# Patient Record
Sex: FEMALE | Race: BLACK OR AFRICAN AMERICAN | NOT HISPANIC OR LATINO | Employment: OTHER | URBAN - METROPOLITAN AREA
[De-identification: names, ages, dates, MRNs, and addresses within clinical notes are randomized per-mention and may not be internally consistent; named-entity substitution may affect disease eponyms.]

---

## 2017-05-02 ENCOUNTER — APPOINTMENT (OUTPATIENT)
Dept: WOMENS IMAGING | Facility: HOSPITAL | Age: 57
End: 2017-05-02

## 2017-05-02 PROCEDURE — 77067 SCR MAMMO BI INCL CAD: CPT | Performed by: RADIOLOGY

## 2017-05-31 DIAGNOSIS — I10 ESSENTIAL HYPERTENSION: ICD-10-CM

## 2017-05-31 DIAGNOSIS — Z00.00 HEALTHCARE MAINTENANCE: Primary | ICD-10-CM

## 2017-06-04 LAB
ALBUMIN SERPL-MCNC: 4.1 G/DL (ref 3.5–5.5)
ALBUMIN/GLOB SERPL: 1.3 {RATIO} (ref 1.2–2.2)
ALP SERPL-CCNC: 91 IU/L (ref 39–117)
ALT SERPL-CCNC: 14 IU/L (ref 0–32)
AST SERPL-CCNC: 14 IU/L (ref 0–40)
BILIRUB SERPL-MCNC: 0.3 MG/DL (ref 0–1.2)
BUN SERPL-MCNC: 9 MG/DL (ref 6–24)
BUN/CREAT SERPL: 11 (ref 9–23)
CALCIUM SERPL-MCNC: 9.7 MG/DL (ref 8.7–10.2)
CHLORIDE SERPL-SCNC: 101 MMOL/L (ref 96–106)
CHOLEST SERPL-MCNC: 242 MG/DL (ref 100–199)
CO2 SERPL-SCNC: 26 MMOL/L (ref 18–29)
CREAT SERPL-MCNC: 0.82 MG/DL (ref 0.57–1)
GLOBULIN SER CALC-MCNC: 3.1 G/DL (ref 1.5–4.5)
GLUCOSE SERPL-MCNC: 98 MG/DL (ref 65–99)
HCV AB S/CO SERPL IA: <0.1 S/CO RATIO (ref 0–0.9)
HDLC SERPL-MCNC: 53 MG/DL
LDLC SERPL CALC-MCNC: 146 MG/DL (ref 0–99)
LDLC/HDLC SERPL: 2.8 RATIO UNITS (ref 0–3.2)
POTASSIUM SERPL-SCNC: 3.8 MMOL/L (ref 3.5–5.2)
PROT SERPL-MCNC: 7.2 G/DL (ref 6–8.5)
SODIUM SERPL-SCNC: 143 MMOL/L (ref 134–144)
TRIGL SERPL-MCNC: 214 MG/DL (ref 0–149)
TSH SERPL DL<=0.005 MIU/L-ACNC: 0.56 UIU/ML (ref 0.45–4.5)
VLDLC SERPL CALC-MCNC: 43 MG/DL (ref 5–40)

## 2017-06-06 ENCOUNTER — OFFICE VISIT (OUTPATIENT)
Dept: INTERNAL MEDICINE | Facility: CLINIC | Age: 57
End: 2017-06-06

## 2017-06-06 VITALS
HEIGHT: 66 IN | WEIGHT: 241 LBS | BODY MASS INDEX: 38.73 KG/M2 | SYSTOLIC BLOOD PRESSURE: 122 MMHG | HEART RATE: 64 BPM | DIASTOLIC BLOOD PRESSURE: 80 MMHG | OXYGEN SATURATION: 98 %

## 2017-06-06 DIAGNOSIS — J30.9 ATOPIC RHINITIS: ICD-10-CM

## 2017-06-06 DIAGNOSIS — E78.5 HYPERLIPIDEMIA, UNSPECIFIED HYPERLIPIDEMIA TYPE: ICD-10-CM

## 2017-06-06 DIAGNOSIS — N32.81 OVERACTIVE BLADDER: ICD-10-CM

## 2017-06-06 DIAGNOSIS — I10 ESSENTIAL HYPERTENSION: Primary | ICD-10-CM

## 2017-06-06 PROCEDURE — 99214 OFFICE O/P EST MOD 30 MIN: CPT | Performed by: INTERNAL MEDICINE

## 2017-06-06 RX ORDER — LISINOPRIL AND HYDROCHLOROTHIAZIDE 20; 12.5 MG/1; MG/1
2 TABLET ORAL DAILY
Qty: 180 TABLET | Refills: 3 | Status: SHIPPED | OUTPATIENT
Start: 2017-06-06 | End: 2018-04-01 | Stop reason: SDUPTHER

## 2017-06-06 RX ORDER — ESTRADIOL 0.05 MG/D
1 FILM, EXTENDED RELEASE TRANSDERMAL 2 TIMES WEEKLY
Refills: 12 | COMMUNITY
Start: 2017-05-02 | End: 2018-07-27

## 2017-06-06 RX ORDER — METOPROLOL SUCCINATE 200 MG/1
200 TABLET, EXTENDED RELEASE ORAL DAILY
Qty: 90 TABLET | Refills: 3 | Status: SHIPPED | OUTPATIENT
Start: 2017-06-06 | End: 2019-03-06 | Stop reason: SDUPTHER

## 2017-06-06 RX ORDER — AMLODIPINE BESYLATE 10 MG/1
10 TABLET ORAL DAILY
Qty: 90 TABLET | Refills: 3 | Status: SHIPPED | OUTPATIENT
Start: 2017-06-06 | End: 2020-02-18

## 2017-06-06 RX ORDER — LOVASTATIN 20 MG/1
20 TABLET ORAL NIGHTLY
Qty: 30 TABLET | Refills: 6 | Status: SHIPPED | OUTPATIENT
Start: 2017-06-06 | End: 2017-12-15

## 2017-06-06 NOTE — PROGRESS NOTES
Subjective   Kirsten Lima is a 56 y.o. female here today to f/u on HTN and hyperlipidemia.  Pt c/o sweating badly.      History of Present Illness   Pt has been taking BP meds as prescribed without any problems.  No HA  No episodes of orthostasis  Pt has been taking cholesterol meds as prescribed.  No difficulties with myalgias.   She is still having some urinary urgency but she is doing ok without and meds right now    The following portions of the patient's history were reviewed and updated as appropriate: allergies, current medications, past medical history, past social history and problem list.  She has   Review of Systems   All other systems reviewed and are negative.      Objective   Physical Exam   Constitutional: She is oriented to person, place, and time. She appears well-developed and well-nourished.   HENT:   Head: Normocephalic and atraumatic.   Right Ear: External ear normal.   Left Ear: External ear normal.   Mouth/Throat: Oropharynx is clear and moist.   Eyes: Conjunctivae and EOM are normal. Pupils are equal, round, and reactive to light.   Neck: Normal range of motion. No tracheal deviation present. No thyromegaly present.   Cardiovascular: Normal rate, regular rhythm, normal heart sounds and intact distal pulses.    Pulmonary/Chest: Effort normal and breath sounds normal.   Abdominal: Soft. Bowel sounds are normal. She exhibits no distension. There is no tenderness.   Musculoskeletal: Normal range of motion. She exhibits no edema or deformity.   Neurological: She is alert and oriented to person, place, and time.   Skin: Skin is warm and dry.   Psychiatric: She has a normal mood and affect. Her behavior is normal. Judgment and thought content normal.   Vitals reviewed.      Vitals:    06/06/17 1610   BP: 122/80   Pulse: 64   SpO2: 98%        Orders Only on 05/31/2017   Component Date Value Ref Range Status   • Hep C Virus Ab 06/03/2017 <0.1  0.0 - 0.9 s/co ratio Final    Comment:                                    Negative:     < 0.8                               Indeterminate: 0.8 - 0.9                                    Positive:     > 0.9   The CDC recommends that a positive HCV antibody result   be followed up with a HCV Nucleic Acid Amplification   test (803584).         Current Outpatient Prescriptions:   •  amLODIPine (NORVASC) 10 MG tablet, Take 1 tablet by mouth Daily., Disp: 90 tablet, Rfl: 3  •  cetirizine (ZyrTEC) 10 MG tablet, Take 1 tablet by mouth daily., Disp: , Rfl:   •  lisinopril-hydrochlorothiazide (PRINZIDE,ZESTORETIC) 20-12.5 MG per tablet, Take 2 tablets by mouth Daily., Disp: 180 tablet, Rfl: 3  •  lovastatin (MEVACOR) 20 MG tablet, Take 1 tablet by mouth every night., Disp: 90 tablet, Rfl: 1  •  metoprolol succinate XL (TOPROL-XL) 200 MG 24 hr tablet, Take 1 tablet by mouth Daily., Disp: 90 tablet, Rfl: 3  •  estradiol (MINIVELLE, VIVELLE-DOT) 0.05 MG/24HR patch, 1 patch 2 (Two) Times a Week., Disp: , Rfl: 12  •  ESTROGEL 0.75 MG/1.25 GM (0.06%) topical gel, 1 application by Other route daily., Disp: , Rfl:   •  meloxicam (MOBIC) 7.5 MG tablet, Take 1 tablet by mouth Daily. Take with food, Disp: 30 tablet, Rfl: 2           Assessment/Plan   Diagnoses and all orders for this visit:    Essential hypertension    Atopic rhinitis    Hyperlipidemia, unspecified hyperlipidemia type    1. HTN-she is doing well with current meds  2. OAB- SHe is doing well now off meds  3.  AR- doing well with zyrtec  4. HPL-  She has been off.  She is going to go back on the and recheck labs at next visit'

## 2017-12-06 ENCOUNTER — RESULTS ENCOUNTER (OUTPATIENT)
Dept: INTERNAL MEDICINE | Facility: CLINIC | Age: 57
End: 2017-12-06

## 2017-12-06 DIAGNOSIS — E78.5 HYPERLIPIDEMIA, UNSPECIFIED HYPERLIPIDEMIA TYPE: ICD-10-CM

## 2017-12-06 DIAGNOSIS — I10 ESSENTIAL HYPERTENSION: ICD-10-CM

## 2017-12-15 ENCOUNTER — OFFICE VISIT (OUTPATIENT)
Dept: INTERNAL MEDICINE | Facility: CLINIC | Age: 57
End: 2017-12-15

## 2017-12-15 VITALS
BODY MASS INDEX: 39.2 KG/M2 | HEART RATE: 67 BPM | SYSTOLIC BLOOD PRESSURE: 122 MMHG | OXYGEN SATURATION: 99 % | HEIGHT: 66 IN | WEIGHT: 243.9 LBS | DIASTOLIC BLOOD PRESSURE: 68 MMHG

## 2017-12-15 DIAGNOSIS — N32.81 OVERACTIVE BLADDER: ICD-10-CM

## 2017-12-15 DIAGNOSIS — I10 ESSENTIAL HYPERTENSION: ICD-10-CM

## 2017-12-15 DIAGNOSIS — F41.9 ANXIETY: Primary | ICD-10-CM

## 2017-12-15 DIAGNOSIS — G47.00 INSOMNIA, UNSPECIFIED TYPE: ICD-10-CM

## 2017-12-15 LAB
ALBUMIN SERPL-MCNC: 3.9 G/DL (ref 3.5–5.5)
ALBUMIN/GLOB SERPL: 1.2 {RATIO} (ref 1.2–2.2)
ALP SERPL-CCNC: 86 IU/L (ref 39–117)
ALT SERPL-CCNC: 12 IU/L (ref 0–32)
AST SERPL-CCNC: 12 IU/L (ref 0–40)
BILIRUB SERPL-MCNC: 0.3 MG/DL (ref 0–1.2)
BUN SERPL-MCNC: 11 MG/DL (ref 6–24)
BUN/CREAT SERPL: 13 (ref 9–23)
CALCIUM SERPL-MCNC: 9.4 MG/DL (ref 8.7–10.2)
CHLORIDE SERPL-SCNC: 102 MMOL/L (ref 96–106)
CHOLEST SERPL-MCNC: 267 MG/DL (ref 100–199)
CO2 SERPL-SCNC: 28 MMOL/L (ref 18–29)
CREAT SERPL-MCNC: 0.84 MG/DL (ref 0.57–1)
GFR SERPLBLD CREATININE-BSD FMLA CKD-EPI: 77 ML/MIN/1.73
GFR SERPLBLD CREATININE-BSD FMLA CKD-EPI: 89 ML/MIN/1.73
GLOBULIN SER CALC-MCNC: 3.2 G/DL (ref 1.5–4.5)
GLUCOSE SERPL-MCNC: 100 MG/DL (ref 65–99)
HDLC SERPL-MCNC: 50 MG/DL
LDLC SERPL CALC-MCNC: 166 MG/DL (ref 0–99)
LDLC/HDLC SERPL: 3.3 RATIO UNITS (ref 0–3.2)
POTASSIUM SERPL-SCNC: 3.9 MMOL/L (ref 3.5–5.2)
PROT SERPL-MCNC: 7.1 G/DL (ref 6–8.5)
SODIUM SERPL-SCNC: 143 MMOL/L (ref 134–144)
TRIGL SERPL-MCNC: 255 MG/DL (ref 0–149)
TSH SERPL DL<=0.005 MIU/L-ACNC: 0.49 UIU/ML (ref 0.45–4.5)
VLDLC SERPL CALC-MCNC: 51 MG/DL (ref 5–40)

## 2017-12-15 PROCEDURE — 99214 OFFICE O/P EST MOD 30 MIN: CPT | Performed by: INTERNAL MEDICINE

## 2017-12-15 RX ORDER — TRAZODONE HYDROCHLORIDE 50 MG/1
50 TABLET ORAL NIGHTLY
Qty: 30 TABLET | Refills: 5 | Status: SHIPPED | OUTPATIENT
Start: 2017-12-15 | End: 2018-07-27

## 2017-12-15 RX ORDER — ATORVASTATIN CALCIUM 10 MG/1
10 TABLET, FILM COATED ORAL DAILY
Qty: 30 TABLET | Refills: 3 | Status: SHIPPED | OUTPATIENT
Start: 2017-12-15 | End: 2018-02-28 | Stop reason: SDUPTHER

## 2017-12-15 NOTE — PROGRESS NOTES
Subjective   Kirsten Lima is a 57 y.o. female who is here to follow up on HTN, HPL, and Insomnia. Pt. states that she is feeling overwhelmed with working, taking care of her mother, dealing with her sister. She has not started her statin yet.     History of Present Illness   Pt has been taking BP meds as prescribed without any problems.  No HA  No episodes of orthostasis  She has tried multiple meds for OAB with no relief.  She has seen the urology for this  She cont to struggle with oab    The following portions of the patient's history were reviewed and updated as appropriate: allergies, current medications, past medical history, past social history and problem list.  She has been under a lot of stress at home   She has not been eating as healthy as she should    Review of Systems   All other systems reviewed and are negative.      Objective   Physical Exam   Constitutional: She is oriented to person, place, and time. She appears well-developed and well-nourished.   HENT:   Head: Normocephalic and atraumatic.   Right Ear: External ear normal.   Left Ear: External ear normal.   Mouth/Throat: Oropharynx is clear and moist.   Eyes: Conjunctivae and EOM are normal. Pupils are equal, round, and reactive to light.   Neck: Normal range of motion. No tracheal deviation present. No thyromegaly present.   Cardiovascular: Normal rate, regular rhythm, normal heart sounds and intact distal pulses.    Pulmonary/Chest: Effort normal and breath sounds normal.   Abdominal: Soft. Bowel sounds are normal. She exhibits no distension. There is no tenderness.   Musculoskeletal: Normal range of motion. She exhibits no edema or deformity.   Neurological: She is alert and oriented to person, place, and time.   Skin: Skin is warm and dry.   Psychiatric: She has a normal mood and affect. Her behavior is normal. Judgment and thought content normal.   Vitals reviewed.    Vitals:    12/15/17 1322   BP: 122/68   Pulse: 67   SpO2: 99%      Results Encounter on 12/06/2017   Component Date Value Ref Range Status   • Glucose 12/14/2017 100* 65 - 99 mg/dL Final   • BUN 12/14/2017 11  6 - 24 mg/dL Final   • Creatinine 12/14/2017 0.84  0.57 - 1.00 mg/dL Final   • eGFR Non  Am 12/14/2017 77  >59 mL/min/1.73 Final   • eGFR African Am 12/14/2017 89  >59 mL/min/1.73 Final   • BUN/Creatinine Ratio 12/14/2017 13  9 - 23 Final   • Sodium 12/14/2017 143  134 - 144 mmol/L Final   • Potassium 12/14/2017 3.9  3.5 - 5.2 mmol/L Final   • Chloride 12/14/2017 102  96 - 106 mmol/L Final   • Total CO2 12/14/2017 28  18 - 29 mmol/L Final   • Calcium 12/14/2017 9.4  8.7 - 10.2 mg/dL Final   • Total Protein 12/14/2017 7.1  6.0 - 8.5 g/dL Final   • Albumin 12/14/2017 3.9  3.5 - 5.5 g/dL Final   • Globulin 12/14/2017 3.2  1.5 - 4.5 g/dL Final   • A/G Ratio 12/14/2017 1.2  1.2 - 2.2 Final   • Total Bilirubin 12/14/2017 0.3  0.0 - 1.2 mg/dL Final   • Alkaline Phosphatase 12/14/2017 86  39 - 117 IU/L Final   • AST (SGOT) 12/14/2017 12  0 - 40 IU/L Final   • ALT (SGPT) 12/14/2017 12  0 - 32 IU/L Final   • Total Cholesterol 12/14/2017 267* 100 - 199 mg/dL Final   • Triglycerides 12/14/2017 255* 0 - 149 mg/dL Final   • HDL Cholesterol 12/14/2017 50  >39 mg/dL Final   • VLDL Cholesterol 12/14/2017 51* 5 - 40 mg/dL Final   • LDL Cholesterol  12/14/2017 166* 0 - 99 mg/dL Final   • LDL/HDL Ratio 12/14/2017 3.3* 0.0 - 3.2 ratio units Final    Comment:                                     LDL/HDL Ratio                                              Men  Women                                1/2 Avg.Risk  1.0    1.5                                    Avg.Risk  3.6    3.2                                 2X Avg.Risk  6.2    5.0                                 3X Avg.Risk  8.0    6.1     • TSH 12/14/2017 0.486  0.450 - 4.500 uIU/mL Final     Current Outpatient Prescriptions:   •  amLODIPine (NORVASC) 10 MG tablet, Take 1 tablet by mouth Daily., Disp: 90 tablet, Rfl: 3  •  cetirizine  (ZyrTEC) 10 MG tablet, Take 1 tablet by mouth daily., Disp: , Rfl:   •  estradiol (MINIVELLE, VIVELLE-DOT) 0.05 MG/24HR patch, 1 patch 2 (Two) Times a Week., Disp: , Rfl: 12  •  ESTROGEL 0.75 MG/1.25 GM (0.06%) topical gel, 1 application by Other route daily., Disp: , Rfl:   •  lisinopril-hydrochlorothiazide (PRINZIDE,ZESTORETIC) 20-12.5 MG per tablet, Take 2 tablets by mouth Daily., Disp: 180 tablet, Rfl: 3  •  meloxicam (MOBIC) 7.5 MG tablet, Take 1 tablet by mouth Daily. Take with food, Disp: 30 tablet, Rfl: 2  •  metoprolol succinate XL (TOPROL-XL) 200 MG 24 hr tablet, Take 1 tablet by mouth Daily., Disp: 90 tablet, Rfl: 3     Assessment/Plan   Kirsten was seen today for hypertension and insomnia.    Diagnoses and all orders for this visit:    Anxiety    Insomnia, unspecified type    Overactive bladder    1. Anxiety- she is under a lot of stress caring for her aging mother  We are going to work on her sleep and may consider an SSRI  2. Insomnia-  She thinks she has been tested in the past.  We will try trazodone. Start with 1/2 tab and follow  3. OAB- she has tried several meds and seen urology   4. HTN- she is doing well with current meds

## 2018-02-15 ENCOUNTER — RESULTS ENCOUNTER (OUTPATIENT)
Dept: INTERNAL MEDICINE | Facility: CLINIC | Age: 58
End: 2018-02-15

## 2018-02-15 DIAGNOSIS — I10 ESSENTIAL HYPERTENSION: ICD-10-CM

## 2018-02-27 ENCOUNTER — OFFICE VISIT (OUTPATIENT)
Dept: INTERNAL MEDICINE | Facility: CLINIC | Age: 58
End: 2018-02-27

## 2018-02-27 VITALS
OXYGEN SATURATION: 98 % | HEART RATE: 76 BPM | DIASTOLIC BLOOD PRESSURE: 76 MMHG | WEIGHT: 240.19 LBS | BODY MASS INDEX: 38.6 KG/M2 | HEIGHT: 66 IN | SYSTOLIC BLOOD PRESSURE: 138 MMHG

## 2018-02-27 DIAGNOSIS — M79.674 PAIN OF TOE OF RIGHT FOOT: Primary | ICD-10-CM

## 2018-02-27 PROCEDURE — 99213 OFFICE O/P EST LOW 20 MIN: CPT | Performed by: NURSE PRACTITIONER

## 2018-02-27 RX ORDER — METHYLPREDNISOLONE 4 MG/1
TABLET ORAL
Qty: 21 TABLET | Refills: 0 | Status: SHIPPED | OUTPATIENT
Start: 2018-02-27 | End: 2018-07-01

## 2018-02-28 ENCOUNTER — OFFICE VISIT (OUTPATIENT)
Dept: INTERNAL MEDICINE | Facility: CLINIC | Age: 58
End: 2018-02-28

## 2018-02-28 ENCOUNTER — HOSPITAL ENCOUNTER (OUTPATIENT)
Dept: GENERAL RADIOLOGY | Facility: HOSPITAL | Age: 58
Discharge: HOME | End: 2018-02-28
Admitting: INTERNAL MEDICINE

## 2018-02-28 VITALS
HEIGHT: 66 IN | SYSTOLIC BLOOD PRESSURE: 132 MMHG | TEMPERATURE: 98 F | DIASTOLIC BLOOD PRESSURE: 60 MMHG | OXYGEN SATURATION: 98 % | WEIGHT: 240 LBS | BODY MASS INDEX: 38.57 KG/M2 | HEART RATE: 64 BPM

## 2018-02-28 DIAGNOSIS — R79.89 ABNORMAL THYROID STIMULATING HORMONE (TSH) LEVEL: ICD-10-CM

## 2018-02-28 DIAGNOSIS — M79.671 RIGHT FOOT PAIN: ICD-10-CM

## 2018-02-28 DIAGNOSIS — I10 ESSENTIAL HYPERTENSION: Primary | ICD-10-CM

## 2018-02-28 LAB — URATE SERPL-MCNC: 4.9 MG/DL (ref 2.4–5.7)

## 2018-02-28 PROCEDURE — 73630 X-RAY EXAM OF FOOT: CPT

## 2018-02-28 PROCEDURE — 99214 OFFICE O/P EST MOD 30 MIN: CPT | Performed by: INTERNAL MEDICINE

## 2018-02-28 RX ORDER — ATORVASTATIN CALCIUM 10 MG/1
10 TABLET, FILM COATED ORAL DAILY
Qty: 90 TABLET | Refills: 3 | Status: SHIPPED | OUTPATIENT
Start: 2018-02-28 | End: 2019-01-15

## 2018-02-28 NOTE — PROGRESS NOTES
Subjective   Kirsten Lima is a 57 y.o. female. Patient is here to follow up on blood pressure and cholesterol.     History of Present Illness   She has been having pain in the left foot 1st MCP  Pain for a few days  Some better today after taking 1 meloxicam  Pt has been taking BP meds as prescribed without any problems.  No HA  No episodes of orthostasis  Pt has been taking cholesterol meds as prescribed.  No difficulties with myalgias.     The following portions of the patient's history were reviewed and updated as appropriate: allergies, current medications, past medical history, past social history and problem list.  Recent death of mother    Review of Systems   All other systems reviewed and are negative.      Objective   Physical Exam   Constitutional: She is oriented to person, place, and time. She appears well-developed and well-nourished.   HENT:   Head: Normocephalic and atraumatic.   Right Ear: External ear normal.   Left Ear: External ear normal.   Mouth/Throat: Oropharynx is clear and moist.   Eyes: Conjunctivae and EOM are normal. Pupils are equal, round, and reactive to light.   Neck: Normal range of motion. No tracheal deviation present. No thyromegaly present.   Cardiovascular: Normal rate, regular rhythm, normal heart sounds and intact distal pulses.    Pulmonary/Chest: Effort normal and breath sounds normal.   Abdominal: Soft. Bowel sounds are normal. She exhibits no distension. There is no tenderness.   Musculoskeletal: Normal range of motion. She exhibits no edema or deformity.   Neurological: She is alert and oriented to person, place, and time.   Skin: Skin is warm and dry.   Psychiatric: She has a normal mood and affect. Her behavior is normal. Judgment and thought content normal.   Vitals reviewed.      Assessment/Plan   Kirsten was seen today for hypertension and hyperlipidemia.    Diagnoses and all orders for this visit:    Essential hypertension  -     Comprehensive Metabolic Panel  -      LP+LDL / HDL Ratio ([facility])  -     CBC & Differential  -     TSH Rfx On Abnormal To Free T4  -     XR Foot 3+ View Right    Abnormal thyroid stimulating hormone (TSH) level  -     Comprehensive Metabolic Panel  -     LP+LDL / HDL Ratio ([facility])  -     CBC & Differential  -     TSH Rfx On Abnormal To Free T4  -     XR Foot 3+ View Right    Right foot pain  -     XR Foot 3+ View Right      1.  HTN-  She isdoing well with current meds  2.  HPL- doing well with lipitor  3. Right foot pain  I will check an xray and refer to podiatrist if needed and she will cont the streroids  4. Abnormal TSH-  Recheck level  5. Insomnia-  She is sleeping better as the stress is better

## 2018-03-02 LAB
ALBUMIN SERPL-MCNC: 3.9 G/DL (ref 3.5–5.5)
ALBUMIN/GLOB SERPL: 1.2 {RATIO} (ref 1.2–2.2)
ALP SERPL-CCNC: 87 IU/L (ref 39–117)
ALT SERPL-CCNC: 10 IU/L (ref 0–32)
AST SERPL-CCNC: 14 IU/L (ref 0–40)
BASOPHILS # BLD AUTO: 0 X10E3/UL (ref 0–0.2)
BASOPHILS NFR BLD AUTO: 0 %
BILIRUB SERPL-MCNC: 0.2 MG/DL (ref 0–1.2)
BUN SERPL-MCNC: 13 MG/DL (ref 6–24)
BUN/CREAT SERPL: 15 (ref 9–23)
CALCIUM SERPL-MCNC: 9.5 MG/DL (ref 8.7–10.2)
CHLORIDE SERPL-SCNC: 100 MMOL/L (ref 96–106)
CHOLEST SERPL-MCNC: 208 MG/DL (ref 100–199)
CO2 SERPL-SCNC: 27 MMOL/L (ref 18–29)
CREAT SERPL-MCNC: 0.86 MG/DL (ref 0.57–1)
EOSINOPHIL # BLD AUTO: 0 X10E3/UL (ref 0–0.4)
EOSINOPHIL NFR BLD AUTO: 0 %
ERYTHROCYTE [DISTWIDTH] IN BLOOD BY AUTOMATED COUNT: 14.7 % (ref 12.3–15.4)
GFR SERPLBLD CREATININE-BSD FMLA CKD-EPI: 75 ML/MIN/1.73
GFR SERPLBLD CREATININE-BSD FMLA CKD-EPI: 87 ML/MIN/1.73
GLOBULIN SER CALC-MCNC: 3.2 G/DL (ref 1.5–4.5)
GLUCOSE SERPL-MCNC: 112 MG/DL (ref 65–99)
HCT VFR BLD AUTO: 42 % (ref 34–46.6)
HDLC SERPL-MCNC: 65 MG/DL
HGB BLD-MCNC: 14 G/DL (ref 11.1–15.9)
IMM GRANULOCYTES # BLD: 0 X10E3/UL (ref 0–0.1)
IMM GRANULOCYTES NFR BLD: 0 %
LDLC SERPL CALC-MCNC: 119 MG/DL (ref 0–99)
LDLC/HDLC SERPL: 1.8 RATIO UNITS (ref 0–3.2)
LYMPHOCYTES # BLD AUTO: 1.7 X10E3/UL (ref 0.7–3.1)
LYMPHOCYTES NFR BLD AUTO: 13 %
Lab: NORMAL
MCH RBC QN AUTO: 28.1 PG (ref 26.6–33)
MCHC RBC AUTO-ENTMCNC: 33.3 G/DL (ref 31.5–35.7)
MCV RBC AUTO: 84 FL (ref 79–97)
MONOCYTES # BLD AUTO: 0.5 X10E3/UL (ref 0.1–0.9)
MONOCYTES NFR BLD AUTO: 4 %
NEUTROPHILS # BLD AUTO: 10.7 X10E3/UL (ref 1.4–7)
NEUTROPHILS NFR BLD AUTO: 83 %
PLATELET # BLD AUTO: 283 X10E3/UL (ref 150–379)
POTASSIUM SERPL-SCNC: 4 MMOL/L (ref 3.5–5.2)
PROT SERPL-MCNC: 7.1 G/DL (ref 6–8.5)
RBC # BLD AUTO: 4.98 X10E6/UL (ref 3.77–5.28)
SODIUM SERPL-SCNC: 142 MMOL/L (ref 134–144)
TRIGL SERPL-MCNC: 119 MG/DL (ref 0–149)
TSH SERPL DL<=0.005 MIU/L-ACNC: 0.56 UIU/ML (ref 0.45–4.5)
VLDLC SERPL CALC-MCNC: 24 MG/DL (ref 5–40)
WBC # BLD AUTO: 13.1 X10E3/UL (ref 3.4–10.8)

## 2018-03-22 DIAGNOSIS — I10 ESSENTIAL HYPERTENSION: ICD-10-CM

## 2018-03-22 RX ORDER — METOPROLOL SUCCINATE 200 MG/1
TABLET, EXTENDED RELEASE ORAL
Qty: 90 TABLET | Refills: 3 | Status: SHIPPED | OUTPATIENT
Start: 2018-03-22 | End: 2018-07-27 | Stop reason: SDUPTHER

## 2018-03-22 RX ORDER — AMLODIPINE BESYLATE 10 MG/1
TABLET ORAL
Qty: 90 TABLET | Refills: 3 | Status: SHIPPED | OUTPATIENT
Start: 2018-03-22 | End: 2018-07-27 | Stop reason: SDUPTHER

## 2018-04-01 DIAGNOSIS — I10 ESSENTIAL HYPERTENSION: ICD-10-CM

## 2018-04-02 RX ORDER — LISINOPRIL AND HYDROCHLOROTHIAZIDE 20; 12.5 MG/1; MG/1
TABLET ORAL
Qty: 180 TABLET | Refills: 3 | Status: SHIPPED | OUTPATIENT
Start: 2018-04-02 | End: 2018-11-08 | Stop reason: SDUPTHER

## 2018-05-15 ENCOUNTER — APPOINTMENT (OUTPATIENT)
Dept: WOMENS IMAGING | Facility: HOSPITAL | Age: 58
End: 2018-05-15

## 2018-05-15 PROCEDURE — 77067 SCR MAMMO BI INCL CAD: CPT | Performed by: RADIOLOGY

## 2018-05-15 PROCEDURE — 77063 BREAST TOMOSYNTHESIS BI: CPT | Performed by: RADIOLOGY

## 2018-07-11 ENCOUNTER — TELEPHONE (OUTPATIENT)
Dept: INTERNAL MEDICINE | Facility: CLINIC | Age: 58
End: 2018-07-11

## 2018-07-11 DIAGNOSIS — I10 ESSENTIAL HYPERTENSION: Primary | ICD-10-CM

## 2018-07-11 DIAGNOSIS — R79.89 ABNORMAL TSH: ICD-10-CM

## 2018-07-11 DIAGNOSIS — E78.5 HYPERLIPIDEMIA, UNSPECIFIED HYPERLIPIDEMIA TYPE: ICD-10-CM

## 2018-07-11 NOTE — TELEPHONE ENCOUNTER
LABS ORDERED AND MAIL TO PT    ----- Message from Verenice Valdez MD sent at 7/11/2018  1:39 PM EDT -----  cmp flp cbc tsh  ----- Message -----  From: Cecy Castro MA  Sent: 7/11/2018  12:52 PM  To: Verenice Valdez MD    PLEASE ADVISE ON LABS  ----- Message -----  From: Kendra Castelan  Sent: 7/11/2018  12:45 PM  To: Cecy Castro MA    Pt has appt with Dr Valdez on 8/28. She wants to get her labs done at an off site lab [facility]. Need an order put in and then mail her order to her.

## 2018-07-27 ENCOUNTER — OFFICE VISIT (OUTPATIENT)
Dept: INTERNAL MEDICINE | Facility: CLINIC | Age: 58
End: 2018-07-27

## 2018-07-27 VITALS
HEART RATE: 72 BPM | SYSTOLIC BLOOD PRESSURE: 134 MMHG | DIASTOLIC BLOOD PRESSURE: 96 MMHG | BODY MASS INDEX: 40.11 KG/M2 | HEIGHT: 66 IN | WEIGHT: 249.6 LBS | OXYGEN SATURATION: 97 % | TEMPERATURE: 98.8 F

## 2018-07-27 DIAGNOSIS — M75.82 TENDINITIS OF LEFT ROTATOR CUFF: Primary | ICD-10-CM

## 2018-07-27 LAB
ALBUMIN SERPL-MCNC: 4.4 G/DL (ref 3.5–5.2)
ALBUMIN/GLOB SERPL: 1.6 G/DL
ALP SERPL-CCNC: 99 U/L (ref 39–117)
ALT SERPL-CCNC: 15 U/L (ref 1–33)
AST SERPL-CCNC: 10 U/L (ref 1–32)
BASOPHILS # BLD AUTO: 0.03 10*3/MM3 (ref 0–0.2)
BASOPHILS NFR BLD AUTO: 0.4 % (ref 0–1.5)
BILIRUB SERPL-MCNC: 0.4 MG/DL (ref 0.1–1.2)
BUN SERPL-MCNC: 8 MG/DL (ref 6–20)
BUN/CREAT SERPL: 10 (ref 7–25)
CALCIUM SERPL-MCNC: 9.1 MG/DL (ref 8.6–10.5)
CHLORIDE SERPL-SCNC: 101 MMOL/L (ref 98–107)
CHOLEST SERPL-MCNC: 210 MG/DL (ref 0–200)
CO2 SERPL-SCNC: 27.9 MMOL/L (ref 22–29)
CREAT SERPL-MCNC: 0.8 MG/DL (ref 0.57–1)
EOSINOPHIL # BLD AUTO: 0.11 10*3/MM3 (ref 0–0.7)
EOSINOPHIL NFR BLD AUTO: 1.4 % (ref 0.3–6.2)
ERYTHROCYTE [DISTWIDTH] IN BLOOD BY AUTOMATED COUNT: 13.9 % (ref 11.7–13)
GLOBULIN SER CALC-MCNC: 2.8 GM/DL
GLUCOSE SERPL-MCNC: 99 MG/DL (ref 65–99)
HCT VFR BLD AUTO: 44.2 % (ref 35.6–45.5)
HDLC SERPL-MCNC: 54 MG/DL (ref 40–60)
HGB BLD-MCNC: 14.6 G/DL (ref 11.9–15.5)
IMM GRANULOCYTES # BLD: 0.03 10*3/MM3 (ref 0–0.03)
IMM GRANULOCYTES NFR BLD: 0.4 % (ref 0–0.5)
LDLC SERPL CALC-MCNC: 119 MG/DL (ref 0–100)
LDLC/HDLC SERPL: 2.21 {RATIO}
LYMPHOCYTES # BLD AUTO: 2.56 10*3/MM3 (ref 0.9–4.8)
LYMPHOCYTES NFR BLD AUTO: 33.2 % (ref 19.6–45.3)
MCH RBC QN AUTO: 28.8 PG (ref 26.9–32)
MCHC RBC AUTO-ENTMCNC: 33 G/DL (ref 32.4–36.3)
MCV RBC AUTO: 87.2 FL (ref 80.5–98.2)
MONOCYTES # BLD AUTO: 0.64 10*3/MM3 (ref 0.2–1.2)
MONOCYTES NFR BLD AUTO: 8.3 % (ref 5–12)
NEUTROPHILS # BLD AUTO: 4.37 10*3/MM3 (ref 1.9–8.1)
NEUTROPHILS NFR BLD AUTO: 56.7 % (ref 42.7–76)
PLATELET # BLD AUTO: 247 10*3/MM3 (ref 140–500)
POTASSIUM SERPL-SCNC: 3.6 MMOL/L (ref 3.5–5.2)
PROT SERPL-MCNC: 7.2 G/DL (ref 6–8.5)
RBC # BLD AUTO: 5.07 10*6/MM3 (ref 3.9–5.2)
SODIUM SERPL-SCNC: 143 MMOL/L (ref 136–145)
TRIGL SERPL-MCNC: 183 MG/DL (ref 0–150)
TSH SERPL DL<=0.005 MIU/L-ACNC: 0.7 MIU/ML (ref 0.27–4.2)
VLDLC SERPL CALC-MCNC: 36.6 MG/DL (ref 5–40)
WBC # BLD AUTO: 7.71 10*3/MM3 (ref 4.5–10.7)

## 2018-07-27 PROCEDURE — 99213 OFFICE O/P EST LOW 20 MIN: CPT | Performed by: INTERNAL MEDICINE

## 2018-07-27 NOTE — PROGRESS NOTES
Subjective   Kirsten Lima is a 58 y.o. female here to follow up on shoulder pain and burned.    History of Present Illness   Left rotator cuff tendonitis.  Painful to lift arm  Some pain between shoulder blades  Now doing all of her work on the right.  No trauma  Woke up Monday like this  No privious problems with this     The following portions of the patient's history were reviewed and updated as appropriate: allergies, current medications, past medical history, past social history and problem list.    Review of Systems   Musculoskeletal:        Left shoulder pain         Objective   Physical Exam   Constitutional: She is oriented to person, place, and time. She appears well-developed and well-nourished.   HENT:   Head: Normocephalic and atraumatic.   Right Ear: External ear normal.   Left Ear: External ear normal.   Mouth/Throat: Oropharynx is clear and moist.   Eyes: Pupils are equal, round, and reactive to light. Conjunctivae and EOM are normal.   Neck: Normal range of motion. No tracheal deviation present. No thyromegaly present.   Cardiovascular: Normal rate, regular rhythm, normal heart sounds and intact distal pulses.    Pulmonary/Chest: Effort normal and breath sounds normal.   Musculoskeletal: Normal range of motion. She exhibits no edema or deformity.   Decrease ROM left shoulder     Neurological: She is alert and oriented to person, place, and time.   Skin: Skin is warm and dry.   Psychiatric: She has a normal mood and affect. Her behavior is normal. Judgment and thought content normal.   Vitals reviewed.    Vitals:    07/27/18 0801   BP: 134/96   Pulse: 72   Temp: 98.8 °F (37.1 °C)   SpO2: 97%     Current Outpatient Prescriptions:   •  amLODIPine (NORVASC) 10 MG tablet, Take 1 tablet by mouth Daily., Disp: 90 tablet, Rfl: 3  •  atorvastatin (LIPITOR) 10 MG tablet, Take 1 tablet by mouth Daily., Disp: 90 tablet, Rfl: 3  •  cetirizine (ZyrTEC) 10 MG tablet, Take 1 tablet by mouth daily., Disp: , Rfl:   •   lisinopril-hydrochlorothiazide (PRINZIDE,ZESTORETIC) 20-12.5 MG per tablet, TAKE 2 TABLETS DAILY, Disp: 180 tablet, Rfl: 3  •  metoprolol succinate XL (TOPROL-XL) 200 MG 24 hr tablet, Take 1 tablet by mouth Daily., Disp: 90 tablet, Rfl: 3  •  diclofenac (VOLTAREN) 50 MG EC tablet, Take 1 tablet by mouth 3 (Three) Times a Day. Fu with food, Disp: 30 tablet, Rfl: 1      Assessment/Plan   Diagnoses and all orders for this visit:    Tendinitis of left rotator cuff  -     Ambulatory Referral to Orthopedic Surgery    Other orders  -     diclofenac (VOLTAREN) 50 MG EC tablet; Take 1 tablet by mouth 3 (Three) Times a Day. Fu with food    1. Left shoulder pain-  I have rec referral ortho  She is too inflamed at present to do PT  SHe will also try diclofena

## 2018-08-30 ENCOUNTER — OFFICE VISIT (OUTPATIENT)
Dept: INTERNAL MEDICINE | Facility: CLINIC | Age: 58
End: 2018-08-30

## 2018-08-30 VITALS
HEIGHT: 66 IN | WEIGHT: 255.1 LBS | TEMPERATURE: 98.5 F | BODY MASS INDEX: 41 KG/M2 | DIASTOLIC BLOOD PRESSURE: 90 MMHG | HEART RATE: 71 BPM | SYSTOLIC BLOOD PRESSURE: 134 MMHG | OXYGEN SATURATION: 99 %

## 2018-08-30 DIAGNOSIS — N64.3 GALACTORRHEA: ICD-10-CM

## 2018-08-30 DIAGNOSIS — M79.10 MUSCLE ACHE: ICD-10-CM

## 2018-08-30 DIAGNOSIS — E55.9 VITAMIN D DEFICIENCY: ICD-10-CM

## 2018-08-30 DIAGNOSIS — I10 ESSENTIAL HYPERTENSION: Primary | ICD-10-CM

## 2018-08-30 PROCEDURE — 99214 OFFICE O/P EST MOD 30 MIN: CPT | Performed by: INTERNAL MEDICINE

## 2018-08-30 NOTE — PROGRESS NOTES
Subjective   Kirsten Lima is a 58 y.o. female here to follow up on HTN.  Pt complains of legs pain and numbness, couldn't stand for long.    History of Present Illness   She has been having bilat leg pain worse after standing a lot  Numbness into heals.  She is stiff in the am  She has been having some in lbp.  Both legs.    She denies any radicular sx  No weakness.  Pain is also worse when she first gets up then improves a little when she moves around  Pt has been taking BP meds as prescribed without any problems.  No HA  No episodes of orthostasis      The following portions of the patient's history were reviewed and updated as appropriate: allergies, past family history, past medical history, past social history and problem list.  She is eating a lot of sugar  No reg exercise    Review of Systems   All other systems reviewed and are negative.      Objective   Physical Exam   Constitutional: She is oriented to person, place, and time. She appears well-developed and well-nourished.   HENT:   Head: Normocephalic and atraumatic.   Right Ear: External ear normal.   Left Ear: External ear normal.   Mouth/Throat: Oropharynx is clear and moist.   Eyes: Pupils are equal, round, and reactive to light. Conjunctivae and EOM are normal.   Neck: Normal range of motion. No tracheal deviation present. No thyromegaly present.   Cardiovascular: Normal rate, regular rhythm, normal heart sounds and intact distal pulses.    Pulmonary/Chest: Effort normal and breath sounds normal.   Abdominal: Soft. Bowel sounds are normal. She exhibits no distension. There is no tenderness.   Musculoskeletal: Normal range of motion. She exhibits no edema or deformity.   Neurological: She is alert and oriented to person, place, and time.   Skin: Skin is warm and dry.   Psychiatric: She has a normal mood and affect. Her behavior is normal. Judgment and thought content normal.   Vitals reviewed.      Vitals:    08/30/18 1512   BP: 134/90   Pulse: 71    Temp: 98.5 °F (36.9 °C)   SpO2: 99%     Current Outpatient Prescriptions:   •  amLODIPine (NORVASC) 10 MG tablet, Take 1 tablet by mouth Daily., Disp: 90 tablet, Rfl: 3  •  atorvastatin (LIPITOR) 10 MG tablet, Take 1 tablet by mouth Daily., Disp: 90 tablet, Rfl: 3  •  cetirizine (ZyrTEC) 10 MG tablet, Take 1 tablet by mouth daily., Disp: , Rfl:   •  diclofenac (VOLTAREN) 50 MG EC tablet, Take 1 tablet by mouth 3 (Three) Times a Day. Fu with food, Disp: 30 tablet, Rfl: 1  •  lisinopril-hydrochlorothiazide (PRINZIDE,ZESTORETIC) 20-12.5 MG per tablet, TAKE 2 TABLETS DAILY, Disp: 180 tablet, Rfl: 3  •  metoprolol succinate XL (TOPROL-XL) 200 MG 24 hr tablet, Take 1 tablet by mouth Daily., Disp: 90 tablet, Rfl: 3         Assessment/Plan   Diagnoses and all orders for this visit:    Essential hypertension  -     Sedimentation rate, automated  -     CK  -     Vitamin D 25 Hydroxy    Muscle ache  -     Sedimentation rate, automated  -     CK  -     Vitamin D 25 Hydroxy    Vitamin D deficiency  -     Sedimentation rate, automated  -     CK  -     Vitamin D 25 Hydroxy    Galactorrhea  -     Prolactin    1. HTN- ok with current meds  2. Myalgias- she is going to check some labs today  She will try magnesium  3. Vit D-  She did take a loading dose in the past  We will recheck today and then may reload  4. galactorhea-  Seeing gyn  Needs labs done per gyn  5.  HPL-  Ok but I am concerned about this causing the muscle aches  She is to stop the statins

## 2018-09-01 LAB
25(OH)D3+25(OH)D2 SERPL-MCNC: 13.8 NG/ML (ref 30–100)
CK SERPL-CCNC: 97 U/L (ref 24–173)
ERYTHROCYTE [SEDIMENTATION RATE] IN BLOOD BY WESTERGREN METHOD: 35 MM/HR (ref 0–40)
PROLACTIN SERPL-MCNC: 10.3 NG/ML (ref 4.8–23.3)

## 2018-09-05 RX ORDER — ERGOCALCIFEROL 1.25 MG/1
50000 CAPSULE ORAL WEEKLY
Qty: 8 CAPSULE | Refills: 0 | Status: SHIPPED | OUTPATIENT
Start: 2018-09-05 | End: 2018-10-25

## 2018-10-11 ENCOUNTER — OFFICE VISIT (OUTPATIENT)
Dept: INTERNAL MEDICINE | Facility: CLINIC | Age: 58
End: 2018-10-11

## 2018-10-11 VITALS
WEIGHT: 255.2 LBS | DIASTOLIC BLOOD PRESSURE: 68 MMHG | OXYGEN SATURATION: 98 % | SYSTOLIC BLOOD PRESSURE: 130 MMHG | HEART RATE: 69 BPM | BODY MASS INDEX: 41.01 KG/M2 | HEIGHT: 66 IN | TEMPERATURE: 99 F

## 2018-10-11 DIAGNOSIS — M79.10 MYALGIA: Primary | ICD-10-CM

## 2018-10-11 DIAGNOSIS — E55.9 VITAMIN D DEFICIENCY: ICD-10-CM

## 2018-10-11 DIAGNOSIS — I10 ESSENTIAL HYPERTENSION: ICD-10-CM

## 2018-10-11 PROCEDURE — 99213 OFFICE O/P EST LOW 20 MIN: CPT | Performed by: INTERNAL MEDICINE

## 2018-10-11 RX ORDER — TRAZODONE HYDROCHLORIDE 50 MG/1
50 TABLET ORAL NIGHTLY
Refills: 5 | COMMUNITY
Start: 2018-10-03 | End: 2018-11-26 | Stop reason: SDUPTHER

## 2018-10-11 NOTE — PROGRESS NOTES
Subjective    Kirsten Lima is a 58 y.o. female here to follow up on myalgias      History of Present Illness   She says she had not been doing well but now she is feeling better and plans to go walking.  She would like to see a chiro    The following portions of the patient's history were reviewed and updated as appropriate: allergies, current medications, past medical history, past social history and problem list.  She is starting to exercise reg  Review of Systems   All other systems reviewed and are negative.      Objective   Physical Exam   Constitutional: She is oriented to person, place, and time. She appears well-developed and well-nourished.   HENT:   Head: Normocephalic and atraumatic.   Right Ear: External ear normal.   Left Ear: External ear normal.   Mouth/Throat: Oropharynx is clear and moist.   Eyes: Pupils are equal, round, and reactive to light. Conjunctivae and EOM are normal.   Neck: Normal range of motion. No tracheal deviation present. No thyromegaly present.   Cardiovascular: Normal rate, regular rhythm, normal heart sounds and intact distal pulses.    Pulmonary/Chest: Effort normal and breath sounds normal.   Abdominal: Soft. Bowel sounds are normal. She exhibits no distension. There is no tenderness.   Musculoskeletal: Normal range of motion. She exhibits no edema or deformity.   Neurological: She is alert and oriented to person, place, and time.   Skin: Skin is warm and dry.   Psychiatric: She has a normal mood and affect. Her behavior is normal. Judgment and thought content normal.   Vitals reviewed.      Vitals:    10/11/18 1514   BP: 130/68   Pulse: 69   Temp: 99 °F (37.2 °C)   SpO2: 98%      Current Outpatient Prescriptions:   •  amLODIPine (NORVASC) 10 MG tablet, Take 1 tablet by mouth Daily., Disp: 90 tablet, Rfl: 3  •  cetirizine (ZyrTEC) 10 MG tablet, Take 1 tablet by mouth daily., Disp: , Rfl:   •  lisinopril-hydrochlorothiazide (PRINZIDE,ZESTORETIC) 20-12.5 MG per tablet, TAKE 2  TABLETS DAILY, Disp: 180 tablet, Rfl: 3  •  metoprolol succinate XL (TOPROL-XL) 200 MG 24 hr tablet, Take 1 tablet by mouth Daily., Disp: 90 tablet, Rfl: 3  •  traZODone (DESYREL) 50 MG tablet, Take 50 mg by mouth Every Night., Disp: , Rfl: 5  •  vitamin D (ERGOCALCIFEROL) 66637 units capsule capsule, Take 1 capsule by mouth 1 (One) Time Per Week for 8 doses., Disp: 8 capsule, Rfl: 0  •  atorvastatin (LIPITOR) 10 MG tablet, Take 1 tablet by mouth Daily., Disp: 90 tablet, Rfl: 3      Assessment/Plan   Diagnoses and all orders for this visit:    Myalgia  -     Vitamin D 25 Hydroxy; Future  -     Comprehensive Metabolic Panel; Future  -     LP+LDL / HDL Ratio ([facility]); Future  -     TSH Rfx On Abnormal To Free T4; Future    Essential hypertension  -     Vitamin D 25 Hydroxy; Future  -     Comprehensive Metabolic Panel; Future  -     LP+LDL / HDL Ratio ([facility]); Future  -     TSH Rfx On Abnormal To Free T4; Future    Vitamin D deficiency  -     Vitamin D 25 Hydroxy; Future  -     Comprehensive Metabolic Panel; Future  -     LP+LDL / HDL Ratio ([facility]); Future  -     TSH Rfx On Abnormal To Free T4; Future      1. Vit d def- she will complete the loading and then will take otc 200 a day and perhaps that is helping with muscle  2.  HTN-  She is doing well with labs  3. Myalgias- better cont mag and vit D

## 2018-11-08 DIAGNOSIS — I10 ESSENTIAL HYPERTENSION: ICD-10-CM

## 2018-11-08 RX ORDER — LISINOPRIL AND HYDROCHLOROTHIAZIDE 20; 12.5 MG/1; MG/1
TABLET ORAL
Qty: 180 TABLET | Refills: 1 | Status: SHIPPED | OUTPATIENT
Start: 2018-11-08 | End: 2019-04-17 | Stop reason: SDUPTHER

## 2018-11-26 RX ORDER — TRAZODONE HYDROCHLORIDE 50 MG/1
50 TABLET ORAL NIGHTLY
Qty: 90 TABLET | Refills: 1 | Status: SHIPPED | OUTPATIENT
Start: 2018-11-26 | End: 2019-01-15 | Stop reason: SDUPTHER

## 2018-12-04 ENCOUNTER — OFFICE VISIT (OUTPATIENT)
Dept: INTERNAL MEDICINE | Facility: CLINIC | Age: 58
End: 2018-12-04

## 2018-12-04 VITALS
OXYGEN SATURATION: 98 % | BODY MASS INDEX: 40.84 KG/M2 | HEIGHT: 66 IN | WEIGHT: 254.1 LBS | TEMPERATURE: 98.9 F | HEART RATE: 81 BPM | SYSTOLIC BLOOD PRESSURE: 140 MMHG | DIASTOLIC BLOOD PRESSURE: 82 MMHG

## 2018-12-04 DIAGNOSIS — B35.1 NAIL FUNGUS: Primary | ICD-10-CM

## 2018-12-04 PROCEDURE — 99213 OFFICE O/P EST LOW 20 MIN: CPT | Performed by: NURSE PRACTITIONER

## 2018-12-04 RX ORDER — TERBINAFINE HYDROCHLORIDE 250 MG/1
250 TABLET ORAL DAILY
Qty: 30 TABLET | Refills: 2 | Status: SHIPPED | OUTPATIENT
Start: 2018-12-04 | End: 2019-03-26

## 2018-12-05 NOTE — PROGRESS NOTES
Subjective   Kirsten Lima is a 58 y.o. female. Patient is here today for   Chief Complaint   Patient presents with   • Nail Problem   .    History of Present Illness   C/o discoloration to her nail for a couple of days.  Patient did recently have her nails done at a salon.  States that she gets them done on a regular basis.  Denies any pain or discomfort.  Denies any drainage from the fingernail.    The following portions of the patient's history were reviewed and updated as appropriate: allergies, current medications, past family history, past medical history, past social history, past surgical history and problem list.    Review of Systems   Constitutional: Negative.    Respiratory: Negative.    Cardiovascular: Negative.    Skin: Positive for color change (left longer finger nail).       Objective   Vitals:    12/04/18 1431   BP: 140/82   Pulse: 81   Temp: 98.9 °F (37.2 °C)   SpO2: 98%     Physical Exam   Constitutional: Vital signs are normal. She appears well-developed and well-nourished.   Cardiovascular: Normal rate, regular rhythm and normal heart sounds.   Pulmonary/Chest: Effort normal and breath sounds normal.   Skin: Skin is warm, dry and intact.   Left middle finger nail with green discoloration. Nail is cracked on the underside in the middle - this is where the discoloration is.   Psychiatric: She has a normal mood and affect. Her speech is normal and behavior is normal. Thought content normal.   Nursing note and vitals reviewed.      Assessment/Plan   Kirsten was seen today for nail problem.    Diagnoses and all orders for this visit:    Nail fungus  -     terbinafine (LAMISIL) 250 MG tablet; Take 1 tablet by mouth Daily.    She was given a sample of [place].  She was shown how to apply it under her nail.

## 2019-01-09 LAB
25(OH)D3+25(OH)D2 SERPL-MCNC: 17.9 NG/ML (ref 30–100)
ALBUMIN SERPL-MCNC: 4.2 G/DL (ref 3.5–5.5)
ALBUMIN/GLOB SERPL: 1.4 {RATIO} (ref 1.2–2.2)
ALP SERPL-CCNC: 89 IU/L (ref 39–117)
ALT SERPL-CCNC: 11 IU/L (ref 0–32)
AST SERPL-CCNC: 13 IU/L (ref 0–40)
BILIRUB SERPL-MCNC: 0.3 MG/DL (ref 0–1.2)
BUN SERPL-MCNC: 9 MG/DL (ref 6–24)
BUN/CREAT SERPL: 10 (ref 9–23)
CALCIUM SERPL-MCNC: 9.8 MG/DL (ref 8.7–10.2)
CHLORIDE SERPL-SCNC: 103 MMOL/L (ref 96–106)
CHOLEST SERPL-MCNC: 246 MG/DL (ref 100–199)
CO2 SERPL-SCNC: 26 MMOL/L (ref 20–29)
CREAT SERPL-MCNC: 0.93 MG/DL (ref 0.57–1)
GLOBULIN SER CALC-MCNC: 3.1 G/DL (ref 1.5–4.5)
GLUCOSE SERPL-MCNC: 100 MG/DL (ref 65–99)
HDLC SERPL-MCNC: 50 MG/DL
LDLC SERPL CALC-MCNC: 159 MG/DL (ref 0–99)
LDLC/HDLC SERPL: 3.2 RATIO (ref 0–3.2)
POTASSIUM SERPL-SCNC: 3.8 MMOL/L (ref 3.5–5.2)
PROT SERPL-MCNC: 7.3 G/DL (ref 6–8.5)
SODIUM SERPL-SCNC: 144 MMOL/L (ref 134–144)
TRIGL SERPL-MCNC: 185 MG/DL (ref 0–149)
TSH SERPL DL<=0.005 MIU/L-ACNC: 0.58 UIU/ML (ref 0.45–4.5)
VLDLC SERPL CALC-MCNC: 37 MG/DL (ref 5–40)

## 2019-01-11 ENCOUNTER — RESULTS ENCOUNTER (OUTPATIENT)
Dept: INTERNAL MEDICINE | Facility: CLINIC | Age: 59
End: 2019-01-11

## 2019-01-11 DIAGNOSIS — I10 ESSENTIAL HYPERTENSION: ICD-10-CM

## 2019-01-11 DIAGNOSIS — M79.10 MYALGIA: ICD-10-CM

## 2019-01-11 DIAGNOSIS — E55.9 VITAMIN D DEFICIENCY: ICD-10-CM

## 2019-01-15 ENCOUNTER — OFFICE VISIT (OUTPATIENT)
Dept: INTERNAL MEDICINE | Facility: CLINIC | Age: 59
End: 2019-01-15

## 2019-01-15 VITALS
WEIGHT: 245.2 LBS | SYSTOLIC BLOOD PRESSURE: 130 MMHG | HEART RATE: 60 BPM | HEIGHT: 66 IN | TEMPERATURE: 99.4 F | OXYGEN SATURATION: 99 % | BODY MASS INDEX: 39.41 KG/M2 | DIASTOLIC BLOOD PRESSURE: 82 MMHG

## 2019-01-15 DIAGNOSIS — E55.9 VITAMIN D DEFICIENCY: ICD-10-CM

## 2019-01-15 DIAGNOSIS — I10 ESSENTIAL HYPERTENSION: ICD-10-CM

## 2019-01-15 DIAGNOSIS — E78.5 HYPERLIPIDEMIA, UNSPECIFIED HYPERLIPIDEMIA TYPE: ICD-10-CM

## 2019-01-15 DIAGNOSIS — Z00.00 HEALTH CARE MAINTENANCE: Primary | ICD-10-CM

## 2019-01-15 PROCEDURE — 99396 PREV VISIT EST AGE 40-64: CPT | Performed by: INTERNAL MEDICINE

## 2019-01-15 RX ORDER — TRAZODONE HYDROCHLORIDE 50 MG/1
50 TABLET ORAL NIGHTLY
Qty: 90 TABLET | Refills: 3 | Status: SHIPPED | OUTPATIENT
Start: 2019-01-15 | End: 2020-02-14

## 2019-01-15 RX ORDER — ERGOCALCIFEROL 1.25 MG/1
50000 CAPSULE ORAL WEEKLY
Qty: 8 CAPSULE | Refills: 0 | Status: SHIPPED | OUTPATIENT
Start: 2019-01-15 | End: 2019-03-19 | Stop reason: SDUPTHER

## 2019-01-15 RX ORDER — ROSUVASTATIN CALCIUM 5 MG/1
5 TABLET, COATED ORAL DAILY
Qty: 30 TABLET | Refills: 3 | Status: SHIPPED | OUTPATIENT
Start: 2019-01-15 | End: 2019-09-24

## 2019-01-15 NOTE — PROGRESS NOTES
Subjective   Kirsten Lima is a 58 y.o. female and is here for a comprehensive physical exam. The patient reports problems - htn.  Pt has been taking BP meds as prescribed without any problems.  No HA  No episodes of orthostasis  She is cannot tolerate lipitor  She says bladder issues are stable  She is doing better with the trazodone  Pt is UTD with annual gyn exam and mammo     Do you take any herbs or supplements that were not prescribed by a doctor? Vit d occas      Social History: she has been watching sugar but eating out a lot  She is not exercising  Social History     Socioeconomic History   • Marital status: Single     Spouse name: Not on file   • Number of children: Not on file   • Years of education: Not on file   • Highest education level: Not on file   Social Needs   • Financial resource strain: Not on file   • Food insecurity - worry: Not on file   • Food insecurity - inability: Not on file   • Transportation needs - medical: Not on file   • Transportation needs - non-medical: Not on file   Occupational History   • Not on file   Tobacco Use   • Smoking status: Current Every Day Smoker   • Smokeless tobacco: Never Used   • Tobacco comment:  ppd since age 18   Substance and Sexual Activity   • Alcohol use: No     Frequency: Never   • Drug use: No   • Sexual activity: Not on file   Other Topics Concern   • Not on file   Social History Narrative   • Not on file       Family History:   Family History   Problem Relation Age of Onset   • Hypertension Mother    • COPD Father    • Heart failure Father    • Hypertension Father    • Pancreatic cancer Brother         1 brother  from pancreatic cancer   • Other Sister         1 sister  renal failure   • Alcohol abuse Sister    • Liver disease Sister        Past Medical History:   Past Medical History:   Diagnosis Date   • Acute cystitis    • Allergic rhinitis    • Dysuria    • Hyperlipidemia    • Hypertension    • Insomnia    • Nonspecific  "abnormal electrocardiogram (ECG) (EKG)    • Obesity    • Overactive bladder    • Pregnancy     G-2, P-0   • Vitamin B12 deficiency    • Vitamin D insufficiency            Review of Systems    A comprehensive review of systems was negative.    Objective   /82 (BP Location: Left arm, Patient Position: Sitting, Cuff Size: Large Adult)   Pulse 60   Temp 99.4 °F (37.4 °C) (Oral)   Ht 167.6 cm (65.98\")   Wt 111 kg (245 lb 3.2 oz)   SpO2 99%   BMI 39.60 kg/m²     General Appearance:    Alert, cooperative, no distress, appears stated age   Head:    Normocephalic, without obvious abnormality, atraumatic   Eyes:    PERRL, conjunctiva/corneas clear, EOM's intact, fundi     benign, both eyes   Ears:    Normal TM's and external ear canals, both ears   Nose:   Nares normal, septum midline, mucosa normal, no drainage    or sinus tenderness   Throat:   Lips, mucosa, and tongue normal; teeth and gums normal   Neck:   Supple, symmetrical, trachea midline, no adenopathy;     thyroid:  no enlargement/tenderness/nodules; no carotid    bruit or JVD   Back:     Symmetric, no curvature, ROM normal, no CVA tenderness   Lungs:     Clear to auscultation bilaterally, respirations unlabored   Chest Wall:    No tenderness or deformity    Heart:    Regular rate and rhythm, S1 and S2 normal, no murmur, rub   or gallop   Breast Exam:    No tenderness, masses, or nipple abnormality   Abdomen:     Soft, non-tender, bowel sounds active all four quadrants,     no masses, no organomegaly           Extremities:   Extremities normal, atraumatic, no cyanosis or edema   Pulses:   2+ and symmetric all extremities   Skin:   Skin color, texture, turgor normal, no rashes or lesions   Lymph nodes:   Cervical, supraclavicular, and axillary nodes normal   Neurologic:   CNII-XII intact, normal strength, sensation and reflexes     throughout       Medications:   Current Outpatient Medications:   •  amLODIPine (NORVASC) 10 MG tablet, Take 1 tablet by " mouth Daily., Disp: 90 tablet, Rfl: 3  •  benzonatate (TESSALON) 200 MG capsule, Take 1 capsule by mouth 3 (Three) Times a Day As Needed for Cough., Disp: 30 capsule, Rfl: 0  •  cetirizine (ZyrTEC) 10 MG tablet, Take 1 tablet by mouth daily., Disp: , Rfl:   •  Efinaconazole 10 % solution, Apply  topically., Disp: , Rfl:   •  fluticasone (FLONASE) 50 MCG/ACT nasal spray, 2 sprays into the nostril(s) as directed by provider Daily., Disp: 1 bottle, Rfl: 0  •  guaiFENesin-codeine (ROMILAR-AC) 100-10 MG/5ML syrup, 2 tsp at bedtime for cough, Disp: 120 mL, Rfl: 0  •  lisinopril-hydrochlorothiazide (PRINZIDE,ZESTORETIC) 20-12.5 MG per tablet, TAKE 2 TABLETS DAILY, Disp: 180 tablet, Rfl: 1  •  metoprolol succinate XL (TOPROL-XL) 200 MG 24 hr tablet, Take 1 tablet by mouth Daily., Disp: 90 tablet, Rfl: 3  •  terbinafine (LAMISIL) 250 MG tablet, Take 1 tablet by mouth Daily., Disp: 30 tablet, Rfl: 2  •  traZODone (DESYREL) 50 MG tablet, Take 1 tablet by mouth Every Night., Disp: 90 tablet, Rfl: 1       Assessment/Plan   Healthy female exam.      1. Healthcare Maintenance:  2. Patient Counseling:  --Nutrition: Stressed importance of moderation in sodium/caffeine intake, saturated fat and cholesterol, caloric balance, sufficient intake of fresh fruits, vegetables, fiber, calcium and vit D  --Exercise: I have rec reg exercise  --Substance Abuse:   --Dental health: she does go to the dentist reg  --Immunizations reviewed.  --Discussed benefits of screening colonoscopy.  3. HPL- we will try crestor 5mg a day  4.  HTN- ok with current meds  5.  Insomnia- she is doing better with trazodone  6. VIT d- needs to be loaded and take otc

## 2019-02-27 ENCOUNTER — OFFICE VISIT (OUTPATIENT)
Dept: INTERNAL MEDICINE | Facility: CLINIC | Age: 59
End: 2019-02-27

## 2019-02-27 VITALS
HEIGHT: 66 IN | HEART RATE: 64 BPM | DIASTOLIC BLOOD PRESSURE: 78 MMHG | TEMPERATURE: 98.9 F | WEIGHT: 244.3 LBS | BODY MASS INDEX: 39.26 KG/M2 | SYSTOLIC BLOOD PRESSURE: 128 MMHG | OXYGEN SATURATION: 98 %

## 2019-02-27 DIAGNOSIS — R05.9 COUGH: ICD-10-CM

## 2019-02-27 DIAGNOSIS — J06.9 ACUTE URI: Primary | ICD-10-CM

## 2019-02-27 LAB
EXPIRATION DATE: NORMAL
FLUAV AG NPH QL: NEGATIVE
FLUBV AG NPH QL: NEGATIVE
INTERNAL CONTROL: NORMAL
Lab: NORMAL

## 2019-02-27 PROCEDURE — 99213 OFFICE O/P EST LOW 20 MIN: CPT | Performed by: INTERNAL MEDICINE

## 2019-02-27 PROCEDURE — 87804 INFLUENZA ASSAY W/OPTIC: CPT | Performed by: INTERNAL MEDICINE

## 2019-02-27 PROCEDURE — 94640 AIRWAY INHALATION TREATMENT: CPT | Performed by: INTERNAL MEDICINE

## 2019-02-27 RX ORDER — ALBUTEROL SULFATE 2.5 MG/3ML
2.5 SOLUTION RESPIRATORY (INHALATION) ONCE
Status: COMPLETED | OUTPATIENT
Start: 2019-02-27 | End: 2019-02-27

## 2019-02-27 RX ORDER — ALBUTEROL SULFATE 90 UG/1
2 AEROSOL, METERED RESPIRATORY (INHALATION) EVERY 6 HOURS PRN
Qty: 1 INHALER | Refills: 1 | Status: SHIPPED | OUTPATIENT
Start: 2019-02-27 | End: 2019-06-14

## 2019-02-27 RX ORDER — AZITHROMYCIN 250 MG/1
TABLET, FILM COATED ORAL
Qty: 6 TABLET | Refills: 0 | Status: SHIPPED | OUTPATIENT
Start: 2019-02-27 | End: 2019-03-26

## 2019-02-27 RX ADMIN — ALBUTEROL SULFATE 2.5 MG: 2.5 SOLUTION RESPIRATORY (INHALATION) at 13:22

## 2019-02-27 NOTE — PROGRESS NOTES
Subjective   Kirsten Lima is a 58 y.o. female c/o cough sometimes productive, chills, had loose stool x last Friday  Pt states she cough till the point that she pooped, loosing appetite.    History of Present Illness   Pt started Friday with a cough  An some sinus pressure.   Cough is more productive and ribs hurt  She has had some nausea with no emesis     The following portions of the patient's history were reviewed and updated as appropriate: allergies, current medications, past medical history, past social history and problem list.  No ill contacts    Review of Systems   Constitutional: Positive for fatigue. Negative for fever.   HENT: Positive for postnasal drip and rhinorrhea.    Respiratory: Positive for cough and wheezing.    All other systems reviewed and are negative.      Objective   Physical Exam   Constitutional: She is oriented to person, place, and time. She appears well-developed and well-nourished.   HENT:   Head: Normocephalic and atraumatic.   Right Ear: External ear normal.   Left Ear: External ear normal.   Mouth/Throat: Oropharynx is clear and moist.   Eyes: Conjunctivae and EOM are normal. Pupils are equal, round, and reactive to light.   Neck: Normal range of motion. No tracheal deviation present. No thyromegaly present.   Cardiovascular: Normal rate, regular rhythm, normal heart sounds and intact distal pulses.   Pulmonary/Chest: Effort normal and breath sounds normal.   Abdominal: Soft. Bowel sounds are normal. She exhibits no distension. There is no tenderness.   Musculoskeletal: Normal range of motion. She exhibits no edema or deformity.   Neurological: She is alert and oriented to person, place, and time.   Skin: Skin is warm and dry.   Psychiatric: She has a normal mood and affect. Her behavior is normal. Judgment and thought content normal.   Vitals reviewed.      Vitals:    02/27/19 1111   BP: 128/78   Pulse: 64   Temp: 98.9 °F (37.2 °C)   SpO2: 98%     Current Outpatient Medications:    •  amLODIPine (NORVASC) 10 MG tablet, Take 1 tablet by mouth Daily., Disp: 90 tablet, Rfl: 3  •  benzonatate (TESSALON) 200 MG capsule, Take 1 capsule by mouth 3 (Three) Times a Day As Needed for Cough., Disp: 30 capsule, Rfl: 0  •  cetirizine (ZyrTEC) 10 MG tablet, Take 1 tablet by mouth daily., Disp: , Rfl:   •  Efinaconazole 10 % solution, Apply  topically., Disp: , Rfl:   •  fluticasone (FLONASE) 50 MCG/ACT nasal spray, 2 sprays into the nostril(s) as directed by provider Daily., Disp: 1 bottle, Rfl: 0  •  guaiFENesin-codeine (ROMILAR-AC) 100-10 MG/5ML syrup, 2 tsp at bedtime for cough, Disp: 120 mL, Rfl: 0  •  lisinopril-hydrochlorothiazide (PRINZIDE,ZESTORETIC) 20-12.5 MG per tablet, TAKE 2 TABLETS DAILY, Disp: 180 tablet, Rfl: 1  •  metoprolol succinate XL (TOPROL-XL) 200 MG 24 hr tablet, Take 1 tablet by mouth Daily., Disp: 90 tablet, Rfl: 3  •  rosuvastatin (CRESTOR) 5 MG tablet, Take 1 tablet by mouth Daily., Disp: 30 tablet, Rfl: 3  •  terbinafine (LAMISIL) 250 MG tablet, Take 1 tablet by mouth Daily., Disp: 30 tablet, Rfl: 2  •  traZODone (DESYREL) 50 MG tablet, Take 1 tablet by mouth Every Night., Disp: 90 tablet, Rfl: 3  •  vitamin D (ERGOCALCIFEROL) 26774 units capsule capsule, Take 1 capsule by mouth 1 (One) Time Per Week., Disp: 8 capsule, Rfl: 0  •  albuterol sulfate  (90 Base) MCG/ACT inhaler, Inhale 2 puffs Every 6 (Six) Hours As Needed for Wheezing., Disp: 1 inhaler, Rfl: 1  •  azithromycin (ZITHROMAX Z-WOLFGANG) 250 MG tablet, Take 2 tablets the first day, then 1 tablet daily for 4 days., Disp: 6 tablet, Rfl: 0         Assessment/Plan   Diagnoses and all orders for this visit:    Acute URI  -     POCT Influenza A/B    Cough    Other orders  -     azithromycin (ZITHROMAX Z-WOLFGANG) 250 MG tablet; Take 2 tablets the first day, then 1 tablet daily for 4 days.  -     albuterol sulfate  (90 Base) MCG/ACT inhaler; Inhale 2 puffs Every 6 (Six) Hours As Needed for Wheezing.      1.  Cough with  URI-  She is smoker.  We will use an abx and an albuterol in haler  I have encouraged rest and vit D  Lungs are clear today  If sx worsen or fail to improve she may need a CXR

## 2019-03-01 ENCOUNTER — TELEPHONE (OUTPATIENT)
Dept: INTERNAL MEDICINE | Facility: CLINIC | Age: 59
End: 2019-03-01

## 2019-03-01 NOTE — TELEPHONE ENCOUNTER
SHE SAID SHE WAS DOING GOOD UNTIL LAST NIGHT AND THE COUGH IS GETTING BAD, NO FEVER.  SHE STILL HAVE 2 MORE ANTIBIOTIC LEFT.  RECOMMEND HER TO GO UC IF IT GET WORSE OVER THE WEEKEND.    ----- Message from Meir Pinto sent at 3/1/2019  1:32 PM EST -----  Pt was in on 2/27/19 and she says the medicine that Dr. Valdez gave her isn't helping her cough and she is coughing more and wants to know what else Dr. Valdez can send her

## 2019-03-06 DIAGNOSIS — I10 ESSENTIAL HYPERTENSION: ICD-10-CM

## 2019-03-06 RX ORDER — AMLODIPINE BESYLATE 10 MG/1
TABLET ORAL
Qty: 90 TABLET | Refills: 3 | Status: SHIPPED | OUTPATIENT
Start: 2019-03-06 | End: 2019-03-26 | Stop reason: SDUPTHER

## 2019-03-06 RX ORDER — METOPROLOL SUCCINATE 200 MG/1
TABLET, EXTENDED RELEASE ORAL
Qty: 90 TABLET | Refills: 3 | Status: SHIPPED | OUTPATIENT
Start: 2019-03-06 | End: 2020-02-18

## 2019-03-19 RX ORDER — ERGOCALCIFEROL 1.25 MG/1
50000 CAPSULE ORAL WEEKLY
Qty: 8 CAPSULE | Refills: 0 | Status: SHIPPED | OUTPATIENT
Start: 2019-03-19 | End: 2019-03-26

## 2019-03-26 ENCOUNTER — OFFICE VISIT (OUTPATIENT)
Dept: INTERNAL MEDICINE | Facility: CLINIC | Age: 59
End: 2019-03-26

## 2019-03-26 VITALS
HEIGHT: 66 IN | TEMPERATURE: 98.3 F | DIASTOLIC BLOOD PRESSURE: 84 MMHG | OXYGEN SATURATION: 98 % | SYSTOLIC BLOOD PRESSURE: 130 MMHG | HEART RATE: 63 BPM | WEIGHT: 245.8 LBS | BODY MASS INDEX: 39.5 KG/M2

## 2019-03-26 DIAGNOSIS — E55.9 VITAMIN D DEFICIENCY: ICD-10-CM

## 2019-03-26 DIAGNOSIS — E78.5 HYPERLIPIDEMIA, UNSPECIFIED HYPERLIPIDEMIA TYPE: ICD-10-CM

## 2019-03-26 DIAGNOSIS — I10 ESSENTIAL HYPERTENSION: Primary | ICD-10-CM

## 2019-03-26 PROCEDURE — 99213 OFFICE O/P EST LOW 20 MIN: CPT | Performed by: INTERNAL MEDICINE

## 2019-03-26 RX ORDER — MULTIVIT-MIN/IRON/FOLIC ACID/K 18-600-40
2000 CAPSULE ORAL DAILY
COMMUNITY

## 2019-03-26 NOTE — PROGRESS NOTES
Subjective   Kirsten Lima is a 58 y.o. female.   Here to FU on vit d and HPl    History of Present Illness   Pt has been taking cholesterol meds as prescribed.  No difficulties with myalgias.   She loaded on Vit D  Pt has been taking BP meds as prescribed without any problems.  No HA  No episodes of orthostasis    The following portions of the patient's history were reviewed and updated as appropriate: allergies, current medications, past medical history, past social history and problem list.  She is not watching  Diet  No exercise    Review of Systems   All other systems reviewed and are negative.      Objective   Physical Exam   Constitutional: She is oriented to person, place, and time. She appears well-developed and well-nourished.   HENT:   Head: Normocephalic and atraumatic.   Right Ear: External ear normal.   Left Ear: External ear normal.   Mouth/Throat: Oropharynx is clear and moist.   Eyes: Conjunctivae and EOM are normal. Pupils are equal, round, and reactive to light.   Neck: Normal range of motion. No tracheal deviation present. No thyromegaly present.   Cardiovascular: Normal rate, regular rhythm, normal heart sounds and intact distal pulses.   Pulmonary/Chest: Effort normal and breath sounds normal.   Abdominal: Soft. Bowel sounds are normal. She exhibits no distension. There is no tenderness.   Musculoskeletal: Normal range of motion. She exhibits no edema or deformity.   Neurological: She is alert and oriented to person, place, and time.   Skin: Skin is warm and dry.   Psychiatric: She has a normal mood and affect. Her behavior is normal. Judgment and thought content normal.   Vitals reviewed.      Vitals:    03/26/19 1534   BP: 130/84   Pulse: 63   Temp: 98.3 °F (36.8 °C)   SpO2: 98%          Assessment/Plan   Diagnoses and all orders for this visit:    Essential hypertension    Hyperlipidemia, unspecified hyperlipidemia type    Vitamin D deficiency    1.  HPL- we will recheck labs on the crestor  and cont to rec diet and exercise  2.  HTN- ok with current meds  3.  Vit D def-  She has loaded and is now taking OTC

## 2019-03-28 LAB
25(OH)D3+25(OH)D2 SERPL-MCNC: 37.2 NG/ML (ref 30–100)
ALBUMIN SERPL-MCNC: 4.3 G/DL (ref 3.5–5.5)
ALBUMIN/GLOB SERPL: 1.3 {RATIO} (ref 1.2–2.2)
ALP SERPL-CCNC: 92 IU/L (ref 39–117)
ALT SERPL-CCNC: 11 IU/L (ref 0–32)
AST SERPL-CCNC: 14 IU/L (ref 0–40)
BILIRUB SERPL-MCNC: 0.3 MG/DL (ref 0–1.2)
BUN SERPL-MCNC: 7 MG/DL (ref 6–24)
BUN/CREAT SERPL: 9 (ref 9–23)
CALCIUM SERPL-MCNC: 9.9 MG/DL (ref 8.7–10.2)
CHLORIDE SERPL-SCNC: 101 MMOL/L (ref 96–106)
CHOLEST SERPL-MCNC: 226 MG/DL (ref 100–199)
CO2 SERPL-SCNC: 27 MMOL/L (ref 20–29)
CREAT SERPL-MCNC: 0.82 MG/DL (ref 0.57–1)
GLOBULIN SER CALC-MCNC: 3.4 G/DL (ref 1.5–4.5)
GLUCOSE SERPL-MCNC: 101 MG/DL (ref 65–99)
HDLC SERPL-MCNC: 56 MG/DL
LDLC SERPL CALC-MCNC: 131 MG/DL (ref 0–99)
LDLC/HDLC SERPL: 2.3 RATIO (ref 0–3.2)
POTASSIUM SERPL-SCNC: 3.7 MMOL/L (ref 3.5–5.2)
PROT SERPL-MCNC: 7.7 G/DL (ref 6–8.5)
SODIUM SERPL-SCNC: 144 MMOL/L (ref 134–144)
TRIGL SERPL-MCNC: 197 MG/DL (ref 0–149)
VLDLC SERPL CALC-MCNC: 39 MG/DL (ref 5–40)

## 2019-03-29 DIAGNOSIS — I10 ESSENTIAL HYPERTENSION: ICD-10-CM

## 2019-03-29 DIAGNOSIS — E78.2 MIXED HYPERLIPIDEMIA: Primary | ICD-10-CM

## 2019-03-29 DIAGNOSIS — E55.9 VITAMIN D DEFICIENCY: ICD-10-CM

## 2019-04-17 DIAGNOSIS — I10 ESSENTIAL HYPERTENSION: ICD-10-CM

## 2019-04-17 RX ORDER — LISINOPRIL AND HYDROCHLOROTHIAZIDE 20; 12.5 MG/1; MG/1
TABLET ORAL
Qty: 180 TABLET | Refills: 3 | Status: SHIPPED | OUTPATIENT
Start: 2019-04-17 | End: 2020-02-14

## 2019-05-28 ENCOUNTER — APPOINTMENT (OUTPATIENT)
Dept: WOMENS IMAGING | Facility: HOSPITAL | Age: 59
End: 2019-05-28

## 2019-05-28 PROCEDURE — 77063 BREAST TOMOSYNTHESIS BI: CPT | Performed by: RADIOLOGY

## 2019-05-28 PROCEDURE — 77067 SCR MAMMO BI INCL CAD: CPT | Performed by: RADIOLOGY

## 2019-05-29 ENCOUNTER — TELEPHONE (OUTPATIENT)
Dept: URGENT CARE | Facility: CLINIC | Age: 59
End: 2019-05-29

## 2019-05-29 NOTE — TELEPHONE ENCOUNTER
----- Message from Jun Neville MD sent at 5/29/2019  2:20 PM EDT -----  Urine culture was positive. Needs to finish the antibiotic. If still having symptoms, needs to recheck with PMD.

## 2019-06-14 ENCOUNTER — OFFICE VISIT (OUTPATIENT)
Dept: INTERNAL MEDICINE | Facility: CLINIC | Age: 59
End: 2019-06-14

## 2019-06-14 VITALS
BODY MASS INDEX: 39.97 KG/M2 | TEMPERATURE: 98.1 F | SYSTOLIC BLOOD PRESSURE: 136 MMHG | DIASTOLIC BLOOD PRESSURE: 82 MMHG | OXYGEN SATURATION: 99 % | WEIGHT: 248.7 LBS | HEIGHT: 66 IN | HEART RATE: 73 BPM

## 2019-06-14 DIAGNOSIS — R30.0 DYSURIA: Primary | ICD-10-CM

## 2019-06-14 LAB
BILIRUB BLD-MCNC: NEGATIVE MG/DL
CLARITY, POC: CLEAR
COLOR UR: YELLOW
GLUCOSE UR STRIP-MCNC: NEGATIVE MG/DL
KETONES UR QL: NEGATIVE
LEUKOCYTE EST, POC: ABNORMAL
NITRITE UR-MCNC: NEGATIVE MG/ML
PH UR: 6 [PH] (ref 5–8)
PROT UR STRIP-MCNC: NEGATIVE MG/DL
RBC # UR STRIP: ABNORMAL /UL
SP GR UR: 1.02 (ref 1–1.03)
UROBILINOGEN UR QL: NORMAL

## 2019-06-14 PROCEDURE — 99213 OFFICE O/P EST LOW 20 MIN: CPT | Performed by: NURSE PRACTITIONER

## 2019-06-14 PROCEDURE — 81003 URINALYSIS AUTO W/O SCOPE: CPT | Performed by: NURSE PRACTITIONER

## 2019-06-14 RX ORDER — CIPROFLOXACIN 500 MG/1
500 TABLET, FILM COATED ORAL 2 TIMES DAILY
Qty: 14 TABLET | Refills: 1 | Status: SHIPPED | OUTPATIENT
Start: 2019-06-14 | End: 2019-09-03

## 2019-06-14 NOTE — PROGRESS NOTES
Subjective   Kirsten Lima is a 58 y.o. female. Patient is here today for   Chief Complaint   Patient presents with   • Back Pain     PT C/O LOWER BACK PAIN X 3 WEEKS.   • Difficulty Urinating     PT C/O BURNING AND PAIN WHILE URINATING X 3 day.   .    History of Present Illness   New problem to this provider: C/o back pain that started today. She started with burning with urination associated with urgency yesterday. She was treated for a UTI about 3 weeks ago at an urgent care. She had been prescribed macrobid. She did feel better after finishing that antibiotic.       The following portions of the patient's history were reviewed and updated as appropriate: allergies, current medications, past family history, past medical history, past social history, past surgical history and problem list.    Review of Systems   Constitutional: Negative.    Respiratory: Negative.    Cardiovascular: Negative.    Genitourinary: Positive for dysuria, frequency and urgency.   Musculoskeletal: Positive for back pain.       Objective   Vitals:    06/14/19 1441   BP: 136/82   Pulse: 73   Temp: 98.1 °F (36.7 °C)   SpO2: 99%     Results for orders placed or performed in visit on 06/14/19   POCT urinalysis dipstick, automated   Result Value Ref Range    Color Yellow Yellow, Straw, Dark Yellow, Chelle    Clarity, UA Clear Clear    Specific Gravity  1.020 1.005 - 1.030    pH, Urine 6.0 5.0 - 8.0    Leukocytes Small (1+) (A) Negative    Nitrite, UA Negative Negative    Protein, POC Negative Negative mg/dL    Glucose, UA Negative Negative, 1000 mg/dL (3+) mg/dL    Ketones, UA Negative Negative    Urobilinogen, UA Normal Normal    Bilirubin Negative Negative    Blood, UA Trace (A) Negative       Physical Exam   Constitutional: Vital signs are normal. She appears well-developed and well-nourished.   Cardiovascular: Normal rate, regular rhythm and normal heart sounds.   Pulmonary/Chest: Effort normal and breath sounds normal.   Abdominal: There is  no CVA tenderness.   Skin: Skin is warm, dry and intact.   Psychiatric: She has a normal mood and affect. Her speech is normal and behavior is normal. Thought content normal.   Nursing note and vitals reviewed.      Assessment/Plan   Kirsten was seen today for back pain and difficulty urinating.    Diagnoses and all orders for this visit:    Dysuria  -     POCT urinalysis dipstick, automated  -     ciprofloxacin (CIPRO) 500 MG tablet; Take 1 tablet by mouth 2 (Two) Times a Day.  -     Urine Culture - Urine, Urine, Clean Catch

## 2019-06-17 LAB
BACTERIA UR CULT: ABNORMAL
BACTERIA UR CULT: ABNORMAL
OTHER ANTIBIOTIC SUSC ISLT: ABNORMAL

## 2019-09-05 ENCOUNTER — TELEPHONE (OUTPATIENT)
Dept: URGENT CARE | Facility: CLINIC | Age: 59
End: 2019-09-05

## 2019-09-06 NOTE — TELEPHONE ENCOUNTER
Pt advised of positive urine culture and need to finish antibiotic per Dr. Neville and TRAM Rosas, YESENIA.

## 2019-09-16 DIAGNOSIS — E78.2 MIXED HYPERLIPIDEMIA: ICD-10-CM

## 2019-09-16 DIAGNOSIS — E55.9 VITAMIN D DEFICIENCY: ICD-10-CM

## 2019-09-16 DIAGNOSIS — I10 ESSENTIAL HYPERTENSION: ICD-10-CM

## 2019-09-24 ENCOUNTER — OFFICE VISIT (OUTPATIENT)
Dept: INTERNAL MEDICINE | Facility: CLINIC | Age: 59
End: 2019-09-24

## 2019-09-24 VITALS
TEMPERATURE: 98.2 F | BODY MASS INDEX: 40.18 KG/M2 | DIASTOLIC BLOOD PRESSURE: 82 MMHG | SYSTOLIC BLOOD PRESSURE: 134 MMHG | OXYGEN SATURATION: 99 % | HEIGHT: 66 IN | WEIGHT: 250 LBS | HEART RATE: 64 BPM

## 2019-09-24 DIAGNOSIS — J30.9 ALLERGIC RHINITIS, UNSPECIFIED SEASONALITY, UNSPECIFIED TRIGGER: ICD-10-CM

## 2019-09-24 DIAGNOSIS — E78.5 HYPERLIPIDEMIA, UNSPECIFIED HYPERLIPIDEMIA TYPE: Primary | ICD-10-CM

## 2019-09-24 DIAGNOSIS — G47.00 INSOMNIA, UNSPECIFIED TYPE: ICD-10-CM

## 2019-09-24 DIAGNOSIS — I10 ESSENTIAL HYPERTENSION: ICD-10-CM

## 2019-09-24 LAB
25(OH)D3+25(OH)D2 SERPL-MCNC: 30.6 NG/ML (ref 30–100)
ALBUMIN SERPL-MCNC: 4 G/DL (ref 3.5–5.5)
ALBUMIN/GLOB SERPL: 1.3 {RATIO} (ref 1.2–2.2)
ALP SERPL-CCNC: 86 IU/L (ref 39–117)
ALT SERPL-CCNC: 13 IU/L (ref 0–32)
AST SERPL-CCNC: 12 IU/L (ref 0–40)
BILIRUB SERPL-MCNC: 0.3 MG/DL (ref 0–1.2)
BUN SERPL-MCNC: 8 MG/DL (ref 6–24)
BUN/CREAT SERPL: 9 (ref 9–23)
CALCIUM SERPL-MCNC: 9.6 MG/DL (ref 8.7–10.2)
CHLORIDE SERPL-SCNC: 98 MMOL/L (ref 96–106)
CHOLEST SERPL-MCNC: 252 MG/DL (ref 100–199)
CO2 SERPL-SCNC: 29 MMOL/L (ref 20–29)
CREAT SERPL-MCNC: 0.87 MG/DL (ref 0.57–1)
GLOBULIN SER CALC-MCNC: 3.1 G/DL (ref 1.5–4.5)
GLUCOSE SERPL-MCNC: 101 MG/DL (ref 65–99)
HBA1C MFR BLD: 5.6 % (ref 4.8–5.6)
HDLC SERPL-MCNC: 54 MG/DL
LDLC SERPL CALC-MCNC: 155 MG/DL (ref 0–99)
LDLC/HDLC SERPL: 2.9 RATIO (ref 0–3.2)
POTASSIUM SERPL-SCNC: 3.9 MMOL/L (ref 3.5–5.2)
PROT SERPL-MCNC: 7.1 G/DL (ref 6–8.5)
SODIUM SERPL-SCNC: 141 MMOL/L (ref 134–144)
TRIGL SERPL-MCNC: 214 MG/DL (ref 0–149)
TSH SERPL DL<=0.005 MIU/L-ACNC: 0.74 UIU/ML (ref 0.45–4.5)
VLDLC SERPL CALC-MCNC: 43 MG/DL (ref 5–40)

## 2019-09-24 PROCEDURE — 99214 OFFICE O/P EST MOD 30 MIN: CPT | Performed by: INTERNAL MEDICINE

## 2019-09-24 RX ORDER — MONTELUKAST SODIUM 10 MG/1
10 TABLET ORAL NIGHTLY
Qty: 30 TABLET | Refills: 2 | Status: SHIPPED | OUTPATIENT
Start: 2019-09-24 | End: 2019-10-18 | Stop reason: SDUPTHER

## 2019-09-24 NOTE — PROGRESS NOTES
Subjective   Kirsten Lima is a 59 y.o. female here today to f/u on HTN and hyperlipidemia.      History of Present Illness   She said she felt great when she went on a trip tp [place]    She hooper shave some aching in her legs and thought the crestor was making her worse but not affected  She has had a cough with some allergy sx    The following portions of the patient's history were reviewed and updated as appropriate: allergies, current medications, past medical history, past social history and problem list.    Review of Systems   All other systems reviewed and are negative.      Objective   Physical Exam   Constitutional: She is oriented to person, place, and time. She appears well-developed and well-nourished.   HENT:   Head: Normocephalic and atraumatic.   Right Ear: External ear normal.   Left Ear: External ear normal.   Mouth/Throat: Oropharynx is clear and moist.   Eyes: Conjunctivae and EOM are normal. Pupils are equal, round, and reactive to light.   Neck: Normal range of motion. No tracheal deviation present. No thyromegaly present.   Cardiovascular: Normal rate, regular rhythm, normal heart sounds and intact distal pulses.   Pulmonary/Chest: Effort normal and breath sounds normal.   Abdominal: Soft. Bowel sounds are normal. She exhibits no distension. There is no tenderness.   Musculoskeletal: Normal range of motion. She exhibits no edema or deformity.   Neurological: She is alert and oriented to person, place, and time.   Skin: Skin is warm and dry.   Psychiatric: She has a normal mood and affect. Her behavior is normal. Judgment and thought content normal.   Vitals reviewed.      Vitals:    09/24/19 1504   BP: 134/82   Pulse: 64   Temp: 98.2 °F (36.8 °C)   SpO2: 99%        Orders Only on 09/16/2019   Component Date Value Ref Range Status   • 25 Hydroxy, Vitamin D 09/23/2019 30.6  30.0 - 100.0 ng/mL Final    Comment: Vitamin D deficiency has been defined by the [place] of  Medicine and an Endocrine  Society practice guideline as a  level of serum 25-OH vitamin D less than 20 ng/mL (1,2).  The Endocrine Society went on to further define vitamin D  insufficiency as a level between 21 and 29 ng/mL (2).  1. IOM ([place] of Medicine). 2010. Dietary reference     intakes for calcium and D. [place]: The     National [employer].  2. Karina MF, Kelli NC, Zoila CARBALLO, et al.     Evaluation, treatment, and prevention of vitamin D     deficiency: an Endocrine Society clinical practice     guideline. JCEM. 2011 Jul; 96(8):7411-30.     • Hemoglobin A1C 09/23/2019 5.6  4.8 - 5.6 % Final    Comment:          Prediabetes: 5.7 - 6.4           Diabetes: >6.4           Glycemic control for adults with diabetes: <7.0     • TSH 09/23/2019 0.739  0.450 - 4.500 uIU/mL Final   • Total Cholesterol 09/23/2019 252* 100 - 199 mg/dL Final   • Triglycerides 09/23/2019 214* 0 - 149 mg/dL Final   • HDL Cholesterol 09/23/2019 54  >39 mg/dL Final   • VLDL Cholesterol 09/23/2019 43* 5 - 40 mg/dL Final   • LDL Cholesterol  09/23/2019 155* 0 - 99 mg/dL Final   • LDL/HDL Ratio 09/23/2019 2.9  0.0 - 3.2 ratio Final    Comment:                                     LDL/HDL Ratio                                              Men  Women                                1/2 Avg.Risk  1.0    1.5                                    Avg.Risk  3.6    3.2                                 2X Avg.Risk  6.2    5.0                                 3X Avg.Risk  8.0    6.1     • Glucose 09/23/2019 101* 65 - 99 mg/dL Final   • BUN 09/23/2019 8  6 - 24 mg/dL Final   • Creatinine 09/23/2019 0.87  0.57 - 1.00 mg/dL Final   • eGFR Non  Am 09/23/2019 73  >59 mL/min/1.73 Final   • eGFR African Am 09/23/2019 84  >59 mL/min/1.73 Final   • BUN/Creatinine Ratio 09/23/2019 9  9 - 23 Final   • Sodium 09/23/2019 141  134 - 144 mmol/L Final   • Potassium 09/23/2019 3.9  3.5 - 5.2 mmol/L Final   • Chloride 09/23/2019 98  96 - 106 mmol/L Final   • Total CO2  09/23/2019 29  20 - 29 mmol/L Final   • Calcium 09/23/2019 9.6  8.7 - 10.2 mg/dL Final   • Total Protein 09/23/2019 7.1  6.0 - 8.5 g/dL Final   • Albumin 09/23/2019 4.0  3.5 - 5.5 g/dL Final   • Globulin 09/23/2019 3.1  1.5 - 4.5 g/dL Final   • A/G Ratio 09/23/2019 1.3  1.2 - 2.2 Final   • Total Bilirubin 09/23/2019 0.3  0.0 - 1.2 mg/dL Final   • Alkaline Phosphatase 09/23/2019 86  39 - 117 IU/L Final   • AST (SGOT) 09/23/2019 12  0 - 40 IU/L Final   • ALT (SGPT) 09/23/2019 13  0 - 32 IU/L Final       Current Outpatient Medications:   •  amLODIPine (NORVASC) 10 MG tablet, Take 1 tablet by mouth Daily., Disp: 90 tablet, Rfl: 3  •  cetirizine (ZyrTEC) 10 MG tablet, Take 1 tablet by mouth daily., Disp: , Rfl:   •  Cholecalciferol (VITAMIN D) 2000 units capsule, Take  by mouth., Disp: , Rfl:   •  fluticasone (FLONASE) 50 MCG/ACT nasal spray, 2 sprays into the nostril(s) as directed by provider Daily., Disp: 1 bottle, Rfl: 0  •  lisinopril-hydrochlorothiazide (PRINZIDE,ZESTORETIC) 20-12.5 MG per tablet, TAKE 2 TABLETS DAILY, Disp: 180 tablet, Rfl: 3  •  metoprolol succinate XL (TOPROL-XL) 200 MG 24 hr tablet, TAKE 1 TABLET DAILY, Disp: 90 tablet, Rfl: 3  •  traZODone (DESYREL) 50 MG tablet, Take 1 tablet by mouth Every Night., Disp: 90 tablet, Rfl: 3  •  montelukast (SINGULAIR) 10 MG tablet, Take 1 tablet by mouth Every Night., Disp: 30 tablet, Rfl: 2      Assessment/Plan   Diagnoses and all orders for this visit:    Hyperlipidemia, unspecified hyperlipidemia type  -     Comprehensive Metabolic Panel; Future  -     Magnesium; Future  -     TSH Rfx On Abnormal To Free T4; Future  -     CBC & Differential; Future    Essential hypertension  -     Comprehensive Metabolic Panel; Future  -     Magnesium; Future  -     TSH Rfx On Abnormal To Free T4; Future  -     CBC & Differential; Future    Allergic rhinitis, unspecified seasonality, unspecified trigger    Insomnia, unspecified type    Other orders  -     montelukast  (SINGULAIR) 10 MG tablet; Take 1 tablet by mouth Every Night.      1.  HPL- she will resume the crestor  2.  AR- we will add singulair  3.  HTN- ok with current meds  4.  COugh- related to allergies and she will will how she does with singulair  She says she is much better  No hemoptysis  If cont we will do  CXR

## 2019-10-07 ENCOUNTER — TELEPHONE (OUTPATIENT)
Dept: INTERNAL MEDICINE | Facility: CLINIC | Age: 59
End: 2019-10-07

## 2019-10-07 DIAGNOSIS — R05.9 COUGH: Primary | ICD-10-CM

## 2019-10-07 NOTE — TELEPHONE ENCOUNTER
TRIED CALLING PT TO LET HER KNOW THAT WE CAN ORDER A CXR. ORDER IS IN.     ----- Message from Sheyla Flannery sent at 10/7/2019  9:24 AM EDT -----  Regarding: chest x-ray  Contact: [phone]  Patient said she still has a cough Dr Valdez told her if the cough continues they will need to do a chest x-ray. She wants to know if we can order a chest x-ray for her.

## 2019-10-09 ENCOUNTER — HOSPITAL ENCOUNTER (OUTPATIENT)
Dept: GENERAL RADIOLOGY | Facility: HOSPITAL | Age: 59
Discharge: HOME | End: 2019-10-09
Admitting: INTERNAL MEDICINE

## 2019-10-09 DIAGNOSIS — R05.9 COUGH: ICD-10-CM

## 2019-10-09 PROCEDURE — 71046 X-RAY EXAM CHEST 2 VIEWS: CPT

## 2019-10-18 RX ORDER — MONTELUKAST SODIUM 10 MG/1
TABLET ORAL
Qty: 30 TABLET | Refills: 5 | Status: SHIPPED | OUTPATIENT
Start: 2019-10-18 | End: 2020-03-24

## 2019-11-01 RX ORDER — ROSUVASTATIN CALCIUM 5 MG/1
TABLET, COATED ORAL
Qty: 30 TABLET | Refills: 5 | Status: SHIPPED | OUTPATIENT
Start: 2019-11-01 | End: 2020-03-24 | Stop reason: SDUPTHER

## 2020-01-07 ENCOUNTER — TELEPHONE (OUTPATIENT)
Dept: INTERNAL MEDICINE | Facility: CLINIC | Age: 60
End: 2020-01-07

## 2020-01-07 NOTE — TELEPHONE ENCOUNTER
PT AWARE. TRANSFERRED TO THE FRONT TO SCHEDULE    ----- Message from Verenice Valdez MD sent at 1/7/2020 10:59 AM EST -----  Contact: [phone]  Needs to schedule appt  Likely needs an mri first  ----- Message -----  From: Cecy Castro MA  Sent: 1/7/2020   9:36 AM EST  To: Verenice Valdez MD    SHE HAS CONSTANT BACK PAIN THAT EFFECTS HER LEGS. ANY RECOMMENDATIONS?  ----- Message -----  From: Isela Flores RegSched Rep  Sent: 1/6/2020   2:20 PM EST  To: Cecy Castro MA    Pt is requesting a recommend for a back doctor

## 2020-01-09 ENCOUNTER — OFFICE VISIT (OUTPATIENT)
Dept: INTERNAL MEDICINE | Facility: CLINIC | Age: 60
End: 2020-01-09

## 2020-01-09 VITALS
HEIGHT: 66 IN | SYSTOLIC BLOOD PRESSURE: 142 MMHG | WEIGHT: 254 LBS | DIASTOLIC BLOOD PRESSURE: 84 MMHG | BODY MASS INDEX: 40.82 KG/M2 | HEART RATE: 65 BPM | TEMPERATURE: 98.1 F | OXYGEN SATURATION: 99 %

## 2020-01-09 DIAGNOSIS — N32.81 OVERACTIVE BLADDER: ICD-10-CM

## 2020-01-09 DIAGNOSIS — M54.50 LOW BACK PAIN WITH RADIATION: Primary | ICD-10-CM

## 2020-01-09 PROCEDURE — 99214 OFFICE O/P EST MOD 30 MIN: CPT | Performed by: INTERNAL MEDICINE

## 2020-01-09 RX ORDER — MELOXICAM 15 MG/1
15 TABLET ORAL DAILY
Qty: 30 TABLET | Refills: 2 | Status: SHIPPED | OUTPATIENT
Start: 2020-01-09 | End: 2020-01-30

## 2020-01-09 RX ORDER — TOLTERODINE 4 MG/1
4 CAPSULE, EXTENDED RELEASE ORAL DAILY
Qty: 30 CAPSULE | Refills: 3 | Status: SHIPPED | OUTPATIENT
Start: 2020-01-09 | End: 2020-03-24 | Stop reason: SDUPTHER

## 2020-01-09 NOTE — PROGRESS NOTES
Subjective   Kirsten Lima is a 59 y.o. female c/o LBP that radiated down to legs and also have tinglings and numbness on legs.    History of Present Illness   SHE SAYS HER BACK pain and stiffness has gotten worse  Pain radiates down both legs with tingling into her leg    Legs feel weak and numb but does not feel like it is going to give out  Sx worse for a couple week  No truama.  She has had this for several months  But worse  She did get some relief with aleve    The following portions of the patient's history were reviewed and updated as appropriate: allergies, current medications, past medical history, past social history and problem list.  No change in PA    Review of Systems   All other systems reviewed and are negative.      Objective   Physical Exam   Constitutional: She is oriented to person, place, and time. She appears well-developed and well-nourished.   HENT:   Head: Normocephalic and atraumatic.   Right Ear: External ear normal.   Left Ear: External ear normal.   Mouth/Throat: Oropharynx is clear and moist.   Eyes: Pupils are equal, round, and reactive to light. Conjunctivae and EOM are normal.   Neck: Normal range of motion. No tracheal deviation present. No thyromegaly present.   Cardiovascular: Normal rate, regular rhythm, normal heart sounds and intact distal pulses.   Pulmonary/Chest: Effort normal and breath sounds normal.   Abdominal: Soft. Bowel sounds are normal. She exhibits no distension. There is no tenderness.   Musculoskeletal: Normal range of motion. She exhibits no edema or deformity.   Neurological: She is alert and oriented to person, place, and time.   Skin: Skin is warm and dry.   Psychiatric: She has a normal mood and affect. Her behavior is normal. Judgment and thought content normal.   Vitals reviewed.      Vitals:    01/09/20 1000   BP: 142/84   Pulse: 65   Temp: 98.1 °F (36.7 °C)   SpO2: 99%     Current Outpatient Medications:   •  amLODIPine (NORVASC) 10 MG tablet, Take 1  tablet by mouth Daily., Disp: 90 tablet, Rfl: 3  •  cetirizine (ZyrTEC) 10 MG tablet, Take 1 tablet by mouth daily., Disp: , Rfl:   •  Cholecalciferol (VITAMIN D) 2000 units capsule, Take  by mouth., Disp: , Rfl:   •  lisinopril-hydrochlorothiazide (PRINZIDE,ZESTORETIC) 20-12.5 MG per tablet, TAKE 2 TABLETS DAILY, Disp: 180 tablet, Rfl: 3  •  metoprolol succinate XL (TOPROL-XL) 200 MG 24 hr tablet, TAKE 1 TABLET DAILY, Disp: 90 tablet, Rfl: 3  •  montelukast (SINGULAIR) 10 MG tablet, TAKE 1 TABLET BY MOUTH EVERY DAY AT NIGHT, Disp: 30 tablet, Rfl: 5  •  rosuvastatin (CRESTOR) 5 MG tablet, TAKE 1 TABLET BY MOUTH EVERY DAY, Disp: 30 tablet, Rfl: 5  •  traZODone (DESYREL) 50 MG tablet, Take 1 tablet by mouth Every Night., Disp: 90 tablet, Rfl: 3  •  fluticasone (FLONASE) 50 MCG/ACT nasal spray, 2 sprays into the nostril(s) as directed by provider Daily., Disp: 1 bottle, Rfl: 0  •  tolterodine LA (DETROL LA) 4 MG 24 hr capsule, Take 1 capsule by mouth Daily., Disp: 30 capsule, Rfl: 3    Body mass index is 41.02 kg/m².         Assessment/Plan   Diagnoses and all orders for this visit:    Low back pain with radiation  -     MRI Lumbar Spine Without Contrast; Future  -     MRI Lumbar Spine Without Contrast    Overactive bladder    Other orders  -     Discontinue: meloxicam (MOBIC) 15 MG tablet; Take 1 tablet by mouth Daily. With food  -     tolterodine LA (DETROL LA) 4 MG 24 hr capsule; Take 1 capsule by mouth Daily.      1.  LBP-  Get mri  May need a referral to pain  2.  OAB- try detrol

## 2020-01-17 DIAGNOSIS — M54.50 LOW BACK PAIN WITH RADIATION: Primary | ICD-10-CM

## 2020-01-30 ENCOUNTER — OFFICE VISIT (OUTPATIENT)
Dept: INTERNAL MEDICINE | Facility: CLINIC | Age: 60
End: 2020-01-30

## 2020-01-30 VITALS
HEIGHT: 66 IN | SYSTOLIC BLOOD PRESSURE: 138 MMHG | OXYGEN SATURATION: 97 % | BODY MASS INDEX: 40.45 KG/M2 | TEMPERATURE: 99.8 F | HEART RATE: 66 BPM | DIASTOLIC BLOOD PRESSURE: 82 MMHG | WEIGHT: 251.7 LBS

## 2020-01-30 DIAGNOSIS — J40 BRONCHITIS: Primary | ICD-10-CM

## 2020-01-30 PROCEDURE — 99213 OFFICE O/P EST LOW 20 MIN: CPT | Performed by: INTERNAL MEDICINE

## 2020-01-30 RX ORDER — FLUCONAZOLE 150 MG/1
150 TABLET ORAL ONCE
Qty: 1 TABLET | Refills: 0 | Status: SHIPPED | OUTPATIENT
Start: 2020-01-30 | End: 2020-01-30

## 2020-01-30 RX ORDER — DOXYCYCLINE HYCLATE 100 MG
100 TABLET ORAL 2 TIMES DAILY
Qty: 14 TABLET | Refills: 0 | Status: SHIPPED | OUTPATIENT
Start: 2020-01-30 | End: 2020-03-24

## 2020-01-30 NOTE — PROGRESS NOTES
Subjective   Kirsten Lima is a 59 y.o. female here today for cough, drainage and nausea.      History of Present Illness   She has had a cough with uri sx  Cough worse in the evening  Sx for close to 3 weeks now  No wheezing.  No sig sinus pressure  She is having some int nause that she thinks is from the drainage    The following portions of the patient's history were reviewed and updated as appropriate: allergies, current medications, past medical history, past social history and problem list.    Review of Systems   All other systems reviewed and are negative.      Objective   Physical Exam   Constitutional: She is oriented to person, place, and time. She appears well-developed and well-nourished.   HENT:   Head: Normocephalic and atraumatic.   Right Ear: External ear normal.   Left Ear: External ear normal.   Mouth/Throat: Oropharynx is clear and moist.   Eyes: Pupils are equal, round, and reactive to light. Conjunctivae and EOM are normal.   Neck: Normal range of motion. No tracheal deviation present. No thyromegaly present.   Cardiovascular: Normal rate, regular rhythm, normal heart sounds and intact distal pulses.   Pulmonary/Chest: Effort normal and breath sounds normal.   Abdominal: Soft. Bowel sounds are normal. She exhibits no distension. There is no tenderness.   Musculoskeletal: Normal range of motion. She exhibits no edema or deformity.   Neurological: She is alert and oriented to person, place, and time.   Skin: Skin is warm and dry.   Psychiatric: She has a normal mood and affect. Her behavior is normal. Judgment and thought content normal.   Vitals reviewed.      Vitals:    01/30/20 1508   BP: 138/82   Pulse: 66   Temp: 99.8 °F (37.7 °C)   SpO2: 97%     Body mass index is 40.65 kg/m².       Current Outpatient Medications:   •  amLODIPine (NORVASC) 10 MG tablet, Take 1 tablet by mouth Daily., Disp: 90 tablet, Rfl: 3  •  cetirizine (ZyrTEC) 10 MG tablet, Take 1 tablet by mouth daily., Disp: , Rfl:   •   Cholecalciferol (VITAMIN D) 2000 units capsule, Take  by mouth., Disp: , Rfl:   •  lisinopril-hydrochlorothiazide (PRINZIDE,ZESTORETIC) 20-12.5 MG per tablet, TAKE 2 TABLETS DAILY, Disp: 180 tablet, Rfl: 3  •  metoprolol succinate XL (TOPROL-XL) 200 MG 24 hr tablet, TAKE 1 TABLET DAILY, Disp: 90 tablet, Rfl: 3  •  montelukast (SINGULAIR) 10 MG tablet, TAKE 1 TABLET BY MOUTH EVERY DAY AT NIGHT, Disp: 30 tablet, Rfl: 5  •  rosuvastatin (CRESTOR) 5 MG tablet, TAKE 1 TABLET BY MOUTH EVERY DAY, Disp: 30 tablet, Rfl: 5  •  tolterodine LA (DETROL LA) 4 MG 24 hr capsule, Take 1 capsule by mouth Daily., Disp: 30 capsule, Rfl: 3  •  traZODone (DESYREL) 50 MG tablet, Take 1 tablet by mouth Every Night., Disp: 90 tablet, Rfl: 3  •  doxycycline (VIBRAMYICN) 100 MG tablet, Take 1 tablet by mouth 2 (Two) Times a Day., Disp: 14 tablet, Rfl: 0  •  fluconazole (DIFLUCAN) 150 MG tablet, Take 1 tablet by mouth 1 (One) Time for 1 dose., Disp: 1 tablet, Rfl: 0  •  fluticasone (FLONASE) 50 MCG/ACT nasal spray, 2 sprays into the nostril(s) as directed by provider Daily., Disp: 1 bottle, Rfl: 0      Assessment/Plan   Diagnoses and all orders for this visit:    Bronchitis    Other orders  -     doxycycline (VIBRAMYICN) 100 MG tablet; Take 1 tablet by mouth 2 (Two) Times a Day.  -     fluconazole (DIFLUCAN) 150 MG tablet; Take 1 tablet by mouth 1 (One) Time for 1 dose.    1.  Bronchitis-  She has a lot of rhinchi that clear with coughing  We will try doxycycline and increased fluids and vit c

## 2020-02-03 ENCOUNTER — OFFICE VISIT (OUTPATIENT)
Dept: PAIN MEDICINE | Facility: CLINIC | Age: 60
End: 2020-02-03

## 2020-02-03 VITALS
HEIGHT: 66 IN | HEART RATE: 63 BPM | OXYGEN SATURATION: 96 % | BODY MASS INDEX: 40.47 KG/M2 | TEMPERATURE: 97.7 F | WEIGHT: 251.8 LBS | SYSTOLIC BLOOD PRESSURE: 145 MMHG | RESPIRATION RATE: 20 BRPM | DIASTOLIC BLOOD PRESSURE: 87 MMHG

## 2020-02-03 DIAGNOSIS — M48.061 SPINAL STENOSIS OF LUMBAR REGION, UNSPECIFIED WHETHER NEUROGENIC CLAUDICATION PRESENT: ICD-10-CM

## 2020-02-03 DIAGNOSIS — G89.29 OTHER CHRONIC PAIN: Primary | ICD-10-CM

## 2020-02-03 DIAGNOSIS — M47.816 LUMBAR FACET ARTHROPATHY: ICD-10-CM

## 2020-02-03 PROCEDURE — 99214 OFFICE O/P EST MOD 30 MIN: CPT | Performed by: NURSE PRACTITIONER

## 2020-02-03 RX ORDER — FLUCONAZOLE 150 MG/1
TABLET ORAL
COMMUNITY
Start: 2020-01-30 | End: 2020-03-24

## 2020-02-03 NOTE — PROGRESS NOTES
CHIEF COMPLAINT  New pt referred to our office by Verenice Valdez MD for back pain. Pt sts pain started 2 years ago bilat legs had constant throbbing pain and pt had intermittent back pain. Pt sts pain worsened in the past year. Pt currently works at [employer], has a desk job, when she gets up and walks she feels tingling in the back of her heels. Pt c/o back pain radiates down bilat legs. Pt sts legs ache all day then gets better at night. Pt saw PCP, had MRI lumbar spine 1/2020. Pt has been taking aleve otc to manage pain, but now not helping. Pt sts she was given mobic in the past, pt sts improved her back pain while walking x 3 days, however on the fourth day, pt developed a HA, and felt flushed, then stopped the medication and the HA went away. Pt sts never went to PT or chiropractor. Pt sts has never had lumbar epidurals or steroid injections. This is pt first time in pain management.     Macey Lima is a single RHD 59 y.o. female.   She presents to the office for evaluation of chronic back pain. She was referred here by Dr Verenice Valdez(PCP).  She lives by herself. 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 Does smoke 3/4 PPD x 30 years. Does drink alcohol, 2/month. Denies any illicit substance use. Family history is positive for alcoholism( 3 brothers, 1 sister). Family history is negative for back problems.    Complains of pain in her low back, buttocks, hips and legs. Today her pain is 6/10VAS. Describes the back pain as continuous aching and throbbing. Her leg pain is intermittent numbing and tingling. Pain increases with walking, standing, activity; pain decreases with rest, changing position, heat/ice. Failed Meloxicam-- initially helped but had intolerable side effects(headache). Tried Aleve an Ibuprofen, noted mild temporary improvement. ADL's by self. Denies any bowel incontinence. History of bladder incontinence, which as been taking Detrol LA, but does not believe this  is due to the back pain.    HAs had low back for several years without any specific incident or trauma. However, over the past 2 years, she started noticing her back and leg pain was happening. She remember slipping about 2-3 years ago and falling on tailbone and falling under car. Since then, her back and legs have been hurting since then. The pain has progressively worsened, including addition of leg pain.  She finally decided to see her PCP a few weeks ago. Ordered lumbar MRI. Referred to pain management.     Back Pain   This is a chronic problem. The current episode started more than 1 year ago. The problem occurs constantly. The problem has been gradually worsening since onset. The pain is present in the lumbar spine and sacro-iliac. The quality of the pain is described as aching. Radiates to: BLE's. The pain is at a severity of 6/10. The pain is moderate. The pain is worse during the day. The symptoms are aggravated by bending, position, standing and twisting. Associated symptoms include bladder incontinence and numbness (feet). Pertinent negatives include no bowel incontinence, chest pain, fever, headaches or weakness. Risk factors include lack of exercise, menopause, obesity and sedentary lifestyle. She has tried bed rest, heat, home exercises, ice, NSAIDs and walking for the symptoms. The treatment provided mild relief.      PEG Assessment   What number best describes your pain on average in the past week?6  What number best describes how, during the past week, pain has interfered with your enjoyment of life?6  What number best describes how, during the past week, pain has interfered with your general activity?  7      Current Outpatient Medications:   •  amLODIPine (NORVASC) 10 MG tablet, Take 1 tablet by mouth Daily., Disp: 90 tablet, Rfl: 3  •  cetirizine (ZyrTEC) 10 MG tablet, Take 1 tablet by mouth daily., Disp: , Rfl:   •  Cholecalciferol (VITAMIN D) 2000 units capsule, Take  by mouth., Disp: , Rfl:    •  doxycycline (VIBRAMYICN) 100 MG tablet, Take 1 tablet by mouth 2 (Two) Times a Day., Disp: 14 tablet, Rfl: 0  •  fluconazole (DIFLUCAN) 150 MG tablet, , Disp: , Rfl:   •  lisinopril-hydrochlorothiazide (PRINZIDE,ZESTORETIC) 20-12.5 MG per tablet, TAKE 2 TABLETS DAILY, Disp: 180 tablet, Rfl: 3  •  metoprolol succinate XL (TOPROL-XL) 200 MG 24 hr tablet, TAKE 1 TABLET DAILY, Disp: 90 tablet, Rfl: 3  •  montelukast (SINGULAIR) 10 MG tablet, TAKE 1 TABLET BY MOUTH EVERY DAY AT NIGHT, Disp: 30 tablet, Rfl: 5  •  rosuvastatin (CRESTOR) 5 MG tablet, TAKE 1 TABLET BY MOUTH EVERY DAY, Disp: 30 tablet, Rfl: 5  •  tolterodine LA (DETROL LA) 4 MG 24 hr capsule, Take 1 capsule by mouth Daily., Disp: 30 capsule, Rfl: 3  •  traZODone (DESYREL) 50 MG tablet, Take 1 tablet by mouth Every Night., Disp: 90 tablet, Rfl: 3  •  fluticasone (FLONASE) 50 MCG/ACT nasal spray, 2 sprays into the nostril(s) as directed by provider Daily., Disp: 1 bottle, Rfl: 0    The following portions of the patient's history were reviewed and updated as appropriate: allergies, current medications, past family history, past medical history, past social history, past surgical history and problem list.    REVIEW OF PERTINENT MEDICAL DATA    DWAYNE [id number]--[id number]    BOYCE Inventory-- 4 (INCOMPLETE)    Reviewed Dr Verenice Valdez(PCP) office visit 1-9-20--complains of low back pain that radiates down to legs.  Also has tingling and numbness of legs.  Legs feel weak and went numb but does not feel like is going to get out.  No recent trauma.  Continues to worsen.  Has had this for several months.  Minimal relief with Aleve. Plan to get lumbar MRI and consider referral to pain managment.           Review of Systems   Constitutional: Positive for activity change (dec) and fatigue. Negative for fever and unexpected weight change.   HENT: Positive for postnasal drip. Negative for congestion.    Eyes: Negative for visual disturbance.   Respiratory: Negative for  "apnea and shortness of breath.    Cardiovascular: Negative for chest pain.   Gastrointestinal: Negative for bowel incontinence, constipation, diarrhea, nausea and vomiting.   Endocrine: Negative for polyuria.   Genitourinary: Positive for bladder incontinence. Negative for difficulty urinating.   Musculoskeletal: Positive for arthralgias (bilat legs) and back pain. Negative for gait problem.   Allergic/Immunologic: Negative for immunocompromised state.   Neurological: Positive for numbness (feet). Negative for dizziness, weakness, light-headedness and headaches.   Psychiatric/Behavioral: Negative for agitation, sleep disturbance and suicidal ideas. The patient is not nervous/anxious.      Vitals:    02/03/20 1440   BP: 145/87  Comment: pt sts had bp meds today   Pulse: 63   Resp: 20   Temp: 97.7 °F (36.5 °C)   SpO2: 96%   Weight: 114 kg (251 lb 12.8 oz)   Height: 167.6 cm (65.98\")   PainSc:   6   PainLoc: Back     Objective   Physical Exam   Constitutional: She is oriented to person, place, and time. Vital signs are normal. She appears well-developed and well-nourished. She is cooperative.   HENT:   Head: Normocephalic and atraumatic.   Nose: Nose normal.   Eyes: Conjunctivae and lids are normal.   Neck: Trachea normal. Neck supple.   Cardiovascular: Normal rate.   Pulmonary/Chest: Effort normal.   Abdominal: Normal appearance.   Musculoskeletal:        Lumbar back: She exhibits decreased range of motion, tenderness, bony tenderness (mild lumbar facet tenderness) and pain.   Equivocal SLR bilaterally   Neurological: She is alert and oriented to person, place, and time. She has normal strength. No cranial nerve deficit. Gait normal.   Reflex Scores:       Patellar reflexes are 1+ on the right side and 1+ on the left side.       Achilles reflexes are 1+ on the right side and 1+ on the left side.  Skin: Skin is warm, dry and intact.   Psychiatric: She has a normal mood and affect. Her speech is normal and behavior is " normal. Judgment and thought content normal. Cognition and memory are normal.   Nursing note and vitals reviewed.      Assessment/Plan   Kirsten was seen today for back pain and leg pain.    Diagnoses and all orders for this visit:    Other chronic pain    Lumbar facet arthropathy  -     Ambulatory Referral to Physical Therapy    Spinal stenosis of lumbar region, unspecified whether neurogenic claudication present  -     Ambulatory Referral to Physical Therapy      --- PT  --- half dose of Meloxicam PRN. Reviewed not taking daily, only when having severe pain. Watch BP. Patient verbalized understanding. She states she still has some at home.  --- Consider LESI L5-S1. PAtient wants to wait at this time.  --- Discussed consideration for PATI referral. Patient declined at this time.  --- Follow-up 6 weeks or sooner if needed.    This was an extended office visit. Over 25 minutes was spent in face to face contact with the patient. Over half of the time was spent in education and counseling.      DWAYNE REPORT    DWAYNE report has been reviewed and scanned into the patient's chart.    As the clinician, I personally reviewed the DWAYNE from 1-30-20 while the patient was in the office today.            EMR Dragon/Transcription disclaimer:   Much of this encounter note is an electronic transcription/translation of spoken language to printed text. The electronic translation of spoken language may permit erroneous, or at times, nonsensical words or phrases to be inadvertently transcribed; Although I have reviewed the note for such errors, some may still exist.

## 2020-02-14 DIAGNOSIS — I10 ESSENTIAL HYPERTENSION: ICD-10-CM

## 2020-02-14 RX ORDER — TRAZODONE HYDROCHLORIDE 50 MG/1
TABLET ORAL
Qty: 90 TABLET | Refills: 3 | Status: SHIPPED | OUTPATIENT
Start: 2020-02-14 | End: 2020-10-08 | Stop reason: SDUPTHER

## 2020-02-14 RX ORDER — LISINOPRIL AND HYDROCHLOROTHIAZIDE 20; 12.5 MG/1; MG/1
TABLET ORAL
Qty: 180 TABLET | Refills: 3 | Status: SHIPPED | OUTPATIENT
Start: 2020-02-14 | End: 2020-10-08 | Stop reason: SDUPTHER

## 2020-02-18 DIAGNOSIS — I10 ESSENTIAL HYPERTENSION: ICD-10-CM

## 2020-02-18 RX ORDER — METOPROLOL SUCCINATE 200 MG/1
TABLET, EXTENDED RELEASE ORAL
Qty: 90 TABLET | Refills: 3 | Status: SHIPPED | OUTPATIENT
Start: 2020-02-18 | End: 2020-10-08 | Stop reason: SDUPTHER

## 2020-02-18 RX ORDER — AMLODIPINE BESYLATE 10 MG/1
TABLET ORAL
Qty: 90 TABLET | Refills: 3 | Status: SHIPPED | OUTPATIENT
Start: 2020-02-18 | End: 2020-10-08 | Stop reason: SDUPTHER

## 2020-02-20 ENCOUNTER — HOSPITAL ENCOUNTER (OUTPATIENT)
Dept: PHYSICAL THERAPY | Facility: HOSPITAL | Age: 60
Setting detail: THERAPIES SERIES
Discharge: HOME | End: 2020-02-20

## 2020-02-20 DIAGNOSIS — G89.29 CHRONIC BILATERAL LOW BACK PAIN WITHOUT SCIATICA: Primary | ICD-10-CM

## 2020-02-20 DIAGNOSIS — M54.50 CHRONIC BILATERAL LOW BACK PAIN WITHOUT SCIATICA: Primary | ICD-10-CM

## 2020-02-20 DIAGNOSIS — R26.2 DIFFICULTY WALKING: ICD-10-CM

## 2020-02-20 PROCEDURE — 97110 THERAPEUTIC EXERCISES: CPT

## 2020-02-20 PROCEDURE — 97161 PT EVAL LOW COMPLEX 20 MIN: CPT

## 2020-02-20 NOTE — THERAPY EVALUATION
Outpatient Physical Therapy Ortho Initial Evaluation  [facility]     Patient Name: Kirsten Lima  : 1960  MRN: [id number]  Today's Date: 2020      Visit Date: 2020    Patient Active Problem List   Diagnosis   • Abnormal thyroid stimulating hormone (TSH) level   • Atopic rhinitis   • Hypertension   • Insomnia   • Overactive bladder   • Knee pain   • Cobalamin deficiency   • Vitamin D deficiency   • Hyperlipidemia   • Other chronic pain   • Lumbar facet arthropathy   • Spinal stenosis of lumbar region        Past Medical History:   Diagnosis Date   • Acute cystitis    • Allergic rhinitis    • Dysuria    • Hyperlipidemia    • Hypertension    • Insomnia    • Nonspecific abnormal electrocardiogram (ECG) (EKG)    • Obesity    • Overactive bladder    • Pregnancy     G-2, P-0   • Vitamin B12 deficiency    • Vitamin D insufficiency         Past Surgical History:   Procedure Laterality Date   • HYSTERECTOMY      total abdominal hysterectomy       Visit Dx:     ICD-10-CM ICD-9-CM   1. Chronic bilateral low back pain without sciatica M54.5 724.2    G89.29 338.29   2. Difficulty walking R26.2 719.7         Patient History     Row Name 20 1400             History    Chief Complaint  Pain  -RS      Type of Pain  Back pain  -RS      Date Current Problem(s) Began  19 approx , pt states began a year prior  -RS      Brief Description of Current Complaint  The pt is a 49 yo female who present sto PT with reports of chronic LBP that has been made worse in the past few months, no KATHRINE. She recalls falling onto her hips when she slipped on ice greater than 1 year prior, does not seem to correlate to the onset of pain. She has tingling in B heels with standing/walking too long (when taking a smoke break) and also has intermittent B anterior thigh pain, no pattern of pain. No changes in B/B or numbness. SHe would like to start a gym routine to lose some weight.  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  MRI  -RS         Pain     Pain Location  Back  -RS      Pain at Present  4  -RS      Tolerance Time- Standing  20-30 min  -RS      Tolerance Time- Sitting  gets stiff  -RS      Tolerance Time- Walking  less than 10 min  -RS      Is your sleep disturbed?  Yes  -RS      Difficulties at work?  walking to building from car, stiffness when sitting  -RS      Difficulties with ADL's?  LE dressing, stairs, walking  -RS         Fall Risk Assessment    Any falls in the past year:  No  -RS         Services    Are you currently receiving Home Health services  No  -RS         Daily Activities    Primary Language  English  -RS      How does patient learn best?  Listening;Reading  -RS      Pt Participated in POC and Goals  Yes  -RS         Safety    Are you being hurt, hit, or frightened by anyone at home or in your life?  No  -RS      Are you being neglected by a caregiver  No  -RS        User Key  (r) = Recorded By, (t) = Taken By, (c) = Cosigned By    Initials Name Provider Type    RS Alina Lee, PT Physical Therapist          PT Ortho     Row Name 02/20/20 1400       Posture/Observations    Alignment Options  Cervical lordosis;Lumbar lordosis  -RS    Cervical Lordosis  Mild  -RS    Lumbar lordosis  Mild;Increased  -RS       Neural Tension Signs- Lower Quarter Clearing    Slump  Bilateral:;Negative  -RS       Myotomal Screen- Lower Quarter Clearing    Hip flexion (L2)  Bilateral:;4+ (Good +)  -RS    Knee extension (L3)  Bilateral:;5 (Normal)  -RS    Ankle DF (L4)  Bilateral:;5 (Normal)  -RS    Great toe extension (L5)  Bilateral:;4+ (Good +)  -RS    Ankle PF (S1)  Bilateral:;4 (Good)  -RS    Knee flexion (S2)  Right:;5 (Normal);Left:;4+ (Good +)  -RS        Lumbar ROM Screen- Lower Quarter Clearing    Lumbar Flexion  Normal  -RS    Lumbar Extension  Impaired 30% WNL, stiff  -RS    Lumbar Lateral Flexion  Impaired 40% WNL bilaterally, pain ipsilateral LB  -RS    Lumbar Rotation  Impaired 60% WNL bilateral  -RS       Special Tests/Palpation    Special Tests/Palpation  Lumbar/SI  -RS       Lumbosacral Accessory Motions    Lumbosacral Accessory Motions Tested?  Yes  -RS    PA [place]- L1  Center:;Hypomobile  -RS    PA [place]- L2  Center:;Hypomobile  -RS    PA [place]- L3  Center:;Hypomobile  -RS    PA [place]- L4  Center:;Hypomobile  -RS    PA [place]- L5  Center:;Hypomobile  -RS       Lumbosacral Palpation    Lumbosacral Palpation?  Yes  -RS    Quadratus Lumborum  Bilateral:;Guarded/taut  -RS    Erector Spinae (Paraspinals)  Bilateral:;Guarded/taut  -RS       MMT (Manual Muscle Testing)    Rt Lower Ext  Rt Hip ABduction;Rt Hip Extension  -RS    Lt Lower Ext  Lt Hip Extension;Lt Hip ABduction  -RS       MMT Right Lower Ext    Rt Hip Extension MMT, Gross Movement  (3+/5) fair plus  -RS    Rt Hip ABduction MMT, Gross Movement  (3+/5) fair plus  -RS    Rt Lower Extremity Comments   measured in sidelying  -RS       MMT Left Lower Ext    Lt Hip Extension MMT, Gross Movement  (3+/5) fair plus  -RS    Lt Hip ABduction MMT, Gross Movement  (3+/5) fair plus  -RS    Lt Lower Extremity Comments   measured in sidelying  -RS      User Key  (r) = Recorded By, (t) = Taken By, (c) = Cosigned By    Initials Name Provider Type    RS Alina Lee, PT Physical Therapist                      Therapy Education  Education Details: Role of OP PT, POC, HEP, diff diagnosis, goals, expectations  Given: HEP, Symptoms/condition management  Program: New  How Provided: Verbal, Demonstration, Written  Provided to: Patient  Level of Understanding: Teach back education performed, Verbalized, Demonstrated     PT OP Goals     Row Name 02/20/20 1500          PT Short Term Goals    STG Date to Achieve  03/12/20   -RS     STG 1  The pt will demonstrate IND with initial HEP focuse don improved lumbar mobility and decreased pain.  -RS     STG 1 Progress  New  -RS     STG 2  The pt will report no increase in pain greater than 2/10 when walking from car to work building for improved functional activity tolerance.   -RS     STG 2 Progress  New  -RS        Long Term Goals    LTG Date to Achieve  04/20/20  -RS     LTG 1  The pt will demonstrate IND with progressive HEP focused on return to PLOF and IND condition management.  -RS     LTG 1 Progress  New  -RS     LTG 2  The pt will walk through grocery store for approx 30 min without increased pain greater than 1-2/10 for improved community navigation.  -RS     LTG 2 Progress  New  -RS     LTG 3  The pt will report at least a 70% reduction in LE pain into thighs for improved QOL.  -RS     LTG 3 Progress  New  -RS     LTG 4  The pt will score less than 30% disability on the modified oswestry to indicate improved perceived performance of ADLs.  -RS     LTG 4 Progress  New  -RS     LTG 5  The pt will initiate a gym routine for 30-45 min 2-3 days for week for improved health maitenance.  -RS     LTG 5 Progress  New  -RS        Time Calculation    PT Goal Re-Cert Due Date  05/20/20  -RS       User Key  (r) = Recorded By, (t) = Taken By, (c) = Cosigned By    Initials Name Provider Type    RS Alina Lee, PT Physical Therapist          PT Assessment/Plan     Row Name 02/20/20 1400          PT Assessment    Functional Limitations  Limitation in home management;Limitations in community activities;Limitations in functional capacity and performance;Performance in leisure activities;Performance in self-care ADL;Performance in work activities  -RS     Impairments  Endurance;Gait;Pain;Poor body mechanics;Posture;Range of motion;Sensation;Muscle strength;Joint mobility  -RS     Assessment Comments  Kirsten Lima is a 59 y.o. female referred to physical therapy for LBP. She presents with a  stable clinical presentation, along with  comorbidities of smoker and overweight and personal factors of decreased activity tolerance and chronicity of pain that may impact her progress in the plan of care. Pt presents today with decreased lumbar mobility, specifically into flexion and pain with sidebending, decreased hip and core stability, limited hip mobility . her signs and symptoms are consistent with referring diagnosis. The previous impairments limit her ability to  Walk short distances, navigate stairs without pain, tolerate prolonged standing without tingling in her heels. She scores 54% disability on the modified oswestry (100=full disability). Pt will benefit from skilled PT to address the previous impairments and return to PLOF.  -RS     Please refer to paper survey for additional self-reported information  Yes  -RS     Rehab Potential  Good  -RS     Patient/caregiver participated in establishment of treatment plan and goals  Yes  -RS     Patient would benefit from skilled therapy intervention  Yes  -RS        PT Plan    PT Frequency  2x/week  -RS     Predicted Duration of Therapy Intervention (Therapy Eval)  8-10 weeks for a total of 12-14 visits  -RS     Planned CPT's?  PT EVAL LOW COMPLEXITY: 71533;PT RE-EVAL: 33772;PT THER PROC EA 15 MIN: 39837;PT THER ACT EA 15 MIN: 34874;PT MANUAL THERAPY EA 15 MIN: 84865;PT NEUROMUSC RE-EDUCATION EA 15 MIN: 02663;PT GAIT TRAINING EA 15 MIN: 43029;PT AQUATIC THERAPY EA 15 MIN: 13516;PT SELF CARE/HOME MGMT/TRAIN EA 15: 87079;PT HOT OR COLD PACK TREAT MCARE;PT ELECTRICAL STIM UNATTEND: ;PT TRACTION LUMBAR: 58488  -RS     PT Plan Comments  Assess tolerance for initial HEP, progress as tolerance allows focused on flexion based protocol and lumbopelvic stabilization  -RS       User Key  (r) = Recorded By, (t) = Taken By, (c) = Cosigned By    Initials Name Provider Type    RS Alina Lee PT Physical Therapist            OP Exercises     Row Name 02/20/20 9284              Total Minutes    59573 - PT Therapeutic Exercise Minutes  12  -RS         Exercise 1    Exercise Name 1  LTR  -RS      Cueing 1  Verbal  -RS      Reps 1  10  -RS      Time 1  3s  -RS         Exercise 2    Exercise Name 2  PPT with TA  -RS      Cueing 2  Verbal;Tactile  -RS      Reps 2  10  -RS      Time 2  5s  -RS      Additional Comments  PT hand under pt's back for tactil cue  -RS         Exercise 3    Exercise Name 3  SKTC  -RS      Cueing 3  Verbal;Demo  -RS      Reps 3  2  -RS      Time 3  30s  -RS         Exercise 4    Exercise Name 4  Hl clamshell  -RS      Cueing 4  Verbal  -RS      Reps 4  15  -RS      Additional Comments  RTB  -RS        User Key  (r) = Recorded By, (t) = Taken By, (c) = Cosigned By    Initials Name Provider Type    RS Alina Lee, PT Physical Therapist                        Outcome Measure Options: Modifed Owestry  Modified Oswestry  Modified Oswestry Score/Comments: 54% disability      Time Calculation:     Start Time: 1445  Stop Time: 1525  Time Calculation (min): 40 min     Therapy Charges for Today     Code Description Service Date Service Provider Modifiers Qty    26959062359 HC PT THER PROC EA 15 MIN 2/20/2020 Alina Lee, PT GP 1    47708212674 HC PT EVAL LOW COMPLEXITY 2 2/20/2020 Alina Lee, PT GP 1          PT G-Codes  Outcome Measure Options: Modifed Owestry  Modified Oswestry Score/Comments: 54% disability         Alina Lee PT  2/20/2020

## 2020-03-03 DIAGNOSIS — I10 ESSENTIAL HYPERTENSION: ICD-10-CM

## 2020-03-03 DIAGNOSIS — E78.5 HYPERLIPIDEMIA, UNSPECIFIED HYPERLIPIDEMIA TYPE: ICD-10-CM

## 2020-03-04 ENCOUNTER — HOSPITAL ENCOUNTER (OUTPATIENT)
Dept: PHYSICAL THERAPY | Facility: HOSPITAL | Age: 60
Setting detail: THERAPIES SERIES
Discharge: HOME | End: 2020-03-04

## 2020-03-04 DIAGNOSIS — R26.2 DIFFICULTY WALKING: ICD-10-CM

## 2020-03-04 DIAGNOSIS — M54.50 CHRONIC BILATERAL LOW BACK PAIN WITHOUT SCIATICA: Primary | ICD-10-CM

## 2020-03-04 DIAGNOSIS — G89.29 CHRONIC BILATERAL LOW BACK PAIN WITHOUT SCIATICA: Primary | ICD-10-CM

## 2020-03-04 PROCEDURE — 97110 THERAPEUTIC EXERCISES: CPT

## 2020-03-04 NOTE — THERAPY TREATMENT NOTE
"    Outpatient Physical Therapy Ortho Treatment Note  [facility]     Patient Name: Kirsten Lima  : 1960  MRN: [id number]  Today's Date: 3/4/2020      Visit Date: 2020    Visit Dx:    ICD-10-CM ICD-9-CM   1. Chronic bilateral low back pain without sciatica M54.5 724.2    G89.29 338.29   2. Difficulty walking R26.2 719.7       Patient Active Problem List   Diagnosis   • Abnormal thyroid stimulating hormone (TSH) level   • Atopic rhinitis   • Hypertension   • Insomnia   • Overactive bladder   • Knee pain   • Cobalamin deficiency   • Vitamin D deficiency   • Hyperlipidemia   • Other chronic pain   • Lumbar facet arthropathy   • Spinal stenosis of lumbar region        Past Medical History:   Diagnosis Date   • Acute cystitis    • Allergic rhinitis    • Dysuria    • Hyperlipidemia    • Hypertension    • Insomnia    • Nonspecific abnormal electrocardiogram (ECG) (EKG)    • Obesity    • Overactive bladder    • Pregnancy     G-2, P-0   • Vitamin B12 deficiency    • Vitamin D insufficiency         Past Surgical History:   Procedure Laterality Date   • HYSTERECTOMY      total abdominal hysterectomy       PT Ortho     Row Name 20 1600       Subjective Comments    Subjective Comments  bilat thigh and bilat leg pain daily  -SI       Subjective Pain    Able to rate subjective pain?  yes  -SI    Subjective Pain Comment  pain worst in AM and lightens up as day goes on...\"achy, throbbing, both legs, stiffness back  -SI      User Key  (r) = Recorded By, (t) = Taken By, (c) = Cosigned By    Initials Name Provider Type    Roxanne Pretty, PTA Physical Therapy Assistant                      PT Assessment/Plan     Row Name 20 1613          PT Assessment    Assessment Comments  pt very receptiveto teaching.  She realizes needs to lose weight....Sit posture review.  Ex review and upgrade and re-print.  -SI       User Key  (r) = Recorded By, (t) = Taken By, (c) = Cosigned By    Initials Name Provider Type    " "Roxanne Pretty PTA Physical Therapy Assistant            OP Exercises     Row Name 03/04/20 1600 03/04/20 1500          Subjective Comments    Subjective Comments  bilat thigh and bilat leg pain daily  -SI  --        Subjective Pain    Able to rate subjective pain?  yes  -SI  --     Subjective Pain Comment  pain worst in AM and lightens up as day goes on...\"achy, throbbing, both legs, stiffness back  -SI  --        Total Minutes    37365 - PT Therapeutic Exercise Minutes  --  45  -SI        Exercise 1    Exercise Name 1  --  LTR  -SI     Cueing 1  --  Verbal  -SI     Reps 1  --  10  -SI     Time 1  --  3s  -SI        Exercise 2    Exercise Name 2  --  PPT with TA  -SI     Cueing 2  --  Verbal;Tactile  -SI     Reps 2  --  10  -SI     Time 2  --  5s  -SI        Exercise 3    Exercise Name 3  --  SKTC followed by DKC  -SI     Cueing 3  --  Verbal;Demo  -SI     Reps 3  --  2  -SI     Time 3  --  30s  -SI        Exercise 4    Exercise Name 4  --  Hl clamshell  -SI     Cueing 4  --  Verbal  -SI     Sets 4  --  2  -SI     Reps 4  --  10  -SI     Additional Comments  --  GTB  -SI        Exercise 5    Exercise Name 5  --  HS stretch sit side of mat  -SI     Additional Comments  --  next time  -SI        Exercise 6    Exercise Name 6  --  90/90 position after ex  -SI     Additional Comments  --  Next time.  -SI       User Key  (r) = Recorded By, (t) = Taken By, (c) = Cosigned By    Initials Name Provider Type    Roxanne Pretty PTA Physical Therapy Assistant                           Therapy Education  Given: Posture/body mechanics, Symptoms/condition management, HEP(HEP reprinted and upgraded.  P and B mechanics info printed and issued.)  Program: Progressed  How Provided: Verbal, Demonstration, Written  Provided to: Patient  Level of Understanding: Teach back education performed              Time Calculation:   Start Time: 1530  Stop Time: 1615  Time Calculation (min): 45 min  Total Timed Code Minutes- PT: 45 " minute(s)  Therapy Charges for Today     Code Description Service Date Service Provider Modifiers Qty    45853738043 HC PT THER PROC EA 15 MIN 3/4/2020 Roxanne Robertson PTA GP 3                    Roxanne Robertson PTA  3/4/2020

## 2020-03-08 LAB
ALBUMIN SERPL-MCNC: 4.1 G/DL (ref 3.8–4.9)
ALBUMIN/GLOB SERPL: 1.3 {RATIO} (ref 1.2–2.2)
ALP SERPL-CCNC: 82 IU/L (ref 39–117)
ALT SERPL-CCNC: 12 IU/L (ref 0–32)
AST SERPL-CCNC: 14 IU/L (ref 0–40)
BASOPHILS # BLD AUTO: 0.1 X10E3/UL (ref 0–0.2)
BASOPHILS NFR BLD AUTO: 1 %
BILIRUB SERPL-MCNC: 0.4 MG/DL (ref 0–1.2)
BUN SERPL-MCNC: 7 MG/DL (ref 6–24)
BUN/CREAT SERPL: 8 (ref 9–23)
CALCIUM SERPL-MCNC: 9.6 MG/DL (ref 8.7–10.2)
CHLORIDE SERPL-SCNC: 102 MMOL/L (ref 96–106)
CO2 SERPL-SCNC: 27 MMOL/L (ref 20–29)
CREAT SERPL-MCNC: 0.83 MG/DL (ref 0.57–1)
EOSINOPHIL # BLD AUTO: 0.1 X10E3/UL (ref 0–0.4)
EOSINOPHIL NFR BLD AUTO: 1 %
ERYTHROCYTE [DISTWIDTH] IN BLOOD BY AUTOMATED COUNT: 14.2 % (ref 11.7–15.4)
GLOBULIN SER CALC-MCNC: 3.1 G/DL (ref 1.5–4.5)
GLUCOSE SERPL-MCNC: 101 MG/DL (ref 65–99)
HCT VFR BLD AUTO: 45.5 % (ref 34–46.6)
HGB BLD-MCNC: 15 G/DL (ref 11.1–15.9)
IMM GRANULOCYTES # BLD AUTO: 0 X10E3/UL (ref 0–0.1)
IMM GRANULOCYTES NFR BLD AUTO: 0 %
LYMPHOCYTES # BLD AUTO: 2.1 X10E3/UL (ref 0.7–3.1)
LYMPHOCYTES NFR BLD AUTO: 32 %
MAGNESIUM SERPL-MCNC: 2.1 MG/DL (ref 1.6–2.3)
MCH RBC QN AUTO: 27.5 PG (ref 26.6–33)
MCHC RBC AUTO-ENTMCNC: 33 G/DL (ref 31.5–35.7)
MCV RBC AUTO: 84 FL (ref 79–97)
MONOCYTES # BLD AUTO: 0.5 X10E3/UL (ref 0.1–0.9)
MONOCYTES NFR BLD AUTO: 7 %
NEUTROPHILS # BLD AUTO: 4 X10E3/UL (ref 1.4–7)
NEUTROPHILS NFR BLD AUTO: 59 %
PLATELET # BLD AUTO: 265 X10E3/UL (ref 150–450)
POTASSIUM SERPL-SCNC: 3.6 MMOL/L (ref 3.5–5.2)
PROT SERPL-MCNC: 7.2 G/DL (ref 6–8.5)
RBC # BLD AUTO: 5.45 X10E6/UL (ref 3.77–5.28)
SODIUM SERPL-SCNC: 144 MMOL/L (ref 134–144)
T4 FREE SERPL-MCNC: 1.53 NG/DL (ref 0.82–1.77)
TSH SERPL DL<=0.005 MIU/L-ACNC: 0.44 UIU/ML (ref 0.45–4.5)
WBC # BLD AUTO: 6.7 X10E3/UL (ref 3.4–10.8)

## 2020-03-10 ENCOUNTER — HOSPITAL ENCOUNTER (OUTPATIENT)
Dept: PHYSICAL THERAPY | Facility: HOSPITAL | Age: 60
Setting detail: THERAPIES SERIES
Discharge: HOME | End: 2020-03-10

## 2020-03-10 DIAGNOSIS — R26.2 DIFFICULTY WALKING: ICD-10-CM

## 2020-03-10 DIAGNOSIS — G89.29 CHRONIC BILATERAL LOW BACK PAIN WITHOUT SCIATICA: Primary | ICD-10-CM

## 2020-03-10 DIAGNOSIS — M54.50 CHRONIC BILATERAL LOW BACK PAIN WITHOUT SCIATICA: Primary | ICD-10-CM

## 2020-03-10 PROCEDURE — 97140 MANUAL THERAPY 1/> REGIONS: CPT

## 2020-03-10 PROCEDURE — 97110 THERAPEUTIC EXERCISES: CPT

## 2020-03-10 NOTE — THERAPY TREATMENT NOTE
Outpatient Physical Therapy Ortho Treatment Note  [facility]     Patient Name: Kirsten Lima  : 1960  MRN: [id number]  Today's Date: 3/10/2020      Visit Date: 03/10/2020    Visit Dx:    ICD-10-CM ICD-9-CM   1. Chronic bilateral low back pain without sciatica M54.5 724.2    G89.29 338.29   2. Difficulty walking R26.2 719.7       Patient Active Problem List   Diagnosis   • Abnormal thyroid stimulating hormone (TSH) level   • Atopic rhinitis   • Hypertension   • Insomnia   • Overactive bladder   • Knee pain   • Cobalamin deficiency   • Vitamin D deficiency   • Hyperlipidemia   • Other chronic pain   • Lumbar facet arthropathy   • Spinal stenosis of lumbar region        Past Medical History:   Diagnosis Date   • Acute cystitis    • Allergic rhinitis    • Dysuria    • Hyperlipidemia    • Hypertension    • Insomnia    • Nonspecific abnormal electrocardiogram (ECG) (EKG)    • Obesity    • Overactive bladder    • Pregnancy     G-2, P-0   • Vitamin B12 deficiency    • Vitamin D insufficiency         Past Surgical History:   Procedure Laterality Date   • HYSTERECTOMY      total abdominal hysterectomy                       PT Assessment/Plan     Row Name 03/10/20 1600          PT Assessment    Assessment Comments  Pt reporting continnued soreness throughout her body, reports good compliance with HEP. Added seated HS stretch, hip add with glute set, as well as erector spinae stretch with ball. Also, performed  manual lumbar traction and BLE LAD with good tolerance.  -RS        PT Plan    PT Plan Comments  Assess tolerance for manual traction, consider mechnical traction in HL position.  -RS       User Key  (r) = Recorded By, (t) = Taken By, (c) = Cosigned By    Initials Name Provider Type    RS Alina Lee, PT Physical Therapist          Modalities     Row Name 03/10/20 1500             Moist Heat    MH Applied  Yes  -RS      Location  low back 90-90 position  -RS      Rx Minutes  10 mins  -RS      MH S/P  Rx  Yes  -RS        User Key  (r) = Recorded By, (t) = Taken By, (c) = Cosigned By    Initials Name Provider Type    RS Alina Lee, PT Physical Therapist        OP Exercises     Row Name 03/10/20 1500             Subjective Comments    Subjective Comments  Still feeling pretty sore, don't feel much better in the back.  -RS         Subjective Pain    Able to rate subjective pain?  yes  -RS      Subjective Pain Comment  pain more in morning, stiff and sore now  -RS         Total Minutes    61687 - PT Therapeutic Exercise Minutes  32  -RS      98931 - PT Manual Therapy Minutes  10  -RS         Exercise 1    Exercise Name 1  LTR  -RS      Cueing 1  Verbal  -RS      Reps 1  10  -RS      Time 1  3s  -RS         Exercise 2    Exercise Name 2  PPT with TA  -RS      Cueing 2  Verbal;Tactile  -RS      Reps 2  10  -RS      Time 2  5s  -RS         Exercise 3    Exercise Name 3  SKTC followed by DKC  -RS      Cueing 3  Verbal;Demo  -RS      Reps 3  2  -RS      Time 3  30s  -RS         Exercise 4    Exercise Name 4  HL clamshell  -RS      Cueing 4  Verbal  -RS      Sets 4  2  -RS      Reps 4  10  -RS      Additional Comments  GTB- with TA  -RS         Exercise 5    Exercise Name 5  HS stretch sit side of mat  -RS      Cueing 5  Verbal;Demo  -RS      Reps 5  3  -RS      Time 5  20s  -RS      Additional Comments  cues for upright posutre/Ant pelvic tilt  -RS         Exercise 6    Exercise Name 6  90/90 position after ex  -RS      Additional Comments  during heat  -RS         Exercise 7    Exercise Name 7  Nustep  -RS      Cueing 7  Verbal  -RS      Time 7  5 min  -RS         Exercise 8    Exercise Name 8  hip ADD with ball squeeze  -RS      Cueing 8  Verbal  -RS      Reps 8  10  -RS      Time 8  5s  -RS      Additional Comments  ball  -RS         Exercise 9    Exercise Name 9  erector stretch with ball  -RS      Cueing 9  Verbal;Demo  -RS      Reps 9  4  -RS      Time 9  10s  -RS      Additional Comments  with big blue ball   -RS        User Key  (r) = Recorded By, (t) = Taken By, (c) = Cosigned By    Initials Name Provider Type    RS Alina Lee PT Physical Therapist                      Manual Rx (last 36 hours)      Manual Treatments     Row Name 03/10/20 1500             Total Minutes    53382 - PT Manual Therapy Minutes  10  -RS         Manual Rx 1    Manual Rx 1 Location  manual lumbar traction with belt  -RS      Manual Rx 1 Grade  III  -RS      Manual Rx 1 Duration  6 min  -RS         Manual Rx 2    Manual Rx 2 Location  B hip  -RS      Manual Rx 2 Type  LAD  -RS      Manual Rx 2 Duration  4 min  -RS        User Key  (r) = Recorded By, (t) = Taken By, (c) = Cosigned By    Initials Name Provider Type    RS Alina Lee PT Physical Therapist                             Time Calculation:   Start Time: 1530  Stop Time: 1625  Time Calculation (min): 55 min  Therapy Charges for Today     Code Description Service Date Service Provider Modifiers Qty    04799519294 HC PT THER PROC EA 15 MIN 3/10/2020 Alina Lee, PT GP 2    09861235405 HC PT MANUAL THERAPY EA 15 MIN 3/10/2020 Alina Lee, PT GP 1    53119684949 HC PT HOT OR COLD PACK TREAT MCARE 3/10/2020 Alina Lee, PT GP 1                    Alina Lee PT  3/10/2020

## 2020-03-13 ENCOUNTER — HOSPITAL ENCOUNTER (OUTPATIENT)
Dept: PHYSICAL THERAPY | Facility: HOSPITAL | Age: 60
Setting detail: THERAPIES SERIES
Discharge: HOME | End: 2020-03-13

## 2020-03-13 DIAGNOSIS — G89.29 CHRONIC BILATERAL LOW BACK PAIN WITHOUT SCIATICA: Primary | ICD-10-CM

## 2020-03-13 DIAGNOSIS — M54.50 CHRONIC BILATERAL LOW BACK PAIN WITHOUT SCIATICA: Primary | ICD-10-CM

## 2020-03-13 DIAGNOSIS — R26.2 DIFFICULTY WALKING: ICD-10-CM

## 2020-03-13 PROCEDURE — 97110 THERAPEUTIC EXERCISES: CPT

## 2020-03-13 NOTE — THERAPY TREATMENT NOTE
"    Outpatient Physical Therapy Ortho Treatment Note  [facility]     Patient Name: Kirsten Lima  : 1960  MRN: [id number]  Today's Date: 3/13/2020      Visit Date: 2020    Visit Dx:    ICD-10-CM ICD-9-CM   1. Chronic bilateral low back pain without sciatica M54.5 724.2    G89.29 338.29   2. Difficulty walking R26.2 719.7       Patient Active Problem List   Diagnosis   • Abnormal thyroid stimulating hormone (TSH) level   • Atopic rhinitis   • Hypertension   • Insomnia   • Overactive bladder   • Knee pain   • Cobalamin deficiency   • Vitamin D deficiency   • Hyperlipidemia   • Other chronic pain   • Lumbar facet arthropathy   • Spinal stenosis of lumbar region        Past Medical History:   Diagnosis Date   • Acute cystitis    • Allergic rhinitis    • Dysuria    • Hyperlipidemia    • Hypertension    • Insomnia    • Nonspecific abnormal electrocardiogram (ECG) (EKG)    • Obesity    • Overactive bladder    • Pregnancy     G-2, P-0   • Vitamin B12 deficiency    • Vitamin D insufficiency         Past Surgical History:   Procedure Laterality Date   • HYSTERECTOMY      total abdominal hysterectomy       PT Ortho     Row Name 20 1500       Subjective Comments    Subjective Comments  Min pain in legs past two days and able to get up fro low seat and haven't been able to do that in past....  -SI       Subjective Pain    Able to rate subjective pain?  yes  -SI    Subjective Pain Comment  sx at time of tx today \"tingle\" left heel  -SI      User Key  (r) = Recorded By, (t) = Taken By, (c) = Cosigned By    Initials Name Provider Type    Roxanne Pretty PTA Physical Therapy Assistant                      PT Assessment/Plan     Row Name 20 1625          PT Assessment    Assessment Comments  Pt starting to have less leg pain and improved mobilty IE tie shoes and get up from low seat....  -SI       User Key  (r) = Recorded By, (t) = Taken By, (c) = Cosigned By    Initials Name Provider Type    NEGIN Robertson" "Roxanne AMBROSIO PTA Physical Therapy Assistant            OP Exercises     Row Name 03/13/20 1500             Subjective Comments    Subjective Comments  Min pain in legs past two days and able to get up fro low seat and haven't been able to do that in past....  -SI         Subjective Pain    Able to rate subjective pain?  yes  -SI      Subjective Pain Comment  sx at time of tx today \"tingle\" left heel  -SI         Total Minutes    67145 - PT Therapeutic Exercise Minutes  40  -SI      04481 - PT Manual Therapy Minutes  5  -SI         Exercise 1    Exercise Name 1  LTR  -SI      Cueing 1  Verbal  -SI      Reps 1  10  -SI      Time 1  3s  -SI         Exercise 2    Exercise Name 2  PPT with TA  -SI      Cueing 2  Verbal;Tactile  -SI      Reps 2  10  -SI      Time 2  5s  -SI         Exercise 3    Exercise Name 3  SKTC followed by DKC  -SI      Cueing 3  Verbal;Demo  -SI      Reps 3  2  -SI      Time 3  30s  -SI         Exercise 4    Exercise Name 4  HL clamshell  -SI      Cueing 4  Verbal  -SI      Sets 4  2  -SI      Reps 4  10  -SI      Additional Comments  GTB  -SI         Exercise 5    Exercise Name 5  HS stretch sit side of mat  -SI      Cueing 5  Verbal;Demo  -SI      Reps 5  3  -SI      Time 5  20s  -SI         Exercise 6    Exercise Name 6  90/90 position after ex  -SI         Exercise 7    Exercise Name 7  Nustep  -SI      Cueing 7  Verbal  -SI      Time 7  5 min  -SI         Exercise 8    Exercise Name 8  hip ADD with ball squeeze  -SI      Cueing 8  Verbal  -SI      Reps 8  10  -SI      Time 8  5s  -SI         Exercise 9    Exercise Name 9  erector stretch with ball  -SI      Cueing 9  Verbal;Demo  -SI      Reps 9  4  -SI      Time 9  10s  -SI        User Key  (r) = Recorded By, (t) = Taken By, (c) = Cosigned By    Initials Name Provider Type    SI Roxanne Robertson PTA Physical Therapy Assistant                      Manual Rx (last 36 hours)      Manual Treatments     Row Name 03/13/20 1500             Total " Minutes    36556 - PT Manual Therapy Minutes  5  -SI         Manual Rx 1    Manual Rx 1 Location  manual lumbar traction with belt  -SI      Manual Rx 1 Duration  5  -SI        User Key  (r) = Recorded By, (t) = Taken By, (c) = Cosigned By    Initials Name Provider Type    Roxanne Pretty PTA Physical Therapy Assistant                             Time Calculation:   Start Time: 1545  Stop Time: 1630  Time Calculation (min): 45 min  Total Timed Code Minutes- PT: 45 minute(s)  Therapy Charges for Today     Code Description Service Date Service Provider Modifiers Qty    69820815407 HC PT THER PROC EA 15 MIN 3/13/2020 Roxanne Robertson PTA GP 3                    Roxanne Robertson PTA  3/13/2020

## 2020-03-16 ENCOUNTER — OFFICE VISIT (OUTPATIENT)
Dept: PAIN MEDICINE | Facility: CLINIC | Age: 60
End: 2020-03-16

## 2020-03-16 VITALS
SYSTOLIC BLOOD PRESSURE: 140 MMHG | HEIGHT: 66 IN | DIASTOLIC BLOOD PRESSURE: 93 MMHG | RESPIRATION RATE: 20 BRPM | BODY MASS INDEX: 41.46 KG/M2 | WEIGHT: 258 LBS | HEART RATE: 64 BPM | OXYGEN SATURATION: 98 % | TEMPERATURE: 98.2 F

## 2020-03-16 DIAGNOSIS — M48.061 SPINAL STENOSIS OF LUMBAR REGION, UNSPECIFIED WHETHER NEUROGENIC CLAUDICATION PRESENT: ICD-10-CM

## 2020-03-16 DIAGNOSIS — M47.816 LUMBAR FACET ARTHROPATHY: ICD-10-CM

## 2020-03-16 DIAGNOSIS — G89.29 OTHER CHRONIC PAIN: Primary | ICD-10-CM

## 2020-03-16 PROCEDURE — 99213 OFFICE O/P EST LOW 20 MIN: CPT | Performed by: NURSE PRACTITIONER

## 2020-03-16 RX ORDER — MELOXICAM 15 MG/1
TABLET ORAL
COMMUNITY
Start: 2020-02-06 | End: 2020-03-24

## 2020-03-16 NOTE — PROGRESS NOTES
CHIEF COMPLAINT  F/u back pain. Pt sts pain remained the same since last ov. Pt sts goes to PT twice a week. Pt sts PT has improved her pain.     Macey Lima is a 59 y.o. female  who presents to the office for follow-up.She has a history of chronic back pain. Reports this is improved since last office visit.    Patient was initially evaluated as a new patient by myself for chronic low back pain on 2/3/2020.  She was referred here by Dr. Juliet Valdez for low back pain.  Had complaints of low back, buttocks, hips, leg pain.  Described as continuous aching and throbbing.  Her leg pain is described as intermittent numbing and tingling.  Pain increases with walking, standing, activity and decreases with rest, changing position, heat and ice.  She previously failed meloxicam.  Low back pain started several years ago without any specific incident or trauma.  Pain is worsened over the past 2 years.  She does remember slipping about 2 to 3 years ago and falling on tailbone and falling onto her car.  Since then her back and leg pain has been worsening.  She decided see her PCP a few weeks ago.  Ordered lumbar MRI referred to pain management.  Ordered physical therapy.  Also discussed half dose of meloxicam.  Discussed consideration for LESI L5-S1 but patient wanted to wait.  Also discussed her consideration for neurosurgical referral.  Patient wanted to wait at this time.    Complains of pain in her low back and legs. Today her pain is 5/10VAS.  Describes her back pain as nearly continuous aching with tingling when on feet. Pain increases with walking, standing, being on feet; pain decreases with sitting or laying. Upon standing, her legs start tingling. ADL's by self. Denies any bowel or bladder changes.     Back Pain   This is a chronic problem. The current episode started more than 1 year ago. The problem has been gradually worsening (improved since last office visit) since onset. The pain is present in the  lumbar spine and sacro-iliac. The quality of the pain is described as aching. Radiates to: BLE's. The pain is at a severity of 5/10. The pain is moderate. The pain is worse during the day. The symptoms are aggravated by bending, position, standing and twisting. Associated symptoms include bladder incontinence and numbness (feet). Pertinent negatives include no bowel incontinence, chest pain, fever, headaches or weakness. Risk factors include lack of exercise, menopause, obesity and sedentary lifestyle. She has tried bed rest, heat, home exercises, ice, NSAIDs and walking for the symptoms. The treatment provided mild relief.          PEG Assessment   What number best describes your pain on average in the past week?5  What number best describes how, during the past week, pain has interfered with your enjoyment of life?4  What number best describes how, during the past week, pain has interfered with your general activity?  4    The following portions of the patient's history were reviewed and updated as appropriate: allergies, current medications, past family history, past medical history, past social history, past surgical history and problem list.    Review of Systems   Constitutional: Positive for fatigue. Negative for activity change, fever and unexpected weight change.   HENT: Negative for congestion and postnasal drip.    Eyes: Negative for visual disturbance.   Respiratory: Negative for apnea, cough and shortness of breath.    Cardiovascular: Negative for chest pain.   Gastrointestinal: Negative for bowel incontinence, constipation, diarrhea, nausea and vomiting.   Endocrine: Negative for polyuria.   Genitourinary: Positive for bladder incontinence. Negative for difficulty urinating.   Musculoskeletal: Positive for arthralgias (bilat legs) and back pain. Negative for gait problem.   Allergic/Immunologic: Negative for immunocompromised state.   Neurological: Positive for numbness (feet). Negative for dizziness,  "weakness, light-headedness and headaches.   Psychiatric/Behavioral: Positive for sleep disturbance. Negative for agitation and suicidal ideas. The patient is not nervous/anxious.      Vitals:    03/16/20 1500   BP: 140/93   Pulse: 64   Resp: 20   Temp: 98.2 °F (36.8 °C)   SpO2: 98%   Weight: 117 kg (258 lb)   Height: 167.6 cm (65.98\")   PainSc:   5   PainLoc: Back     Objective   Physical Exam   Constitutional: She is oriented to person, place, and time. Vital signs are normal. She appears well-developed and well-nourished. She is cooperative.   HENT:   Head: Normocephalic and atraumatic.   Nose: Nose normal.   Eyes: Conjunctivae and lids are normal.   Neck: Trachea normal. Neck supple.   Cardiovascular: Normal rate.   Pulmonary/Chest: Effort normal.   Abdominal: Normal appearance.   Musculoskeletal:        Lumbar back: She exhibits decreased range of motion, tenderness, bony tenderness (mild lumbar facet tenderness) and pain.   Neurological: She is alert and oriented to person, place, and time. Gait normal.   Skin: Skin is warm, dry and intact.   Psychiatric: She has a normal mood and affect. Her speech is normal and behavior is normal. Judgment and thought content normal. Cognition and memory are normal.   Nursing note and vitals reviewed.      Assessment/Plan   Kirsten was seen today for back pain.    Diagnoses and all orders for this visit:    Other chronic pain    Lumbar facet arthropathy    Spinal stenosis of lumbar region, unspecified whether neurogenic claudication present      --- Discussed LESI.  Patient wants to wait at this time. Given information about epidural  --- Discussed neurosurgical evaluation. Patient wants to wait at this time.  --- Continue with PT at this time.  --- Follow-up 4-6 weeks or sooner if needed.    This was an extended office visit. Over 15 minutes minutes was spent in face to face contact with the patient. Over half of the time was spent in education and counseling.        DWAYNE" REPORT      DWAYNE report has been reviewed and scanned into the patient's chart.    As the clinician, I personally reviewed the DWAYNE from 3-13-20 while the patient was in the office today.        EMR Dragon/Transcription disclaimer:   Much of this encounter note is an electronic transcription/translation of spoken language to printed text. The electronic translation of spoken language may permit erroneous, or at times, nonsensical words or phrases to be inadvertently transcribed; Although I have reviewed the note for such errors, some may still exist.

## 2020-03-16 NOTE — PATIENT INSTRUCTIONS
Epidural Steroid Injection, Care After  Refer to this sheet in the next few weeks. These instructions provide you with information about caring for yourself after your procedure. Your health care provider may also give you more specific instructions. Your treatment has been planned according to current medical practices, but problems sometimes occur. Call your health care provider if you have any problems or questions after your procedure.  What can I expect after the procedure?  After your procedure, it is common to feel a little discomfort at the injection site.  Follow these instructions at home:  · For 24 hours after the procedure:  ? Avoid using heat on the injection site.  ? Do not take a tub bath, and do not soak in water.  ? Do not drive if you received a medicine to help you relax (sedative).  · If directed, put ice on the injection site:  ? Put ice in a plastic bag.  ? Place a towel between your skin and the bag.  ? Leave the ice on for 20 minutes, 2-3 times a day.  · Return to your normal activities as told by your health care provider. Ask your health care provider what activities are safe for you.  · You may remove the bandage (dressing) after 24 hours.  · Take over-the-counter and prescription medicines only as told by your health care provider.  · Keep all follow-up visits as told by your health care provider. This is important.  Contact a health care provider if:  · You have a fever.  · You continue to have pain and soreness around the injection site, even after taking over-the-counter pain medicine.  · You have severe, sudden, or lasting nausea or vomiting.  Get help right away if:  · You have severe pain at the injection site that is not relieved by medicines.  · You develop a severe headache or a stiff neck.  · You become sensitive to light.  · You have any new numbness or weakness in your legs or arms.  · You lose control of your bladder or bowel movements.  · You have trouble breathing.  This  information is not intended to replace advice given to you by your health care provider. Make sure you discuss any questions you have with your health care provider.  Document Released: 04/03/2012 Document Revised: 05/25/2017 Document Reviewed: 04/04/2017  [employer] Interactive Patient Education © 2020 [employer]      Reviewed the procedure at length with the patient.  Included in the review was expectations, complications, risk and benefits.The procedure was described in detail and the risks, benefits and alternatives were discussed with the patient (including but not limited to: bleeding, infection, nerve damage, worsening of pain, inability to perform injection, paralysis, seizures, and death) who agreed to proceed.  Discussed the potential for sedation if warranted/wanted.  The procedure will plan to be performed at [facility] with fluoroscopic guidance(unless ultrasound is indicated). Discussed with patient that all procedures are part of a multimodal plan of care and include either formal PT or a home exercise program.  Patient has no evidence of coagulopathy or current infection.

## 2020-03-17 ENCOUNTER — HOSPITAL ENCOUNTER (OUTPATIENT)
Dept: PHYSICAL THERAPY | Facility: HOSPITAL | Age: 60
Setting detail: THERAPIES SERIES
Discharge: HOME | End: 2020-03-17

## 2020-03-17 DIAGNOSIS — R26.2 DIFFICULTY WALKING: ICD-10-CM

## 2020-03-17 DIAGNOSIS — G89.29 CHRONIC BILATERAL LOW BACK PAIN WITHOUT SCIATICA: Primary | ICD-10-CM

## 2020-03-17 DIAGNOSIS — M54.50 CHRONIC BILATERAL LOW BACK PAIN WITHOUT SCIATICA: Primary | ICD-10-CM

## 2020-03-17 PROCEDURE — 97140 MANUAL THERAPY 1/> REGIONS: CPT

## 2020-03-17 PROCEDURE — 97110 THERAPEUTIC EXERCISES: CPT

## 2020-03-17 NOTE — THERAPY PROGRESS REPORT/RE-CERT
Outpatient Physical Therapy Ortho Progress Note  [facility]     Patient Name: Kirsten Lima  : 1960  MRN: [id number]  Today's Date: 3/17/2020      Visit Date: 2020    Visit Dx:    ICD-10-CM ICD-9-CM   1. Chronic bilateral low back pain without sciatica M54.5 724.2    G89.29 338.29   2. Difficulty walking R26.2 719.7       Patient Active Problem List   Diagnosis   • Abnormal thyroid stimulating hormone (TSH) level   • Atopic rhinitis   • Hypertension   • Insomnia   • Overactive bladder   • Knee pain   • Cobalamin deficiency   • Vitamin D deficiency   • Hyperlipidemia   • Other chronic pain   • Lumbar facet arthropathy   • Spinal stenosis of lumbar region        Past Medical History:   Diagnosis Date   • Acute cystitis    • Allergic rhinitis    • Dysuria    • Hyperlipidemia    • Hypertension    • Insomnia    • Nonspecific abnormal electrocardiogram (ECG) (EKG)    • Obesity    • Overactive bladder    • Pregnancy     G-2, P-0   • Vitamin B12 deficiency    • Vitamin D insufficiency         Past Surgical History:   Procedure Laterality Date   • HYSTERECTOMY      total abdominal hysterectomy                       PT Assessment/Plan     Row Name 20 1500          PT Assessment    Functional Limitations  Limitation in home management;Limitations in community activities;Limitations in functional capacity and performance;Performance in leisure activities;Performance in self-care ADL;Performance in work activities  -RS     Impairments  Endurance;Gait;Pain;Poor body mechanics;Posture;Range of motion;Sensation;Muscle strength;Joint mobility  -RS     Assessment Comments  Ms. Lima reports some improvement in pain/ function overall with less frequent/intense pain into her LLE as well as less pain when walking into work from her car. Shecontinues to be limitmed in lumbopelvic strength/stability and functional activity tolerance, as evideced by LE strength and reports of 4-5/10 pain with short distance  ambulation. Added 23 standing activities to therex program, fair tolerance noted for mini squats. Overall, pt progressing toward functional goals but continues to demonstrate the need for continued skilled PT.  -RS     Please refer to paper survey for additional self-reported information  Yes  -RS     Rehab Potential  Good  -RS     Patient/caregiver participated in establishment of treatment plan and goals  Yes  -RS     Patient would benefit from skilled therapy intervention  Yes  -RS        PT Plan    PT Plan Comments  Continue skilled PT focused on improved lumbopelvic stability and functional activity tolerance. Consider performing manual theraapy after stretching, prior to standing activities.  -RS       User Key  (r) = Recorded By, (t) = Taken By, (c) = Cosigned By    Initials Name Provider Type    RS Alina Lee PT Physical Therapist          Modalities     Row Name 03/17/20 1500             Moist Heat    MH Applied  Yes  -RS      Location  low back 90-90 position  -RS      Rx Minutes  10 mins  -RS      MH S/P Rx  Yes  -RS        User Key  (r) = Recorded By, (t) = Taken By, (c) = Cosigned By    Initials Name Provider Type    RS Alina Lee, PT Physical Therapist        OP Exercises     Row Name 03/17/20 1500             Subjective Comments    Subjective Comments  Pain in leg is less frequent, walking from car to work is some better too.  -RS         Subjective Pain    Able to rate subjective pain?  yes  -RS      Pre-Treatment Pain Level  4  -RS         Total Minutes    08797 - PT Therapeutic Exercise Minutes  35  -RS      19732 - PT Manual Therapy Minutes  10  -RS         Exercise 1    Exercise Name 1  LTR  -RS      Cueing 1  Verbal  -RS      Reps 1  10  -RS      Time 1  3s  -RS         Exercise 2    Exercise Name 2  PPT with TA  -RS      Cueing 2  Verbal;Tactile  -RS      Reps 2  10  -RS      Time 2  5s  -RS         Exercise 3    Exercise Name 3  SKTC followed by DKC  -RS      Cueing 3   Verbal;Demo  -RS      Reps 3  2  -RS      Time 3  30s  -RS         Exercise 4    Exercise Name 4  HL clamshell  -RS      Cueing 4  Verbal  -RS      Sets 4  2  -RS      Reps 4  10  -RS      Additional Comments  BTB  -RS         Exercise 5    Exercise Name 5  HS stretch sit side of mat  -RS      Cueing 5  Verbal;Demo  -RS      Reps 5  3  -RS      Time 5  20s  -RS      Additional Comments  cues for uprightposture  -RS         Exercise 6    Exercise Name 6  rows with TB  -RS      Cueing 6  Verbal;Demo  -RS      Reps 6  10  -RS      Additional Comments  RTB, cues for TA  -RS         Exercise 7    Exercise Name 7  Nustep  -RS      Cueing 7  Verbal  -RS      Time 7  5 min  -RS      Additional Comments  L4  -RS         Exercise 8    Exercise Name 8  small bridge with hip ADD  -RS      Cueing 8  Verbal  -RS      Reps 8  10  -RS      Time 8  5s  -RS      Additional Comments  ball, 50% squeeze, has increased L ant hip pain toward end of exercise  -RS         Exercise 9    Exercise Name 9  erector stretch with ball  -RS      Cueing 9  Verbal;Demo  -RS      Reps 9  4  -RS      Time 9  10s  -RS      Additional Comments  with big blue ball  -RS         Exercise 10    Exercise Name 10  side steps without resistance  -RS      Cueing 10  Verbal;Demo  -RS      Reps 10  2 laps  -RS      Additional Comments  cues for TA  -RS         Exercise 11    Exercise Name 11  mini squat  -RS      Cueing 11  Verbal;Demo  -RS      Reps 11  6  -RS      Additional Comments  pt with increased L ant hip pain, did not improve with smaller ROM  -RS        User Key  (r) = Recorded By, (t) = Taken By, (c) = Cosigned By    Initials Name Provider Type    RS Alina Lee PT Physical Therapist                      Manual Rx (last 36 hours)      Manual Treatments     Row Name 03/17/20 1500             Total Minutes    81702 - PT Manual Therapy Minutes  10  -RS         Manual Rx 1    Manual Rx 1 Location  manual lumbar traction with belt  -RS      Manual Rx  1 Grade  III  -RS      Manual Rx 1 Duration  6 min  -RS         Manual Rx 2    Manual Rx 2 Location  B hip  -RS      Manual Rx 2 Type  LAD  -RS      Manual Rx 2 Duration  4 min  -RS        User Key  (r) = Recorded By, (t) = Taken By, (c) = Cosigned By    Initials Name Provider Type    RS Alina Lee, PT Physical Therapist          PT OP Goals     Row Name 03/17/20 1500          PT Short Term Goals    STG Date to Achieve  03/12/20  -RS     STG 1  The pt will demonstrate IND with initial HEP focuse don improved lumbar mobility and decreased pain.  -RS     STG 1 Progress  Met  -RS     STG 2  The pt will report no increase in pain greater than 2/10 when walking from car to work building for improved functional activity tolerance.   -RS     STG 2 Progress  Ongoing;Progressing  -RS     STG 2 Progress Comments  pain 4-5 on average walking into work- pt reporting less pain overall  -RS        Long Term Goals    LTG Date to Achieve  04/20/20  -RS     LTG 1  The pt will demonstrate IND with progressive HEP focused on return to PLOF and IND condition management.  -RS     LTG 1 Progress  Ongoing;Progressing  -RS     LTG 2  The pt will walk through grocery store for approx 30 min without increased pain greater than 1-2/10 for improved community navigation.  -RS     LTG 2 Progress  Ongoing  -RS     LTG 2 Progress Comments  pain 4-5 out of 10, has not attempted to walk through grocery store  -RS     LTG 3  The pt will report at least a 70% reduction in LE pain into thighs for improved QOL.  -RS     LTG 3 Progress  Ongoing  -RS     LTG 3 Progress Comments  reports 40-50% better and less frequent  -RS     LTG 4  The pt will score less than 30% disability on the modified oswestry to indicate improved perceived performance of ADLs.  -RS     LTG 4 Progress  Partially Met  -RS     LTG 4 Progress Comments  32%  -RS     LTG 5  The pt will initiate a gym routine for 30-45 min 2-3 days for week for improved health maitenance.  -RS      LTG 5 Progress  Ongoing  -RS     LTG 5 Progress Comments  has not attempted yet  -RS       User Key  (r) = Recorded By, (t) = Taken By, (c) = Cosigned By    Initials Name Provider Type    RS Alina Lee, PT Physical Therapist               Outcome Measure Options: Modifed Owestry  Modified Oswestry  Modified Oswestry Score/Comments: 32% disability      Time Calculation:   Start Time: 1530  Stop Time: 1635  Time Calculation (min): 65 min  Therapy Charges for Today     Code Description Service Date Service Provider Modifiers Qty    82247329923 HC PT THER PROC EA 15 MIN 3/17/2020 Alina Lee, PT GP 2    83566133487 HC PT MANUAL THERAPY EA 15 MIN 3/17/2020 Alina Lee, PT GP 1    [id number]  PT HOT OR COLD PACK TREAT MCARE 3/17/2020 Alina Lee, PT GP 1          PT G-Codes  Outcome Measure Options: Modifed Owestry  Modified Oswestry Score/Comments: 32% disability         Alina Lee PT  3/17/2020

## 2020-03-24 ENCOUNTER — OFFICE VISIT (OUTPATIENT)
Dept: INTERNAL MEDICINE | Facility: CLINIC | Age: 60
End: 2020-03-24

## 2020-03-24 VITALS
DIASTOLIC BLOOD PRESSURE: 82 MMHG | WEIGHT: 257.6 LBS | BODY MASS INDEX: 41.4 KG/M2 | OXYGEN SATURATION: 96 % | TEMPERATURE: 98.6 F | SYSTOLIC BLOOD PRESSURE: 134 MMHG | HEIGHT: 66 IN | HEART RATE: 71 BPM

## 2020-03-24 DIAGNOSIS — I10 ESSENTIAL HYPERTENSION: ICD-10-CM

## 2020-03-24 DIAGNOSIS — Z00.00 HEALTH CARE MAINTENANCE: Primary | ICD-10-CM

## 2020-03-24 DIAGNOSIS — E78.5 HYPERLIPIDEMIA, UNSPECIFIED HYPERLIPIDEMIA TYPE: ICD-10-CM

## 2020-03-24 PROCEDURE — 99396 PREV VISIT EST AGE 40-64: CPT | Performed by: INTERNAL MEDICINE

## 2020-03-24 RX ORDER — TOLTERODINE 4 MG/1
4 CAPSULE, EXTENDED RELEASE ORAL DAILY
Qty: 90 CAPSULE | Refills: 3 | Status: SHIPPED | OUTPATIENT
Start: 2020-03-24 | End: 2021-02-15

## 2020-03-24 RX ORDER — ROSUVASTATIN CALCIUM 5 MG/1
5 TABLET, COATED ORAL DAILY
Qty: 90 TABLET | Refills: 3 | Status: SHIPPED | OUTPATIENT
Start: 2020-03-24 | End: 2020-10-08 | Stop reason: SDUPTHER

## 2020-03-24 NOTE — PROGRESS NOTES
Subjective   Kirsten Lima is a 59 y.o. female and is here for a comprehensive physical exam. The patient reports problems - htn.  Pt has been taking BP meds as prescribed without any problems.  No HA  No episodes of orthostasis  Pt has been taking cholesterol meds as prescribed.  No difficulties with myalgias.     Pt is UTD with annual gyn exam and mammo     Do you take any herbs or supplements that were not prescribed by a doctor?       Social History:   Social History     Socioeconomic History   • Marital status: Single     Spouse name: Not on file   • Number of children: Not on file   • Years of education: Not on file   • Highest education level: Not on file   Tobacco Use   • Smoking status: Current Every Day Smoker     Packs/day: 0.50     Types: Cigarettes   • Smokeless tobacco: Never Used   • Tobacco comment:  ppd since age 18   Substance and Sexual Activity   • Alcohol use: Yes     Frequency: Never     Drinks per session: 1 or 2     Binge frequency: Never     Comment: OCCASS   • Drug use: No   • Sexual activity: Defer       Family History:   Family History   Problem Relation Age of Onset   • Hypertension Mother    • COPD Father    • Heart failure Father    • Hypertension Father    • Pancreatic cancer Brother         1 brother  from pancreatic cancer   • Other Sister         1 sister  renal failure   • Alcohol abuse Sister    • Liver disease Sister        Past Medical History:   Past Medical History:   Diagnosis Date   • Acute cystitis    • Allergic rhinitis    • Dysuria    • Hyperlipidemia    • Hypertension    • Insomnia    • Nonspecific abnormal electrocardiogram (ECG) (EKG)    • Obesity    • Overactive bladder    • Pregnancy     G-2, P-0   • Vitamin B12 deficiency    • Vitamin D insufficiency            Review of Systems    A comprehensive review of systems was negative.    Objective   /82 (BP Location: Left arm, Patient Position: Sitting)   Pulse 71   Temp 98.6 °F (37 °C) (Oral)   " Ht 167.6 cm (65.98\")   Wt 117 kg (257 lb 9.6 oz)   SpO2 96%   BMI 41.60 kg/m²     General Appearance:    Alert, cooperative, no distress, appears stated age   Head:    Normocephalic, without obvious abnormality, atraumatic   Eyes:    PERRL, conjunctiva/corneas clear, EOM's intact, fundi     benign, both eyes   Ears:    Normal TM's and external ear canals, both ears   Nose:   Nares normal, septum midline, mucosa normal, no drainage    or sinus tenderness   Throat:   Lips, mucosa, and tongue normal; teeth and gums normal   Neck:   Supple, symmetrical, trachea midline, no adenopathy;     thyroid:  no enlargement/tenderness/nodules; no carotid    bruit or JVD   Back:     Symmetric, no curvature, ROM normal, no CVA tenderness   Lungs:     Clear to auscultation bilaterally, respirations unlabored   Chest Wall:    No tenderness or deformity    Heart:    Regular rate and rhythm, S1 and S2 normal, no murmur, rub   or gallop   Breast Exam:    No tenderness, masses, or nipple abnormality   Abdomen:     Soft, non-tender, bowel sounds active all four quadrants,     no masses, no organomegaly   Genitalia:    Normal female without lesion, discharge or tenderness   Rectal:    Normal tone, no masses or tenderness; guaiac negative stool   Extremities:   Extremities normal, atraumatic, no cyanosis or edema   Pulses:   2+ and symmetric all extremities   Skin:   Skin color, texture, turgor normal, no rashes or lesions   Lymph nodes:   Cervical, supraclavicular, and axillary nodes normal   Neurologic:   CNII-XII intact, normal strength, sensation and reflexes     throughout       Vitals:    03/24/20 0750   BP: 134/82   Pulse: 71   Temp: 98.6 °F (37 °C)   SpO2: 96%     Body mass index is 41.6 kg/m².      Medications:   Current Outpatient Medications:   •  amLODIPine (NORVASC) 10 MG tablet, TAKE 1 TABLET DAILY, Disp: 90 tablet, Rfl: 3  •  cetirizine (ZyrTEC) 10 MG tablet, Take 1 tablet by mouth daily., Disp: , Rfl:   •  " Cholecalciferol (VITAMIN D) 2000 units capsule, Take  by mouth., Disp: , Rfl:   •  lisinopril-hydrochlorothiazide (PRINZIDE,ZESTORETIC) 20-12.5 MG per tablet, TAKE 2 TABLETS DAILY, Disp: 180 tablet, Rfl: 3  •  metoprolol succinate XL (TOPROL-XL) 200 MG 24 hr tablet, TAKE 1 TABLET DAILY, Disp: 90 tablet, Rfl: 3  •  rosuvastatin (CRESTOR) 5 MG tablet, TAKE 1 TABLET BY MOUTH EVERY DAY, Disp: 30 tablet, Rfl: 5  •  tolterodine LA (DETROL LA) 4 MG 24 hr capsule, Take 1 capsule by mouth Daily., Disp: 30 capsule, Rfl: 3  •  traZODone (DESYREL) 50 MG tablet, TAKE 1 TABLET EVERY NIGHT, Disp: 90 tablet, Rfl: 3  •  fluticasone (FLONASE) 50 MCG/ACT nasal spray, 2 sprays into the nostril(s) as directed by provider Daily., Disp: 1 bottle, Rfl: 0       Assessment/Plan   Healthy female exam.      1. Healthcare Maintenance:  2. Patient Counseling:  --Nutrition: Stressed importance of moderation in sodium/caffeine intake, saturated fat and cholesterol, caloric balance, sufficient intake of fresh fruits, vegetables, fiber, calcium and vit D  --Exercise: she has been exercising reg  --Substance Abuse: no tob no eoth  --Dental health: she does go to the dentist reg  --Immunizations reviewed.  --Discussed benefits of screening colonoscopy.  3.  HTN-  Ok with current meds  4.  HPL- ok with crestor

## 2020-06-02 ENCOUNTER — APPOINTMENT (OUTPATIENT)
Dept: WOMENS IMAGING | Facility: HOSPITAL | Age: 60
End: 2020-06-02

## 2020-06-24 ENCOUNTER — APPOINTMENT (OUTPATIENT)
Dept: WOMENS IMAGING | Facility: HOSPITAL | Age: 60
End: 2020-06-24

## 2020-06-24 PROCEDURE — 77061 BREAST TOMOSYNTHESIS UNI: CPT | Performed by: RADIOLOGY

## 2020-06-24 PROCEDURE — 76641 ULTRASOUND BREAST COMPLETE: CPT | Performed by: RADIOLOGY

## 2020-06-24 PROCEDURE — G0279 TOMOSYNTHESIS, MAMMO: HCPCS | Performed by: RADIOLOGY

## 2020-06-24 PROCEDURE — 77065 DX MAMMO INCL CAD UNI: CPT | Performed by: RADIOLOGY

## 2020-06-29 ENCOUNTER — APPOINTMENT (OUTPATIENT)
Dept: WOMENS IMAGING | Facility: HOSPITAL | Age: 60
End: 2020-06-29

## 2020-06-29 PROCEDURE — 19083 BX BREAST 1ST LESION US IMAG: CPT | Performed by: RADIOLOGY

## 2020-06-29 PROCEDURE — 19084 BX BREAST ADD LESION US IMAG: CPT | Performed by: RADIOLOGY

## 2020-07-01 ENCOUNTER — TELEPHONE (OUTPATIENT)
Dept: SURGERY | Facility: CLINIC | Age: 60
End: 2020-07-01

## 2020-07-01 NOTE — TELEPHONE ENCOUNTER
New patient appointment with Dr. Zambrano is scheduled on 8/27/20 @ 1:00pm.    Called and spoke to patient, patient expressed verbal understanding of appointment times.    Sent patient a reminder letter in the mail.

## 2020-08-07 ENCOUNTER — OFFICE VISIT (OUTPATIENT)
Dept: SURGERY | Facility: CLINIC | Age: 60
End: 2020-08-07

## 2020-08-07 VITALS
HEART RATE: 62 BPM | SYSTOLIC BLOOD PRESSURE: 140 MMHG | OXYGEN SATURATION: 98 % | BODY MASS INDEX: 40.98 KG/M2 | RESPIRATION RATE: 18 BRPM | WEIGHT: 255 LBS | DIASTOLIC BLOOD PRESSURE: 82 MMHG | HEIGHT: 66 IN

## 2020-08-07 DIAGNOSIS — D24.2 INTRADUCTAL PAPILLOMA OF LEFT BREAST: Primary | ICD-10-CM

## 2020-08-07 PROCEDURE — 99204 OFFICE O/P NEW MOD 45 MIN: CPT | Performed by: SURGERY

## 2020-08-07 NOTE — PROGRESS NOTES
[facility]     Referring Provider: Tad Lowe MD     Chief complaint: Left breast intraductal papillomas     HPI: Ms. Kirsten Lima is a 59 yo woman, seen at the request of Dr. Tad Lowe, for a new diagnosis of left breast intraductal papillomas. These were initially detected as an imaging abnormality on routine screening. Her work-up is detailed in the breast history section below. Despite these being incidentally found, the patient reports that she has had intermittent, clear, left nipple discharge for years. 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 She denies any associated breast lumps or pain. She does report chronic issues with her breast skin due to hidradenitis and recurrent skin abscesses, however she has not had any acute problems lately. She has a past history of a benign left breast stereotactic biopsy in . She denies any family history of breast or ovarian cancer.       I personally reviewed her records and summarized her relevant breast history/imagin/15/18, Screening MMG with [facility] ([facility]):  Scattered areas of fibroglandular density.  No suspicious masses, significant calcifications or other abnormalities are seen. There is a biopsy clip in the left breast.  BI-RADS 1: Negative.     19, Screening MMG with [facility] ([facility]):  Scattered areas of fibroglandular density.  1. There are small asymmetries seen in both breasts. The parenchyma includes stable nodular asymmetries. No suspicious masses, suspicious microcalcifications or areas of architectural distortion are identified. There has been no significant change from the prior exams.  2. There are stable punctate and spherical lucent-centered calcifications with diffuse/scattered distribution seen in both breast.  BI-RADS 2: Benign.    20, Screening MMG with [facility] ([facility]):  Scattered areas of fibroglandular density.  1. There are small asymmetries seen in both breasts. There has been no significant change from the prior exams.  2. There are stable punctate and spherical lucent-centered calcifications with diffuse distribution seen in both breasts.  3. There is a new small focal asymmetry measuring 12 mm seen in the central region of the left breast. This is best visualized on tomosynthesis MLO view slice # 78. This is best seen on the MLO View.  BI-RADS 0: Incomplete.    20, Left Diagnostic MMG with [facility] & Left Breast US ([facility]):  MM. On the present examination, focal asymmetry in the left breast, central does not persist. The asymmetry noted on the recent screening mammogram resolves into stroma on additional views.  US:  1. There is no sonographic correlate. There is no evidence of any solid mass or abnormal cystic elements.  2. There are three oval intraductal masses with partially defined margins measuring 5 mm to 9 mm seen in the anterior one-third subareolar region of the left breast. The patient reports history of intermittent clear left nipple discharge for the past 10 years. These three structures are adjacent and would be included within the same biopsy site.  3. There is an oval solid mass with partially defined margins measuring 6 x 5 x 5 mm seen in the anterior one-third subareolar region of the left breast. This is located 2 to 3 cm away from the above described masses.   BI-RADS 4: Suspicious     20, Left Breast, US-Guided Biopsy x 2 ([facility])  1. Left Subareolar mass (5 x 5 x 6 cm), Ultrasound guided biopsy:   Benign sclerosing intraductal papilloma with calcifications.  -Tophat clip. Concordant.  2. Left Subareolar mass, ultrasound guided biopsy:   Benign sclerosing intraductal papilloma with usual duct hyperplasia and microcalcifications.   -Hydromark clip. Concordant.      Review of Systems   Constitutional: Negative for appetite change, chills,  diaphoresis, fatigue, fever and unexpected weight change.   HENT:   Negative for hearing loss, lump/mass, mouth sores, nosebleeds, sore throat, tinnitus, trouble swallowing and voice change.    Eyes: Negative for eye problems and icterus.   Respiratory: Negative for chest tightness, cough, hemoptysis, shortness of breath and wheezing.    Cardiovascular: Negative for chest pain, leg swelling and palpitations.   Gastrointestinal: Positive for constipation. Negative for abdominal distention, abdominal pain, blood in stool, diarrhea, nausea, rectal pain and vomiting.   Endocrine: Negative for hot flashes.   Genitourinary: Negative for bladder incontinence, difficulty urinating, dyspareunia, dysuria, frequency, hematuria, menstrual problem, nocturia, pelvic pain, vaginal bleeding and vaginal discharge.    Musculoskeletal: Positive for arthralgias and back pain. Negative for flank pain, gait problem, myalgias, neck pain and neck stiffness.   Skin: Negative for itching, rash and wound.   Neurological: Negative for dizziness, extremity weakness, gait problem, headaches, light-headedness, numbness, seizures and speech difficulty.   Hematological: Negative for adenopathy. Does not bruise/bleed easily.   Psychiatric/Behavioral: Negative for confusion, decreased concentration, depression, sleep disturbance and suicidal ideas. The patient is not nervous/anxious.    I personally reviewed and updated the ROS.      Medications:    Current Outpatient Medications:   •  amLODIPine (NORVASC) 10 MG tablet, TAKE 1 TABLET DAILY, Disp: 90 tablet, Rfl: 3  •  cetirizine (ZyrTEC) 10 MG tablet, Take 1 tablet by mouth daily., Disp: , Rfl:   •  Cholecalciferol (VITAMIN D) 2000 units capsule, Take  by mouth., Disp: , Rfl:   •  fluticasone (FLONASE) 50 MCG/ACT nasal spray, 2 sprays into the nostril(s) as directed by provider Daily., Disp: 1 bottle, Rfl: 0  •  lisinopril-hydrochlorothiazide (PRINZIDE,ZESTORETIC) 20-12.5 MG per tablet, TAKE 2  TABLETS DAILY, Disp: 180 tablet, Rfl: 3  •  metoprolol succinate XL (TOPROL-XL) 200 MG 24 hr tablet, TAKE 1 TABLET DAILY, Disp: 90 tablet, Rfl: 3  •  rosuvastatin (CRESTOR) 5 MG tablet, Take 1 tablet by mouth Daily., Disp: 90 tablet, Rfl: 3  •  tolterodine LA (Detrol LA) 4 MG 24 hr capsule, Take 1 capsule by mouth Daily., Disp: 90 capsule, Rfl: 3  •  traZODone (DESYREL) 50 MG tablet, TAKE 1 TABLET EVERY NIGHT, Disp: 90 tablet, Rfl: 3      Allergies:  No Known Allergies      Past Medical History:   Diagnosis Date   • Acute cystitis    • Allergic rhinitis    • Dysuria    • Hidradenitis    • Hyperlipidemia    • Hypertension    • Insomnia    • Nonspecific abnormal electrocardiogram (ECG) (EKG)    • Obesity    • Overactive bladder    • Pregnancy     G-2, P-0   • Vitamin B12 deficiency    • Vitamin D insufficiency        Past Surgical History:   Procedure Laterality Date   • AXILLARY HIDRADENITIS EXCISION     • HYSTERECTOMY      [facility]       Family History   Problem Relation Age of Onset   • Hypertension Mother    • COPD Father    • Heart failure Father    • Hypertension Father    • Pancreatic cancer Brother         1 brother  from pancreatic cancer   • Other Sister         1 sister  renal failure   • Alcohol abuse Sister    • Liver disease Sister        Social History     Socioeconomic History   • Marital status: Single     Spouse name: Not on file   • Number of children: 0   • Years of education: Not on file   • Highest education level: Not on file   Occupational History   • Occupation: Retired    Tobacco Use   • Smoking status: Current Every Day Smoker     Packs/day: 0.50     Types: Cigarettes   • Smokeless tobacco: Never Used   • Tobacco comment: / ppd since age 18   Substance and Sexual Activity   • Alcohol use: Not Currently   • Drug use: No   • Sexual activity: Defer     Patient drinks 1 serving of caffeine per day.     GYNECOLOGIC HISTORY:   . P: 0. AB: 0.  Last menstrual period: , sp  hysterectomy, still has both ovaries  Age at menarche: 13  Age at first childbirth: n/a  Lactation/How long: n/a  Age at menopause: ~50  Total years of oral contraceptive use: 8  Total years of hormone replacement therapy: 0      Physical Exam  Vitals:    08/07/20 1212   BP: 140/82   Pulse: 62   Resp: 18   SpO2: 98%     ECOG 0 - Asymptomatic  General: NAD, well appearing, obese  Psych: a&o x 3, normal mood and affect  Eyes: EOMI, no scleral icterus  ENMT: neck supple without masses or thyromegaly, mucus membranes moist  Resp: normal effort, CTAB  CV: RRR, no murmurs, no edema  GI: soft, NT, ND  MSK: normal gait, normal ROM in bilateral shoulders  Lymph nodes: Bilateral axillary scars; no cervical, supraclavicular or axillary lymphadenopathy  Breast: symmetric, very large size, pendulous  Right: On inspection, there are scattered hidradenitis scars throughout the breast. No masses or nipple abnormalities.  Left: On inspection, there are scattered hidradenitis scars throughout the breast. This is especially apparent in the superior and lateral periareolar region where there is confluent scarring with skin thickening and hyperpigmentation. With mild palpation, there is clear discharge from a central duct. No masses.    Left breast, in-office ultrasound: At 10:00, at the areolar edge, hydro-Abrahan clip is visualized within a dilated duct. This is located about 5 mm deep to the skin. There appears to be a portion of a residual intraductal mass just deep to the clip. At 5:00, at the nipple base, there are possible postbiopsy changes seen about 5 mm deep to the skin. Second biopsy clip is not easily visualized.       I have independently reviewed her imaging and here are my findings:   In the left subareolar breast, there initially were 3 adjacent intraductal masses located about 8 mm deep to the skin (hydro-Abrahan clip), as well as a separate 6 mm subareolar mass located about 1 cm deep to the skin (tophat  clip).      Assessment:  60 y.o. F with a new diagnosis of left breast intraductal papillomas, associated with nipple discharge    Discussion:  We reviewed her pathology and imaging reports. We discussed that when an intraductal papilloma is associated with nipple discharge, excision is recommended. I also explained that even in asymptomatic papillomas, excision is sometimes recommended due to the low risk of upgrade. Since I cannot be certain which of the papillomas is causing the nipple discharge, I would recommend excision of both biopsy sites. She is not sure yet whether she would like to undergo surgery. She wants to think about it and let me know. If she does not undergo surgery, then I would recommend imaging surveillance with follow-up ultrasound in 6 months.    We discussed the procedure in case she decides to undergo surgery. I explained that excisional biopsy would require preoperative wire-localization of both of the lesions. We discussed that should the final pathology be upgraded, she would likely require an additional operation. We reviewed additional risks and potential complications associated with surgery, including, but not limited to, bleeding, infection, deformity or poor cosmetic result, chronic pain, numbness, seroma, hematoma, nipple retraction, deep venous thrombosis, and skin flap necrosis. We reviewed postoperative wound care and activity restrictions.     Plan:  -She will call me within the next 2 weeks to let me know if she wants to schedule surgery, which would be a left breast needle-localized excisional biopsy x2. If she does not undergo surgery, then she should follow-up in 12/2020 with a left ultrasound.    Debora Zambrano MD      CC:  MD Verenice Donaldson MD Ann Grider, MD

## 2020-09-22 ENCOUNTER — TRANSCRIBE ORDERS (OUTPATIENT)
Dept: SURGERY | Facility: CLINIC | Age: 60
End: 2020-09-22

## 2020-09-22 ENCOUNTER — TELEPHONE (OUTPATIENT)
Dept: SURGERY | Facility: CLINIC | Age: 60
End: 2020-09-22

## 2020-09-22 DIAGNOSIS — D24.2 INTRADUCTAL PAPILLOMA OF BREAST, LEFT: Primary | ICD-10-CM

## 2020-09-22 NOTE — TELEPHONE ENCOUNTER
Called patient to see if she would like to have surgery or f/u with imaging.     Patient would like to f/u with imaging, I will get that scheduled and call her back.

## 2020-09-29 DIAGNOSIS — E78.5 HYPERLIPIDEMIA, UNSPECIFIED HYPERLIPIDEMIA TYPE: ICD-10-CM

## 2020-09-29 DIAGNOSIS — I10 ESSENTIAL HYPERTENSION: Primary | ICD-10-CM

## 2020-09-30 ENCOUNTER — TELEPHONE (OUTPATIENT)
Dept: SURGERY | Facility: CLINIC | Age: 60
End: 2020-09-30

## 2020-09-30 NOTE — TELEPHONE ENCOUNTER
US is scheduled @ [facility] on 12/28/2020 @ 9:30am.    F/u appointment with Dr. Zambrano is scheduled on 1/7/2021 @ 10:00am.    Called and spoke to patient, patient expressed verbal understanding of appointment times.    Sent patient a reminder letter in the mail.

## 2020-10-04 LAB
ALBUMIN SERPL-MCNC: 3.9 G/DL (ref 3.8–4.9)
ALBUMIN/GLOB SERPL: 1.2 {RATIO} (ref 1.2–2.2)
ALP SERPL-CCNC: 100 IU/L (ref 39–117)
ALT SERPL-CCNC: 12 IU/L (ref 0–32)
AST SERPL-CCNC: 13 IU/L (ref 0–40)
BASOPHILS # BLD AUTO: 0.1 X10E3/UL (ref 0–0.2)
BASOPHILS NFR BLD AUTO: 1 %
BILIRUB SERPL-MCNC: 0.4 MG/DL (ref 0–1.2)
BUN SERPL-MCNC: 10 MG/DL (ref 8–27)
BUN/CREAT SERPL: 11 (ref 12–28)
CALCIUM SERPL-MCNC: 9.7 MG/DL (ref 8.7–10.3)
CHLORIDE SERPL-SCNC: 102 MMOL/L (ref 96–106)
CHOLEST SERPL-MCNC: 168 MG/DL (ref 100–199)
CO2 SERPL-SCNC: 26 MMOL/L (ref 20–29)
CREAT SERPL-MCNC: 0.89 MG/DL (ref 0.57–1)
EOSINOPHIL # BLD AUTO: 0.1 X10E3/UL (ref 0–0.4)
EOSINOPHIL NFR BLD AUTO: 1 %
ERYTHROCYTE [DISTWIDTH] IN BLOOD BY AUTOMATED COUNT: 13.7 % (ref 11.7–15.4)
GLOBULIN SER CALC-MCNC: 3.3 G/DL (ref 1.5–4.5)
GLUCOSE SERPL-MCNC: 114 MG/DL (ref 65–99)
HCT VFR BLD AUTO: 46.3 % (ref 34–46.6)
HDLC SERPL-MCNC: 51 MG/DL
HGB BLD-MCNC: 15.7 G/DL (ref 11.1–15.9)
IMM GRANULOCYTES # BLD AUTO: 0 X10E3/UL (ref 0–0.1)
IMM GRANULOCYTES NFR BLD AUTO: 1 %
LDLC SERPL CALC-MCNC: 83 MG/DL (ref 0–99)
LDLC/HDLC SERPL: 1.6 RATIO (ref 0–3.2)
LYMPHOCYTES # BLD AUTO: 1.9 X10E3/UL (ref 0.7–3.1)
LYMPHOCYTES NFR BLD AUTO: 22 %
MCH RBC QN AUTO: 28.4 PG (ref 26.6–33)
MCHC RBC AUTO-ENTMCNC: 33.9 G/DL (ref 31.5–35.7)
MCV RBC AUTO: 84 FL (ref 79–97)
MONOCYTES # BLD AUTO: 0.6 X10E3/UL (ref 0.1–0.9)
MONOCYTES NFR BLD AUTO: 7 %
NEUTROPHILS # BLD AUTO: 5.9 X10E3/UL (ref 1.4–7)
NEUTROPHILS NFR BLD AUTO: 68 %
PLATELET # BLD AUTO: 285 X10E3/UL (ref 150–450)
POTASSIUM SERPL-SCNC: 4 MMOL/L (ref 3.5–5.2)
PROT SERPL-MCNC: 7.2 G/DL (ref 6–8.5)
RBC # BLD AUTO: 5.52 X10E6/UL (ref 3.77–5.28)
SODIUM SERPL-SCNC: 143 MMOL/L (ref 134–144)
TRIGL SERPL-MCNC: 201 MG/DL (ref 0–149)
TSH SERPL DL<=0.005 MIU/L-ACNC: 0.67 UIU/ML (ref 0.45–4.5)
VLDLC SERPL CALC-MCNC: 34 MG/DL (ref 5–40)
WBC # BLD AUTO: 8.5 X10E3/UL (ref 3.4–10.8)

## 2020-10-08 ENCOUNTER — OFFICE VISIT (OUTPATIENT)
Dept: INTERNAL MEDICINE | Facility: CLINIC | Age: 60
End: 2020-10-08

## 2020-10-08 VITALS
DIASTOLIC BLOOD PRESSURE: 82 MMHG | OXYGEN SATURATION: 99 % | TEMPERATURE: 96.6 F | WEIGHT: 257.2 LBS | BODY MASS INDEX: 41.33 KG/M2 | SYSTOLIC BLOOD PRESSURE: 136 MMHG | HEART RATE: 66 BPM | HEIGHT: 66 IN

## 2020-10-08 DIAGNOSIS — I10 ESSENTIAL HYPERTENSION: ICD-10-CM

## 2020-10-08 DIAGNOSIS — E78.00 PURE HYPERCHOLESTEROLEMIA: Primary | ICD-10-CM

## 2020-10-08 DIAGNOSIS — R73.09 ELEVATED GLUCOSE: ICD-10-CM

## 2020-10-08 DIAGNOSIS — Z23 NEED FOR INFLUENZA VACCINATION: ICD-10-CM

## 2020-10-08 DIAGNOSIS — F51.01 PRIMARY INSOMNIA: ICD-10-CM

## 2020-10-08 PROCEDURE — 99213 OFFICE O/P EST LOW 20 MIN: CPT | Performed by: INTERNAL MEDICINE

## 2020-10-08 PROCEDURE — 90732 PPSV23 VACC 2 YRS+ SUBQ/IM: CPT | Performed by: INTERNAL MEDICINE

## 2020-10-08 PROCEDURE — 90471 IMMUNIZATION ADMIN: CPT | Performed by: INTERNAL MEDICINE

## 2020-10-08 RX ORDER — METOPROLOL SUCCINATE 200 MG/1
200 TABLET, EXTENDED RELEASE ORAL DAILY
Qty: 90 TABLET | Refills: 3 | Status: SHIPPED | OUTPATIENT
Start: 2020-10-08 | End: 2021-10-29

## 2020-10-08 RX ORDER — AMLODIPINE BESYLATE 10 MG/1
10 TABLET ORAL DAILY
Qty: 90 TABLET | Refills: 3 | Status: SHIPPED | OUTPATIENT
Start: 2020-10-08 | End: 2021-10-29

## 2020-10-08 RX ORDER — LISINOPRIL AND HYDROCHLOROTHIAZIDE 20; 12.5 MG/1; MG/1
2 TABLET ORAL DAILY
Qty: 180 TABLET | Refills: 3 | Status: SHIPPED | OUTPATIENT
Start: 2020-10-08 | End: 2021-07-26

## 2020-10-08 RX ORDER — ROSUVASTATIN CALCIUM 5 MG/1
5 TABLET, COATED ORAL DAILY
Qty: 90 TABLET | Refills: 3 | Status: SHIPPED | OUTPATIENT
Start: 2020-10-08 | End: 2021-10-05

## 2020-10-08 RX ORDER — TRAZODONE HYDROCHLORIDE 50 MG/1
50 TABLET ORAL NIGHTLY
Qty: 90 TABLET | Refills: 3 | Status: SHIPPED | OUTPATIENT
Start: 2020-10-08 | End: 2021-12-21

## 2020-10-08 NOTE — PROGRESS NOTES
Subjective   Kirsten Lima is a 60 y.o. female here to follow up on HTN, HPL with labs.    History of Present Illness   Pt has been taking BP meds as prescribed without any problems.  No HA  No episodes of orthostasis  Pt has been taking cholesterol meds as prescribed.  No difficulties with myalgias.   She says the detrol LA was expensive.     The following portions of the patient's history were reviewed and updated as appropriate: allergies, current medications, past medical history, past social history and problem list.    Review of Systems   All other systems reviewed and are negative.      Objective   Physical Exam  Vitals signs reviewed.   Constitutional:       Appearance: She is well-developed.   HENT:      Head: Normocephalic and atraumatic.      Right Ear: External ear normal.      Left Ear: External ear normal.   Eyes:      Conjunctiva/sclera: Conjunctivae normal.      Pupils: Pupils are equal, round, and reactive to light.   Neck:      Musculoskeletal: Normal range of motion.      Thyroid: No thyromegaly.      Trachea: No tracheal deviation.   Cardiovascular:      Rate and Rhythm: Normal rate and regular rhythm.      Heart sounds: Normal heart sounds.   Pulmonary:      Effort: Pulmonary effort is normal.      Breath sounds: Normal breath sounds.   Abdominal:      General: Bowel sounds are normal. There is no distension.      Palpations: Abdomen is soft.      Tenderness: There is no abdominal tenderness.   Musculoskeletal: Normal range of motion.         General: No deformity.   Skin:     General: Skin is warm and dry.   Neurological:      Mental Status: She is alert and oriented to person, place, and time.   Psychiatric:         Behavior: Behavior normal.         Thought Content: Thought content normal.         Judgment: Judgment normal.         Vitals:    10/08/20 0756   BP: 136/82   Pulse: 66   Temp: 96.6 °F (35.9 °C)   SpO2: 99%     Orders Only on 09/29/2020   Component Date Value Ref Range Status   •  Glucose 10/03/2020 114* 65 - 99 mg/dL Final   • BUN 10/03/2020 10  8 - 27 mg/dL Final   • Creatinine 10/03/2020 0.89  0.57 - 1.00 mg/dL Final   • eGFR Non  Am 10/03/2020 71  >59 mL/min/1.73 Final   • eGFR African Am 10/03/2020 81  >59 mL/min/1.73 Final   • BUN/Creatinine Ratio 10/03/2020 11* 12 - 28 Final   • Sodium 10/03/2020 143  134 - 144 mmol/L Final   • Potassium 10/03/2020 4.0  3.5 - 5.2 mmol/L Final   • Chloride 10/03/2020 102  96 - 106 mmol/L Final   • Total CO2 10/03/2020 26  20 - 29 mmol/L Final   • Calcium 10/03/2020 9.7  8.7 - 10.3 mg/dL Final   • Total Protein 10/03/2020 7.2  6.0 - 8.5 g/dL Final   • Albumin 10/03/2020 3.9  3.8 - 4.9 g/dL Final   • Globulin 10/03/2020 3.3  1.5 - 4.5 g/dL Final   • A/G Ratio 10/03/2020 1.2  1.2 - 2.2 Final   • Total Bilirubin 10/03/2020 0.4  0.0 - 1.2 mg/dL Final   • Alkaline Phosphatase 10/03/2020 100  39 - 117 IU/L Final   • AST (SGOT) 10/03/2020 13  0 - 40 IU/L Final   • ALT (SGPT) 10/03/2020 12  0 - 32 IU/L Final   • Total Cholesterol 10/03/2020 168  100 - 199 mg/dL Final   • Triglycerides 10/03/2020 201* 0 - 149 mg/dL Final   • HDL Cholesterol 10/03/2020 51  >39 mg/dL Final   • VLDL Cholesterol Avel 10/03/2020 34  5 - 40 mg/dL Final   • LDL Chol Calc (NIH) 10/03/2020 83  0 - 99 mg/dL Final   • LDL/HDL RATIO 10/03/2020 1.6  0.0 - 3.2 ratio Final    Comment:                                     LDL/HDL Ratio                                              Men  Women                                1/2 Avg.Risk  1.0    1.5                                    Avg.Risk  3.6    3.2                                 2X Avg.Risk  6.2    5.0                                 3X Avg.Risk  8.0    6.1     • TSH 10/03/2020 0.668  0.450 - 4.500 uIU/mL Final   • WBC 10/03/2020 8.5  3.4 - 10.8 x10E3/uL Final   • RBC 10/03/2020 5.52* 3.77 - 5.28 x10E6/uL Final   • Hemoglobin 10/03/2020 15.7  11.1 - 15.9 g/dL Final   • Hematocrit 10/03/2020 46.3  34.0 - 46.6 % Final   • MCV 10/03/2020 84   79 - 97 fL Final   • MCH 10/03/2020 28.4  26.6 - 33.0 pg Final   • MCHC 10/03/2020 33.9  31.5 - 35.7 g/dL Final   • RDW 10/03/2020 13.7  11.7 - 15.4 % Final   • Platelets 10/03/2020 285  150 - 450 x10E3/uL Final   • Neutrophil Rel % 10/03/2020 68  Not Estab. % Final   • Lymphocyte Rel % 10/03/2020 22  Not Estab. % Final   • Monocyte Rel % 10/03/2020 7  Not Estab. % Final   • Eosinophil Rel % 10/03/2020 1  Not Estab. % Final   • Basophil Rel % 10/03/2020 1  Not Estab. % Final   • Neutrophils Absolute 10/03/2020 5.9  1.4 - 7.0 x10E3/uL Final   • Lymphocytes Absolute 10/03/2020 1.9  0.7 - 3.1 x10E3/uL Final   • Monocytes Absolute 10/03/2020 0.6  0.1 - 0.9 x10E3/uL Final   • Eosinophils Absolute 10/03/2020 0.1  0.0 - 0.4 x10E3/uL Final   • Basophils Absolute 10/03/2020 0.1  0.0 - 0.2 x10E3/uL Final   • Immature Granulocyte Rel % 10/03/2020 1  Not Estab. % Final   • Immature Grans Absolute 10/03/2020 0.0  0.0 - 0.1 x10E3/uL Final     Current Outpatient Medications:   •  amLODIPine (NORVASC) 10 MG tablet, Take 1 tablet by mouth Daily., Disp: 90 tablet, Rfl: 3  •  cetirizine (ZyrTEC) 10 MG tablet, Take 1 tablet by mouth daily., Disp: , Rfl:   •  Cholecalciferol (VITAMIN D) 2000 units capsule, Take  by mouth., Disp: , Rfl:   •  fluticasone (FLONASE) 50 MCG/ACT nasal spray, 2 sprays into the nostril(s) as directed by provider Daily., Disp: 1 bottle, Rfl: 0  •  lisinopril-hydrochlorothiazide (PRINZIDE,ZESTORETIC) 20-12.5 MG per tablet, Take 2 tablets by mouth Daily., Disp: 180 tablet, Rfl: 3  •  metoprolol succinate XL (TOPROL-XL) 200 MG 24 hr tablet, Take 1 tablet by mouth Daily., Disp: 90 tablet, Rfl: 3  •  rosuvastatin (CRESTOR) 5 MG tablet, Take 1 tablet by mouth Daily., Disp: 90 tablet, Rfl: 3  •  tolterodine LA (Detrol LA) 4 MG 24 hr capsule, Take 1 capsule by mouth Daily., Disp: 90 capsule, Rfl: 3  •  traZODone (DESYREL) 50 MG tablet, Take 1 tablet by mouth Every Night., Disp: 90 tablet, Rfl: 3      Body mass index is  41.51 kg/m².         Assessment/Plan   Diagnoses and all orders for this visit:    Pure hypercholesterolemia  -     Hemoglobin A1c; Future  -     Comprehensive Metabolic Panel; Future  -     LP+LDL / HDL Ratio ([facility]); Future  -     TSH Rfx On Abnormal To Free T4; Future    Essential hypertension  -     amLODIPine (NORVASC) 10 MG tablet; Take 1 tablet by mouth Daily.  -     lisinopril-hydrochlorothiazide (PRINZIDE,ZESTORETIC) 20-12.5 MG per tablet; Take 2 tablets by mouth Daily.  -     metoprolol succinate XL (TOPROL-XL) 200 MG 24 hr tablet; Take 1 tablet by mouth Daily.  -     Hemoglobin A1c; Future  -     Comprehensive Metabolic Panel; Future  -     LP+LDL / HDL Ratio ([facility]); Future  -     TSH Rfx On Abnormal To Free T4; Future    Primary insomnia    Elevated glucose  -     Hemoglobin A1c; Future  -     Comprehensive Metabolic Panel; Future  -     LP+LDL / HDL Ratio ([facility]); Future  -     TSH Rfx On Abnormal To Free T4; Future    Other orders  -     traZODone (DESYREL) 50 MG tablet; Take 1 tablet by mouth Every Night.  -     rosuvastatin (CRESTOR) 5 MG tablet; Take 1 tablet by mouth Daily.      1.  HPL-  crestor is doing  2.  HTN- ok wi current meds  3.  Insomnia-  Ok with trazodone  4. OAB- use myrbetric given cost and I have sample  May try IR oxybutinin

## 2020-10-08 NOTE — PATIENT INSTRUCTIONS

## 2020-12-28 ENCOUNTER — APPOINTMENT (OUTPATIENT)
Dept: WOMENS IMAGING | Facility: HOSPITAL | Age: 60
End: 2020-12-28

## 2020-12-28 PROCEDURE — 76642 ULTRASOUND BREAST LIMITED: CPT | Performed by: RADIOLOGY

## 2021-01-07 ENCOUNTER — OFFICE VISIT (OUTPATIENT)
Dept: SURGERY | Facility: CLINIC | Age: 61
End: 2021-01-07

## 2021-01-07 ENCOUNTER — PREP FOR SURGERY (OUTPATIENT)
Dept: OTHER | Facility: HOSPITAL | Age: 61
End: 2021-01-07

## 2021-01-07 VITALS
OXYGEN SATURATION: 98 % | HEIGHT: 66 IN | DIASTOLIC BLOOD PRESSURE: 77 MMHG | RESPIRATION RATE: 16 BRPM | SYSTOLIC BLOOD PRESSURE: 118 MMHG | HEART RATE: 76 BPM | WEIGHT: 253 LBS | BODY MASS INDEX: 40.66 KG/M2

## 2021-01-07 DIAGNOSIS — D24.2 INTRADUCTAL PAPILLOMA OF LEFT BREAST: Primary | ICD-10-CM

## 2021-01-07 PROCEDURE — 99215 OFFICE O/P EST HI 40 MIN: CPT | Performed by: SURGERY

## 2021-01-07 RX ORDER — DIAZEPAM 5 MG/1
10 TABLET ORAL ONCE
Status: CANCELLED | OUTPATIENT
Start: 2021-02-22 | End: 2021-01-07

## 2021-01-07 RX ORDER — CEFAZOLIN SODIUM 2 G/100ML
2 INJECTION, SOLUTION INTRAVENOUS ONCE
Status: CANCELLED | OUTPATIENT
Start: 2021-02-22 | End: 2021-01-07

## 2021-01-07 NOTE — PROGRESS NOTES
[facility]     Referring Provider: Tad Lowe MD     Chief complaint:  F/u left breast intraductal papillomas     HPI:   8/7/20:  Ms. Kirsten Lima is a 61 yo woman, seen at the request of Dr. Tad Lowe, for a new diagnosis of left breast intraductal papillomas. These were initially detected as an imaging abnormality on routine screening. Her work-up is detailed in the breast history section below. Despite these being incidentally found, the patient reports that she has had intermittent, clear, left nipple discharge for years. 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 She denies any associated breast lumps or pain. She does report chronic issues with her breast skin due to hidradenitis and recurrent skin abscesses, however she has not had any acute problems lately. She has a past history of a benign left breast stereotactic biopsy in 2012. She denies any family history of breast or ovarian cancer.     1/7/21, Interval History:  At her last visit, I recommended excision of both of the left breast papillomas due the associated nipple discharge. She wanted to think about it and ultimately decided to hold off on surgery. She underwent follow-up ultrasound prior to her appointment, which showed stable left breast intraductal masses (see report details below). She has not tried to elicit any nipple discharge since the last visit and has not noticed any spontaneously.       Breast history/imaging (updated 1/7/21):    5/15/18, Screening MMG with [facility] ([facility]):  Scattered areas of fibroglandular density.  No suspicious masses, significant calcifications or other abnormalities are seen. There is a biopsy clip in the left breast.  BI-RADS 1: Negative.      5/28/19, Screening MMG with [facility] ([facility]):  Scattered areas of fibroglandular density.  1. There are small asymmetries  seen in both breasts. The parenchyma includes stable nodular asymmetries. No suspicious masses, suspicious microcalcifications or areas of architectural distortion are identified. There has been no significant change from the prior exams.  2. There are stable punctate and spherical lucent-centered calcifications with diffuse/scattered distribution seen in both breast.  BI-RADS 2: Benign.     20, Screening MMG with [facility] ([facility]):  Scattered areas of fibroglandular density.  1. There are small asymmetries seen in both breasts. There has been no significant change from the prior exams.  2. There are stable punctate and spherical lucent-centered calcifications with diffuse distribution seen in both breasts.  3. There is a new small focal asymmetry measuring 12 mm seen in the central region of the left breast. This is best visualized on tomosynthesis MLO view slice # 78. This is best seen on the MLO View.  BI-RADS 0: Incomplete.     20, Left Diagnostic MMG with [facility] & Left Breast US ([facility]):  MM. On the present examination, focal asymmetry in the left breast, central does not persist. The asymmetry noted on the recent screening mammogram resolves into stroma on additional views.  US:  1. There is no sonographic correlate. There is no evidence of any solid mass or abnormal cystic elements.  2. There are three oval intraductal masses with partially defined margins measuring 5 mm to 9 mm seen in the anterior one-third subareolar region of the left breast. The patient reports history of intermittent clear left nipple discharge for the past 10 years. These three structures are adjacent and would be included within the same biopsy site.  3. There is an oval solid mass with partially defined margins measuring 6 x 5 x 5 mm seen in the anterior one-third subareolar region of the left breast. This is located 2 to 3 cm away from the above described masses.   BI-RADS 4: Suspicious      20, Left Breast,  US-Guided Biopsy x 2 ([facility])  1. Left Subareolar mass (5 x 5 x 6 cm), Ultrasound guided biopsy:   Benign sclerosing intraductal papilloma with calcifications.  -Tophat clip. Concordant.  2. Left Subareolar mass, ultrasound guided biopsy:   Benign sclerosing intraductal papilloma with usual duct hyperplasia and microcalcifications.   -Hydromark clip. Concordant.    12/28/20, Left Breast US ([facility]):  There are two oval masses and biopsy clips with circumscribed margins seen in the sub-areolar region of the left breast. There are no significant changes from the prior exam(s). This finding represents biopsy proven benign breast disease. These measure less than 1 cm.  BI-RADS  2: Benign.      Review of Systems:  See interval history.       Medications:    Current Outpatient Medications:   •  amLODIPine (NORVASC) 10 MG tablet, Take 1 tablet by mouth Daily., Disp: 90 tablet, Rfl: 3  •  cetirizine (ZyrTEC) 10 MG tablet, Take 1 tablet by mouth daily., Disp: , Rfl:   •  Cholecalciferol (VITAMIN D) 2000 units capsule, Take  by mouth., Disp: , Rfl:   •  ciprofloxacin (Cipro) 500 MG tablet, 1 po q12, Disp: 14 tablet, Rfl: 0  •  fluticasone (FLONASE) 50 MCG/ACT nasal spray, 2 sprays into the nostril(s) as directed by provider Daily., Disp: 1 bottle, Rfl: 0  •  lisinopril-hydrochlorothiazide (PRINZIDE,ZESTORETIC) 20-12.5 MG per tablet, Take 2 tablets by mouth Daily., Disp: 180 tablet, Rfl: 3  •  metoprolol succinate XL (TOPROL-XL) 200 MG 24 hr tablet, Take 1 tablet by mouth Daily., Disp: 90 tablet, Rfl: 3  •  rosuvastatin (CRESTOR) 5 MG tablet, Take 1 tablet by mouth Daily., Disp: 90 tablet, Rfl: 3  •  tolterodine LA (Detrol LA) 4 MG 24 hr capsule, Take 1 capsule by mouth Daily., Disp: 90 capsule, Rfl: 3  •  traZODone (DESYREL) 50 MG tablet, Take 1 tablet by mouth Every Night., Disp: 90 tablet, Rfl: 3      Allergies:  No Known Allergies      Family History   Problem Relation Age of Onset   • Hypertension Mother    • COPD Father    •  Heart failure Father    • Hypertension Father    • Pancreatic cancer Brother         1 brother  from pancreatic cancer   • Other Sister         1 sister  renal failure   • Alcohol abuse Sister    • Liver disease Sister        PHYSICAL EXAMINATION:   Vitals:    21 1433   BP: 118/77   Pulse: 76   Resp: 16   SpO2: 98%     ECOG 0 - Asymptomatic  General: NAD, well appearing  Psych: a&o x 3, normal mood and affect  Eyes: EOMI, no scleral icterus  ENMT: neck supple without masses or thyromegaly, mucus membranes moist  MSK: normal gait, normal ROM in bilateral shoulders  Lymph nodes: Bilateral axillary scars; no cervical, supraclavicular or axillary lymphadenopathy  Breast: symmetric, very large size, pendulous  Right: On inspection, there are scattered hidradenitis scars throughout the breast. No masses or nipple abnormalities.  Left: On inspection, there are scattered hidradenitis scars throughout the breast. This is especially apparent in the superior and lateral periareolar region where there is confluent scarring with skin thickening and hyperpigmentation. With moderate palpation, there is clear discharge from a central duct. No masses.    Left breast, in-office ultrasound: At 9:00, under the areola, there is a hydromark clip located immediately beneath the dermis. There appears to be an intraductal mass immediately deep to the clip. The second subareolar biopsy clip is not visualized.      I have independently reviewed her imaging and here are my findings:   In the left subareolar breast, there initially were 3 adjacent intraductal masses located about 8 mm deep to the skin (hydromark clip), as well as a separate 6 mm subareolar mass located about 1 cm deep to the skin (tophat clip).  Recent repeat imaging demonstrates a residual 8 mm mass near the hydromark clip located superficially, and a residual 4 mm mass located at the tophat clip.      Assessment:  60 y.o. F with at least 2 left breast  intraductal papillomas associated with nipple discharge.    Discussion:  We again discussed that when an intraductal papilloma is associated with nipple discharge, excision is recommended. These masses are stable on repeat imaging, however since she has persistent pathologic nipple discharge on clinical exam, I would recommend excision due to the low risk of upgrade. Since I cannot be certain which of the papillomas is causing the nipple discharge, I would recommend excision of both sites.    I explained that excisional biopsy would require preoperative wire-localization of of the lesion marked by the tophat clip, since this clip is not ultrasound-visible. We discussed that should the final pathology be upgraded, she would likely require an additional operation. We reviewed additional risks and potential complications associated with surgery, including, but not limited to, bleeding, infection, deformity or poor cosmetic result, chronic pain, numbness, seroma, hematoma, nipple retraction, deep venous thrombosis, and skin flap necrosis. We reviewed postoperative wound care and activity restrictions.     Plan:  -Left breast needle-localized excisional biopsy (tophat clip) and left breast ultrasound-guided excisional biopsy (hydromark clip).    Debora Zambrano MD    I spent 40 minutes caring for Kirsten on this date of service. This time includes time spent by me in the following activities: preparing for the visit, performing a medically appropriate examination and/or evaluation, counseling and educating the patient/family/caregiver and independently interpreting results and communicating that information with the patient/family/caregiver.      CC:  MD Verenice Donaldson MD Ann Grider, MD

## 2021-01-08 ENCOUNTER — TRANSCRIBE ORDERS (OUTPATIENT)
Dept: SURGERY | Facility: CLINIC | Age: 61
End: 2021-01-08

## 2021-01-08 DIAGNOSIS — D24.2 BENIGN NEOPLASM OF LEFT BREAST: Primary | ICD-10-CM

## 2021-01-12 ENCOUNTER — TELEPHONE (OUTPATIENT)
Dept: SURGERY | Facility: CLINIC | Age: 61
End: 2021-01-12

## 2021-01-12 NOTE — TELEPHONE ENCOUNTER
Surgery is scheduled on 2/22/2021 @ 9:00am, NL @ 7:30am, patient arrival 5:30am.    PAT is scheduled on 2/15/2021 @ 10:00am.    Post-op appointment with Dr. Zambrano is scheduled on 3/10/2021 @ 10:00am.    Called and spoke to patient, patient expressed v/u of appointment times.    Sent patient a reminder letter in the mail.

## 2021-01-18 ENCOUNTER — TELEPHONE (OUTPATIENT)
Dept: INTERNAL MEDICINE | Facility: CLINIC | Age: 61
End: 2021-01-18

## 2021-01-18 NOTE — TELEPHONE ENCOUNTER
PT STATED THAT SHE HAD GOTTEN [employer] FOR BLADDER CONTROL AND IT WORKED WELL FOR HER.     SHELLY REQUESTED SOME MORE SAMPLES     PLEASE CALL [phone] WHEN AVAILABLE FOR PICKUP

## 2021-02-10 ENCOUNTER — TRANSCRIBE ORDERS (OUTPATIENT)
Dept: PREADMISSION TESTING | Facility: HOSPITAL | Age: 61
End: 2021-02-10

## 2021-02-10 DIAGNOSIS — Z01.818 OTHER SPECIFIED PRE-OPERATIVE EXAMINATION: Primary | ICD-10-CM

## 2021-02-15 ENCOUNTER — APPOINTMENT (OUTPATIENT)
Dept: PREADMISSION TESTING | Facility: HOSPITAL | Age: 61
End: 2021-02-15

## 2021-02-15 DIAGNOSIS — D24.2 INTRADUCTAL PAPILLOMA OF LEFT BREAST: ICD-10-CM

## 2021-02-15 LAB
ANION GAP SERPL CALCULATED.3IONS-SCNC: 7.2 MMOL/L (ref 5–15)
BUN SERPL-MCNC: 8 MG/DL (ref 8–23)
BUN/CREAT SERPL: 9.1 (ref 7–25)
CALCIUM SPEC-SCNC: 9.7 MG/DL (ref 8.6–10.5)
CHLORIDE SERPL-SCNC: 103 MMOL/L (ref 98–107)
CO2 SERPL-SCNC: 29.8 MMOL/L (ref 22–29)
CREAT SERPL-MCNC: 0.88 MG/DL (ref 0.57–1)
DEPRECATED RDW RBC AUTO: 40 FL (ref 37–54)
ERYTHROCYTE [DISTWIDTH] IN BLOOD BY AUTOMATED COUNT: 13.8 % (ref 12.3–15.4)
GFR SERPL CREATININE-BSD FRML MDRD: 79 ML/MIN/1.73
GLUCOSE SERPL-MCNC: 96 MG/DL (ref 65–99)
HCT VFR BLD AUTO: 42.5 % (ref 34–46.6)
HGB BLD-MCNC: 14.7 G/DL (ref 12–15.9)
MCH RBC QN AUTO: 28.4 PG (ref 26.6–33)
MCHC RBC AUTO-ENTMCNC: 34.6 G/DL (ref 31.5–35.7)
MCV RBC AUTO: 82.2 FL (ref 79–97)
PLATELET # BLD AUTO: 247 10*3/MM3 (ref 140–450)
PMV BLD AUTO: 9.7 FL (ref 6–12)
POTASSIUM SERPL-SCNC: 3.7 MMOL/L (ref 3.5–5.2)
QT INTERVAL: 389 MS
RBC # BLD AUTO: 5.17 10*6/MM3 (ref 3.77–5.28)
SODIUM SERPL-SCNC: 140 MMOL/L (ref 136–145)
WBC # BLD AUTO: 8.03 10*3/MM3 (ref 3.4–10.8)

## 2021-02-15 PROCEDURE — 85027 COMPLETE CBC AUTOMATED: CPT

## 2021-02-15 PROCEDURE — 36415 COLL VENOUS BLD VENIPUNCTURE: CPT

## 2021-02-15 PROCEDURE — 80048 BASIC METABOLIC PNL TOTAL CA: CPT

## 2021-02-15 PROCEDURE — 93005 ELECTROCARDIOGRAM TRACING: CPT

## 2021-02-15 PROCEDURE — 93010 ELECTROCARDIOGRAM REPORT: CPT | Performed by: INTERNAL MEDICINE

## 2021-02-15 RX ORDER — NAPROXEN SODIUM 220 MG
220 TABLET ORAL 2 TIMES DAILY PRN
COMMUNITY
End: 2021-05-14

## 2021-02-15 RX ORDER — ACETAMINOPHEN 500 MG
1000 TABLET ORAL EVERY 6 HOURS PRN
COMMUNITY
End: 2021-03-10

## 2021-02-15 NOTE — DISCHARGE INSTRUCTIONS
Take the following medications the morning of surgery:    Metoprolol   Amlodipine  zyrtec    If you are on prescription narcotic pain medication to control your pain you may also take that medication the morning of surgery.    General Instructions:  • Do not eat solid food after midnight the night before surgery.  • You may drink clear liquids day of surgery but must stop at least one hour before your hospital arrival time.  • It is beneficial for you to have a clear drink that contains carbohydrates the day of surgery.  We suggest a 12 to 20 ounce bottle of Gatorade or Powerade for non-diabetic patients or a 12 to 20 ounce bottle of G2 or Powerade Zero for diabetic patients. (Pediatric patients, are not advised to drink a 12 to 20 ounce carbohydrate drink)    Clear liquids are liquids you can see through.  Nothing red in color.     Plain water                               Sports drinks  Sodas                                   Gelatin (Jell-O)  Fruit juices without pulp such as white grape juice and apple juice  Popsicles that contain no fruit or yogurt  Tea or coffee (no cream or milk added)  Gatorade / Powerade  G2 / Powerade Zero    • Infants may have breast milk up to four hours before surgery.  • Infants drinking formula may drink formula up to six hours before surgery.   • Patients who avoid smoking, chewing tobacco and alcohol for 4 weeks prior to surgery have a reduced risk of post-operative complications.  Quit smoking as many days before surgery as you can.  • Do not smoke, use chewing tobacco or drink alcohol the day of surgery.   • If applicable bring your C-PAP/ BI-PAP machine.  • Bring any papers given to you in the doctor’s office.  • Wear clean comfortable clothes.  • Do not wear contact lenses, false eyelashes or make-up.  Bring a case for your glasses.   • Bring crutches or walker if applicable.  • Remove all piercings.  Leave jewelry and any other valuables at home.  • Hair extensions with metal  clips must be removed prior to surgery.  • The Pre-Admission Testing nurse will instruct you to bring medications if unable to obtain an accurate list in Pre-Admission Testing.        If you were given a blood bank ID arm band remember to bring it with you the day of surgery.    Preventing a Surgical Site Infection:  • For 2 to 3 days before surgery, avoid shaving with a razor because the razor can irritate skin and make it easier to develop an infection.    • Any areas of open skin can increase the risk of a post-operative wound infection by allowing bacteria to enter and travel throughout the body.  Notify your surgeon if you have any skin wounds / rashes even if it is not near the expected surgical site.  The area will need assessed to determine if surgery should be delayed until it is healed.  • The night prior to surgery shower using a fresh bar of anti-bacterial soap (such as Dial) and clean washcloth.  Sleep in a clean bed with clean clothing.  Do not allow pets to sleep with you.  • Shower on the morning of surgery using a fresh bar of anti-bacterial soap (such as Dial) and clean washcloth.  Dry with a clean towel and dress in clean clothing.  • Ask your surgeon if you will be receiving antibiotics prior to surgery.  • Make sure you, your family, and all healthcare providers clean their hands with soap and water or an alcohol based hand  before caring for you or your wound.    Day of surgery:2/22/2021   0530  Your arrival time is approximately two hours before your scheduled surgery time.  Upon arrival, a Pre-op nurse and Anesthesiologist will review your health history, obtain vital signs, and answer questions you may have.  The only belongings needed at this time will be a list of your home medications and if applicable your C-PAP/BI-PAP machine.  A Pre-op nurse will start an IV and you may receive medication in preparation for surgery, including something to help you relax.     Please be aware  that surgery does come with discomfort.  We want to make every effort to control your discomfort so please discuss any uncontrolled symptoms with your nurse.   Your doctor will most likely have prescribed pain medications.      If you are going home after surgery you will receive individualized written care instructions before being discharged.  A responsible adult must drive you to and from the hospital on the day of your surgery and stay with you for 24 hours.  Discharge prescriptions can be filled by the hospital pharmacy during regular pharmacy hours.  If you are having surgery late in the day/evening your prescription may be e-prescribed to your pharmacy.  Please verify your pharmacy hours or chose a 24 hour pharmacy to avoid not having access to your prescription because your pharmacy has closed for the day.    If you are staying overnight following surgery, you will be transported to your hospital room following the recovery period.  [facility] has all private rooms.    If you have any questions please call Pre-Admission Testing at [phone].  Deductibles and co-payments are collected on the day of service. Please be prepared to pay the required co-pay, deductible or deposit on the day of service as defined by your plan.    Patient Education for Self-Quarantine Process    Following your COVID testing, we strongly recommend that you do not leave your home after you have been tested for COVID except to get medical care. This includes not going to work, school or to public areas.  If this is not possible for you to do please limit your activities to only required outings.  Be sure to wear a mask when you are with other people, practice social distancing and wash your hands frequently.      The following items provide additional details to keep you safe.  • Wash your hands with soap and water frequently for at least 20 seconds.   • Avoid touching your eyes, nose and mouth with unwashed  hands.  • Do not share anything - utensils, towels, food from the same bowl.   • Have your own utensils, drinking glass, dishes, towels and bedding.   • Do not have visitors.   • Do use FaceTime to stay in touch with family and friends.  • You should stay in a specific room away from others if possible.   • Stay at least 6 feet away from others in the home if you cannot have a dedicated room to yourself.   • Do not snuggle with your pet. While the CDC says there is no evidence that pets can spread COVID-19 or be infected from humans, it is probably best to avoid “petting, snuggling, being kissed or licked and sharing food (during self-quarantine)”, according to the CDC.   • Sanitize household surfaces daily. Include all high touch areas (door handles, light switches, phones, countertops, etc.)  • Do not share a bathroom with others, if possible.   • Wear a mask around others in your home if you are unable to stay in a separate room or 6 feet apart. If  you are unable to wear a mask, have your family member wear a mask if they must be within 6 feet of you.   Call your surgeon immediately if you experience any of the following symptoms:  • Sore Throat  • Shortness of Breath or difficulty breathing  • Cough  • Chills  • Body soreness or muscle pain  • Headache  • Fever  • New loss of taste or smell  • Do not arrive for your surgery ill.  Your procedure will need to be rescheduled to another time.  You will need to call your physician before the day of surgery to avoid any unnecessary exposure to hospital staff as well as other patients.

## 2021-02-16 ENCOUNTER — TELEPHONE (OUTPATIENT)
Dept: INTERNAL MEDICINE | Facility: CLINIC | Age: 61
End: 2021-02-16

## 2021-02-16 RX ORDER — OXYBUTYNIN CHLORIDE 10 MG/1
10 TABLET, EXTENDED RELEASE ORAL DAILY
Qty: 30 TABLET | Refills: 3 | Status: SHIPPED | OUTPATIENT
Start: 2021-02-16 | End: 2021-05-11

## 2021-02-19 ENCOUNTER — LAB (OUTPATIENT)
Dept: LAB | Facility: HOSPITAL | Age: 61
End: 2021-02-19

## 2021-02-19 DIAGNOSIS — Z01.818 OTHER SPECIFIED PRE-OPERATIVE EXAMINATION: ICD-10-CM

## 2021-02-19 PROCEDURE — C9803 HOPD COVID-19 SPEC COLLECT: HCPCS

## 2021-02-19 PROCEDURE — U0004 COV-19 TEST NON-CDC HGH THRU: HCPCS

## 2021-02-19 RX ORDER — SULFAMETHOXAZOLE AND TRIMETHOPRIM 800; 160 MG/1; MG/1
1 TABLET ORAL 2 TIMES DAILY
Qty: 14 TABLET | Refills: 0 | Status: SHIPPED | OUTPATIENT
Start: 2021-02-19 | End: 2021-02-26

## 2021-02-20 LAB — SARS-COV-2 ORF1AB RESP QL NAA+PROBE: NOT DETECTED

## 2021-02-22 ENCOUNTER — APPOINTMENT (OUTPATIENT)
Dept: MAMMOGRAPHY | Facility: HOSPITAL | Age: 61
End: 2021-02-22

## 2021-02-22 ENCOUNTER — HOSPITAL ENCOUNTER (OUTPATIENT)
Dept: MAMMOGRAPHY | Facility: HOSPITAL | Age: 61
Discharge: HOME | End: 2021-02-22

## 2021-02-22 ENCOUNTER — APPOINTMENT (OUTPATIENT)
Dept: GENERAL RADIOLOGY | Facility: HOSPITAL | Age: 61
End: 2021-02-22

## 2021-02-22 ENCOUNTER — HOSPITAL ENCOUNTER (OUTPATIENT)
Facility: HOSPITAL | Age: 61
Setting detail: HOSPITAL OUTPATIENT SURGERY
Discharge: HOME | End: 2021-02-22
Attending: SURGERY | Admitting: SURGERY

## 2021-02-22 ENCOUNTER — ANESTHESIA (OUTPATIENT)
Dept: PERIOP | Facility: HOSPITAL | Age: 61
End: 2021-02-22

## 2021-02-22 ENCOUNTER — ANESTHESIA EVENT (OUTPATIENT)
Dept: PERIOP | Facility: HOSPITAL | Age: 61
End: 2021-02-22

## 2021-02-22 VITALS
WEIGHT: 256.4 LBS | SYSTOLIC BLOOD PRESSURE: 114 MMHG | DIASTOLIC BLOOD PRESSURE: 75 MMHG | RESPIRATION RATE: 18 BRPM | TEMPERATURE: 98.6 F | HEART RATE: 59 BPM | BODY MASS INDEX: 41.21 KG/M2 | OXYGEN SATURATION: 90 % | HEIGHT: 66 IN

## 2021-02-22 DIAGNOSIS — D24.2 BENIGN NEOPLASM OF LEFT BREAST: ICD-10-CM

## 2021-02-22 DIAGNOSIS — D24.2 INTRADUCTAL PAPILLOMA OF LEFT BREAST: ICD-10-CM

## 2021-02-22 PROCEDURE — 76098 X-RAY EXAM SURGICAL SPECIMEN: CPT

## 2021-02-22 PROCEDURE — 25010000002 NEOSTIGMINE PER 0.5 MG: Performed by: ANESTHESIOLOGY

## 2021-02-22 PROCEDURE — 19120 REMOVAL OF BREAST LESION: CPT | Performed by: SURGERY

## 2021-02-22 PROCEDURE — 76998 US GUIDE INTRAOP: CPT | Performed by: SURGERY

## 2021-02-22 PROCEDURE — 19125 EXCISION BREAST LESION: CPT | Performed by: SURGERY

## 2021-02-22 PROCEDURE — 88307 TISSUE EXAM BY PATHOLOGIST: CPT | Performed by: SURGERY

## 2021-02-22 PROCEDURE — S0260 H&P FOR SURGERY: HCPCS | Performed by: SURGERY

## 2021-02-22 PROCEDURE — 88342 IMHCHEM/IMCYTCHM 1ST ANTB: CPT | Performed by: SURGERY

## 2021-02-22 PROCEDURE — 25010000002 PROPOFOL 10 MG/ML EMULSION: Performed by: ANESTHESIOLOGY

## 2021-02-22 PROCEDURE — 88341 IMHCHEM/IMCYTCHM EA ADD ANTB: CPT | Performed by: SURGERY

## 2021-02-22 PROCEDURE — 25010000002 FENTANYL CITRATE (PF) 100 MCG/2ML SOLUTION: Performed by: ANESTHESIOLOGY

## 2021-02-22 PROCEDURE — 25010000002 DEXAMETHASONE PER 1 MG: Performed by: ANESTHESIOLOGY

## 2021-02-22 PROCEDURE — 25010000003 CEFAZOLIN IN DEXTROSE 2-4 GM/100ML-% SOLUTION: Performed by: SURGERY

## 2021-02-22 PROCEDURE — 25010000002 ONDANSETRON PER 1 MG: Performed by: ANESTHESIOLOGY

## 2021-02-22 PROCEDURE — 25010000003 LIDOCAINE 1 % SOLUTION: Performed by: SURGERY

## 2021-02-22 RX ORDER — LIDOCAINE HYDROCHLORIDE 10 MG/ML
0.5 INJECTION, SOLUTION EPIDURAL; INFILTRATION; INTRACAUDAL; PERINEURAL ONCE AS NEEDED
Status: DISCONTINUED | OUTPATIENT
Start: 2021-02-22 | End: 2021-02-22

## 2021-02-22 RX ORDER — MIDAZOLAM HYDROCHLORIDE 1 MG/ML
1 INJECTION INTRAMUSCULAR; INTRAVENOUS
Status: DISCONTINUED | OUTPATIENT
Start: 2021-02-22 | End: 2021-02-22

## 2021-02-22 RX ORDER — OXYCODONE AND ACETAMINOPHEN 7.5; 325 MG/1; MG/1
1 TABLET ORAL ONCE AS NEEDED
Status: DISCONTINUED | OUTPATIENT
Start: 2021-02-22 | End: 2021-02-22 | Stop reason: HOSPADM

## 2021-02-22 RX ORDER — MIDAZOLAM HYDROCHLORIDE 1 MG/ML
2 INJECTION INTRAMUSCULAR; INTRAVENOUS
Status: DISCONTINUED | OUTPATIENT
Start: 2021-02-22 | End: 2021-02-22 | Stop reason: HOSPADM

## 2021-02-22 RX ORDER — ACETAMINOPHEN 500 MG
1000 TABLET ORAL EVERY 8 HOURS
Qty: 30 TABLET | Refills: 0 | Status: SHIPPED | OUTPATIENT
Start: 2021-02-22 | End: 2021-02-27

## 2021-02-22 RX ORDER — SODIUM CHLORIDE, SODIUM LACTATE, POTASSIUM CHLORIDE, CALCIUM CHLORIDE 600; 310; 30; 20 MG/100ML; MG/100ML; MG/100ML; MG/100ML
9 INJECTION, SOLUTION INTRAVENOUS CONTINUOUS
Status: DISCONTINUED | OUTPATIENT
Start: 2021-02-22 | End: 2021-02-22 | Stop reason: HOSPADM

## 2021-02-22 RX ORDER — PROPOFOL 10 MG/ML
VIAL (ML) INTRAVENOUS AS NEEDED
Status: DISCONTINUED | OUTPATIENT
Start: 2021-02-22 | End: 2021-02-22 | Stop reason: SURG

## 2021-02-22 RX ORDER — FAMOTIDINE 10 MG/ML
20 INJECTION, SOLUTION INTRAVENOUS ONCE
Status: COMPLETED | OUTPATIENT
Start: 2021-02-22 | End: 2021-02-22

## 2021-02-22 RX ORDER — SODIUM CHLORIDE, SODIUM LACTATE, POTASSIUM CHLORIDE, CALCIUM CHLORIDE 600; 310; 30; 20 MG/100ML; MG/100ML; MG/100ML; MG/100ML
9 INJECTION, SOLUTION INTRAVENOUS CONTINUOUS
Status: DISCONTINUED | OUTPATIENT
Start: 2021-02-22 | End: 2021-02-22

## 2021-02-22 RX ORDER — PROMETHAZINE HYDROCHLORIDE 25 MG/1
25 SUPPOSITORY RECTAL ONCE AS NEEDED
Status: DISCONTINUED | OUTPATIENT
Start: 2021-02-22 | End: 2021-02-22 | Stop reason: HOSPADM

## 2021-02-22 RX ORDER — FENTANYL CITRATE 50 UG/ML
50 INJECTION, SOLUTION INTRAMUSCULAR; INTRAVENOUS
Status: DISCONTINUED | OUTPATIENT
Start: 2021-02-22 | End: 2021-02-22 | Stop reason: HOSPADM

## 2021-02-22 RX ORDER — DIPHENHYDRAMINE HYDROCHLORIDE 50 MG/ML
12.5 INJECTION INTRAMUSCULAR; INTRAVENOUS
Status: DISCONTINUED | OUTPATIENT
Start: 2021-02-22 | End: 2021-02-22 | Stop reason: HOSPADM

## 2021-02-22 RX ORDER — FLUMAZENIL 0.1 MG/ML
0.2 INJECTION INTRAVENOUS AS NEEDED
Status: DISCONTINUED | OUTPATIENT
Start: 2021-02-22 | End: 2021-02-22 | Stop reason: HOSPADM

## 2021-02-22 RX ORDER — DIAZEPAM 5 MG/1
10 TABLET ORAL ONCE
Status: COMPLETED | OUTPATIENT
Start: 2021-02-22 | End: 2021-02-22

## 2021-02-22 RX ORDER — LIDOCAINE HYDROCHLORIDE 10 MG/ML
3 INJECTION, SOLUTION INFILTRATION; PERINEURAL ONCE
Status: COMPLETED | OUTPATIENT
Start: 2021-02-22 | End: 2021-02-22

## 2021-02-22 RX ORDER — OXYCODONE HYDROCHLORIDE 5 MG/1
5 TABLET ORAL EVERY 6 HOURS PRN
Qty: 10 TABLET | Refills: 0 | Status: SHIPPED | OUTPATIENT
Start: 2021-02-22 | End: 2021-03-10

## 2021-02-22 RX ORDER — FENTANYL CITRATE 50 UG/ML
50 INJECTION, SOLUTION INTRAMUSCULAR; INTRAVENOUS
Status: DISCONTINUED | OUTPATIENT
Start: 2021-02-22 | End: 2021-02-22

## 2021-02-22 RX ORDER — MAGNESIUM HYDROXIDE 1200 MG/15ML
LIQUID ORAL AS NEEDED
Status: DISCONTINUED | OUTPATIENT
Start: 2021-02-22 | End: 2021-02-22 | Stop reason: HOSPADM

## 2021-02-22 RX ORDER — HYDROCODONE BITARTRATE AND ACETAMINOPHEN 7.5; 325 MG/1; MG/1
1 TABLET ORAL ONCE AS NEEDED
Status: DISCONTINUED | OUTPATIENT
Start: 2021-02-22 | End: 2021-02-22 | Stop reason: HOSPADM

## 2021-02-22 RX ORDER — ONDANSETRON 2 MG/ML
4 INJECTION INTRAMUSCULAR; INTRAVENOUS ONCE AS NEEDED
Status: DISCONTINUED | OUTPATIENT
Start: 2021-02-22 | End: 2021-02-22 | Stop reason: HOSPADM

## 2021-02-22 RX ORDER — SODIUM CHLORIDE 0.9 % (FLUSH) 0.9 %
3 SYRINGE (ML) INJECTION EVERY 12 HOURS SCHEDULED
Status: DISCONTINUED | OUTPATIENT
Start: 2021-02-22 | End: 2021-02-22 | Stop reason: HOSPADM

## 2021-02-22 RX ORDER — ONDANSETRON 2 MG/ML
INJECTION INTRAMUSCULAR; INTRAVENOUS AS NEEDED
Status: DISCONTINUED | OUTPATIENT
Start: 2021-02-22 | End: 2021-02-22 | Stop reason: SURG

## 2021-02-22 RX ORDER — DIPHENHYDRAMINE HCL 25 MG
25 CAPSULE ORAL
Status: DISCONTINUED | OUTPATIENT
Start: 2021-02-22 | End: 2021-02-22 | Stop reason: HOSPADM

## 2021-02-22 RX ORDER — PROMETHAZINE HYDROCHLORIDE 25 MG/1
25 TABLET ORAL ONCE AS NEEDED
Status: DISCONTINUED | OUTPATIENT
Start: 2021-02-22 | End: 2021-02-22 | Stop reason: HOSPADM

## 2021-02-22 RX ORDER — LIDOCAINE HYDROCHLORIDE 20 MG/ML
INJECTION, SOLUTION INFILTRATION; PERINEURAL AS NEEDED
Status: DISCONTINUED | OUTPATIENT
Start: 2021-02-22 | End: 2021-02-22 | Stop reason: SURG

## 2021-02-22 RX ORDER — DEXAMETHASONE SODIUM PHOSPHATE 10 MG/ML
INJECTION INTRAMUSCULAR; INTRAVENOUS AS NEEDED
Status: DISCONTINUED | OUTPATIENT
Start: 2021-02-22 | End: 2021-02-22 | Stop reason: SURG

## 2021-02-22 RX ORDER — HYDROMORPHONE HYDROCHLORIDE 1 MG/ML
0.5 INJECTION, SOLUTION INTRAMUSCULAR; INTRAVENOUS; SUBCUTANEOUS
Status: DISCONTINUED | OUTPATIENT
Start: 2021-02-22 | End: 2021-02-22 | Stop reason: HOSPADM

## 2021-02-22 RX ORDER — EPHEDRINE SULFATE 50 MG/ML
5 INJECTION, SOLUTION INTRAVENOUS ONCE AS NEEDED
Status: DISCONTINUED | OUTPATIENT
Start: 2021-02-22 | End: 2021-02-22 | Stop reason: HOSPADM

## 2021-02-22 RX ORDER — LABETALOL HYDROCHLORIDE 5 MG/ML
5 INJECTION, SOLUTION INTRAVENOUS
Status: DISCONTINUED | OUTPATIENT
Start: 2021-02-22 | End: 2021-02-22 | Stop reason: HOSPADM

## 2021-02-22 RX ORDER — GLYCOPYRROLATE 0.2 MG/ML
INJECTION INTRAMUSCULAR; INTRAVENOUS AS NEEDED
Status: DISCONTINUED | OUTPATIENT
Start: 2021-02-22 | End: 2021-02-22 | Stop reason: SURG

## 2021-02-22 RX ORDER — LIDOCAINE HYDROCHLORIDE 10 MG/ML
0.5 INJECTION, SOLUTION EPIDURAL; INFILTRATION; INTRACAUDAL; PERINEURAL ONCE AS NEEDED
Status: DISCONTINUED | OUTPATIENT
Start: 2021-02-22 | End: 2021-02-22 | Stop reason: HOSPADM

## 2021-02-22 RX ORDER — ROCURONIUM BROMIDE 10 MG/ML
INJECTION, SOLUTION INTRAVENOUS AS NEEDED
Status: DISCONTINUED | OUTPATIENT
Start: 2021-02-22 | End: 2021-02-22 | Stop reason: SURG

## 2021-02-22 RX ORDER — SODIUM CHLORIDE 0.9 % (FLUSH) 0.9 %
3-10 SYRINGE (ML) INJECTION AS NEEDED
Status: DISCONTINUED | OUTPATIENT
Start: 2021-02-22 | End: 2021-02-22

## 2021-02-22 RX ORDER — SODIUM CHLORIDE 0.9 % (FLUSH) 0.9 %
3-10 SYRINGE (ML) INJECTION AS NEEDED
Status: DISCONTINUED | OUTPATIENT
Start: 2021-02-22 | End: 2021-02-22 | Stop reason: HOSPADM

## 2021-02-22 RX ORDER — NALOXONE HCL 0.4 MG/ML
0.2 VIAL (ML) INJECTION AS NEEDED
Status: DISCONTINUED | OUTPATIENT
Start: 2021-02-22 | End: 2021-02-22 | Stop reason: HOSPADM

## 2021-02-22 RX ORDER — SODIUM CHLORIDE 0.9 % (FLUSH) 0.9 %
3 SYRINGE (ML) INJECTION EVERY 12 HOURS SCHEDULED
Status: DISCONTINUED | OUTPATIENT
Start: 2021-02-22 | End: 2021-02-22

## 2021-02-22 RX ORDER — FENTANYL CITRATE 50 UG/ML
INJECTION, SOLUTION INTRAMUSCULAR; INTRAVENOUS AS NEEDED
Status: DISCONTINUED | OUTPATIENT
Start: 2021-02-22 | End: 2021-02-22 | Stop reason: SURG

## 2021-02-22 RX ORDER — BUPIVACAINE HYDROCHLORIDE AND EPINEPHRINE 2.5; 5 MG/ML; UG/ML
INJECTION, SOLUTION EPIDURAL; INFILTRATION; INTRACAUDAL; PERINEURAL AS NEEDED
Status: DISCONTINUED | OUTPATIENT
Start: 2021-02-22 | End: 2021-02-22 | Stop reason: HOSPADM

## 2021-02-22 RX ORDER — CEFAZOLIN SODIUM 2 G/100ML
2 INJECTION, SOLUTION INTRAVENOUS ONCE
Status: COMPLETED | OUTPATIENT
Start: 2021-02-22 | End: 2021-02-22

## 2021-02-22 RX ADMIN — ONDANSETRON HYDROCHLORIDE 4 MG: 2 SOLUTION INTRAMUSCULAR; INTRAVENOUS at 10:17

## 2021-02-22 RX ADMIN — LIDOCAINE HYDROCHLORIDE 3 ML: 10 INJECTION, SOLUTION INFILTRATION; PERINEURAL at 09:15

## 2021-02-22 RX ADMIN — FENTANYL CITRATE 100 MCG: 50 INJECTION INTRAMUSCULAR; INTRAVENOUS at 10:10

## 2021-02-22 RX ADMIN — PROPOFOL 200 MG: 10 INJECTION, EMULSION INTRAVENOUS at 10:10

## 2021-02-22 RX ADMIN — NEOSTIGMINE METHYLSULFATE 5 MG: 1 INJECTION INTRAMUSCULAR; INTRAVENOUS; SUBCUTANEOUS at 10:56

## 2021-02-22 RX ADMIN — FENTANYL CITRATE 50 MCG: 50 INJECTION INTRAMUSCULAR; INTRAVENOUS at 11:19

## 2021-02-22 RX ADMIN — SODIUM CHLORIDE, POTASSIUM CHLORIDE, SODIUM LACTATE AND CALCIUM CHLORIDE 9 ML/HR: 600; 310; 30; 20 INJECTION, SOLUTION INTRAVENOUS at 07:36

## 2021-02-22 RX ADMIN — ROCURONIUM BROMIDE 50 MG: 10 INJECTION INTRAVENOUS at 10:10

## 2021-02-22 RX ADMIN — GLYCOPYRROLATE 1 MG: 0.2 INJECTION INTRAMUSCULAR; INTRAVENOUS at 10:56

## 2021-02-22 RX ADMIN — CEFAZOLIN SODIUM 2 G: 2 INJECTION, SOLUTION INTRAVENOUS at 10:03

## 2021-02-22 RX ADMIN — FAMOTIDINE 20 MG: 10 INJECTION INTRAVENOUS at 07:36

## 2021-02-22 RX ADMIN — LIDOCAINE HYDROCHLORIDE 100 MG: 20 INJECTION, SOLUTION INFILTRATION; PERINEURAL at 10:10

## 2021-02-22 RX ADMIN — FENTANYL CITRATE 50 MCG: 50 INJECTION INTRAMUSCULAR; INTRAVENOUS at 11:30

## 2021-02-22 RX ADMIN — DIAZEPAM 10 MG: 5 TABLET ORAL at 07:25

## 2021-02-22 RX ADMIN — SODIUM CHLORIDE, POTASSIUM CHLORIDE, SODIUM LACTATE AND CALCIUM CHLORIDE: 600; 310; 30; 20 INJECTION, SOLUTION INTRAVENOUS at 10:03

## 2021-02-22 RX ADMIN — DEXAMETHASONE SODIUM PHOSPHATE 8 MG: 10 INJECTION INTRAMUSCULAR; INTRAVENOUS at 10:17

## 2021-02-22 RX ADMIN — FENTANYL CITRATE 50 MCG: 50 INJECTION INTRAMUSCULAR; INTRAVENOUS at 10:38

## 2021-02-22 NOTE — H&P
BREAST SURGERY HISTORY & PHYSICAL         Chief complaint:  Left breast intraductal papillomas     HPI:   8/7/20:  Ms. Kirsten Lima is a 59 yo woman, seen at the request of Dr. Tad Lowe, for a new diagnosis of left breast intraductal papillomas. These were initially detected as an imaging abnormality on routine screening. Her work-up is detailed in the breast history section below. Despite these being incidentally found, the patient reports that she has had intermittent, clear, left nipple discharge for years. 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 She denies any associated breast lumps or pain. She does report chronic issues with her breast skin due to hidradenitis and recurrent skin abscesses, however she has not had any acute problems lately. She has a past history of a benign left breast stereotactic biopsy in 2012. She denies any family history of breast or ovarian cancer.      1/7/21:  At her last visit, I recommended excision of both of the left breast papillomas due the associated nipple discharge. She wanted to think about it and ultimately decided to hold off on surgery. She underwent follow-up ultrasound prior to her appointment, which showed stable left breast intraductal masses (see report details below). She has not tried to elicit any nipple discharge since the last visit and has not noticed any spontaneously.     2/22/21, Interval History:  She presents today for surgery. She denies any new complaints.       Breast history/imaging (updated 1/7/21):     5/15/18, Screening MMG with [facility] ([facility]):  Scattered areas of fibroglandular density.  No suspicious masses, significant calcifications or other abnormalities are seen. There is a biopsy clip in the left breast.  BI-RADS 1: Negative.      5/28/19, Screening MMG with [facility] ([facility]):  Scattered areas of  fibroglandular density.  1. There are small asymmetries seen in both breasts. The parenchyma includes stable nodular asymmetries. No suspicious masses, suspicious microcalcifications or areas of architectural distortion are identified. There has been no significant change from the prior exams.  2. There are stable punctate and spherical lucent-centered calcifications with diffuse/scattered distribution seen in both breast.  BI-RADS 2: Benign.     20, Screening MMG with [facility] ([facility]):  Scattered areas of fibroglandular density.  1. There are small asymmetries seen in both breasts. There has been no significant change from the prior exams.  2. There are stable punctate and spherical lucent-centered calcifications with diffuse distribution seen in both breasts.  3. There is a new small focal asymmetry measuring 12 mm seen in the central region of the left breast. This is best visualized on tomosynthesis MLO view slice # 78. This is best seen on the MLO View.  BI-RADS 0: Incomplete.     20, Left Diagnostic MMG with [facility] & Left Breast US ([facility]):  MM. On the present examination, focal asymmetry in the left breast, central does not persist. The asymmetry noted on the recent screening mammogram resolves into stroma on additional views.  US:  1. There is no sonographic correlate. There is no evidence of any solid mass or abnormal cystic elements.  2. There are three oval intraductal masses with partially defined margins measuring 5 mm to 9 mm seen in the anterior one-third subareolar region of the left breast. The patient reports history of intermittent clear left nipple discharge for the past 10 years. These three structures are adjacent and would be included within the same biopsy site.  3. There is an oval solid mass with partially defined margins measuring 6 x 5 x 5 mm seen in the anterior one-third subareolar region of the left breast. This is located 2 to 3 cm away from the above described masses.    BI-RADS 4: Suspicious      6/29/20, Left Breast, US-Guided Biopsy x 2 ([facility])  1. Left Subareolar mass (5 x 5 x 6 cm), Ultrasound guided biopsy:   Benign sclerosing intraductal papilloma with calcifications.  -Tophat clip. Concordant.  2. Left Subareolar mass, ultrasound guided biopsy:   Benign sclerosing intraductal papilloma with usual duct hyperplasia and microcalcifications.   -Hydromark clip. Concordant.     12/28/20, Left Breast US ([facility]):  There are two oval masses and biopsy clips with circumscribed margins seen in the sub-areolar region of the left breast. There are no significant changes from the prior exam(s). This finding represents biopsy proven benign breast disease. These measure less than 1 cm.  BI-RADS  2: Benign.        Review of Systems:  See interval history.         Medications:  •  amLODIPine (NORVASC) 10 MG tablet, Take 1 tablet by mouth Daily., Disp: 90 tablet, Rfl: 3  •  cetirizine (ZyrTEC) 10 MG tablet, Take 1 tablet by mouth daily., Disp: , Rfl:   •  Cholecalciferol (VITAMIN D) 2000 units capsule, Take  by mouth., Disp: , Rfl:   •  ciprofloxacin (Cipro) 500 MG tablet, 1 po q12, Disp: 14 tablet, Rfl: 0  •  fluticasone (FLONASE) 50 MCG/ACT nasal spray, 2 sprays into the nostril(s) as directed by provider Daily., Disp: 1 bottle, Rfl: 0  •  lisinopril-hydrochlorothiazide (PRINZIDE,ZESTORETIC) 20-12.5 MG per tablet, Take 2 tablets by mouth Daily., Disp: 180 tablet, Rfl: 3  •  metoprolol succinate XL (TOPROL-XL) 200 MG 24 hr tablet, Take 1 tablet by mouth Daily., Disp: 90 tablet, Rfl: 3  •  rosuvastatin (CRESTOR) 5 MG tablet, Take 1 tablet by mouth Daily., Disp: 90 tablet, Rfl: 3  •  tolterodine LA (Detrol LA) 4 MG 24 hr capsule, Take 1 capsule by mouth Daily., Disp: 90 capsule, Rfl: 3  •  traZODone (DESYREL) 50 MG tablet, Take 1 tablet by mouth Every Night., Disp: 90 tablet, Rfl: 3        Allergies:  No Known Allergies              Family History   Problem Relation Age of Onset   • Hypertension  "Mother     • COPD Father     • Heart failure Father     • Hypertension Father     • Pancreatic cancer Brother           1 brother  from pancreatic cancer   • Other Sister           1 sister  renal failure   • Alcohol abuse Sister     • Liver disease Sister           PHYSICAL EXAMINATION:   /78 (BP Location: Right arm, Patient Position: Sitting)   Pulse 61   Temp 98.7 °F (37.1 °C) (Oral)   Resp 20   Ht 167.6 cm (66\")   Wt 116 kg (256 lb 6.4 oz)   SpO2 98%   BMI 41.38 kg/m²   ECOG 0 - Asymptomatic  General: NAD, well appearing  Psych: a&o x 3, normal mood and affect  Eyes: EOMI, no scleral icterus  ENMT: neck supple without masses or thyromegaly, mucus membranes moist  MSK: normal gait, normal ROM in bilateral shoulders  Lymph nodes: Bilateral axillary scars; no cervical, supraclavicular or axillary lymphadenopathy  Breast: symmetric, very large size, pendulous  Right: On inspection, there are scattered hidradenitis scars throughout the breast. No masses or nipple abnormalities.  Left: On inspection, there are scattered hidradenitis scars throughout the breast. This is especially apparent in the superior and lateral periareolar region where there is confluent scarring with skin thickening and hyperpigmentation. With moderate palpation, there is clear discharge from a central duct. No masses.        I have independently reviewed her imaging and here are my findings:   In the left subareolar breast, there initially were 3 adjacent intraductal masses located about 8 mm deep to the skin (hydromark clip), as well as a separate 6 mm subareolar mass located about 1 cm deep to the skin (tophat clip).  Recent repeat imaging demonstrates a residual 8 mm mass near the hydromark clip located superficially, and a residual 4 mm mass located at the tophat clip.        Assessment:  60 y.o. F with at least 2 left breast intraductal papillomas associated with nipple discharge.     Plan:  -Left breast " needle-localized excisional biopsy (tophat clip) and left breast ultrasound-guided excisional biopsy (hydromark clip).     Debora Zambrano MD

## 2021-02-22 NOTE — ANESTHESIA PREPROCEDURE EVALUATION
Anesthesia Evaluation     Patient summary reviewed and Nursing notes reviewed                Airway   Mallampati: II  TM distance: >3 FB  Dental      Pulmonary    (+) sleep apnea,   Cardiovascular     ECG reviewed  Rhythm: regular  Rate: normal    (+) hypertension, hyperlipidemia,       Neuro/Psych- negative ROS  GI/Hepatic/Renal/Endo    (+) morbid obesity,      Musculoskeletal (-) negative ROS    Abdominal    Substance History - negative use     OB/GYN negative ob/gyn ROS         Other                        Anesthesia Plan    ASA 3     general   (CMAC available)  intravenous induction     Anesthetic plan, all risks, benefits, and alternatives have been provided, discussed and informed consent has been obtained with: patient.

## 2021-02-22 NOTE — OP NOTE
Operative Report    Patient Name:  Kirsten Lima  YOB: 1960    Date of Surgery:  2/22/2021    Pre-op Diagnosis:   Intraductal papilloma of left breast [D24.2]       Post-Op Diagnosis:  Intraductal papilloma of left breast [D24.2]    Procedures:  Left breast needle-localized excisional biopsy (tophat clip) and left breast ultrasound-guided excisional biopsy (hydromark clip)      Staff:  Surgeon(s):  Debora Zambrano MD       Anesthesia: General    Estimated Blood Loss: 5 mL    Implants:    Nothing was implanted during the procedure    Specimen:          Specimens     ID Source Type Tests Collected By Collected At Frozen?      A Breast, Left Tissue · TISSUE PATHOLOGY EXAM   Debora Zambrano MD 2/22/21 1050 No     Description: Left breast excisional biopsy x2 - short stitch superior and long stitch lateral    Comment: Fresh for permanent          Findings: Tophat clip, hydromark clip, and wire seen in specimen radiograph.    Complications: None     Indications: The patient is a 60 y.o. F with at least 2 left breast intraductal papillomas associated with nipple discharge. She presents today for excisional biopsy. One of the lesions required preoperative wire-localization. The risks and benefits of surgery were discussed preoperatively and she understood and agreed to proceed.     Description of Procedure:  Preoperatively, the patient was taken to the radiology department and the papilloma marked with a tophat clip was localized with a needle/wire. I reviewed the post-localization mammogram to determine the trajectory of the wire. The patient was identified in the preoperative area and brought to the operating room and placed supine on the table. Patient verification was performed and general anesthesia was induced. I used intraoperative ultrasound to examine the papilloma marked with a hydromark clip. I marked the location of this lesion and intended area for resection. On the skin, I also  marked the suggested location of the tophat clip based on the mammographic localization imaging. I planned my incision based on the imaging. The patient was prepped and draped in sterile fashion with the wire/needle prepped into the field. A time out was performed and all were in agreement. I confirmed that the patient had received preoperative antibiotics.      I began with excision of the hydromark clip. The skin was infiltrated with local anesthesia. A medial periareolar incision was made with a scalpel and carried down through the dermis with sharp dissection. Flaps were raised according to the planned dissection. An anterior flap was raised first. Dissection was then carried out superiorly, inferiorly, medially, and laterally. While evaluating the posterior margin of the hydromark clip excision site, I realized that this was a close proximity to the distal end of the needle/wire. I therefore decided to take this excision in continuity with the needle-localized excisional biopsy. I continued my anterior flap laterally. Dissection was then carried out superiorly, inferiorly, medially, and laterally, circumferentially around the wire. The wire was delivered into the wound. The posterior dissection was completed and the specimen removed. The specimen was oriented with a short stitch superiorly and a long stitch laterally. Specimen radiograph showed the tophat clip, hydromark clip, and wire in good position.     The breast cavity was irrigated and hemostasis achieved. The remaining local anesthesia was infiltrated into the breast cavity. The breast parenchyma was brought together with 3-0 vicryl stitches. The deep dermis was closed with interrupted 3-0 vicryl stitches. The skin was closed with 4-0 subcuticular running monocryl and covered with dermabond. Counts were correct at the end of the case. The patient was awakened from anesthesia and taken to the PACU in stable condition.    Debora Zambrano MD     Date:  2/22/2021  Time: 11:28 EST

## 2021-02-22 NOTE — ANESTHESIA PROCEDURE NOTES
Airway  Urgency: elective    Date/Time: 2/22/2021 10:10 AM  End Time:2/22/2021 10:13 AM  Airway not difficult    General Information and Staff    Patient location during procedure: OR  Anesthesiologist: Brown Hernandez MD    Indications and Patient Condition  Indications for airway management: airway protection    Preoxygenated: yes  MILS not maintained throughout  Mask difficulty assessment: 2 - vent by mask + OA or adjuvant +/- NMBA    Final Airway Details  Final airway type: endotracheal airway      Successful airway: ETT  Cuffed: yes   Successful intubation technique: fiberoptic  Facilitating devices/methods: anterior pressure/BURP and Bougie  Endotracheal tube insertion site: oral  Blade: CMAC  Blade size: 3  ETT size (mm): 6.5  Cormack-Lehane Classification: grade IIa - partial view of glottis  Placement verified by: chest auscultation and capnometry   Cuff volume (mL): 7  Measured from: teeth  ETT/EBT  to teeth (cm): 20  Number of attempts at approach: 2  Assessment: lips, teeth, and gum same as pre-op and atraumatic intubation    Additional Comments  Smooth IV induction, eyes taped, EZ mask with OAW, DL, ETT placed/secured, ETCO2>20x6.

## 2021-02-22 NOTE — ANESTHESIA POSTPROCEDURE EVALUATION
"Patient: Kirsten Lima    Procedure Summary     Date: 02/22/21 Room / Location: [facility] OR  / [facility] MAIN OR    Anesthesia Start: 1003 Anesthesia Stop: 1137    Procedure: Left breast needle-localized excisional biopsy (tophat clip) and left breast ultrasound-guided excisional biopsy (hydromark clip) (Left Breast) Diagnosis:       Intraductal papilloma of left breast      (Intraductal papilloma of left breast [D24.2])    Surgeon: Debora Zambrano MD Provider: Brown Hernandez MD    Anesthesia Type: general ASA Status: 3          Anesthesia Type: general    Vitals  Vitals Value Taken Time   /80 02/22/21 1215   Temp 37 °C (98.6 °F) 02/22/21 1215   Pulse 60 02/22/21 1225   Resp 16 02/22/21 1215   SpO2 94 % 02/22/21 1227   Vitals shown include unvalidated device data.        Post Anesthesia Care and Evaluation    Patient location during evaluation: PACU  Patient participation: complete - patient cannot participate  Pain management: adequate  Airway patency: patent  Anesthetic complications: No anesthetic complications    Cardiovascular status: acceptable  Respiratory status: acceptable  Hydration status: acceptable    Comments: /75   Pulse 59   Temp 37 °C (98.6 °F) (Oral)   Resp 18   Ht 167.6 cm (66\")   Wt 116 kg (256 lb 6.4 oz)   SpO2 90%   BMI 41.38 kg/m²     Patient discharged before being seen by an Anesthesiologist.  No anesthetic complications noted per record.      "

## 2021-02-24 ENCOUNTER — TELEPHONE (OUTPATIENT)
Dept: SURGERY | Facility: CLINIC | Age: 61
End: 2021-02-24

## 2021-02-24 LAB
LAB AP CASE REPORT: NORMAL
LAB AP CLINICAL INFORMATION: NORMAL
LAB AP SPECIAL STAINS: NORMAL
PATH REPORT.FINAL DX SPEC: NORMAL
PATH REPORT.GROSS SPEC: NORMAL

## 2021-02-24 NOTE — TELEPHONE ENCOUNTER
I called Ms. Lima to discuss her pathology report. She is recovering well from surgery. I will see her at her scheduled follow-up appointment.     Debora Zambrano MD

## 2021-03-10 ENCOUNTER — OFFICE VISIT (OUTPATIENT)
Dept: SURGERY | Facility: CLINIC | Age: 61
End: 2021-03-10

## 2021-03-10 VITALS
WEIGHT: 256 LBS | OXYGEN SATURATION: 98 % | HEART RATE: 67 BPM | BODY MASS INDEX: 41.14 KG/M2 | HEIGHT: 66 IN | DIASTOLIC BLOOD PRESSURE: 90 MMHG | SYSTOLIC BLOOD PRESSURE: 144 MMHG | RESPIRATION RATE: 18 BRPM

## 2021-03-10 DIAGNOSIS — Z91.89 INCREASED RISK OF BREAST CANCER: ICD-10-CM

## 2021-03-10 DIAGNOSIS — Z48.89 POSTOPERATIVE VISIT: Primary | ICD-10-CM

## 2021-03-10 DIAGNOSIS — Z12.31 ENCOUNTER FOR SCREENING MAMMOGRAM FOR MALIGNANT NEOPLASM OF BREAST: ICD-10-CM

## 2021-03-10 DIAGNOSIS — D05.02 LOBULAR CARCINOMA IN SITU (LCIS) OF LEFT BREAST: ICD-10-CM

## 2021-03-10 PROCEDURE — 99024 POSTOP FOLLOW-UP VISIT: CPT | Performed by: SURGERY

## 2021-03-10 NOTE — PROGRESS NOTES
[facility]     Referring Provider: Tad Lowe MD     Chief complaint:  Postoperative visit      HPI:   8/7/20:  Ms. Kirsten Lima is a 61 yo woman, seen at the request of Dr. Tad Lowe, for a new diagnosis of left breast intraductal papillomas. These were initially detected as an imaging abnormality on routine screening. Her work-up is detailed in the breast history section below. Despite these being incidentally found, the patient reports that she has had intermittent, clear, left nipple discharge for years. 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 She denies any associated breast lumps or pain. She does report chronic issues with her breast skin due to hidradenitis and recurrent skin abscesses, however she has not had any acute problems lately. She has a past history of a benign left breast stereotactic biopsy in 2012. She denies any family history of breast or ovarian cancer.     1/7/21:  At her last visit, I recommended excision of both of the left breast papillomas due the associated nipple discharge. She wanted to think about it and ultimately decided to hold off on surgery. She underwent follow-up ultrasound prior to her appointment, which showed stable left breast intraductal masses (see report details below). She has not tried to elicit any nipple discharge since the last visit and has not noticed any spontaneously.    3/10/21, Interval History:  She underwent left breast excisional biopsy x 2 on 2/22/21, which showed LCIS. See surgery & pathology details in breast history section below. She has been recovering well and has no complaints.        Breast history/imaging (updated 3/10/21):    5/15/18, Screening MMG with [facility] ([facility]):  Scattered areas of fibroglandular density.  No suspicious masses, significant calcifications or other abnormalities are seen.  There is a biopsy clip in the left breast.  BI-RADS 1: Negative.      19, Screening MMG with [facility] ([facility]):  Scattered areas of fibroglandular density.  1. There are small asymmetries seen in both breasts. The parenchyma includes stable nodular asymmetries. No suspicious masses, suspicious microcalcifications or areas of architectural distortion are identified. There has been no significant change from the prior exams.  2. There are stable punctate and spherical lucent-centered calcifications with diffuse/scattered distribution seen in both breast.  BI-RADS 2: Benign.     20, Screening MMG with [facility] ([facility]):  Scattered areas of fibroglandular density.  1. There are small asymmetries seen in both breasts. There has been no significant change from the prior exams.  2. There are stable punctate and spherical lucent-centered calcifications with diffuse distribution seen in both breasts.  3. There is a new small focal asymmetry measuring 12 mm seen in the central region of the left breast. This is best visualized on tomosynthesis MLO view slice # 78. This is best seen on the MLO View.  BI-RADS 0: Incomplete.     20, Left Diagnostic MMG with [facility] & Left Breast US ([facility]):  MM. On the present examination, focal asymmetry in the left breast, central does not persist. The asymmetry noted on the recent screening mammogram resolves into stroma on additional views.  US:  1. There is no sonographic correlate. There is no evidence of any solid mass or abnormal cystic elements.  2. There are three oval intraductal masses with partially defined margins measuring 5 mm to 9 mm seen in the anterior one-third subareolar region of the left breast. The patient reports history of intermittent clear left nipple discharge for the past 10 years. These three structures are adjacent and would be included within the same biopsy site.  3. There is an oval solid mass with partially defined margins measuring 6 x 5 x 5  mm seen in the anterior one-third subareolar region of the left breast. This is located 2 to 3 cm away from the above described masses.   BI-RADS 4: Suspicious      6/29/20, Left Breast, US-Guided Biopsy x 2 ([facility])  1. Left Subareolar mass (5 x 5 x 6 cm), Ultrasound guided biopsy:   Benign sclerosing intraductal papilloma with calcifications.  -Tophat clip. Concordant.  2. Left Subareolar mass, ultrasound guided biopsy:   Benign sclerosing intraductal papilloma with usual duct hyperplasia and microcalcifications.   -Hydromark clip. Concordant.    12/28/20, Left Breast US ([facility]):  There are two oval masses and biopsy clips with circumscribed margins seen in the sub-areolar region of the left breast. There are no significant changes from the prior exam(s). This finding represents biopsy proven benign breast disease. These measure less than 1 cm.  BI-RADS  2: Benign.    02/22/21, Left breast needle-localized excisional biopsy and left breast ultrasound-guided excisional biopsy:  1. Left Breast, Needle-Localized Excisional Biopsy (13 grams):               A. LOBULAR CARCINOMA IN SITU (LCIS).               B. Multiple sclerosed intraductal papillomas with focal calcifications (completely excised).               C. Clips and associated biopsy site changes are present and adjacent to papillomas.               D. Background breast parenchyma with clusters of apocrine cysts, usual ductal hyperplasia, and       calcifications associated with sclerosing adenosis.      Review of Systems:  See interval history.       Medications:    Current Outpatient Medications:   •  amLODIPine (NORVASC) 10 MG tablet, Take 1 tablet by mouth Daily., Disp: 90 tablet, Rfl: 3  •  cetirizine (ZyrTEC) 10 MG tablet, Take 1 tablet by mouth daily., Disp: , Rfl:   •  Cholecalciferol (VITAMIN D) 2000 units capsule, Take 2,000 Units by mouth Daily., Disp: , Rfl:   •  lisinopril-hydrochlorothiazide (PRINZIDE,ZESTORETIC) 20-12.5 MG per tablet, Take 2 tablets  by mouth Daily., Disp: 180 tablet, Rfl: 3  •  metoprolol succinate XL (TOPROL-XL) 200 MG 24 hr tablet, Take 1 tablet by mouth Daily., Disp: 90 tablet, Rfl: 3  •  naproxen sodium (ALEVE) 220 MG tablet, Take 220 mg by mouth 2 (Two) Times a Day As Needed for Mild Pain , Fever or Headache., Disp: , Rfl:   •  oxybutynin XL (DITROPAN-XL) 10 MG 24 hr tablet, Take 1 tablet by mouth Daily. (Patient taking differently: Take 10 mg by mouth Every Night.), Disp: 30 tablet, Rfl: 3  •  rosuvastatin (CRESTOR) 5 MG tablet, Take 1 tablet by mouth Daily. (Patient taking differently: Take 5 mg by mouth Every Night.), Disp: 90 tablet, Rfl: 3  •  traZODone (DESYREL) 50 MG tablet, Take 1 tablet by mouth Every Night., Disp: 90 tablet, Rfl: 3      Allergies:  No Known Allergies      Family History   Problem Relation Age of Onset   • Hypertension Mother    • COPD Father    • Heart failure Father    • Hypertension Father    • Pancreatic cancer Brother         1 brother  from pancreatic cancer   • Other Sister         1 sister  renal failure   • Alcohol abuse Sister    • Liver disease Sister    • Malig Hyperthermia Neg Hx        PHYSICAL EXAMINATION:   Vitals:    03/10/21 1001   BP: 144/90   Pulse: 67   Resp: 18   SpO2: 98%     ECOG 0 - Asymptomatic  General: NAD, well appearing  Psych: a&o x 3, normal mood and affect  Eyes: EOMI, no scleral icterus  ENMT: neck supple without masses or thyromegaly, mucus membranes moist  MSK: normal gait, normal ROM in bilateral shoulders  Lymph nodes: Bilateral axillary scars; no cervical, supraclavicular or axillary lymphadenopathy  Breast: symmetric, very large size, pendulous  Right: deferred.  Left: Well-healing medial periareolar incision with Dermabond intact. No concavity or fullness at the surgical site.      Assessment:  60 y.o. 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 This was diagnosed after left breast excisional biopsy x2 on 21 for intraductal  papillomas associated with pathologic nipple discharge. She has an increased lifetime risk of breast cancer (47.1%, TCv8, calculated 3/10/21).    Discussion:  We discussed the diagnosis of LCIS. I explained that this diagnosis increases her lifetime risk of breast cancer bilaterally. I explained that breast cancer risk is assessed by considering multiple factors, including her family history and personal history, which now includes the diagnosis of LCIS. We discussed the various models for calculating breast cancer risk, that none of them are perfect and that some overestimate or underestimate. I calculated her lifetime risk of breast cancer, which is 47.1% (TCv8). We discussed that breast cancer risk needs to be continually reassessed throughout her lifetime.     We discussed management options for individuals who are at increased risk:  1) High risk screening - Annual mammogram and annual breast MRI, alternating one test every 6 months, biannual clinical exam and monthly self breast exam. She meets criteria for high risk screening.  2) Chemoprevention with Tamoxifen, Raloxifene or Exemestane. These may reduce risk up to 50%. I reviewed that these particular medications are not without risks and the risk/benefit ratio must be considered carefully.   3) Risk reducing surgery such as prophylactic mastectomy which may reduce risk by 90-95%. We discussed that this is a relatively radical strategy and is generally reserved for individuals with a known genetic mutation predisposing them to an increased risk of breast cancer. She does not meet NCCN criteria for genetic testing.     Plan:  -Start high risk screening. F/u in 6/2021 with MMG with NP. Will plan on breast MRI in 12/2021.  -Medical oncology referral to discuss chemoprevention.  -She was instructed to call sooner with any questions, concerns or changes on BSE.    Debora Zambrano MD      CC:  MD Verenice Donaldson MD Ann Grider, MD

## 2021-03-11 ENCOUNTER — TELEPHONE (OUTPATIENT)
Dept: SURGERY | Facility: CLINIC | Age: 61
End: 2021-03-11

## 2021-03-11 NOTE — TELEPHONE ENCOUNTER
MMG is scheduled @ [facility] on 6/11/2021 @ 9:10am.    F/u appointment with Regina Gilbert NP is scheduled on 6/15/2021 @ 9:30am.    Called and spoke to patient, patient expressed v/u of appointment time.    Sent patient a reminder letter in the mail.

## 2021-03-18 ENCOUNTER — LAB (OUTPATIENT)
Dept: OTHER | Facility: HOSPITAL | Age: 61
End: 2021-03-18

## 2021-03-18 ENCOUNTER — CONSULT (OUTPATIENT)
Dept: ONCOLOGY | Facility: CLINIC | Age: 61
End: 2021-03-18

## 2021-03-18 VITALS
SYSTOLIC BLOOD PRESSURE: 153 MMHG | BODY MASS INDEX: 41.11 KG/M2 | RESPIRATION RATE: 16 BRPM | WEIGHT: 255.8 LBS | DIASTOLIC BLOOD PRESSURE: 88 MMHG | OXYGEN SATURATION: 98 % | HEIGHT: 66 IN | HEART RATE: 67 BPM | TEMPERATURE: 97.8 F

## 2021-03-18 DIAGNOSIS — D05.02 LOBULAR CARCINOMA IN SITU (LCIS) OF LEFT BREAST: ICD-10-CM

## 2021-03-18 DIAGNOSIS — Z91.89 INCREASED RISK OF BREAST CANCER: ICD-10-CM

## 2021-03-18 DIAGNOSIS — Z91.89 INCREASED RISK OF BREAST CANCER: Primary | ICD-10-CM

## 2021-03-18 PROCEDURE — 36415 COLL VENOUS BLD VENIPUNCTURE: CPT

## 2021-03-18 PROCEDURE — 99205 OFFICE O/P NEW HI 60 MIN: CPT | Performed by: INTERNAL MEDICINE

## 2021-03-18 RX ORDER — TAMOXIFEN CITRATE 10 MG/1
10 TABLET ORAL EVERY OTHER DAY
Qty: 30 TABLET | Refills: 3 | Status: SHIPPED | OUTPATIENT
Start: 2021-03-18 | End: 2021-09-17

## 2021-03-18 NOTE — PROGRESS NOTES
Subjective   Kirsten Lima is a 60 y.o. female. Referred by  for LCIS     History of Present Illness   Ms. Lima is a 60-year-old -American lady who presents with a screen detected abnormality of the left breast.    6/2/2020-screening mammogram  Scattered areas of fibroglandular density.  There are small asymmetry seen in both breasts.  No significant change from prior exams.  Stable punctate and spherical lucent centered calcifications with diffuse distribution in both breasts.  There is a new focal asymmetry measuring 12 mm in the central region of the left breast.    6/24/2020-left diagnostic mammogram  Focal asymmetry in the left breast does not persist.  Ultrasound-no sonographic correlate.  3 oval intraductal masses with partially defined margins measuring 5 mm to 9 mm in the anterior one third subareolar region of the left breast.  Patient reported history of intermittent clear nipple discharge for about 10 years.  These were considered suspicious and ultrasound-guided biopsy was recommended.    6/29/2020-left breast ultrasound-guided biopsy x2  1.left subareolar mass measuring 5 x 5 x 6 mm-benign sclerosing intraductal papilloma with calcifications.  2.left subareolar mass-benign sclerosing intraductal papilloma with usual ductal hyperplasia and microcalcifications.    She was evaluated by Dr. Zambrano on 8/7/2020 and recommended to undergo excisional biopsy of both intraductal papillomas.    2/22/2021-left breast needle localized excisional biopsy-pathology was consistent with lobular carcinoma in situ.  Multiple sclerosed intraductal papillomas with focal calcifications which were completely excised.  Background breast parenchyma showed usual ductal hyperplasia and calcifications with sclerosing adenosis.    She has been referred to medical oncology to discuss risk reduction given findings of lobular carcinoma in situ.    Patient reports chronic nipple discharge from the left.  She also  had significant issues with the breast skin due to hidradenitis and recurrent skin abscesses.  She had a past history of benign breast biopsy , unable to recall the date.  Denies any family history of breast or ovarian cancer.    The following portions of the patient's history were reviewed and updated as appropriate: allergies, current medications, past family history, past medical history, past social history, past surgical history and problem list.    Past Medical History:   Diagnosis Date   • Acute cystitis    • Allergic rhinitis    • Dysuria    • Hemorrhoid    • Hidradenitis    • History of bronchitis    • Hyperlipidemia    • Hypertension    • Incontinence in female     wears pads   • Insomnia    • Nonspecific abnormal electrocardiogram (ECG) (EKG)    • Obesity    • Overactive bladder    • Pregnancy     G-2, P-0   • Sleep apnea    • UTI (urinary tract infection) 2021   • Vitamin B12 deficiency    • Vitamin D insufficiency         Past Surgical History:   Procedure Laterality Date   • AXILLARY HIDRADENITIS EXCISION Bilateral    • BREAST BIOPSY Left 2021    Procedure: Left breast needle-localized excisional biopsy (tophat clip) and left breast ultrasound-guided excisional biopsy (hydromark clip);  Surgeon: Debora Zambrano MD;  Location: [facility];  Service: General;  Laterality: Left;   • CARDIAC CATHETERIZATION     • COLONOSCOPY     • HYSTERECTOMY      [facility]        Family History   Problem Relation Age of Onset   • Hypertension Mother    • COPD Father    • Heart failure Father    • Hypertension Father    • Pancreatic cancer Brother         1 brother  from pancreatic cancer   • Other Sister         1 sister  renal failure   • Alcohol abuse Sister    • Liver disease Sister    • Malig Hyperthermia Neg Hx         Social History     Socioeconomic History   • Marital status: Single     Spouse name: Not on file   • Number of children: 0   • Years of education: Not on file   •  Highest education level: Not on file   Tobacco Use   • Smoking status: Current Every Day Smoker     Packs/day: 0.25     Years: 40.00     Pack years: 10.00     Types: Cigarettes   • Smokeless tobacco: Never Used   Vaping Use   • Vaping Use: Never used   Substance and Sexual Activity   • Alcohol use: Yes     Comment: 3 drinks a month   • Drug use: Not Currently     Types: Marijuana     Comment: in her twenties   • Sexual activity: Defer        OB History    No obstetric history on file.      Age at menarche-13  Age at menopause-2006 she is status post hysterectomy.  Still has both ovaries  She does not have any children  Total period of oral contraceptive pill use 8 years      No Known Allergies         Review of Systems   Constitutional: Negative.    HENT: Negative.    Eyes: Negative.    Respiratory: Negative.    Cardiovascular: Negative.    Gastrointestinal: Negative.    Genitourinary: Positive for amenorrhea.   Allergic/Immunologic: Negative.    Neurological: Negative.    Hematological: Negative.    Psychiatric/Behavioral: Positive for sleep disturbance.         Objective   not currently breastfeeding.   Physical Exam  Vitals reviewed.   Constitutional:       Appearance: Normal appearance. She is obese.   HENT:      Head: Normocephalic and atraumatic.      Right Ear: External ear normal.      Left Ear: External ear normal.      Nose: Nose normal. No congestion or rhinorrhea.      Mouth/Throat:      Mouth: Mucous membranes are moist.      Pharynx: Oropharynx is clear. No oropharyngeal exudate or posterior oropharyngeal erythema.   Eyes:      General:         Right eye: No discharge.         Left eye: No discharge.      Conjunctiva/sclera: Conjunctivae normal.      Pupils: Pupils are equal, round, and reactive to light.   Cardiovascular:      Rate and Rhythm: Normal rate.   Pulmonary:      Effort: Pulmonary effort is normal.      Breath sounds: Normal breath sounds.   Abdominal:      General: Abdomen is flat. Bowel  sounds are normal.      Palpations: Abdomen is soft.   Musculoskeletal:         General: No swelling, tenderness or deformity. Normal range of motion.      Cervical back: Normal range of motion.   Skin:     General: Skin is warm.      Coloration: Skin is not jaundiced or pale.   Neurological:      General: No focal deficit present.      Mental Status: She is alert and oriented to person, place, and time.      Cranial Nerves: No cranial nerve deficit.      Sensory: No sensory deficit.   Psychiatric:         Mood and Affect: Mood normal.         Behavior: Behavior normal.         Thought Content: Thought content normal.         Judgment: Judgment normal.       Breast Exam: Right breast appears normal inspection no palpable abnormalities of the right breast.  Left breast on inspection there is a periareolar incision which is healing well, no palpable abnormalities of the left breast.    Admission on 02/22/2021, Discharged on 02/22/2021   Component Date Value Ref Range Status   • Case Report 02/22/2021    Final                    Value:Surgical Pathology Report                         Case: [id number]                                  Authorizing Provider:  Debora Zambrano MD    Collected:           02/22/2021 10:50 AM          Ordering Location:     [facility]  Received:            02/22/2021 11:04 AM                                 MAIN OR                                                                      Pathologist:           Isabel Grijalva MD                                                          Specimen:    Breast, Left, Left breast excisional biopsy x2 - short stitch superior and long                     stitch lateral                                                                            • Clinical Information 02/22/2021    Final                    Value:This result contains rich text formatting which cannot be displayed here.   • Final Diagnosis 02/22/2021    Final                     Value:This result contains rich text formatting which cannot be displayed here.   • Gross Description 02/22/2021    Final                    Value:This result contains rich text formatting which cannot be displayed here.   • Special Stains 02/22/2021    Final                    Value:This result contains rich text formatting which cannot be displayed here.   Lab on 02/19/2021   Component Date Value Ref Range Status   • SARS-CoV-2 PCR 02/19/2021 Not Detected  Not Detected Final    Nucleic acid specific to SARS-CoV-2 (COVID-19) virus was not detected in  this sample by the [employer]ptima (R) SARS-CoV-2 Assay.                SARS-CoV-2 (COVID-19) nucleic acid testing performed using [facility] Aptima (R) SARS-CoV-2 Assay or [facility] TaqPath (TM)  COVID-19 Combo Kit as indicated above under Emergency Use Authorization (EUA) from the FDA. Aptima (R) and TaqPath (TM) test performance  characteristics verified by [facility] in accordance with the FDAs Guidance Document (Policy for Diagnostic Tests for Coronavirus Disease-2019  during the Public Health Emergency) issued on March 16, 2020. The laboratory is regulated under CLIA as qualified to perform high-complexity testing  Unless otherwise noted, all testing was performed at [facility], CLIA No. [id number], [place] State Licensee No. [id number]        Mammo Breast Placement Device Initial Without Biopsy    Result Date: 2/22/2021  Technically successful mammographic guided left breast needle localization.  This report was finalized on 2/22/2021 1:39 PM by Dr. Felipe Brennan M.D.      XR Breast Specimen    Result Date: 2/22/2021  Technically successful mammographic guided left breast needle localization.  This report was finalized on 2/22/2021 1:39 PM by Dr. Felipe Brennan M.D.           Assessment/Plan       60-year-old lady with a previous hysterectomy with uncertain menopausal status presents for management of lobular carcinoma in situ.     *Lobular carcinoma in  situ  · 2/22/2021-left breast needle localized excisional biopsy shows lobular carcinoma in situ with multiple intraductal papillomas.  · Explained to her the increased risk of breast cancer associated lobular carcinoma in situ  · As per [place] risk model her lifetime risk is 47.1%  · LCIS in itself increase the risk of breast cancer with an absolute risk of 1 %/year and a 7-11 fold increase in risk of her lifetime.  · Explained to her that LCIS increase the risk of ipsilateral as well as contralateral breast cancer.  · Discussed recommendations for risk reduction.  · Discussed at length lifestyle changes, increase surveillance with mammograms and MRIs and risk reduction with tamoxifen.  · Recommend tamoxifen 5 mg daily  · Adverse effects of tamoxifen including but not limited to hot flashes, nausea, mood changes, fatigue, sexual dysfunction, risk of DVT, risk of PE, risk of cataracts discussed     *Lifestyle changes  · Recommend 30 minutes of moderate intensity exercise 5 days a week  · Discussed weight management  · Referral to nutrition made     *Obesity  · BMI 41.2  · Discussed the various health risks associated with obesity  · Nutrition referral made     *Tobacco cessation  · Referral made to Prerna Lewis to help with smoking cessation  · Discussed the various health risks including malignancies and coronary artery disease associated with smoking  · Recommend annual screening lung CT  · She plans to discuss this with her primary care physician     *Surveillance  · Mammogram due June 2021  · MRI due December 2021        *Hypertension  · Blood pressure mildly elevated today  · Continue current medication     *Hyperlipidemia  · Continue Crestor     *Bladder incontinence  · Continue oxybutynin  · Continue follow-up with Dr. Cantu her gynecologist     *Follow-up-6 months    60 minutes spent on the encounter including review of medical records, face-to-face time, counseling regarding the diagnosis,  increased risk of breast cancer, tamoxifen and its adverse effects, documentation.

## 2021-03-22 ENCOUNTER — BULK ORDERING (OUTPATIENT)
Dept: CASE MANAGEMENT | Facility: OTHER | Age: 61
End: 2021-03-22

## 2021-03-22 DIAGNOSIS — Z23 IMMUNIZATION DUE: ICD-10-CM

## 2021-03-29 ENCOUNTER — TELEPHONE (OUTPATIENT)
Dept: INTERNAL MEDICINE | Facility: CLINIC | Age: 61
End: 2021-03-29

## 2021-03-29 DIAGNOSIS — E78.00 PURE HYPERCHOLESTEROLEMIA: ICD-10-CM

## 2021-03-29 DIAGNOSIS — I10 ESSENTIAL HYPERTENSION: ICD-10-CM

## 2021-03-29 DIAGNOSIS — R73.09 ELEVATED GLUCOSE: ICD-10-CM

## 2021-03-29 NOTE — TELEPHONE ENCOUNTER
Caller: Kirsten Lima    Relationship: Self    Best call back number: [phone]     What orders are you requesting (i.e. lab or imaging): LAB ORDERS     In what timeframe would the patient need to come in: 4/2/2021     Where will you receive your lab/imaging services: [facility] .     Additional notes: N/A

## 2021-04-03 LAB
ALBUMIN SERPL-MCNC: 4.1 G/DL (ref 3.8–4.9)
ALBUMIN/GLOB SERPL: 1.4 {RATIO} (ref 1.2–2.2)
ALP SERPL-CCNC: 94 IU/L (ref 39–117)
ALT SERPL-CCNC: 11 IU/L (ref 0–32)
AST SERPL-CCNC: 15 IU/L (ref 0–40)
BILIRUB SERPL-MCNC: 0.3 MG/DL (ref 0–1.2)
BUN SERPL-MCNC: 10 MG/DL (ref 8–27)
BUN/CREAT SERPL: 12 (ref 12–28)
CALCIUM SERPL-MCNC: 9.7 MG/DL (ref 8.7–10.3)
CHLORIDE SERPL-SCNC: 102 MMOL/L (ref 96–106)
CHOLEST SERPL-MCNC: 166 MG/DL (ref 100–199)
CO2 SERPL-SCNC: 26 MMOL/L (ref 20–29)
CREAT SERPL-MCNC: 0.85 MG/DL (ref 0.57–1)
GLOBULIN SER CALC-MCNC: 2.9 G/DL (ref 1.5–4.5)
GLUCOSE SERPL-MCNC: 106 MG/DL (ref 65–99)
HBA1C MFR BLD: 5.8 % (ref 4.8–5.6)
HDLC SERPL-MCNC: 54 MG/DL
LDLC SERPL CALC-MCNC: 84 MG/DL (ref 0–99)
LDLC/HDLC SERPL: 1.6 RATIO (ref 0–3.2)
POTASSIUM SERPL-SCNC: 3.8 MMOL/L (ref 3.5–5.2)
PROT SERPL-MCNC: 7 G/DL (ref 6–8.5)
SODIUM SERPL-SCNC: 140 MMOL/L (ref 134–144)
TRIGL SERPL-MCNC: 167 MG/DL (ref 0–149)
TSH SERPL DL<=0.005 MIU/L-ACNC: 0.76 UIU/ML (ref 0.45–4.5)
VLDLC SERPL CALC-MCNC: 28 MG/DL (ref 5–40)

## 2021-04-05 ENCOUNTER — CLINICAL SUPPORT (OUTPATIENT)
Dept: OTHER | Facility: HOSPITAL | Age: 61
End: 2021-04-05

## 2021-04-05 ENCOUNTER — OFFICE VISIT (OUTPATIENT)
Dept: OTHER | Facility: HOSPITAL | Age: 61
End: 2021-04-05

## 2021-04-05 VITALS — HEIGHT: 66 IN | WEIGHT: 255 LBS | BODY MASS INDEX: 40.98 KG/M2

## 2021-04-05 DIAGNOSIS — F17.200 TOBACCO USE DISORDER: Primary | ICD-10-CM

## 2021-04-05 NOTE — PROGRESS NOTES
[facility] MULTIDISCIPLINARY CLINIC  SMOKING CESSATION INTAKE VISIT    Kirsten Lima is a pleasant 60 y.o. female referred by Irena Dong MD to our Multidisciplinary Clinic reviewed today for smoking cessation counseling.     HPI:  Patient here today to discuss resources available to support her in a quit attempt. Has just completed consult with oncology nutrition. Patient has been treated for left breast LCIS with lumpectomy and planned tamoxifen. She is interested in minimizing any modifiable lifestyle factors.    Smoking History:  Patient is a current every day smoker. Reports currently smoking less than 10 cigarettes per day. Typically smoking one pack every 2.5 days  Has smoked as less in the past, typically not more.   Approximately 21 pack year history.   Patient has quit smoking a few times in the past - once successfully with hypnosis.   They report relapse occurred due to stress.     Social History     Socioeconomic History   • Marital status: Single     Spouse name: Not on file   • Number of children: 0   • Years of education: Not on file   • Highest education level: Not on file   Tobacco Use   • Smoking status: Current Every Day Smoker     Packs/day: 0.50     Years: 42.00     Pack years: 21.00     Types: Cigarettes     Start date: 1979   • Smokeless tobacco: Never Used   Vaping Use   • Vaping Use: Never used   Substance and Sexual Activity   • Alcohol use: Yes     Comment: 3 drinks a month   • Drug use: Not Currently     Types: Marijuana     Comment: in her twenties   • Sexual activity: Defer        CBC Treatment Plan:   Left breast LCIS treated with lumpectomy, chemoprophylaxis planned with Tamoxifen    History Of Seizures: NO     Lab Results   Component Value Date    GLUCOSE 96 02/15/2021    BUN 10 04/02/2021    CREATININE 0.85 04/02/2021    EGFRIFNONA 75 04/02/2021    EGFRIFAFRI 86 04/02/2021    BCR 12 04/02/2021    K 3.8 04/02/2021    CO2 26 04/02/2021    CALCIUM 9.7 04/02/2021     PROTENTOTREF 7.0 04/02/2021    ALBUMIN 4.1 04/02/2021    LABIL2 1.4 04/02/2021    AST 15 04/02/2021    ALT 11 04/02/2021        Past Medical History:   Diagnosis Date   • Acute cystitis    • Allergic rhinitis    • Dysuria    • Hemorrhoid    • Hidradenitis    • History of bronchitis    • Hyperlipidemia    • Hypertension    • Incontinence in female     wears pads   • Insomnia    • Nonspecific abnormal electrocardiogram (ECG) (EKG)    • Obesity    • Overactive bladder    • Pregnancy     G-2, P-0   • Sleep apnea    • UTI (urinary tract infection) 02/2021   • Vitamin B12 deficiency    • Vitamin D insufficiency        Psychiatric History: No formal history, is concerned about depression currently with loss of motivation/interest. 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 Mom passed away 3 years ago with hospice. Planning to discuss with family practice provider this week at a planned appointment. Expresses some regrets about not utilizing hospice bereavement benefit at the time.     Patient importance of quitting smoking 8/10.   Rates confidence of being able to quit 5/10.   They are not ready to quit smoking within the next 2-4 weeks.    Patient is willing to discuss setting a quit date.    Patient identified motivating factors for quitting include reducing lifetime risk of development of breast cancer.     Patient anticipates future challenges/barriers including finding new stress management techniques.      Strongly encouraged patient to stop smoking. Explained that multiple attempts to quit are often needed before patients can achieve prolonged periods of time free from tobacco.        QUIT PLAN (STAR):  Set quit date: Discussed ideal quit date will be about a week but no more than a month into the future.    Tell friends/family: Discussed strategies for eliciting support from loved ones and minimizing potential conflicts.    Anticipate challenges: Discussion regarding anticipating difficult  situations, exploration of triggers. She identifies also the need to begin to unpack grief related to loss of mom. Provided with information for upcoming [facility] offering on resilience and loss. Provided patient a copy of the grief recovery handbook.    Remove tobacco from environment: Explored strategies for preparing environment, getting rid of all ashtrays, lighters, smoking related accessories; preparing home and vehicle, obtaining medications and feeling confident about use    We discussed evidence-based approaches to quit smoking including options for pharmacotherapy, nicotine replacement therapy, and supportive counseling in group, individual or phone/web based settings. Discussed that counseling or medications used alone are effective but effectiveness is increased when both methods are used.  Advised that e-cigarettes and/or electronic cigarettes are not recommended for smoking cessation or as a safe alternative to smoking.    Medication plan: Patient feels nicotine patch may be best option. Reviewed proper use, change patch daily, alternate sites above the waist. Reviewed possible side effects of nicotine patch including local skin irritation, sleep disturbances (insomnia/vivid dreams), headache.     Practical counseling: D[facility]ed [facility] - patient declines a web referral today but I have advised her she can self-refer at any time. Also discussed smokefree.gov as an excellent resource. Advised patient I am available to continue to follow by telephone or in person.    Patient provided with resources to assist with smoking cessation including the 1-800-Quit Now line and Health Department programs.     Follow-up: She wants to review materials provided today and consider a quit date. We will plan for phone follow up in two weeks. I will email patient registration link for [facility] and continue to explore additional supportive resources which we can discuss at that  time. I have encouraged them to call me with any questions or as needed in the interm.    No diagnosis found.    No orders of the defined types were placed in this encounter.        A total of 40 minutes was spent engaged in one to one discussion of cessation of tobacco, setting a quit date, pharmacotherapy, side effects, behavioral counseling.

## 2021-04-05 NOTE — PROGRESS NOTES
"Outpatient Oncology Nutrition Assessment      Patient Name:  Kirsten Lima  YOB: 1960  MRN: [id number]      Assessment Date:  4/5/2021    Comments:  [facility] The patient lives alone, does not cook very much, prefers ready to heat convenience foods. Reviewed breast cancer nutrition information along with practical tips on meal planning, easy meal prep, healthy convenience foods all with an emphasis on a plant focused diet and weight management. I provided the patient with a packet of materials and resources. Encouraged her to call with any questions.    Reason for Assessment     Row Name 04/05/21 1300          Reason for Assessment    Reason For Assessment  diagnosis/disease state;physician consult     Diagnosis  -- lcis, Lobular carcinoma in situ           Anthropometrics     Row Name 04/05/21 1300          Anthropometrics    Height  167.9 cm (66.1\")     Weight  116 kg (255 lb)        Ideal Body Weight (IBW)    Ideal Body Weight (IBW) (kg)  59.81     % Ideal Body Weight  193.39        Body Mass Index (BMI)    BMI (kg/m2)  41.12     BMI Assessment  BMI 40 or greater: obesity grade III         Labs/Tests/Procedures/Meds     Row Name 04/05/21 1300          Medications    Pertinent Medications Comments  vitamin D, tamoxifen           Estimated/Assessed Needs     Row Name 04/05/21 1300          Calculation Measurements    Weight Used For Calculations  116 kg (255 lb)     Height  167.9 cm (66.1\")        Estimated/Assessed Needs    Additional Documentation  KCAL/KG (Group);Protein Requirements (Group)        KCAL/KG    KCAL/KG  15 Kcal/Kg (kcal);18 Kcal/Kg (kcal)     15 Kcal/Kg (kcal)  1735.005     18 Kcal/Kg (kcal)  2082.006        Protein Requirements    Weight Used For Protein Calculations  116 kg (255 lb)     Est Protein Requirement Amount (gms/kg)  0.8 gm protein     Estimated Protein Requirements (gms/day)  92.53     "               Problem/Interventions:  Information deficit related to breast cancer prevention and weight management.        Intervention Goal     Row Name 04/05/21 1400          Intervention Goal    General  Provide information regarding MNT for treatment/condition     Weight  Appropriate weight loss             Education/Evaluation     Row Name 04/05/21 1400          Education    Education  Provided education regarding;Education topics;Advised regarding habits/behavior     Provided education regarding  Preventative health     Education Topics  Gradual weight loss     Advised Regarding Habits/Behavior  Activity;Appropriate beverage;Appropriate portions;Eating pattern;Food choices;Food prep;Food shopping;Label reading;Meal planning           Electronically signed by:  Serene London RD, LD  04/05/21 14:46 EDT

## 2021-04-09 ENCOUNTER — OFFICE VISIT (OUTPATIENT)
Dept: INTERNAL MEDICINE | Facility: CLINIC | Age: 61
End: 2021-04-09

## 2021-04-09 VITALS
DIASTOLIC BLOOD PRESSURE: 82 MMHG | HEIGHT: 66 IN | BODY MASS INDEX: 40.98 KG/M2 | WEIGHT: 255 LBS | OXYGEN SATURATION: 98 % | SYSTOLIC BLOOD PRESSURE: 146 MMHG | TEMPERATURE: 96.6 F | HEART RATE: 71 BPM

## 2021-04-09 DIAGNOSIS — D05.02 LOBULAR CARCINOMA IN SITU (LCIS) OF LEFT BREAST: ICD-10-CM

## 2021-04-09 DIAGNOSIS — E78.00 PURE HYPERCHOLESTEROLEMIA: ICD-10-CM

## 2021-04-09 DIAGNOSIS — I10 ESSENTIAL HYPERTENSION: Primary | ICD-10-CM

## 2021-04-09 DIAGNOSIS — R73.09 ELEVATED GLUCOSE: ICD-10-CM

## 2021-04-09 PROCEDURE — 99214 OFFICE O/P EST MOD 30 MIN: CPT | Performed by: INTERNAL MEDICINE

## 2021-04-09 RX ORDER — CITALOPRAM 10 MG/1
10 TABLET ORAL DAILY
Qty: 30 TABLET | Refills: 3 | Status: SHIPPED | OUTPATIENT
Start: 2021-04-09 | End: 2021-05-03 | Stop reason: SDUPTHER

## 2021-04-09 RX ORDER — BUPROPION HYDROCHLORIDE 150 MG/1
150 TABLET ORAL EVERY MORNING
Qty: 30 TABLET | Refills: 3 | Status: CANCELLED | OUTPATIENT
Start: 2021-04-09

## 2021-04-09 NOTE — PROGRESS NOTES
Subjective   Kirsten Lima is a 60 y.o. female.     Pt here to follow up on HPL, HTN, Insomnia & Predm.   History of Present Illness   PT WAS RECENTLY DX WITH LCIS AND IS WORKING ON QUITTING SMOKING  She does feel isolated and depressed  With lack of desire to do anything.  Tearful freq.   Pt has been taking BP meds as prescribed without any problems.  No HA  No episodes of orthostasis  Pt has been taking cholesterol meds as prescribed.  No difficulties with myalgias.       The following portions of the patient's history were reviewed and updated as appropriate: allergies, current medications, past medical history, past social history and problem list.  She does work reg    Review of Systems   All other systems reviewed and are negative.      Objective     Vitals:    04/09/21 0756   BP: 146/82   Pulse: 71   Temp: 96.6 °F (35.9 °C)   SpO2: 98%       Physical Exam  Vitals reviewed.   Constitutional:       Appearance: She is well-developed.   HENT:      Head: Normocephalic and atraumatic.      Right Ear: External ear normal.      Left Ear: External ear normal.   Eyes:      Conjunctiva/sclera: Conjunctivae normal.      Pupils: Pupils are equal, round, and reactive to light.   Neck:      Thyroid: No thyromegaly.      Trachea: No tracheal deviation.   Cardiovascular:      Rate and Rhythm: Normal rate and regular rhythm.      Heart sounds: Normal heart sounds.   Pulmonary:      Effort: Pulmonary effort is normal.      Breath sounds: Normal breath sounds.   Abdominal:      General: Bowel sounds are normal. There is no distension.      Palpations: Abdomen is soft.      Tenderness: There is no abdominal tenderness.   Musculoskeletal:         General: No deformity. Normal range of motion.      Cervical back: Normal range of motion.   Skin:     General: Skin is warm and dry.   Neurological:      Mental Status: She is alert and oriented to person, place, and time.   Psychiatric:         Behavior: Behavior normal.          Thought Content: Thought content normal.         Judgment: Judgment normal.           Assessment/Plan   Diagnoses and all orders for this visit:    1. Essential hypertension (Primary)    2. Pure hypercholesterolemia    3. Lobular carcinoma in situ (LCIS) of left breast    4. Elevated glucose    Other orders  -     buPROPion XL (Wellbutrin XL) 150 MG 24 hr tablet; Take 1 tablet by mouth Every Morning.  Dispense: 30 tablet; Refill: 3      1. HTN-  Ok with current meds  2. Depression-  She has really never dealt with the death of her mother.  She wants to see a therapist  I have given her a list and will try celexa givn the tamoxifen.  I did also advise her to contact hospice as they will many times let people come even a couple years later for some sessions  3.  HPL-  She is doing well with current meds  4.  Insomnia-  She is doing

## 2021-04-19 ENCOUNTER — TELEPHONE (OUTPATIENT)
Dept: OTHER | Facility: HOSPITAL | Age: 61
End: 2021-04-19

## 2021-04-19 NOTE — TELEPHONE ENCOUNTER
[facility] MULTIDISCIPLINARY CLINIC  SMOKING CESSATION FOLLOW UP    Unable to reach patient for scheduled phone follow up for supportive counseling for smoking cessation. Attempted mobile and home phone numbers. Unable to reach patient or leave a voice mail at either number.

## 2021-05-04 RX ORDER — CITALOPRAM 10 MG/1
10 TABLET ORAL DAILY
Qty: 90 TABLET | Refills: 1 | Status: SHIPPED | OUTPATIENT
Start: 2021-05-04 | End: 2021-11-08

## 2021-05-11 RX ORDER — OXYBUTYNIN CHLORIDE 10 MG/1
TABLET, EXTENDED RELEASE ORAL
Qty: 90 TABLET | Refills: 1 | Status: SHIPPED | OUTPATIENT
Start: 2021-05-11 | End: 2021-11-22

## 2021-05-14 ENCOUNTER — OFFICE VISIT (OUTPATIENT)
Dept: INTERNAL MEDICINE | Facility: CLINIC | Age: 61
End: 2021-05-14

## 2021-05-14 VITALS
HEART RATE: 60 BPM | SYSTOLIC BLOOD PRESSURE: 138 MMHG | HEIGHT: 66 IN | BODY MASS INDEX: 40.76 KG/M2 | TEMPERATURE: 97.1 F | WEIGHT: 253.6 LBS | OXYGEN SATURATION: 99 % | DIASTOLIC BLOOD PRESSURE: 82 MMHG

## 2021-05-14 DIAGNOSIS — N32.81 OVERACTIVE BLADDER: ICD-10-CM

## 2021-05-14 DIAGNOSIS — F32.A DEPRESSION, UNSPECIFIED DEPRESSION TYPE: ICD-10-CM

## 2021-05-14 DIAGNOSIS — I10 ESSENTIAL HYPERTENSION: Primary | ICD-10-CM

## 2021-05-14 DIAGNOSIS — D05.02 LOBULAR CARCINOMA IN SITU (LCIS) OF LEFT BREAST: ICD-10-CM

## 2021-05-14 PROCEDURE — 99213 OFFICE O/P EST LOW 20 MIN: CPT | Performed by: INTERNAL MEDICINE

## 2021-05-14 NOTE — PROGRESS NOTES
Subjective   Kirsten Lima is a 60 y.o. female here today to f/u on depression.      History of Present Illness   Pt has been taking BP meds as prescribed without any problems.  No HA  No episodes of orthostasis  She does feel like she is doing much better with the lexapro ad thinks the dose is good where she is  She does feel like oab sx are better    The following portions of the patient's history were reviewed and updated as appropriate: allergies, current medications, past medical history, past social history and problem list.  She is doing better with eating  May be able to exercise better     Review of Systems   All other systems reviewed and are negative.      Objective   Physical Exam  Vitals reviewed.   Constitutional:       Appearance: She is well-developed.   HENT:      Head: Normocephalic and atraumatic.      Right Ear: External ear normal.      Left Ear: External ear normal.   Eyes:      Conjunctiva/sclera: Conjunctivae normal.      Pupils: Pupils are equal, round, and reactive to light.   Neck:      Thyroid: No thyromegaly.      Trachea: No tracheal deviation.   Cardiovascular:      Rate and Rhythm: Normal rate and regular rhythm.      Heart sounds: Normal heart sounds.   Pulmonary:      Effort: Pulmonary effort is normal.      Breath sounds: Normal breath sounds.   Abdominal:      General: Bowel sounds are normal. There is no distension.      Palpations: Abdomen is soft.      Tenderness: There is no abdominal tenderness.   Musculoskeletal:         General: No deformity. Normal range of motion.      Cervical back: Normal range of motion.   Skin:     General: Skin is warm and dry.   Neurological:      Mental Status: She is alert and oriented to person, place, and time.   Psychiatric:         Behavior: Behavior normal.         Thought Content: Thought content normal.         Judgment: Judgment normal.         Vitals:    05/14/21 1124   BP: 138/82   Pulse: 60   Temp: 97.1 °F (36.2 °C)   SpO2: 99%      Body mass index is 40.81 kg/m².       Current Outpatient Medications   Medication Instructions   • amLODIPine (NORVASC) 10 mg, Oral, Daily   • cetirizine (ZyrTEC) 10 MG tablet 1 tablet, Oral, Daily   • citalopram (CELEXA) 10 mg, Oral, Daily   • lisinopril-hydrochlorothiazide (PRINZIDE,ZESTORETIC) 20-12.5 MG per tablet 2 tablets, Oral, Daily   • metoprolol succinate XL (TOPROL-XL) 200 mg, Oral, Daily   • oxybutynin XL (DITROPAN-XL) 10 MG 24 hr tablet TAKE 1 TABLET BY MOUTH EVERY DAY   • rosuvastatin (CRESTOR) 5 mg, Oral, Daily   • tamoxifen (NOLVADEX) 10 mg, Oral, Every Other Day   • traZODone (DESYREL) 50 mg, Oral, Nightly   • Vitamin D 2,000 Units, Oral, Daily         Assessment/Plan   Diagnoses and all orders for this visit:    1. Essential hypertension (Primary)    2. Depression, unspecified depression type    3. Overactive bladder      1.  HTN-  Ok with current meds  2.  OAB-  Better with meds  Will follow  3.  Depression-  Ok with lexapro

## 2021-06-11 ENCOUNTER — APPOINTMENT (OUTPATIENT)
Dept: WOMENS IMAGING | Facility: HOSPITAL | Age: 61
End: 2021-06-11

## 2021-06-11 PROCEDURE — 77067 SCR MAMMO BI INCL CAD: CPT | Performed by: RADIOLOGY

## 2021-06-11 PROCEDURE — 77063 BREAST TOMOSYNTHESIS BI: CPT | Performed by: RADIOLOGY

## 2021-06-23 ENCOUNTER — TELEPHONE (OUTPATIENT)
Dept: INTERNAL MEDICINE | Facility: CLINIC | Age: 61
End: 2021-06-23

## 2021-06-23 NOTE — TELEPHONE ENCOUNTER
PATIENT STATES:THAT SHE WOULD LIKE A CALL BACK FOR COVID QUESTION PLEASE ADVISE      PATIENT CAN BE REACHED ON: [phone]

## 2021-06-28 ENCOUNTER — OFFICE VISIT (OUTPATIENT)
Dept: SURGERY | Facility: CLINIC | Age: 61
End: 2021-06-28

## 2021-06-28 VITALS
BODY MASS INDEX: 40.66 KG/M2 | HEIGHT: 66 IN | SYSTOLIC BLOOD PRESSURE: 132 MMHG | OXYGEN SATURATION: 98 % | DIASTOLIC BLOOD PRESSURE: 83 MMHG | WEIGHT: 253 LBS | HEART RATE: 60 BPM

## 2021-06-28 DIAGNOSIS — D05.02 LOBULAR CARCINOMA IN SITU (LCIS) OF LEFT BREAST: Primary | ICD-10-CM

## 2021-06-28 DIAGNOSIS — Z91.89 INCREASED RISK OF BREAST CANCER: ICD-10-CM

## 2021-06-28 PROCEDURE — 99214 OFFICE O/P EST MOD 30 MIN: CPT | Performed by: NURSE PRACTITIONER

## 2021-06-28 NOTE — PROGRESS NOTES
[facility]     Referring Provider: Dr. Tad Lowe     Chief complaint: follow up of LCIS     HPI:   8/7/20:  Ms. Kirsten Lima is a 61 yo woman, seen at the request of Dr. Tad Lowe, for a new diagnosis of left breast intraductal papillomas. These were initially detected as an imaging abnormality on routine screening. Her work-up is detailed in the breast history section below. Despite these being incidentally found, the patient reports that she has had intermittent, clear, left nipple discharge for years. 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 She denies any associated breast lumps or pain. She does report chronic issues with her breast skin due to hidradenitis and recurrent skin abscesses, however she has not had any acute problems lately. She has a past history of a benign left breast stereotactic biopsy in 2012. She denies any family history of breast or ovarian cancer.     1/7/21:  At her last visit, I recommended excision of both of the left breast papillomas due the associated nipple discharge. She wanted to think about it and ultimately decided to hold off on surgery. She underwent follow-up ultrasound prior to her appointment, which showed stable left breast intraductal masses (see report details below). She has not tried to elicit any nipple discharge since the last visit and has not noticed any spontaneously.    3/10/21:  She underwent left breast excisional biopsy x 2 on 2/22/21, which showed LCIS. See surgery & pathology details in breast history section below. She has been recovering well and has no complaints.     6/28/2021, Interval History:  She returns today for follow up with no breast     In 3/2021 she met with Dr Dong and discussed starting tamoxifen, referral to nutritionist and smoking cessation were made.  Due to risk for blood clots she has  reservations about taking tamoxifen. She will follow up with Dr Dong in  to discuss again.   Screening mammogram with tomosynthesis was completed 2021 at [facility], [facility] (see full report below)    Breast history/imaging (updated 21):    5/15/18, Screening MMG with [facility] ([facility]):  Scattered areas of fibroglandular density.  No suspicious masses, significant calcifications or other abnormalities are seen. There is a biopsy clip in the left breast.  BI-RADS 1: Negative.      19, Screening MMG with [facility] ([facility]):  Scattered areas of fibroglandular density.  1. There are small asymmetries seen in both breasts. The parenchyma includes stable nodular asymmetries. No suspicious masses, suspicious microcalcifications or areas of architectural distortion are identified. There has been no significant change from the prior exams.  2. There are stable punctate and spherical lucent-centered calcifications with diffuse/scattered distribution seen in both breast.  BI-RADS 2: Benign.     20, Screening MMG with [facility] ([facility]):  Scattered areas of fibroglandular density.  1. There are small asymmetries seen in both breasts. There has been no significant change from the prior exams.  2. There are stable punctate and spherical lucent-centered calcifications with diffuse distribution seen in both breasts.  3. There is a new small focal asymmetry measuring 12 mm seen in the central region of the left breast. This is best visualized on tomosynthesis MLO view slice # 78. This is best seen on the MLO View.  BI-RADS 0: Incomplete.     20, Left Diagnostic MMG with [facility] & Left Breast US ([facility]):  MM. On the present examination, focal asymmetry in the left breast, central does not persist. The asymmetry noted on the recent screening mammogram resolves into stroma on additional views.  US:  1. There is no sonographic correlate. There is no evidence of any solid mass or abnormal cystic  elements.  2. There are three oval intraductal masses with partially defined margins measuring 5 mm to 9 mm seen in the anterior one-third subareolar region of the left breast. The patient reports history of intermittent clear left nipple discharge for the past 10 years. These three structures are adjacent and would be included within the same biopsy site.  3. There is an oval solid mass with partially defined margins measuring 6 x 5 x 5 mm seen in the anterior one-third subareolar region of the left breast. This is located 2 to 3 cm away from the above described masses.   BI-RADS 4: Suspicious      6/29/20, Left Breast, US-Guided Biopsy x 2 ([facility])  1. Left Subareolar mass (5 x 5 x 6 cm), Ultrasound guided biopsy:   Benign sclerosing intraductal papilloma with calcifications.  -Tophat clip. Concordant.  2. Left Subareolar mass, ultrasound guided biopsy:   Benign sclerosing intraductal papilloma with usual duct hyperplasia and microcalcifications.   -Hydromark clip. Concordant.    12/28/20, Left Breast US ([facility]):  There are two oval masses and biopsy clips with circumscribed margins seen in the sub-areolar region of the left breast. There are no significant changes from the prior exam(s). This finding represents biopsy proven benign breast disease. These measure less than 1 cm.  BI-RADS  2: Benign.    02/22/21, Left breast needle-localized excisional biopsy and left breast ultrasound-guided excisional biopsy:  1. Left Breast, Needle-Localized Excisional Biopsy (13 grams):               A. LOBULAR CARCINOMA IN SITU (LCIS).               B. Multiple sclerosed intraductal papillomas with focal calcifications (completely excised).               C. Clips and associated biopsy site changes are present and adjacent to papillomas.               D. Background breast parenchyma with clusters of apocrine cysts, usual ductal hyperplasia, and       calcifications associated with sclerosing adenosis.    6/11/2021: Bilateral  scre[facility]  Scattered areas of fibroglandular density.  There is a new postsurgical scar seen subareolar region of the left breast.  There are no suspicious masses, calcifications, or areas of architectural distortion.  In the right breast, no suspicious masses, significant calcifications or other abnormalities are seen.  Impression:  New postsurgical scar in the left breast is benign negative.  Screening mammogram 1 year is recommended.  BI-RADS Category 2    Review of Systems:  See interval history.       Medications:    Current Outpatient Medications:   •  amLODIPine (NORVASC) 10 MG tablet, Take 1 tablet by mouth Daily., Disp: 90 tablet, Rfl: 3  •  cetirizine (ZyrTEC) 10 MG tablet, Take 1 tablet by mouth daily., Disp: , Rfl:   •  Cholecalciferol (VITAMIN D) 2000 units capsule, Take 2,000 Units by mouth Daily., Disp: , Rfl:   •  citalopram (CeleXA) 10 MG tablet, Take 1 tablet by mouth Daily., Disp: 90 tablet, Rfl: 1  •  lisinopril-hydrochlorothiazide (PRINZIDE,ZESTORETIC) 20-12.5 MG per tablet, Take 2 tablets by mouth Daily., Disp: 180 tablet, Rfl: 3  •  metoprolol succinate XL (TOPROL-XL) 200 MG 24 hr tablet, Take 1 tablet by mouth Daily., Disp: 90 tablet, Rfl: 3  •  oxybutynin XL (DITROPAN-XL) 10 MG 24 hr tablet, TAKE 1 TABLET BY MOUTH EVERY DAY, Disp: 90 tablet, Rfl: 1  •  rosuvastatin (CRESTOR) 5 MG tablet, Take 1 tablet by mouth Daily. (Patient taking differently: Take 5 mg by mouth Every Night.), Disp: 90 tablet, Rfl: 3  •  tamoxifen (NOLVADEX) 10 MG tablet, Take 1 tablet by mouth Every Other Day., Disp: 30 tablet, Rfl: 3  •  traZODone (DESYREL) 50 MG tablet, Take 1 tablet by mouth Every Night., Disp: 90 tablet, Rfl: 3      Allergies:  No Known Allergies      Family History   Problem Relation Age of Onset   • Hypertension Mother    • COPD Father    • Heart failure Father    • Hypertension Father    • Pancreatic cancer Brother         1 brother  from  pancreatic cancer   • Other Sister         1 sister  renal failure   • Alcohol abuse Sister    • Liver disease Sister    • Malig Hyperthermia Neg Hx        PHYSICAL EXAMINATION:   Vitals:    21 1110   BP: 132/83   Pulse: 60   SpO2: 98%        I reviewed physical exam, no changes noted    ECOG 0 - Asymptomatic  General: NAD, well appearing  Psych: a&o x 3, normal mood and affect  Eyes: EOMI, no scleral icterus  ENMT: neck supple without masses or thyromegaly, mucus membranes moist  MSK: normal gait, normal ROM in bilateral shoulders  Lymph nodes: Bilateral axillary scars; no cervical, supraclavicular or axillary lymphadenopathy  Breast: symmetric, very large size, pendulous  Right:  No visible abnormalities on inspection while seated, with arms raised or hands on hips. No masses, skin changes, or nipple abnormalities.  Left:  No visible abnormalities on inspection while seated, with arms raised or hands on hips. No masses, skin changes, or nipple abnormalities.   Well-healed medial periareolar incision     Assessment:  1)    60 y.o. 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 This was diagnosed after left breast excisional biopsy x2 on 21 for intraductal papillomas associated with pathologic nipple discharge.     2)  Increased lifetime risk of breast cancer (47.1%, TCv8, calculated 3/10/21).    3)  Obesity, BMI 40.84    4)  Tobacco dependence      Discussion:  Dr Zambrano discussed the diagnosis of LCIS. She explained that this diagnosis increases her lifetime risk of breast cancer bilaterally, explained that breast cancer risk is assessed by considering multiple factors, including her family history and personal history, which now includes the diagnosis of LCIS. We discussed the various models for calculating breast cancer risk, that none of them are perfect and that some overestimate or underestimate. I calculated her lifetime risk of breast cancer, which is 47.1%  (TCv8). We discussed that breast cancer risk needs to be continually reassessed throughout her lifetime.     We discussed management options for individuals who are at increased risk:  1) High risk screening - Annual mammogram and annual breast MRI, alternating one test every 6 months, biannual clinical exam and monthly self breast exam. She meets criteria for high risk screening.  2) Chemoprevention with Tamoxifen, Raloxifene or Exemestane. These may reduce risk up to 50%. I reviewed that these particular medications are not without risks and the risk/benefit ratio must be considered carefully.   3) Risk reducing surgery such as prophylactic mastectomy which may reduce risk by 90-95%. We discussed that this is a relatively radical strategy and is generally reserved for individuals with a known genetic mutation predisposing them to an increased risk of breast cancer. She does not meet NCCN criteria for genetic testing.     BMI is 40.84.  Patient was counseled on the importance of avoidance of obesity for a number of health issues including breast cancer. Diet changes and exercise were recommended.        Tobacco Use: High Risk   • Smoking Tobacco Use: Current Every Day Smoker   • Smokeless Tobacco Use: Never Used     She continues to work on smoking cessation, has been seen for smoking cessation which she did not find helpful.    Plan:  -continue high risk screening.    -breast MRI in 6 months at [facility] followed by exam    - follow up with medical oncology to again discuss chemoprevention.  -She was instructed to call sooner with any questions, concerns or changes on BSE.    Regina Gilbert, YAN      CC:  MD Emma Lane MD

## 2021-07-01 ENCOUNTER — TELEPHONE (OUTPATIENT)
Dept: MAMMOGRAPHY | Facility: CLINIC | Age: 61
End: 2021-07-01

## 2021-07-25 DIAGNOSIS — I10 ESSENTIAL HYPERTENSION: ICD-10-CM

## 2021-07-26 RX ORDER — LISINOPRIL AND HYDROCHLOROTHIAZIDE 20; 12.5 MG/1; MG/1
TABLET ORAL
Qty: 180 TABLET | Refills: 1 | Status: SHIPPED | OUTPATIENT
Start: 2021-07-26 | End: 2022-03-15

## 2021-09-14 ENCOUNTER — TELEPHONE (OUTPATIENT)
Dept: ONCOLOGY | Facility: OTHER | Age: 61
End: 2021-09-14

## 2021-09-17 ENCOUNTER — OFFICE VISIT (OUTPATIENT)
Dept: ONCOLOGY | Facility: CLINIC | Age: 61
End: 2021-09-17

## 2021-09-17 ENCOUNTER — LAB (OUTPATIENT)
Dept: LAB | Facility: HOSPITAL | Age: 61
End: 2021-09-17

## 2021-09-17 VITALS
HEIGHT: 66 IN | OXYGEN SATURATION: 99 % | BODY MASS INDEX: 39.73 KG/M2 | HEART RATE: 65 BPM | WEIGHT: 247.2 LBS | TEMPERATURE: 97.1 F | DIASTOLIC BLOOD PRESSURE: 84 MMHG | RESPIRATION RATE: 18 BRPM | SYSTOLIC BLOOD PRESSURE: 143 MMHG

## 2021-09-17 DIAGNOSIS — D05.02 LOBULAR CARCINOMA IN SITU (LCIS) OF LEFT BREAST: Primary | ICD-10-CM

## 2021-09-17 DIAGNOSIS — Z79.811 USE OF AROMATASE INHIBITORS: ICD-10-CM

## 2021-09-17 DIAGNOSIS — D05.02 LOBULAR CARCINOMA IN SITU (LCIS) OF LEFT BREAST: ICD-10-CM

## 2021-09-17 LAB
ALBUMIN SERPL-MCNC: 3.9 G/DL (ref 3.5–5.2)
ALBUMIN/GLOB SERPL: 1.2 G/DL (ref 1.1–2.4)
ALP SERPL-CCNC: 90 U/L (ref 38–116)
ALT SERPL W P-5'-P-CCNC: 7 U/L (ref 0–33)
ANION GAP SERPL CALCULATED.3IONS-SCNC: 9.4 MMOL/L (ref 5–15)
AST SERPL-CCNC: 14 U/L (ref 0–32)
BASOPHILS # BLD AUTO: 0.04 10*3/MM3 (ref 0–0.2)
BASOPHILS NFR BLD AUTO: 0.6 % (ref 0–1.5)
BILIRUB SERPL-MCNC: 0.5 MG/DL (ref 0.2–1.2)
BUN SERPL-MCNC: 7 MG/DL (ref 6–20)
BUN/CREAT SERPL: 8 (ref 7.3–30)
CALCIUM SPEC-SCNC: 9.6 MG/DL (ref 8.5–10.2)
CHLORIDE SERPL-SCNC: 100 MMOL/L (ref 98–107)
CO2 SERPL-SCNC: 29.6 MMOL/L (ref 22–29)
CREAT SERPL-MCNC: 0.87 MG/DL (ref 0.6–1.1)
DEPRECATED RDW RBC AUTO: 41.7 FL (ref 37–54)
EOSINOPHIL # BLD AUTO: 0.08 10*3/MM3 (ref 0–0.4)
EOSINOPHIL NFR BLD AUTO: 1.1 % (ref 0.3–6.2)
ERYTHROCYTE [DISTWIDTH] IN BLOOD BY AUTOMATED COUNT: 13.8 % (ref 12.3–15.4)
GFR SERPL CREATININE-BSD FRML MDRD: 80 ML/MIN/1.73
GLOBULIN UR ELPH-MCNC: 3.2 GM/DL (ref 1.8–3.5)
GLUCOSE SERPL-MCNC: 103 MG/DL (ref 74–124)
HCT VFR BLD AUTO: 45.2 % (ref 34–46.6)
HGB BLD-MCNC: 15 G/DL (ref 12–15.9)
IMM GRANULOCYTES # BLD AUTO: 0.03 10*3/MM3 (ref 0–0.05)
IMM GRANULOCYTES NFR BLD AUTO: 0.4 % (ref 0–0.5)
LYMPHOCYTES # BLD AUTO: 1.94 10*3/MM3 (ref 0.7–3.1)
LYMPHOCYTES NFR BLD AUTO: 27.4 % (ref 19.6–45.3)
MCH RBC QN AUTO: 27.6 PG (ref 26.6–33)
MCHC RBC AUTO-ENTMCNC: 33.2 G/DL (ref 31.5–35.7)
MCV RBC AUTO: 83.1 FL (ref 79–97)
MONOCYTES # BLD AUTO: 0.48 10*3/MM3 (ref 0.1–0.9)
MONOCYTES NFR BLD AUTO: 6.8 % (ref 5–12)
NEUTROPHILS NFR BLD AUTO: 4.51 10*3/MM3 (ref 1.7–7)
NEUTROPHILS NFR BLD AUTO: 63.7 % (ref 42.7–76)
NRBC BLD AUTO-RTO: 0 /100 WBC (ref 0–0.2)
PLATELET # BLD AUTO: 229 10*3/MM3 (ref 140–450)
PMV BLD AUTO: 9.4 FL (ref 6–12)
POTASSIUM SERPL-SCNC: 3.4 MMOL/L (ref 3.5–4.7)
PROT SERPL-MCNC: 7.1 G/DL (ref 6.3–8)
RBC # BLD AUTO: 5.44 10*6/MM3 (ref 3.77–5.28)
SODIUM SERPL-SCNC: 139 MMOL/L (ref 134–145)
WBC # BLD AUTO: 7.08 10*3/MM3 (ref 3.4–10.8)

## 2021-09-17 PROCEDURE — 99214 OFFICE O/P EST MOD 30 MIN: CPT | Performed by: INTERNAL MEDICINE

## 2021-09-17 PROCEDURE — 80053 COMPREHEN METABOLIC PANEL: CPT

## 2021-09-17 PROCEDURE — 85025 COMPLETE CBC W/AUTO DIFF WBC: CPT

## 2021-09-17 PROCEDURE — 36415 COLL VENOUS BLD VENIPUNCTURE: CPT

## 2021-09-17 RX ORDER — NICOTINE 21 MG/24HR
1 PATCH, TRANSDERMAL 24 HOURS TRANSDERMAL EVERY 24 HOURS
Qty: 30 EACH | Refills: 3 | Status: SHIPPED | OUTPATIENT
Start: 2021-09-17 | End: 2022-01-27

## 2021-09-17 RX ORDER — ANASTROZOLE 1 MG/1
1 TABLET ORAL DAILY
Qty: 30 TABLET | Refills: 3 | Status: SHIPPED | OUTPATIENT
Start: 2021-09-17 | End: 2021-12-15

## 2021-09-17 NOTE — PROGRESS NOTES
Subjective   Kirsten Lima is a 61 y.o. female. Referred by  for LCIS     History of Present Illness   Ms. Lima is a 61-year-old -American lady who presents with a screen detected abnormality of the left breast.    6/2/2020-screening mammogram  Scattered areas of fibroglandular density.  There are small asymmetry seen in both breasts.  No significant change from prior exams.  Stable punctate and spherical lucent centered calcifications with diffuse distribution in both breasts.  There is a new focal asymmetry measuring 12 mm in the central region of the left breast.    6/24/2020-left diagnostic mammogram  Focal asymmetry in the left breast does not persist.  Ultrasound-no sonographic correlate.  3 oval intraductal masses with partially defined margins measuring 5 mm to 9 mm in the anterior one third subareolar region of the left breast.  Patient reported history of intermittent clear nipple discharge for about 10 years.  These were considered suspicious and ultrasound-guided biopsy was recommended.    6/29/2020-left breast ultrasound-guided biopsy x2  1.left subareolar mass measuring 5 x 5 x 6 mm-benign sclerosing intraductal papilloma with calcifications.  2.left subareolar mass-benign sclerosing intraductal papilloma with usual ductal hyperplasia and microcalcifications.    She was evaluated by Dr. Zambrano on 8/7/2020 and recommended to undergo excisional biopsy of both intraductal papillomas.    2/22/2021-left breast needle localized excisional biopsy-pathology was consistent with lobular carcinoma in situ.  Multiple sclerosed intraductal papillomas with focal calcifications which were completely excised.  Background breast parenchyma showed usual ductal hyperplasia and calcifications with sclerosing adenosis.    She has been referred to medical oncology to discuss risk reduction given findings of lobular carcinoma in situ.    Patient reports chronic nipple discharge from the left.  She also  had significant issues with the breast skin due to hidradenitis and recurrent skin abscesses.  She had a past history of benign breast biopsy , unable to recall the date.  Denies any family history of breast or ovarian cancer.    Interval history  Ms. Lima presents to the clinic today for follow-up.  She has not started the low-dose tamoxifen.  She is concerned about the risk of clotting.  She continues to smoke.  She has not been exercising however she has lost about 3 kg since her prior visit.  She has not modified her diet and still ate poorly.  Denies any new palpable masses in either breast.  Had a mammogram in June 2021 which was benign.    The following portions of the patient's history were reviewed and updated as appropriate: allergies, current medications, past family history, past medical history, past social history, past surgical history and problem list.    Past Medical History:   Diagnosis Date   • Acute cystitis    • Allergic rhinitis    • Dysuria    • Hemorrhoid    • Hidradenitis    • History of bronchitis    • Hyperlipidemia    • Hypertension    • Incontinence in female     wears pads   • Insomnia    • Nonspecific abnormal electrocardiogram (ECG) (EKG)    • Obesity    • Overactive bladder    • Pregnancy     G-2, P-0   • Sleep apnea    • UTI (urinary tract infection) 02/2021   • Vitamin B12 deficiency    • Vitamin D insufficiency         Past Surgical History:   Procedure Laterality Date   • AXILLARY HIDRADENITIS EXCISION Bilateral    • BREAST BIOPSY Left 2/22/2021    Procedure: Left breast needle-localized excisional biopsy (tophat clip) and left breast ultrasound-guided excisional biopsy (hydromark clip);  Surgeon: Debora Zambrano MD;  Location: [facility];  Service: General;  Laterality: Left;   • CARDIAC CATHETERIZATION     • COLONOSCOPY     • HYSTERECTOMY  2006    [facility]        Family History   Problem Relation Age of Onset   • Hypertension Mother    • COPD Father    • Heart failure  Father    • Hypertension Father    • Pancreatic cancer Brother         1 brother  from pancreatic cancer   • Other Sister         1 sister  renal failure   • Alcohol abuse Sister    • Liver disease Sister    • Malig Hyperthermia Neg Hx         Social History     Socioeconomic History   • Marital status: Single     Spouse name: Not on file   • Number of children: 0   • Years of education: Not on file   • Highest education level: Not on file   Tobacco Use   • Smoking status: Current Every Day Smoker     Packs/day: 0.50     Years: 42.00     Pack years: 21.00     Types: Cigarettes     Start date:    • Smokeless tobacco: Never Used   Vaping Use   • Vaping Use: Never used   Substance and Sexual Activity   • Alcohol use: Yes     Comment: 3 drinks a month   • Drug use: Not Currently     Comment: marijuana in her twenties   • Sexual activity: Defer        OB History    No obstetric history on file.      Age at menarche-13  Age at menopause- she is status post hysterectomy.  Still has both ovaries  She does not have any children  Total period of oral contraceptive pill use 8 years      No Known Allergies         Review of Systems   Constitutional: Negative.    HENT: Negative.    Eyes: Negative.    Respiratory: Negative.    Cardiovascular: Negative.    Gastrointestinal: Negative.    Genitourinary: Positive for amenorrhea.   Allergic/Immunologic: Negative.    Neurological: Negative.    Hematological: Negative.    Psychiatric/Behavioral: Positive for sleep disturbance.     Review of systems as mentioned in the HPI  Objective   not currently breastfeeding.   Physical Exam  Vitals reviewed.   Constitutional:       Appearance: Normal appearance. She is obese.   HENT:      Head: Normocephalic and atraumatic.      Right Ear: External ear normal.      Left Ear: External ear normal.      Nose: Nose normal. No congestion or rhinorrhea.      Mouth/Throat:      Mouth: Mucous membranes are moist.      Pharynx:  Oropharynx is clear. No oropharyngeal exudate or posterior oropharyngeal erythema.   Eyes:      General:         Right eye: No discharge.         Left eye: No discharge.      Conjunctiva/sclera: Conjunctivae normal.      Pupils: Pupils are equal, round, and reactive to light.   Cardiovascular:      Rate and Rhythm: Normal rate.   Pulmonary:      Effort: Pulmonary effort is normal.      Breath sounds: Normal breath sounds.   Abdominal:      General: Abdomen is flat. Bowel sounds are normal.      Palpations: Abdomen is soft.   Musculoskeletal:         General: No swelling, tenderness or deformity. Normal range of motion.      Cervical back: Normal range of motion.   Skin:     General: Skin is warm.      Coloration: Skin is not jaundiced or pale.   Neurological:      General: No focal deficit present.      Mental Status: She is alert and oriented to person, place, and time.      Cranial Nerves: No cranial nerve deficit.      Sensory: No sensory deficit.   Psychiatric:         Mood and Affect: Mood normal.         Behavior: Behavior normal.         Thought Content: Thought content normal.         Judgment: Judgment normal.       Breast Exam: Right breast appears normal inspection no palpable abnormalities of the right breast.  Left breast on inspection there is a periareolar incision which is healing well, no palpable abnormalities of the left breast.    I have reexamined the patient and the results are consistent with the previously documented exam. Irena Dong MD     Lab on 09/17/2021   Component Date Value Ref Range Status   • WBC 09/17/2021 7.08  3.40 - 10.80 10*3/mm3 Final   • RBC 09/17/2021 5.44* 3.77 - 5.28 10*6/mm3 Final   • Hemoglobin 09/17/2021 15.0  12.0 - 15.9 g/dL Final   • Hematocrit 09/17/2021 45.2  34.0 - 46.6 % Final   • MCV 09/17/2021 83.1  79.0 - 97.0 fL Final   • MCH 09/17/2021 27.6  26.6 - 33.0 pg Final   • MCHC 09/17/2021 33.2  31.5 - 35.7 g/dL Final   • RDW 09/17/2021 13.8  12.3 - 15.4 %  Final   • RDW-SD 09/17/2021 41.7  37.0 - 54.0 fl Final   • MPV 09/17/2021 9.4  6.0 - 12.0 fL Final   • Platelets 09/17/2021 229  140 - 450 10*3/mm3 Final   • Neutrophil % 09/17/2021 63.7  42.7 - 76.0 % Final   • Lymphocyte % 09/17/2021 27.4  19.6 - 45.3 % Final   • Monocyte % 09/17/2021 6.8  5.0 - 12.0 % Final   • Eosinophil % 09/17/2021 1.1  0.3 - 6.2 % Final   • Basophil % 09/17/2021 0.6  0.0 - 1.5 % Final   • Immature Grans % 09/17/2021 0.4  0.0 - 0.5 % Final   • Neutrophils, Absolute 09/17/2021 4.51  1.70 - 7.00 10*3/mm3 Final   • Lymphocytes, Absolute 09/17/2021 1.94  0.70 - 3.10 10*3/mm3 Final   • Monocytes, Absolute 09/17/2021 0.48  0.10 - 0.90 10*3/mm3 Final   • Eosinophils, Absolute 09/17/2021 0.08  0.00 - 0.40 10*3/mm3 Final   • Basophils, Absolute 09/17/2021 0.04  0.00 - 0.20 10*3/mm3 Final   • Immature Grans, Absolute 09/17/2021 0.03  0.00 - 0.05 10*3/mm3 Final   • nRBC 09/17/2021 0.0  0.0 - 0.2 /100 WBC Final        No radiology results for the last 30 days.     CBC reviewed and within normal limits    Assessment/Plan       61-year-old lady with a previous hysterectomy with uncertain menopausal status presents for management of lobular carcinoma in situ.     *Lobular carcinoma in situ  · 2/22/2021-left breast needle localized excisional biopsy shows lobular carcinoma in situ with multiple intraductal papillomas.  · Explained to her the increased risk of breast cancer associated lobular carcinoma in situ  · As per [employer] risk model her lifetime risk is 47.1%  · LCIS in itself increase the risk of breast cancer with an absolute risk of 1 %/year and a 7-11 fold increase in risk of her lifetime.  · Explained to her that LCIS increase the risk of ipsilateral as well as contralateral breast cancer.  · Discussed recommendations for risk reduction.  · Discussed at length lifestyle changes, increase surveillance with mammograms and MRIs and risk reduction with tamoxifen.  · Recommend tamoxifen 5 mg  daily  · Adverse effects of tamoxifen including but not limited to hot flashes, nausea, mood changes, fatigue, sexual dysfunction, risk of DVT, risk of PE, risk of cataracts discussed  · She is concerned about the risk of clotting with tamoxifen smoking.  And hence she has not started the tamoxifen  · We discussed about anastrozole 1 mg p.o. daily which does not have the risk of clotting.  · Adverse effects of anastrozole including but not limited to hot flashes, mood changes, decrease in bone mineral density, arthralgias and myalgias, vaginal dryness discussed  · Anastrozole prescribed today     *Lifestyle changes  · She has not been exercising or modified her diet  · We had a lengthy discussion again today about the importance of the same    *Obesity  · BMI 39.9  · She has met with a dietitian before  · She knows what to do however she has not been implementing that  · She is going to try to do better with her diet and exercise     *Tobacco cessation  · Referral made to [facility] to help with smoking cessation  · Insurance does not cover the programs  · Prescribed nicotine patches today  · She needs an annual lung CT     *Surveillance  · Mammogram June 2021 benign  · MRI due December 2021        *Hypertension  · Blood pressure 143/84  · Continue current medication       *Hyperlipidemia  · Continue Crestor     *Bladder incontinence  · Continue oxybutynin  · Continue follow-up with Dr. Cantu her gynecologist     *Follow-up-6 months    .

## 2021-09-23 ENCOUNTER — HOSPITAL ENCOUNTER (OUTPATIENT)
Dept: BONE DENSITY | Facility: HOSPITAL | Age: 61
Discharge: HOME | End: 2021-09-23
Admitting: INTERNAL MEDICINE

## 2021-09-23 DIAGNOSIS — Z79.811 USE OF AROMATASE INHIBITORS: ICD-10-CM

## 2021-09-23 DIAGNOSIS — D05.02 LOBULAR CARCINOMA IN SITU (LCIS) OF LEFT BREAST: ICD-10-CM

## 2021-09-23 PROCEDURE — 77080 DXA BONE DENSITY AXIAL: CPT

## 2021-09-28 ENCOUNTER — TELEPHONE (OUTPATIENT)
Dept: ONCOLOGY | Facility: CLINIC | Age: 61
End: 2021-09-28

## 2021-09-28 NOTE — TELEPHONE ENCOUNTER
Contacted pt by phone and notified her of normal bone mineral density.  She v/u. She also inquired about her labs she had drawn, states she didn't hear anything about them.  Reviewed them with her. Advised her CBC is normal.  Noted her potassium was just slightly outside the normal range and she could increase he potassium rich foods.  She v/u.

## 2021-10-01 ENCOUNTER — OFFICE VISIT (OUTPATIENT)
Dept: INTERNAL MEDICINE | Facility: CLINIC | Age: 61
End: 2021-10-01

## 2021-10-01 VITALS
WEIGHT: 246.5 LBS | BODY MASS INDEX: 39.62 KG/M2 | OXYGEN SATURATION: 99 % | HEIGHT: 66 IN | SYSTOLIC BLOOD PRESSURE: 136 MMHG | TEMPERATURE: 97.1 F | DIASTOLIC BLOOD PRESSURE: 90 MMHG | HEART RATE: 58 BPM

## 2021-10-01 DIAGNOSIS — B35.1 ONYCHOMYCOSIS: Primary | ICD-10-CM

## 2021-10-01 PROCEDURE — 99213 OFFICE O/P EST LOW 20 MIN: CPT | Performed by: INTERNAL MEDICINE

## 2021-10-01 RX ORDER — CLOTRIMAZOLE AND BETAMETHASONE DIPROPIONATE 10; .5 MG/ML; MG/ML
LOTION TOPICAL 2 TIMES DAILY
Qty: 30 ML | Refills: 1 | Status: SHIPPED | OUTPATIENT
Start: 2021-10-01

## 2021-10-01 RX ORDER — TERBINAFINE HYDROCHLORIDE 250 MG/1
250 TABLET ORAL DAILY
Qty: 30 TABLET | Refills: 3 | Status: SHIPPED | OUTPATIENT
Start: 2021-10-01 | End: 2021-10-05 | Stop reason: SDUPTHER

## 2021-10-05 RX ORDER — ROSUVASTATIN CALCIUM 5 MG/1
TABLET, COATED ORAL
Qty: 90 TABLET | Refills: 3 | Status: SHIPPED | OUTPATIENT
Start: 2021-10-05 | End: 2022-06-22

## 2021-10-05 RX ORDER — TERBINAFINE HYDROCHLORIDE 250 MG/1
250 TABLET ORAL DAILY
Qty: 30 TABLET | Refills: 3 | Status: SHIPPED | OUTPATIENT
Start: 2021-10-05 | End: 2022-01-27

## 2021-10-29 DIAGNOSIS — I10 ESSENTIAL HYPERTENSION: ICD-10-CM

## 2021-10-29 RX ORDER — METOPROLOL SUCCINATE 200 MG/1
TABLET, EXTENDED RELEASE ORAL
Qty: 90 TABLET | Refills: 1 | Status: SHIPPED | OUTPATIENT
Start: 2021-10-29 | End: 2022-04-20

## 2021-10-29 RX ORDER — AMLODIPINE BESYLATE 10 MG/1
TABLET ORAL
Qty: 90 TABLET | Refills: 1 | Status: SHIPPED | OUTPATIENT
Start: 2021-10-29 | End: 2022-04-20

## 2021-11-08 RX ORDER — CITALOPRAM 10 MG/1
TABLET ORAL
Qty: 90 TABLET | Refills: 1 | Status: SHIPPED | OUTPATIENT
Start: 2021-11-08 | End: 2022-05-23

## 2021-11-19 ENCOUNTER — OFFICE VISIT (OUTPATIENT)
Dept: INTERNAL MEDICINE | Facility: CLINIC | Age: 61
End: 2021-11-19

## 2021-11-19 VITALS
OXYGEN SATURATION: 99 % | TEMPERATURE: 96.8 F | SYSTOLIC BLOOD PRESSURE: 122 MMHG | BODY MASS INDEX: 40.13 KG/M2 | DIASTOLIC BLOOD PRESSURE: 84 MMHG | HEIGHT: 66 IN | HEART RATE: 61 BPM | WEIGHT: 249.7 LBS

## 2021-11-19 DIAGNOSIS — Z12.11 SCREENING FOR COLON CANCER: ICD-10-CM

## 2021-11-19 DIAGNOSIS — Z00.00 HEALTH CARE MAINTENANCE: Primary | ICD-10-CM

## 2021-11-19 DIAGNOSIS — I10 PRIMARY HYPERTENSION: ICD-10-CM

## 2021-11-19 DIAGNOSIS — E78.00 PURE HYPERCHOLESTEROLEMIA: ICD-10-CM

## 2021-11-19 PROCEDURE — 99396 PREV VISIT EST AGE 40-64: CPT | Performed by: INTERNAL MEDICINE

## 2021-11-19 NOTE — PROGRESS NOTES
Subjective   Kirsten Lima is a 61 y.o. female and is here for a comprehensive physical exam. The patient reports problems - htn.  Pt has been taking BP meds as prescribed without any problems.  No HA  No episodes of orthostasis  Pt has been taking cholesterol meds as prescribed.  No difficulties with myalgias.   Her sleep is not good  She does take the trazodone but she has to wake up 2x a night   Pt is UTD with annual gyn exam and mammo     Do you take any herbs or supplements that were not prescribed by a doctor?       Social History: no tob no etoh  Social History     Socioeconomic History   • Marital status: Single   • Number of children: 0   Tobacco Use   • Smoking status: Current Every Day Smoker     Packs/day: 0.50     Years: 42.00     Pack years: 21.00     Types: Cigarettes     Start date:    • Smokeless tobacco: Never Used   Vaping Use   • Vaping Use: Never used   Substance and Sexual Activity   • Alcohol use: Yes     Comment: 3 drinks a month   • Drug use: Not Currently     Comment: marijuana in her twenties   • Sexual activity: Defer       Family History:   Family History   Problem Relation Age of Onset   • Hypertension Mother    • COPD Father    • Heart failure Father    • Hypertension Father    • Pancreatic cancer Brother         1 brother  from pancreatic cancer   • Other Sister         1 sister  renal failure   • Alcohol abuse Sister    • Liver disease Sister    • Malig Hyperthermia Neg Hx        Past Medical History:   Past Medical History:   Diagnosis Date   • Acute cystitis    • Allergic rhinitis    • Dysuria    • Hemorrhoid    • Hidradenitis    • History of bronchitis    • Hyperlipidemia    • Hypertension    • Incontinence in female     wears pads   • Insomnia    • Nonspecific abnormal electrocardiogram (ECG) (EKG)    • Obesity    • Overactive bladder    • Pregnancy     G-2, P-0   • Sleep apnea    • UTI (urinary tract infection) 2021   • Vitamin B12 deficiency    • Vitamin  "D insufficiency            Review of Systems    A comprehensive review of systems was negative.    Objective   /84 (BP Location: Left arm, Patient Position: Sitting)   Pulse 61   Temp 96.8 °F (36 °C) (Infrared)   Ht 167.6 cm (66\")   Wt 113 kg (249 lb 11.2 oz)   SpO2 99%   BMI 40.30 kg/m²     General Appearance:    Alert, cooperative, no distress, appears stated age   Head:    Normocephalic, without obvious abnormality, atraumatic   Eyes:    PERRL, conjunctiva/corneas clear, EOM's intact, fundi     benign, both eyes   Ears:    Normal TM's and external ear canals, both ears   Nose:   Nares normal, septum midline, mucosa normal, no drainage    or sinus tenderness   Throat:   Lips, mucosa, and tongue normal; teeth and gums normal   Neck:   Supple, symmetrical, trachea midline, no adenopathy;     thyroid:  no enlargement/tenderness/nodules; no carotid    bruit or JVD   Back:     Symmetric, no curvature, ROM normal, no CVA tenderness   Lungs:     Clear to auscultation bilaterally, respirations unlabored   Chest Wall:    No tenderness or deformity    Heart:    Regular rate and rhythm, S1 and S2 normal, no murmur, rub   or gallop   Breast Exam:    No tenderness, masses, or nipple abnormality   Abdomen:     Soft, non-tender, bowel sounds active all four quadrants,     no masses, no organomegaly   Genitalia:    Normal female without lesion, discharge or tenderness   Rectal:    Normal tone, no masses or tenderness; guaiac negative stool   Extremities:   Extremities normal, atraumatic, no cyanosis or edema   Pulses:   2+ and symmetric all extremities   Skin:   Skin color, texture, turgor normal, no rashes or lesions   Lymph nodes:   Cervical, supraclavicular, and axillary nodes normal   Neurologic:   CNII-XII intact, normal strength, sensation and reflexes     throughout       Vitals:    11/19/21 1122   BP: 122/84   Pulse: 61   Temp: 96.8 °F (36 °C)   SpO2: 99%     Body mass index is 40.3 kg/m².      Medications: "   Current Outpatient Medications:   •  amLODIPine (NORVASC) 10 MG tablet, TAKE 1 TABLET DAILY, Disp: 90 tablet, Rfl: 1  •  anastrozole (Arimidex) 1 MG tablet, Take 1 tablet by mouth Daily for 90 days., Disp: 30 tablet, Rfl: 3  •  cetirizine (ZyrTEC) 10 MG tablet, Take 1 tablet by mouth daily., Disp: , Rfl:   •  Cholecalciferol (VITAMIN D) 2000 units capsule, Take 2,000 Units by mouth Daily., Disp: , Rfl:   •  citalopram (CeleXA) 10 MG tablet, TAKE 1 TABLET BY MOUTH EVERY DAY, Disp: 90 tablet, Rfl: 1  •  clotrimazole-betamethasone (LOTRISONE) 1-0.05 % lotion, Apply  topically to the appropriate area as directed 2 (Two) Times a Day., Disp: 30 mL, Rfl: 1  •  lisinopril-hydrochlorothiazide (PRINZIDE,ZESTORETIC) 20-12.5 MG per tablet, TAKE 2 TABLETS DAILY, Disp: 180 tablet, Rfl: 1  •  metoprolol succinate XL (TOPROL-XL) 200 MG 24 hr tablet, TAKE 1 TABLET DAILY, Disp: 90 tablet, Rfl: 1  •  oxybutynin XL (DITROPAN-XL) 10 MG 24 hr tablet, TAKE 1 TABLET BY MOUTH EVERY DAY, Disp: 90 tablet, Rfl: 1  •  rosuvastatin (CRESTOR) 5 MG tablet, TAKE 1 TABLET DAILY, Disp: 90 tablet, Rfl: 3  •  traZODone (DESYREL) 50 MG tablet, Take 1 tablet by mouth Every Night., Disp: 90 tablet, Rfl: 3  •  Efinaconazole 10 % solution, Apply 1 drop topically Daily., Disp: 8 mL, Rfl: 3  •  nicotine (NICODERM CQ) 14 MG/24HR patch, Place 1 patch on the skin as directed by provider Daily., Disp: 30 each, Rfl: 3  •  terbinafine (LamISIL) 250 MG tablet, Take 1 tablet by mouth Daily., Disp: 30 tablet, Rfl: 3       Assessment/Plan   Healthy female exam.      1. Healthcare Maintenance:  2. Patient Counseling:  --Nutrition: Stressed importance of moderation in sodium/caffeine intake, saturated fat and cholesterol, caloric balance, sufficient intake of fresh fruits, vegetables, fiber, calcium and vit D  --Exercise: she does exercise reg  --Substance Abuse: no tob no etoh  --Dental health: she does go to the dentist reg  --Immunizations reviewed.   She is going  to get the covid   --Discussed benefits of screening colonoscopy.  3.  HPL-  Ok with current meds  4.  HTN-  Ok with current meds  5. OAB-  Cont oxybutinin xl

## 2021-11-20 LAB
ALBUMIN SERPL-MCNC: 4.2 G/DL (ref 3.8–4.8)
ALBUMIN/GLOB SERPL: 1.4 {RATIO} (ref 1.2–2.2)
ALP SERPL-CCNC: 92 IU/L (ref 44–121)
ALT SERPL-CCNC: 10 IU/L (ref 0–32)
AST SERPL-CCNC: 12 IU/L (ref 0–40)
BASOPHILS # BLD AUTO: 0 X10E3/UL (ref 0–0.2)
BASOPHILS NFR BLD AUTO: 1 %
BILIRUB SERPL-MCNC: 0.4 MG/DL (ref 0–1.2)
BUN SERPL-MCNC: 6 MG/DL (ref 8–27)
BUN/CREAT SERPL: 7 (ref 12–28)
CALCIUM SERPL-MCNC: 9.9 MG/DL (ref 8.7–10.3)
CHLORIDE SERPL-SCNC: 102 MMOL/L (ref 96–106)
CHOLEST SERPL-MCNC: 168 MG/DL (ref 100–199)
CO2 SERPL-SCNC: 26 MMOL/L (ref 20–29)
CREAT SERPL-MCNC: 0.83 MG/DL (ref 0.57–1)
EOSINOPHIL # BLD AUTO: 0.1 X10E3/UL (ref 0–0.4)
EOSINOPHIL NFR BLD AUTO: 2 %
ERYTHROCYTE [DISTWIDTH] IN BLOOD BY AUTOMATED COUNT: 13.6 % (ref 11.7–15.4)
GLOBULIN SER CALC-MCNC: 3.1 G/DL (ref 1.5–4.5)
GLUCOSE SERPL-MCNC: 95 MG/DL (ref 65–99)
HBA1C MFR BLD: 6 % (ref 4.8–5.6)
HCT VFR BLD AUTO: 45.6 % (ref 34–46.6)
HDLC SERPL-MCNC: 59 MG/DL
HGB BLD-MCNC: 15.6 G/DL (ref 11.1–15.9)
IMM GRANULOCYTES # BLD AUTO: 0 X10E3/UL (ref 0–0.1)
IMM GRANULOCYTES NFR BLD AUTO: 1 %
LDLC SERPL CALC-MCNC: 78 MG/DL (ref 0–99)
LDLC/HDLC SERPL: 1.3 RATIO (ref 0–3.2)
LYMPHOCYTES # BLD AUTO: 2.6 X10E3/UL (ref 0.7–3.1)
LYMPHOCYTES NFR BLD AUTO: 35 %
MCH RBC QN AUTO: 28 PG (ref 26.6–33)
MCHC RBC AUTO-ENTMCNC: 34.2 G/DL (ref 31.5–35.7)
MCV RBC AUTO: 82 FL (ref 79–97)
MONOCYTES # BLD AUTO: 0.5 X10E3/UL (ref 0.1–0.9)
MONOCYTES NFR BLD AUTO: 6 %
NEUTROPHILS # BLD AUTO: 4.3 X10E3/UL (ref 1.4–7)
NEUTROPHILS NFR BLD AUTO: 55 %
PLATELET # BLD AUTO: 277 X10E3/UL (ref 150–450)
POTASSIUM SERPL-SCNC: 3.6 MMOL/L (ref 3.5–5.2)
PROT SERPL-MCNC: 7.3 G/DL (ref 6–8.5)
RBC # BLD AUTO: 5.58 X10E6/UL (ref 3.77–5.28)
SODIUM SERPL-SCNC: 144 MMOL/L (ref 134–144)
TRIGL SERPL-MCNC: 186 MG/DL (ref 0–149)
TSH SERPL DL<=0.005 MIU/L-ACNC: 0.59 UIU/ML (ref 0.45–4.5)
VLDLC SERPL CALC-MCNC: 31 MG/DL (ref 5–40)
WBC # BLD AUTO: 7.5 X10E3/UL (ref 3.4–10.8)

## 2021-11-22 RX ORDER — OXYBUTYNIN CHLORIDE 10 MG/1
TABLET, EXTENDED RELEASE ORAL
Qty: 90 TABLET | Refills: 1 | Status: SHIPPED | OUTPATIENT
Start: 2021-11-22 | End: 2022-05-20

## 2021-12-04 ENCOUNTER — IMMUNIZATION (OUTPATIENT)
Dept: VACCINE CLINIC | Facility: HOSPITAL | Age: 61
End: 2021-12-04

## 2021-12-04 PROCEDURE — 0004A HC ADM SARSCOV2 30MCG/0.3ML BOOSTER: CPT | Performed by: INTERNAL MEDICINE

## 2021-12-04 PROCEDURE — 91300 HC SARSCOV02 VAC 30MCG/0.3ML IM: CPT | Performed by: INTERNAL MEDICINE

## 2021-12-06 ENCOUNTER — APPOINTMENT (OUTPATIENT)
Dept: MRI IMAGING | Facility: HOSPITAL | Age: 61
End: 2021-12-06

## 2021-12-10 ENCOUNTER — HOSPITAL ENCOUNTER (OUTPATIENT)
Dept: MRI IMAGING | Facility: HOSPITAL | Age: 61
Discharge: HOME | End: 2021-12-10
Admitting: NURSE PRACTITIONER

## 2021-12-10 DIAGNOSIS — D05.02 LOBULAR CARCINOMA IN SITU (LCIS) OF LEFT BREAST: ICD-10-CM

## 2021-12-10 DIAGNOSIS — Z91.89 INCREASED RISK OF BREAST CANCER: ICD-10-CM

## 2021-12-10 LAB — CREAT BLDA-MCNC: 1 MG/DL (ref 0.6–1.3)

## 2021-12-10 PROCEDURE — 77049 MRI BREAST C-+ W/CAD BI: CPT

## 2021-12-10 PROCEDURE — A9577 INJ MULTIHANCE: HCPCS | Performed by: NURSE PRACTITIONER

## 2021-12-10 PROCEDURE — 0 GADOBENATE DIMEGLUMINE 529 MG/ML SOLUTION: Performed by: NURSE PRACTITIONER

## 2021-12-10 PROCEDURE — 82565 ASSAY OF CREATININE: CPT

## 2021-12-10 RX ADMIN — GADOBENATE DIMEGLUMINE 20 ML: 529 INJECTION, SOLUTION INTRAVENOUS at 14:33

## 2021-12-13 ENCOUNTER — TELEPHONE (OUTPATIENT)
Dept: SURGERY | Facility: CLINIC | Age: 61
End: 2021-12-13

## 2021-12-13 NOTE — TELEPHONE ENCOUNTER
MRI results reported to patient.  Left MRI biopsy will be scheduled along with follow up with Dr Zambrano to discuss surgical treatment of left nipple mass seen in MRI.        IMPRESSION AND RECOMMENDATION:     1.  Suspicious 1.7 cm enhancing left nipple mass. Surgical excision  should be considered as this area is not amenable to imaging guided core  needle biopsy due to its location within the nipple.  2.  Suspicious 1.8 cm linear branching nonmass enhancement at 5:00 in  the left breast. Recommend further evaluation with MRI guided core  needle biopsy.  3.  No MRI evidence of malignancy in the right breast.     An Epic in basket message was sent to YAN Dash on  12/10/2021.     BI-RADS Category 4: Suspicious

## 2021-12-13 NOTE — PROGRESS NOTES
Message to Clara to schedule left MRI biopsy and follow up with you to discuss treatment of left nipple lesion.  thanks

## 2021-12-14 ENCOUNTER — PREP FOR SURGERY (OUTPATIENT)
Dept: OTHER | Facility: HOSPITAL | Age: 61
End: 2021-12-14

## 2021-12-14 ENCOUNTER — TELEPHONE (OUTPATIENT)
Dept: SURGERY | Facility: CLINIC | Age: 61
End: 2021-12-14

## 2021-12-14 DIAGNOSIS — R92.8 ABNORMAL FINDINGS ON DIAGNOSTIC IMAGING OF BREAST: Primary | ICD-10-CM

## 2021-12-14 NOTE — TELEPHONE ENCOUNTER
MR-bx is scheduled on 12/22/2021 @ 7:45am, patient arrival 7:15am.    Called and spoke to patient, patient expressed v/u of appointment time.

## 2021-12-14 NOTE — TELEPHONE ENCOUNTER
Appointment with Dr. Zambrano is scheduled on 1/12/2022 @ 9:30am.    Called and spoke to patient, patient expressed v/u of appointment time.

## 2021-12-15 RX ORDER — ANASTROZOLE 1 MG/1
TABLET ORAL
Qty: 90 TABLET | Refills: 1 | Status: SHIPPED | OUTPATIENT
Start: 2021-12-15 | End: 2022-06-13

## 2021-12-21 RX ORDER — TRAZODONE HYDROCHLORIDE 50 MG/1
TABLET ORAL
Qty: 90 TABLET | Refills: 1 | Status: SHIPPED | OUTPATIENT
Start: 2021-12-21 | End: 2022-06-22

## 2021-12-22 ENCOUNTER — HOSPITAL ENCOUNTER (OUTPATIENT)
Dept: MRI IMAGING | Facility: HOSPITAL | Age: 61
Discharge: HOME | End: 2021-12-22

## 2021-12-22 ENCOUNTER — HOSPITAL ENCOUNTER (OUTPATIENT)
Dept: MAMMOGRAPHY | Facility: HOSPITAL | Age: 61
Discharge: HOME | End: 2021-12-22

## 2021-12-22 VITALS
RESPIRATION RATE: 18 BRPM | BODY MASS INDEX: 40.18 KG/M2 | TEMPERATURE: 96.9 F | HEIGHT: 66 IN | OXYGEN SATURATION: 100 % | DIASTOLIC BLOOD PRESSURE: 94 MMHG | HEART RATE: 59 BPM | SYSTOLIC BLOOD PRESSURE: 156 MMHG | WEIGHT: 250 LBS

## 2021-12-22 DIAGNOSIS — R92.8 ABNORMAL FINDINGS ON DIAGNOSTIC IMAGING OF BREAST: ICD-10-CM

## 2021-12-22 DIAGNOSIS — Z98.890 STATUS POST BIOPSY: ICD-10-CM

## 2021-12-22 LAB — CREAT BLDA-MCNC: 0.9 MG/DL (ref 0.6–1.3)

## 2021-12-22 PROCEDURE — 0 GADOBENATE DIMEGLUMINE 529 MG/ML SOLUTION: Performed by: NURSE PRACTITIONER

## 2021-12-22 PROCEDURE — 88341 IMHCHEM/IMCYTCHM EA ADD ANTB: CPT | Performed by: NURSE PRACTITIONER

## 2021-12-22 PROCEDURE — 88342 IMHCHEM/IMCYTCHM 1ST ANTB: CPT | Performed by: NURSE PRACTITIONER

## 2021-12-22 PROCEDURE — 77065 DX MAMMO INCL CAD UNI: CPT

## 2021-12-22 PROCEDURE — 88360 TUMOR IMMUNOHISTOCHEM/MANUAL: CPT | Performed by: NURSE PRACTITIONER

## 2021-12-22 PROCEDURE — 0 LIDOCAINE 1 % SOLUTION: Performed by: NURSE PRACTITIONER

## 2021-12-22 PROCEDURE — 88305 TISSUE EXAM BY PATHOLOGIST: CPT | Performed by: NURSE PRACTITIONER

## 2021-12-22 PROCEDURE — 82565 ASSAY OF CREATININE: CPT

## 2021-12-22 PROCEDURE — A4648 IMPLANTABLE TISSUE MARKER: HCPCS

## 2021-12-22 PROCEDURE — A9577 INJ MULTIHANCE: HCPCS | Performed by: NURSE PRACTITIONER

## 2021-12-22 RX ORDER — LIDOCAINE HYDROCHLORIDE 10 MG/ML
1 INJECTION, SOLUTION INFILTRATION; PERINEURAL ONCE
Status: COMPLETED | OUTPATIENT
Start: 2021-12-22 | End: 2021-12-22

## 2021-12-22 RX ADMIN — LIDOCAINE HYDROCHLORIDE 1 ML: 10 INJECTION, SOLUTION INFILTRATION; PERINEURAL at 08:43

## 2021-12-22 RX ADMIN — LIDOCAINE HYDROCHLORIDE 20 ML: 10; .005 INJECTION, SOLUTION EPIDURAL; INFILTRATION; INTRACAUDAL; PERINEURAL at 08:44

## 2021-12-22 RX ADMIN — GADOBENATE DIMEGLUMINE 20 ML: 529 INJECTION, SOLUTION INTRAVENOUS at 08:28

## 2021-12-22 NOTE — H&P
Name: Kirsten Lima ADMIT: 2021   : 1960  PCP: Verenice Valdez MD    MRN: [id number] LOS: 0 days   AGE/SEX: 61 y.o. female  ROOM: Room/bed info not found       Chief complaint L breast NME    Present Illness or Internal History:  Patient is a 61 y.o. female presents with L breast NME for MRI bx.     Past Surgical History:  Past Surgical History:   Procedure Laterality Date   • AXILLARY HIDRADENITIS EXCISION Bilateral    • BREAST BIOPSY Left 2021    Procedure: Left breast needle-localized excisional biopsy (tophat clip) and left breast ultrasound-guided excisional biopsy (hydromark clip);  Surgeon: Debora Zambrano MD;  Location: [facility];  Service: General;  Laterality: Left;   • CARDIAC CATHETERIZATION     • COLONOSCOPY     • HYSTERECTOMY      [facility]       Past Medical History:  Past Medical History:   Diagnosis Date   • Acute cystitis    • Allergic rhinitis    • Dysuria    • Hemorrhoid    • Hidradenitis    • History of bronchitis    • Hyperlipidemia    • Hypertension    • Incontinence in female     wears pads   • Insomnia    • Nonspecific abnormal electrocardiogram (ECG) (EKG)    • Obesity    • Overactive bladder    • Pregnancy     G-2, P-0   • Sleep apnea    • UTI (urinary tract infection) 2021   • Vitamin B12 deficiency    • Vitamin D insufficiency        Home Medications:  (Not in a hospital admission)      Allergies:  Patient has no known allergies.    Family History:  Family History   Problem Relation Age of Onset   • Hypertension Mother    • COPD Father    • Heart failure Father    • Hypertension Father    • Pancreatic cancer Brother         1 brother  from pancreatic cancer   • Other Sister         1 sister  renal failure   • Alcohol abuse Sister    • Liver disease Sister    • Malig Hyperthermia Neg Hx        Social History:  Social History     Tobacco Use   • Smoking status: Current Every Day Smoker     Packs/day: 0.50     Years: 42.00     Pack years:  21.00     Types: Cigarettes     Start date: 1979   • Smokeless tobacco: Never Used   Vaping Use   • Vaping Use: Never used   Substance Use Topics   • Alcohol use: Yes     Comment: 3 drinks a month   • Drug use: Not Currently     Comment: marijuana in her twenties        Objective     Physical Exam:    No exam performed today,    Vital Signs  Temp:  [96.9 °F (36.1 °C)] 96.9 °F (36.1 °C)  Heart Rate:  [58] 58  Resp:  [18] 18  BP: (161)/(95) 161/95    Anticipated Surgical Procedure:  Left breast MRI guided core needle biopsy    The risks, benefits and alternatives of this procedure have been discussed with the patient or responsible party: Yes        Arabella John MD  12/22/21  07:45 EST

## 2021-12-22 NOTE — NURSING NOTE
Biopsy site to left, outer, lower breast clear with Dermabond dry and intact. No firmness or swelling noted at or around biopsy site. Denies pain. Ice pack with protective covering applied to biopsy site. Discharge instructions discussed with understanding voiced by patient. Copies provided to patient. No distress noted. To home via private vehicle.   This patient has an order placed for a CTA Chest. He is allergic to Iodine.  I see this note from 3/16/20 @ [facility]:   Telephone Encounter - Alice Miller APRN - 03/16/2020 12:57 PM CDT  Spoke with central scheduling as pt with iodine allergy (report of anaphylaxis reaction) and CTA ordered. Spoke with Dr. Clark- radiologist- okay to proceed with scan if premedicated for the study.      For this reason, the patient needs to be sent to the hospital for this CTA with premeds.  Please enter an external order. Thank you.

## 2021-12-24 LAB
LAB AP CASE REPORT: NORMAL
LAB AP DIAGNOSIS COMMENT: NORMAL
LAB AP SPECIAL STAINS: NORMAL
LAB AP SYNOPTIC CHECKLIST: NORMAL
PATH REPORT.FINAL DX SPEC: NORMAL
PATH REPORT.GROSS SPEC: NORMAL

## 2021-12-28 ENCOUNTER — TELEPHONE (OUTPATIENT)
Dept: SURGERY | Facility: CLINIC | Age: 61
End: 2021-12-28

## 2021-12-28 NOTE — TELEPHONE ENCOUNTER
Caller: Kirsten Noble    Relationship: Self    Best call back number: [phone]    Caller requesting test results: KIRSTEN NOBLE    What test was performed: BIOPSY OF LEFT BREAST    When was the test performed: 12.22.21    Where was the test performed: [facility]    Additional notes: PT WOULD LIKE A CALL WITH HER BIOPSY RESULTS.

## 2022-01-03 ENCOUNTER — TELEPHONE (OUTPATIENT)
Dept: SURGERY | Facility: CLINIC | Age: 62
End: 2022-01-03

## 2022-01-03 NOTE — TELEPHONE ENCOUNTER
I called patient to let her know that biopsy showed LCIS. This is similar to what we previously excised and is not the same thing as cancer. Dr. Zambrano will go over report with her at visit. She is going to recommend a small surgery.

## 2022-01-12 ENCOUNTER — PREP FOR SURGERY (OUTPATIENT)
Dept: SURGERY | Facility: SURGERY CENTER | Age: 62
End: 2022-01-12

## 2022-01-12 ENCOUNTER — OFFICE VISIT (OUTPATIENT)
Dept: SURGERY | Facility: CLINIC | Age: 62
End: 2022-01-12

## 2022-01-12 ENCOUNTER — PREP FOR SURGERY (OUTPATIENT)
Dept: OTHER | Facility: HOSPITAL | Age: 62
End: 2022-01-12

## 2022-01-12 VITALS
DIASTOLIC BLOOD PRESSURE: 78 MMHG | HEIGHT: 66 IN | WEIGHT: 245 LBS | BODY MASS INDEX: 39.37 KG/M2 | SYSTOLIC BLOOD PRESSURE: 142 MMHG | HEART RATE: 65 BPM | RESPIRATION RATE: 17 BRPM | OXYGEN SATURATION: 98 %

## 2022-01-12 DIAGNOSIS — N63.0 MASS OF NIPPLE: ICD-10-CM

## 2022-01-12 DIAGNOSIS — D05.02 LOBULAR CARCINOMA IN SITU (LCIS) OF LEFT BREAST: Primary | ICD-10-CM

## 2022-01-12 DIAGNOSIS — Z12.11 COLON CANCER SCREENING: Primary | ICD-10-CM

## 2022-01-12 PROCEDURE — 99215 OFFICE O/P EST HI 40 MIN: CPT | Performed by: SURGERY

## 2022-01-12 RX ORDER — DIAZEPAM 5 MG/1
10 TABLET ORAL ONCE
Status: CANCELLED | OUTPATIENT
Start: 2022-02-01 | End: 2022-01-12

## 2022-01-12 RX ORDER — CEFAZOLIN SODIUM 2 G/100ML
2 INJECTION, SOLUTION INTRAVENOUS ONCE
Status: CANCELLED | OUTPATIENT
Start: 2022-02-01 | End: 2022-01-12

## 2022-01-12 NOTE — PROGRESS NOTES
[facility]     Referring Provider: Tad Lowe MD     Chief complaint: Additional left breast LCIS and left nipple mass     HPI:   8/7/20:  Ms. Kirsten Lima is a 61 yo woman, seen at the request of Dr. Tad Lowe, for a new diagnosis of left breast intraductal papillomas. These were initially detected as an imaging abnormality on routine screening. Her work-up is detailed in the breast history section below. Despite these being incidentally found, the patient reports that she has had intermittent, clear, left nipple discharge for years. 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 She denies any associated breast lumps or pain. She does report chronic issues with her breast skin due to hidradenitis and recurrent skin abscesses, however she has not had any acute problems lately. She has a past history of a benign left breast stereotactic biopsy in 2012. She denies any family history of breast or ovarian cancer.      1/7/21:  At her last visit, I recommended excision of both of the left breast papillomas due the associated nipple discharge. She wanted to think about it and ultimately decided to hold off on surgery. She underwent follow-up ultrasound prior to her appointment, which showed stable left breast intraductal masses (see report details below). She has not tried to elicit any nipple discharge since the last visit and has not noticed any spontaneously.     3/10/21:  She underwent left breast excisional biopsy x 2 on 2/22/21, which showed LCIS. See surgery & pathology details in breast history section below. She has been recovering well and has no complaints.      6/28/21, Visit with YAN Dill :  She returns today for follow up with no breast complaints   In 3/2021 she met with Dr Dong and discussed starting tamoxifen, referral to nutritionist and smoking cessation  were made.  Due to risk for blood clots she has reservations about taking tamoxifen. She will follow up with Dr Dong in 9/21 to discuss again.   Screening mammogram with tomosynthesis was completed 6/11/2021 at [facility] (see full report below)    1/12/22, Interval History:  She saw Regina Gilbert in June with a normal screening mammogram. She saw Dr. Dong who initially prescribed low-dose tamoxifen, however the patient never started taking it because she was concerned about blood clots. Dr. Dong then prescribed anastrozole in September, however the patient just started taking this 3 weeks ago.  She underwent her first screening MRI in December, which showed an area of non-mass enhancement in the left lower outer breast and a left nipple mass. She subsequently underwent an MR biopsy of the non-mass enhancement and this showed extensive LCIS (see imaging and pathology report details below).       Breast history/imaging (updated 1/12/22):    5/15/18, Screening MMG with Hol[facility] ([facility]):  Scattered areas of fibroglandular density.  No suspicious masses, significant calcifications or other abnormalities are seen. There is a biopsy clip in the left breast.  BI-RADS 1: Negative.      5/28/19, Screening MMG with [facility] ([facility]):  Scattered areas of fibroglandular density.  1. There are small asymmetries seen in both breasts. The parenchyma includes stable nodular asymmetries. No suspicious masses, suspicious microcalcifications or areas of architectural distortion are identified. There has been no significant change from the prior exams.  2. There are stable punctate and spherical lucent-centered calcifications with diffuse/scattered distribution seen in both breast.  BI-RADS 2: Benign.     6/2/20, Screening MMG with Hola ([facility]):  Scattered areas of fibroglandular density.  1. There are small asymmetries seen in both breasts. There has been no significant change from the prior exams.  2. There  are stable punctate and spherical lucent-centered calcifications with diffuse distribution seen in both breasts.  3. There is a new small focal asymmetry measuring 12 mm seen in the central region of the left breast. This is best visualized on tomosynthesis MLO view slice # 78. This is best seen on the MLO View.  BI-RADS 0: Incomplete.     20, Left Diagnostic MMG with [facility]a & Left Breast US ([facility]):  MM. On the present examination, focal asymmetry in the left breast, central does not persist. The asymmetry noted on the recent screening mammogram resolves into stroma on additional views.  US:  1. There is no sonographic correlate. There is no evidence of any solid mass or abnormal cystic elements.  2. There are three oval intraductal masses with partially defined margins measuring 5 mm to 9 mm seen in the anterior one-third subareolar region of the left breast. The patient reports history of intermittent clear left nipple discharge for the past 10 years. These three structures are adjacent and would be included within the same biopsy site.  3. There is an oval solid mass with partially defined margins measuring 6 x 5 x 5 mm seen in the anterior one-third subareolar region of the left breast. This is located 2 to 3 cm away from the above described masses.   BI-RADS 4: Suspicious      20, Left Breast, US-Guided Biopsy x 2 ([facility])  1. Left Subareolar mass (5 x 5 x 6 cm), Ultrasound guided biopsy:   Benign sclerosing intraductal papilloma with calcifications.  -Tophat clip. Concordant.  2. Left Subareolar mass, ultrasound guided biopsy:   Benign sclerosing intraductal papilloma with usual duct hyperplasia and microcalcifications.   -Hydromark clip. Concordant.     20, Left Breast US ([facility]):  There are two oval masses and biopsy clips with circumscribed margins seen in the sub-areolar region of the left breast. There are no significant changes from the prior exam(s). This finding represents biopsy proven  benign breast disease. These measure less than 1 cm.  BI-RADS  2: Benign.     02/22/21, Left breast needle-localized excisional biopsy and left breast ultrasound-guided excisional biopsy:  1. Left Breast, Needle-Localized Excisional Biopsy (13 grams):               A. LOBULAR CARCINOMA IN SITU (LCIS).               B. Multiple sclerosed intraductal papillomas with focal calcifications (completely excised).               C. Clips and associated biopsy site changes are present and adjacent to papillomas.               D. Background breast parenchyma with clusters of apocrine cysts, usual ductal hyperplasia, and       calcifications associated with sclerosing adenosis.    6/11/2021, Screening MMG with Hol[facility] ([facility]):  Scattered areas of fibroglandular density. There is a new postsurgical scar seen subareolar region of the left breast.  There are no suspicious masses, calcifications, or areas of architectural distortion. In the right breast, no suspicious masses, significant calcifications or other abnormalities are seen.  BI-RADS 2: Benign.    12/10/21, Bilateral Breast MRI ([facility]):  RIGHT BREAST:    No suspicious enhancing mass or area of non-mass enhancement is identified. The visualized axilla is within normal limits.    LEFT BREAST:    There are post surgical changes in the anterior left breast. At 5:00 in the anterior left breast, 3.4 cm posterior to the nipple,  there is a 1.8 cm AP dimension somewhat linear branching nonmass enhancement, which is suspicious.   There is abnormal asymmetric enhancement within the left nipple with an approximately 1.2 cm AP dimension, 1.7 cm transverse dimension, 1.0 cm craniocaudal dimension mass involving the nipple itself and extending slightly deeper into the subareolar breast, which is associated with rapid initial and washout delayed phase enhancement on kinetic analysis. This is suspicious.  No suspicious enhancement is identified in the left chest wall. The visualized  axilla is within normal limits.    EXTRAMAMMARY FINDINGS:   There are no abnormally enlarged internal mammary chain lymph nodes on either side.      BI-RADS 4: Suspicious.    12/22/21, Left Breast[id number]  1. Left Breast at 5 o'clock (not for calcifications) MRI-Guided Core Biopsy:                A.  Extensive lobular carcinoma in situ (LCIS):                            1.  LCIS measures up to 7 mm in single greatest dimension focally involving 7 of 8                      representative cores.                2.  Low grade nuclear features without necrosis.  B.  No invasive carcinoma identified by routine and/or immunostaining.  C.  Associated intraductal papilloma with microcalcification, ductal cyst wall and fibrocystic change         noted.       Review of Systems:  See interval history.       Medications:    Current Outpatient Medications:   •  amLODIPine (NORVASC) 10 MG tablet, TAKE 1 TABLET DAILY, Disp: 90 tablet, Rfl: 1  •  anastrozole (ARIMIDEX) 1 MG tablet, TAKE 1 TABLET BY MOUTH EVERY DAY, Disp: 90 tablet, Rfl: 1  •  cetirizine (ZyrTEC) 10 MG tablet, Take 1 tablet by mouth daily., Disp: , Rfl:   •  Cholecalciferol (VITAMIN D) 2000 units capsule, Take 2,000 Units by mouth Daily., Disp: , Rfl:   •  citalopram (CeleXA) 10 MG tablet, TAKE 1 TABLET BY MOUTH EVERY DAY, Disp: 90 tablet, Rfl: 1  •  clotrimazole-betamethasone (LOTRISONE) 1-0.05 % lotion, Apply  topically to the appropriate area as directed 2 (Two) Times a Day., Disp: 30 mL, Rfl: 1  •  Efinaconazole 10 % solution, Apply 1 drop topically Daily., Disp: 8 mL, Rfl: 3  •  lisinopril-hydrochlorothiazide (PRINZIDE,ZESTORETIC) 20-12.5 MG per tablet, TAKE 2 TABLETS DAILY, Disp: 180 tablet, Rfl: 1  •  metoprolol succinate XL (TOPROL-XL) 200 MG 24 hr tablet, TAKE 1 TABLET DAILY, Disp: 90 tablet, Rfl: 1  •  nicotine (NICODERM CQ) 14 MG/24HR patch, Place 1 patch on the skin as directed by provider Daily., Disp: 30 each, Rfl: 3  •  oxybutynin  XL (DITROPAN-XL) 10 MG 24 hr tablet, TAKE 1 TABLET BY MOUTH EVERY DAY, Disp: 90 tablet, Rfl: 1  •  rosuvastatin (CRESTOR) 5 MG tablet, TAKE 1 TABLET DAILY, Disp: 90 tablet, Rfl: 3  •  terbinafine (LamISIL) 250 MG tablet, Take 1 tablet by mouth Daily., Disp: 30 tablet, Rfl: 3  •  traZODone (DESYREL) 50 MG tablet, TAKE 1 TABLET EVERY NIGHT, Disp: 90 tablet, Rfl: 1      Allergies:  No Known Allergies      Family History   Problem Relation Age of Onset   • Hypertension Mother    • COPD Father    • Heart failure Father    • Hypertension Father    • Pancreatic cancer Brother         1 brother  from pancreatic cancer   • Other Sister         1 sister  renal failure   • Alcohol abuse Sister    • Liver disease Sister    • Malig Hyperthermia Neg Hx        PHYSICAL EXAMINATION:   Vitals:    22 0933   BP: 142/78   Pulse: 65   Resp: 17   SpO2: 98%     ECOG 0 - Asymptomatic  General: NAD, well appearing, obese  Psych: a&o x 3, normal mood and affect  Eyes: EOMI, no scleral icterus  ENMT: neck supple without masses or thyromegaly, mucus membranes moist  MSK: normal gait, normal ROM in bilateral shoulders  Lymph nodes: Bilateral axillary scar; no cervical, supraclavicular or axillary lymphadenopathy  Breast: symmetric, very large size, pendulous  Right: No visible abnormalities on inspection while seated, with arms raised or hands on hips. No masses, skin changes, or nipple abnormalities.  Left: Well-healed medial periareolar scar, otherwise no visible abnormalities on inspection while seated, with arms raised or hands on hips. At 5:00, 2 cm FN, there is a soft 2 cm mass (postbiopsy hematoma). No nipple abnormalities; nipple papilla mass is not palpated.    Left breast, in-office ultrasound: At 5:00, 2 cm FN, there is a postbiopsy hematoma with biopsy clip visualized. This is located about 15 mm deep to the skin. Within the nipple papilla, there is a round hypoechoic mass. The postbiopsy hematoma and nipple  papilla mass are simultaneously visualized with radial scanning.       I have independently reviewed her imaging and here are my findings:   In the left lower outer retroareolar breast on MRI, there is a 1.5 cm area of linear non-mass enhancement.  Within the left nipple papilla, there is a 1.7 cm abnormal enhancing mass.      Assessment:  61 y.o. F with a new suspicious left nipple mass and a new area of left breast lobular carcinoma in situ (LCIS).   -She has a past history of left breast LCIS, which was diagnosed after left breast excisional biopsy x2 on 2/22/21 for intraductal papillomas associated with pathologic nipple discharge.   -She has an increased lifetime risk of breast cancer (47.1%, TCv8, calculated 3/10/21) and is currently on anastrozole.    Discussion:  We first discussed the new area of LCIS. I explained that the finding of LCIS on core biopsy is associated with an approximately 15-20% risk of identifying cancer within the vicinity of the lesion. This cannot be recognized on the small volume of tissue obtained at percutaneous biopsy. Therefore excisional biopsy with more generous tissue sampling and pathologic analysis is recommended. She is in agreement with this plan. I explained that excisional biopsy would require preoperative wire-localization.     We discussed the left nipple mass. This is not amenable to percutaneous biopsy and will need to be excised at the time of surgery. This can hopefully be removed through the same incision for the excisional biopsy. She understands the risk of nipple retraction.    We discussed that should the final pathology be upgraded, she would likely require an additional operation, and possibly nipple removal. We reviewed additional risks and potential complications associated with surgery, including, but not limited to, bleeding, infection, deformity or poor cosmetic result, chronic pain, numbness, seroma, hematoma, nipple retraction, deep venous thrombosis,  and skin flap necrosis. We reviewed postoperative wound care and activity restrictions. She expressed an understanding of these factors and wished to proceed.    Plan:  -Left breast needle localized excisional biopsy and left nipple mass excision.    Debora Zambrano MD    I spent 60 minutes caring for Kirsetn on this date of service. This time includes time spent by me in the following activities: preparing for the visit, performing a medically appropriate examination and/or evaluation , counseling and educating the patient/family/caregiver, documenting information in the medical record and independently interpreting results and communicating that information with the patient/family/caregiver.      CC:  MD Verenice Donaldson MD Ann Grider, MD

## 2022-01-14 ENCOUNTER — TRANSCRIBE ORDERS (OUTPATIENT)
Dept: SURGERY | Facility: CLINIC | Age: 62
End: 2022-01-14

## 2022-01-14 DIAGNOSIS — D05.02 LOBULAR CARCINOMA IN SITU (LCIS) OF LEFT BREAST: Primary | ICD-10-CM

## 2022-01-17 ENCOUNTER — TELEPHONE (OUTPATIENT)
Dept: SURGERY | Facility: CLINIC | Age: 62
End: 2022-01-17

## 2022-01-17 NOTE — TELEPHONE ENCOUNTER
Surgery is scheduled on 2/1/2022 @ 10:00am, NL @ 8:30am, patient arrival 6:30am.    PAT is scheduled on 1/27/2022  @ 10:00am.    Post-op appointment with Dr. Zambrano is scheduled on 2/16/2022 @ 8:00am.    Called and spoke to patient, patient expressed v/u of appointment times.    Sent patient a reminder letter in the mail.

## 2022-01-25 PROBLEM — Z12.11 COLON CANCER SCREENING: Status: ACTIVE | Noted: 2022-01-25

## 2022-01-27 ENCOUNTER — PRE-ADMISSION TESTING (OUTPATIENT)
Dept: PREADMISSION TESTING | Facility: HOSPITAL | Age: 62
End: 2022-01-27

## 2022-01-27 VITALS
SYSTOLIC BLOOD PRESSURE: 174 MMHG | HEIGHT: 66 IN | WEIGHT: 255 LBS | RESPIRATION RATE: 16 BRPM | TEMPERATURE: 98.6 F | BODY MASS INDEX: 40.98 KG/M2 | HEART RATE: 65 BPM | DIASTOLIC BLOOD PRESSURE: 82 MMHG | OXYGEN SATURATION: 98 %

## 2022-01-27 DIAGNOSIS — D05.02 LOBULAR CARCINOMA IN SITU (LCIS) OF LEFT BREAST: ICD-10-CM

## 2022-01-27 LAB
ANION GAP SERPL CALCULATED.3IONS-SCNC: 10 MMOL/L (ref 5–15)
BUN SERPL-MCNC: 8 MG/DL (ref 8–23)
BUN/CREAT SERPL: 9.5 (ref 7–25)
CALCIUM SPEC-SCNC: 9.5 MG/DL (ref 8.6–10.5)
CHLORIDE SERPL-SCNC: 101 MMOL/L (ref 98–107)
CO2 SERPL-SCNC: 28 MMOL/L (ref 22–29)
CREAT SERPL-MCNC: 0.84 MG/DL (ref 0.57–1)
DEPRECATED RDW RBC AUTO: 42 FL (ref 37–54)
ERYTHROCYTE [DISTWIDTH] IN BLOOD BY AUTOMATED COUNT: 14.1 % (ref 12.3–15.4)
GFR SERPL CREATININE-BSD FRML MDRD: 84 ML/MIN/1.73
GLUCOSE SERPL-MCNC: 93 MG/DL (ref 65–99)
HCT VFR BLD AUTO: 45.4 % (ref 34–46.6)
HGB BLD-MCNC: 15.2 G/DL (ref 12–15.9)
MCH RBC QN AUTO: 27.8 PG (ref 26.6–33)
MCHC RBC AUTO-ENTMCNC: 33.5 G/DL (ref 31.5–35.7)
MCV RBC AUTO: 83.2 FL (ref 79–97)
PLATELET # BLD AUTO: 267 10*3/MM3 (ref 140–450)
PMV BLD AUTO: 9.7 FL (ref 6–12)
POTASSIUM SERPL-SCNC: 3.3 MMOL/L (ref 3.5–5.2)
QT INTERVAL: 414 MS
RBC # BLD AUTO: 5.46 10*6/MM3 (ref 3.77–5.28)
SODIUM SERPL-SCNC: 139 MMOL/L (ref 136–145)
WBC NRBC COR # BLD: 8 10*3/MM3 (ref 3.4–10.8)

## 2022-01-27 PROCEDURE — 93010 ELECTROCARDIOGRAM REPORT: CPT | Performed by: INTERNAL MEDICINE

## 2022-01-27 PROCEDURE — 36415 COLL VENOUS BLD VENIPUNCTURE: CPT

## 2022-01-27 PROCEDURE — 85027 COMPLETE CBC AUTOMATED: CPT

## 2022-01-27 PROCEDURE — 80048 BASIC METABOLIC PNL TOTAL CA: CPT

## 2022-01-27 PROCEDURE — 93005 ELECTROCARDIOGRAM TRACING: CPT

## 2022-01-27 NOTE — DISCHARGE INSTRUCTIONS

## 2022-01-29 ENCOUNTER — LAB (OUTPATIENT)
Dept: LAB | Facility: HOSPITAL | Age: 62
End: 2022-01-29

## 2022-01-29 DIAGNOSIS — D05.02 LOBULAR CARCINOMA IN SITU (LCIS) OF LEFT BREAST: ICD-10-CM

## 2022-01-29 LAB — SARS-COV-2 ORF1AB RESP QL NAA+PROBE: NOT DETECTED

## 2022-01-29 PROCEDURE — C9803 HOPD COVID-19 SPEC COLLECT: HCPCS

## 2022-01-29 PROCEDURE — U0004 COV-19 TEST NON-CDC HGH THRU: HCPCS

## 2022-02-01 ENCOUNTER — APPOINTMENT (OUTPATIENT)
Dept: GENERAL RADIOLOGY | Facility: HOSPITAL | Age: 62
End: 2022-02-01

## 2022-02-01 ENCOUNTER — HOSPITAL ENCOUNTER (OUTPATIENT)
Dept: MAMMOGRAPHY | Facility: HOSPITAL | Age: 62
Discharge: HOME | End: 2022-02-01

## 2022-02-01 ENCOUNTER — ANESTHESIA EVENT (OUTPATIENT)
Dept: PERIOP | Facility: HOSPITAL | Age: 62
End: 2022-02-01

## 2022-02-01 ENCOUNTER — HOSPITAL ENCOUNTER (OUTPATIENT)
Facility: HOSPITAL | Age: 62
Setting detail: HOSPITAL OUTPATIENT SURGERY
Discharge: HOME | End: 2022-02-01
Attending: SURGERY | Admitting: SURGERY

## 2022-02-01 ENCOUNTER — ANESTHESIA (OUTPATIENT)
Dept: PERIOP | Facility: HOSPITAL | Age: 62
End: 2022-02-01

## 2022-02-01 VITALS
TEMPERATURE: 97.7 F | RESPIRATION RATE: 18 BRPM | DIASTOLIC BLOOD PRESSURE: 76 MMHG | OXYGEN SATURATION: 94 % | SYSTOLIC BLOOD PRESSURE: 130 MMHG | BODY MASS INDEX: 39.81 KG/M2 | HEIGHT: 66 IN | HEART RATE: 66 BPM | WEIGHT: 247.7 LBS

## 2022-02-01 DIAGNOSIS — N63.0 MASS OF NIPPLE: ICD-10-CM

## 2022-02-01 DIAGNOSIS — D05.02 LOBULAR CARCINOMA IN SITU (LCIS) OF LEFT BREAST: ICD-10-CM

## 2022-02-01 PROCEDURE — 25010000002 DEXAMETHASONE PER 1 MG: Performed by: NURSE ANESTHETIST, CERTIFIED REGISTERED

## 2022-02-01 PROCEDURE — 88360 TUMOR IMMUNOHISTOCHEM/MANUAL: CPT | Performed by: SURGERY

## 2022-02-01 PROCEDURE — 88342 IMHCHEM/IMCYTCHM 1ST ANTB: CPT | Performed by: SURGERY

## 2022-02-01 PROCEDURE — C1819 TISSUE LOCALIZATION-EXCISION: HCPCS

## 2022-02-01 PROCEDURE — 0 CEFAZOLIN IN DEXTROSE 2-4 GM/100ML-% SOLUTION: Performed by: SURGERY

## 2022-02-01 PROCEDURE — 25010000002 ONDANSETRON PER 1 MG: Performed by: NURSE ANESTHETIST, CERTIFIED REGISTERED

## 2022-02-01 PROCEDURE — 19125 EXCISION BREAST LESION: CPT | Performed by: SURGERY

## 2022-02-01 PROCEDURE — 76098 X-RAY EXAM SURGICAL SPECIMEN: CPT

## 2022-02-01 PROCEDURE — 88341 IMHCHEM/IMCYTCHM EA ADD ANTB: CPT | Performed by: SURGERY

## 2022-02-01 PROCEDURE — 88307 TISSUE EXAM BY PATHOLOGIST: CPT | Performed by: SURGERY

## 2022-02-01 PROCEDURE — 0 LIDOCAINE 1 % SOLUTION: Performed by: RADIOLOGY

## 2022-02-01 PROCEDURE — 25010000002 FENTANYL CITRATE (PF) 50 MCG/ML SOLUTION: Performed by: NURSE ANESTHETIST, CERTIFIED REGISTERED

## 2022-02-01 PROCEDURE — 19120 REMOVAL OF BREAST LESION: CPT | Performed by: SURGERY

## 2022-02-01 PROCEDURE — 25010000002 PROPOFOL 10 MG/ML EMULSION: Performed by: NURSE ANESTHETIST, CERTIFIED REGISTERED

## 2022-02-01 RX ORDER — PROMETHAZINE HYDROCHLORIDE 25 MG/1
25 TABLET ORAL ONCE AS NEEDED
Status: DISCONTINUED | OUTPATIENT
Start: 2022-02-01 | End: 2022-02-01 | Stop reason: HOSPADM

## 2022-02-01 RX ORDER — SODIUM CHLORIDE 0.9 % (FLUSH) 0.9 %
3 SYRINGE (ML) INJECTION EVERY 12 HOURS SCHEDULED
Status: DISCONTINUED | OUTPATIENT
Start: 2022-02-01 | End: 2022-02-01 | Stop reason: HOSPADM

## 2022-02-01 RX ORDER — DIPHENHYDRAMINE HCL 25 MG
25 CAPSULE ORAL
Status: DISCONTINUED | OUTPATIENT
Start: 2022-02-01 | End: 2022-02-01 | Stop reason: HOSPADM

## 2022-02-01 RX ORDER — CEFAZOLIN SODIUM 2 G/100ML
2 INJECTION, SOLUTION INTRAVENOUS ONCE
Status: COMPLETED | OUTPATIENT
Start: 2022-02-01 | End: 2022-02-01

## 2022-02-01 RX ORDER — HYDROMORPHONE HYDROCHLORIDE 1 MG/ML
0.5 INJECTION, SOLUTION INTRAMUSCULAR; INTRAVENOUS; SUBCUTANEOUS
Status: DISCONTINUED | OUTPATIENT
Start: 2022-02-01 | End: 2022-02-01 | Stop reason: HOSPADM

## 2022-02-01 RX ORDER — LIDOCAINE HYDROCHLORIDE 10 MG/ML
0.5 INJECTION, SOLUTION EPIDURAL; INFILTRATION; INTRACAUDAL; PERINEURAL ONCE AS NEEDED
Status: DISCONTINUED | OUTPATIENT
Start: 2022-02-01 | End: 2022-02-01 | Stop reason: HOSPADM

## 2022-02-01 RX ORDER — NALOXONE HCL 0.4 MG/ML
0.2 VIAL (ML) INJECTION AS NEEDED
Status: DISCONTINUED | OUTPATIENT
Start: 2022-02-01 | End: 2022-02-01 | Stop reason: HOSPADM

## 2022-02-01 RX ORDER — PROPOFOL 10 MG/ML
VIAL (ML) INTRAVENOUS AS NEEDED
Status: DISCONTINUED | OUTPATIENT
Start: 2022-02-01 | End: 2022-02-01 | Stop reason: SURG

## 2022-02-01 RX ORDER — LIDOCAINE HYDROCHLORIDE 20 MG/ML
INJECTION, SOLUTION INFILTRATION; PERINEURAL AS NEEDED
Status: DISCONTINUED | OUTPATIENT
Start: 2022-02-01 | End: 2022-02-01 | Stop reason: SURG

## 2022-02-01 RX ORDER — LIDOCAINE HYDROCHLORIDE 10 MG/ML
3 INJECTION, SOLUTION INFILTRATION; PERINEURAL ONCE
Status: COMPLETED | OUTPATIENT
Start: 2022-02-01 | End: 2022-02-01

## 2022-02-01 RX ORDER — SODIUM CHLORIDE 0.9 % (FLUSH) 0.9 %
3-10 SYRINGE (ML) INJECTION AS NEEDED
Status: DISCONTINUED | OUTPATIENT
Start: 2022-02-01 | End: 2022-02-01 | Stop reason: HOSPADM

## 2022-02-01 RX ORDER — LABETALOL HYDROCHLORIDE 5 MG/ML
5 INJECTION, SOLUTION INTRAVENOUS
Status: DISCONTINUED | OUTPATIENT
Start: 2022-02-01 | End: 2022-02-01 | Stop reason: HOSPADM

## 2022-02-01 RX ORDER — ONDANSETRON 2 MG/ML
4 INJECTION INTRAMUSCULAR; INTRAVENOUS ONCE AS NEEDED
Status: DISCONTINUED | OUTPATIENT
Start: 2022-02-01 | End: 2022-02-01 | Stop reason: HOSPADM

## 2022-02-01 RX ORDER — ONDANSETRON 2 MG/ML
INJECTION INTRAMUSCULAR; INTRAVENOUS AS NEEDED
Status: DISCONTINUED | OUTPATIENT
Start: 2022-02-01 | End: 2022-02-01 | Stop reason: SURG

## 2022-02-01 RX ORDER — EPHEDRINE SULFATE 50 MG/ML
INJECTION, SOLUTION INTRAVENOUS AS NEEDED
Status: DISCONTINUED | OUTPATIENT
Start: 2022-02-01 | End: 2022-02-01 | Stop reason: SURG

## 2022-02-01 RX ORDER — SODIUM CHLORIDE, SODIUM LACTATE, POTASSIUM CHLORIDE, CALCIUM CHLORIDE 600; 310; 30; 20 MG/100ML; MG/100ML; MG/100ML; MG/100ML
9 INJECTION, SOLUTION INTRAVENOUS CONTINUOUS
Status: DISCONTINUED | OUTPATIENT
Start: 2022-02-01 | End: 2022-02-01 | Stop reason: HOSPADM

## 2022-02-01 RX ORDER — EPHEDRINE SULFATE 50 MG/ML
5 INJECTION, SOLUTION INTRAVENOUS ONCE AS NEEDED
Status: DISCONTINUED | OUTPATIENT
Start: 2022-02-01 | End: 2022-02-01 | Stop reason: HOSPADM

## 2022-02-01 RX ORDER — HYDRALAZINE HYDROCHLORIDE 20 MG/ML
5 INJECTION INTRAMUSCULAR; INTRAVENOUS
Status: DISCONTINUED | OUTPATIENT
Start: 2022-02-01 | End: 2022-02-01 | Stop reason: HOSPADM

## 2022-02-01 RX ORDER — HYDROCODONE BITARTRATE AND ACETAMINOPHEN 5; 325 MG/1; MG/1
1 TABLET ORAL ONCE AS NEEDED
Status: COMPLETED | OUTPATIENT
Start: 2022-02-01 | End: 2022-02-01

## 2022-02-01 RX ORDER — FENTANYL CITRATE 50 UG/ML
50 INJECTION, SOLUTION INTRAMUSCULAR; INTRAVENOUS
Status: DISCONTINUED | OUTPATIENT
Start: 2022-02-01 | End: 2022-02-01 | Stop reason: HOSPADM

## 2022-02-01 RX ORDER — DEXAMETHASONE SODIUM PHOSPHATE 4 MG/ML
INJECTION, SOLUTION INTRA-ARTICULAR; INTRALESIONAL; INTRAMUSCULAR; INTRAVENOUS; SOFT TISSUE AS NEEDED
Status: DISCONTINUED | OUTPATIENT
Start: 2022-02-01 | End: 2022-02-01 | Stop reason: SURG

## 2022-02-01 RX ORDER — PROMETHAZINE HYDROCHLORIDE 25 MG/1
25 SUPPOSITORY RECTAL ONCE AS NEEDED
Status: DISCONTINUED | OUTPATIENT
Start: 2022-02-01 | End: 2022-02-01 | Stop reason: HOSPADM

## 2022-02-01 RX ORDER — FENTANYL CITRATE 50 UG/ML
INJECTION, SOLUTION INTRAMUSCULAR; INTRAVENOUS AS NEEDED
Status: DISCONTINUED | OUTPATIENT
Start: 2022-02-01 | End: 2022-02-01 | Stop reason: SURG

## 2022-02-01 RX ORDER — ACETAMINOPHEN 500 MG
1000 TABLET ORAL EVERY 8 HOURS
Qty: 30 TABLET | Refills: 0 | Status: SHIPPED | OUTPATIENT
Start: 2022-02-01 | End: 2022-02-06

## 2022-02-01 RX ORDER — FLUMAZENIL 0.1 MG/ML
0.2 INJECTION INTRAVENOUS AS NEEDED
Status: DISCONTINUED | OUTPATIENT
Start: 2022-02-01 | End: 2022-02-01 | Stop reason: HOSPADM

## 2022-02-01 RX ORDER — DIAZEPAM 5 MG/1
10 TABLET ORAL ONCE
Status: COMPLETED | OUTPATIENT
Start: 2022-02-01 | End: 2022-02-01

## 2022-02-01 RX ORDER — DIPHENHYDRAMINE HYDROCHLORIDE 50 MG/ML
12.5 INJECTION INTRAMUSCULAR; INTRAVENOUS
Status: DISCONTINUED | OUTPATIENT
Start: 2022-02-01 | End: 2022-02-01 | Stop reason: HOSPADM

## 2022-02-01 RX ORDER — TRAMADOL HYDROCHLORIDE 50 MG/1
50 TABLET ORAL EVERY 8 HOURS PRN
Qty: 6 TABLET | Refills: 0 | Status: SHIPPED | OUTPATIENT
Start: 2022-02-01 | End: 2022-02-16

## 2022-02-01 RX ORDER — HYDROCODONE BITARTRATE AND ACETAMINOPHEN 7.5; 325 MG/1; MG/1
2 TABLET ORAL EVERY 4 HOURS PRN
Status: DISCONTINUED | OUTPATIENT
Start: 2022-02-01 | End: 2022-02-01 | Stop reason: HOSPADM

## 2022-02-01 RX ORDER — FAMOTIDINE 10 MG/ML
20 INJECTION, SOLUTION INTRAVENOUS ONCE
Status: COMPLETED | OUTPATIENT
Start: 2022-02-01 | End: 2022-02-01

## 2022-02-01 RX ADMIN — HYDROCODONE BITARTRATE AND ACETAMINOPHEN 1 TABLET: 5; 325 TABLET ORAL at 11:50

## 2022-02-01 RX ADMIN — FENTANYL CITRATE 25 MCG: 0.05 INJECTION, SOLUTION INTRAMUSCULAR; INTRAVENOUS at 10:18

## 2022-02-01 RX ADMIN — SODIUM CHLORIDE, POTASSIUM CHLORIDE, SODIUM LACTATE AND CALCIUM CHLORIDE 9 ML/HR: 600; 310; 30; 20 INJECTION, SOLUTION INTRAVENOUS at 07:07

## 2022-02-01 RX ADMIN — FAMOTIDINE 20 MG: 10 INJECTION INTRAVENOUS at 08:09

## 2022-02-01 RX ADMIN — ONDANSETRON 4 MG: 2 INJECTION INTRAMUSCULAR; INTRAVENOUS at 10:33

## 2022-02-01 RX ADMIN — DEXAMETHASONE SODIUM PHOSPHATE 8 MG: 4 INJECTION, SOLUTION INTRAMUSCULAR; INTRAVENOUS at 10:06

## 2022-02-01 RX ADMIN — LIDOCAINE HYDROCHLORIDE 100 MG: 20 INJECTION, SOLUTION INFILTRATION; PERINEURAL at 10:00

## 2022-02-01 RX ADMIN — Medication 3 ML: at 08:52

## 2022-02-01 RX ADMIN — DIAZEPAM 10 MG: 5 TABLET ORAL at 07:37

## 2022-02-01 RX ADMIN — PROPOFOL 200 MG: 10 INJECTION, EMULSION INTRAVENOUS at 10:00

## 2022-02-01 RX ADMIN — FENTANYL CITRATE 25 MCG: 0.05 INJECTION, SOLUTION INTRAMUSCULAR; INTRAVENOUS at 11:07

## 2022-02-01 RX ADMIN — EPHEDRINE SULFATE 5 MG: 50 INJECTION INTRAVENOUS at 10:21

## 2022-02-01 RX ADMIN — FENTANYL CITRATE 25 MCG: 0.05 INJECTION, SOLUTION INTRAMUSCULAR; INTRAVENOUS at 10:40

## 2022-02-01 RX ADMIN — PROPOFOL 20 MG: 10 INJECTION, EMULSION INTRAVENOUS at 10:19

## 2022-02-01 RX ADMIN — CEFAZOLIN SODIUM 2 G: 2 INJECTION, SOLUTION INTRAVENOUS at 09:46

## 2022-02-01 RX ADMIN — SODIUM CHLORIDE, POTASSIUM CHLORIDE, SODIUM LACTATE AND CALCIUM CHLORIDE: 600; 310; 30; 20 INJECTION, SOLUTION INTRAVENOUS at 11:14

## 2022-02-01 RX ADMIN — FENTANYL CITRATE 25 MCG: 0.05 INJECTION, SOLUTION INTRAMUSCULAR; INTRAVENOUS at 10:13

## 2022-02-01 NOTE — ANESTHESIA POSTPROCEDURE EVALUATION
"Patient: Kirsten Lima    Procedure Summary     Date: 02/01/22 Room / Location: [facility] OR  / [facility] MAIN OR    Anesthesia Start: 0950 Anesthesia Stop: 1118    Procedure: Left breast needle-localized excisional biopsy, Left nipple mass excision (Left Breast) Diagnosis:       Lobular carcinoma in situ (LCIS) of left breast      Mass of nipple      (Lobular carcinoma in situ (LCIS) of left breast [D05.02])      (Mass of nipple [N63.0])    Surgeons: Debora Zambrano MD Provider: Violetta Maxwell MD    Anesthesia Type: general ASA Status: 3          Anesthesia Type: general    Vitals  Vitals Value Taken Time   /91 02/01/22 1230   Temp 36.5 °C (97.7 °F) 02/01/22 1200   Pulse 67 02/01/22 1230   Resp 14 02/01/22 1230   SpO2 97 % 02/01/22 1230           Post Anesthesia Care and Evaluation    Patient location during evaluation: bedside  Patient participation: complete - patient participated  Level of consciousness: awake and alert  Pain management: adequate  Airway patency: patent  Anesthetic complications: No anesthetic complications    Cardiovascular status: acceptable  Respiratory status: acceptable  Hydration status: acceptable    Comments: /91   Pulse 67   Temp 36.5 °C (97.7 °F) (Oral)   Resp 14   Ht 167.6 cm (66\")   Wt 112 kg (247 lb 11.2 oz)   SpO2 97%   BMI 39.98 kg/m²           "

## 2022-02-01 NOTE — ANESTHESIA PROCEDURE NOTES
Airway  Urgency: elective    Date/Time: 2/1/2022 10:01 AM  Airway not difficult    General Information and Staff    Patient location during procedure: OR  Anesthesiologist: Violetta Maxwell MD  CRNA: Jessenia Champagne CRNA    Indications and Patient Condition  Indications for airway management: airway protection    Preoxygenated: yes  MILS not maintained throughout  Mask difficulty assessment: 1 - vent by mask    Final Airway Details  Final airway type: supraglottic airway      Successful airway: LMA and unique  Size 4    Number of attempts at approach: 1  Assessment: lips, teeth, and gum same as pre-op and atraumatic intubation    Additional Comments  Airway exam prior to airway device insertion. Teeth/lips inspected. Preoxygenated with 100% O2; sniffing position, easy mask ventilation. Eyes taped. Atraumatic device insertion. Airway device connected to anesthesia machine. Confirmed EBBS, +EtCO2. Lips and teeth intact, no damage.

## 2022-02-01 NOTE — OP NOTE
Operative Report    Patient Name:  Kirsten Lima  YOB: 1960    Date of Surgery:  2/1/2022    Pre-op Diagnosis:   Lobular carcinoma in situ (LCIS) of left breast [D05.02]  Mass of nipple [N63.0]       Post-Op Diagnosis:  Lobular carcinoma in situ (LCIS) of left breast [D05.02]  Mass of nipple [N63.0]    Procedures:  1. Left breast needle-localized excisional biopsy,   2. Left nipple mass excision      Staff:  Surgeon(s):  Debora Zambrano MD      Anesthesia: General    Estimated Blood Loss: 10 mL    Implants:    Nothing was implanted during the procedure    Specimen:          Specimens     ID Source Type Tests Collected By Collected At Frozen?    A Breast, Left Tissue · TISSUE PATHOLOGY EXAM   Debora Zambrano MD 2/1/22 1021 No    Description: Left breast excisional biopsy, ink marks margins, fresh for permanent     B Breast, Left Tissue · TISSUE PATHOLOGY EXAM   Debora Zambrano MD 2/1/22 1036 No    Description: Left nipple core, fresh for permanent             Findings: Wire and clip seen in specimen radiograph.    Complications: None     Indications: The patient is a 61 y.o. F with a new suspicious left nipple mass and a new area of left breast lobular carcinoma in situ (LCIS). She presents today for excisional biopsy of the LCIS and excision of the nipple mass. The LCIS lesion required preoperative wire-localization. The risks and benefits of surgery were discussed preoperatively and she understood and agreed to proceed.      Description of Procedure:  Preoperatively, the patient was taken to the radiology department and the lesion was localized with a needle/wire. I reviewed the post-localization mammogram to determine the trajectory of the wire. The patient was identified in the preoperative area and brought to the operating room and placed supine on the table. Patient verification was performed and general anesthesia was induced. The patient was prepped and draped in sterile  fashion with the wire/needle prepped into the field. A time out was performed and all were in agreement. I confirmed that the patient had received preoperative antibiotics.       I began with the LCIS excisional biopsy. On the skin, I marked the suggested location of the lesion based on the mammographic localization imaging. The skin was infiltrated with local anesthesia. A lower outer periareolar incision was made with a scalpel and carried down through the dermis with sharp dissection. Flaps were raised according to the planned dissection. An anterior flap was raised first. Dissection was then carried out superiorly, inferiorly, medially, and laterally. The wire was delivered into the wound. The posterior dissection was completed and the specimen removed. The specimen was oriented with paint (red - superior, green - anterior, blue - inferior, yellow - medial, orange - lateral, black - posterior). Specimen radiograph showed the wire and clip in good position.    I then proceeded with the nipple mass excision. I raised the flap under the areola to the base of the nipple papilla. I then circumferentially dissected out the cord of tissue within the nipple papilla. This was removed from the retroareolar tissue and oriented with paint (red - superior, green - anterior, blue - inferior, yellow - medial, orange - lateral, black - posterior). Scar tissue from her prior surgery was noted on the medial edge of this tissue. The breast cavity was irrigated and hemostasis achieved. The remaining local anesthesia was infiltrated into the breast cavity. The breast parenchyma was brought together with 3-0 vicryl stitches. This included a pursestring stitch at the base of the nipple papilla. The deep dermis was closed with interrupted 3-0 vicryl stitches. The skin was closed with 4-0 subcuticular running monocryl and covered with dermabond. Counts were correct at the end of the case. The patient was awakened from anesthesia and  taken to the PACU in stable condition.    Debora Zambrano MD     Date: 2/1/2022  Time: 11:14 EST

## 2022-02-01 NOTE — ANESTHESIA PREPROCEDURE EVALUATION
Anesthesia Evaluation     Patient summary reviewed and Nursing notes reviewed   no history of anesthetic complications:  NPO Solid Status: > 8 hours  NPO Liquid Status: > 2 hours           Airway   Mallampati: III  TM distance: >3 FB  Neck ROM: full  Possible difficult intubation and Large neck circumference  Dental - normal exam     Pulmonary - normal exam   (+) a smoker Current Smoked day of surgery, sleep apnea on CPAP,   (-) COPD, lung cancer  Cardiovascular - normal exam  Exercise tolerance: good (4-7 METS)    ECG reviewed  Patient on routine beta blocker and Beta blocker given within 24 hours of surgery  Rhythm: regular  Rate: normal    (+) hypertension well controlled 2 medications or greater, hyperlipidemia,   (-) valvular problems/murmurs, past MI, CAD, dysrhythmias, angina, CHF, cardiac stents, CABG, pericardial effusion      Neuro/Psych  (+) psychiatric history Depression,     (-) seizures, TIA, CVA  GI/Hepatic/Renal/Endo    (+) morbid obesity,    (-) hiatal hernia, GERD, PUD, hepatitis, liver disease, no renal disease, diabetes, GI bleed, no thyroid disorder    Musculoskeletal (-) negative ROS    Abdominal   (+) obese,     Abdomen: soft.   Substance History - negative use     OB/GYN negative ob/gyn ROS         Other - negative ROS                       Anesthesia Plan    ASA 3     general     intravenous induction     Anesthetic plan, all risks, benefits, and alternatives have been provided, discussed and informed consent has been obtained with: patient.    Plan discussed with CRNA and attending.        CODE STATUS:

## 2022-02-01 NOTE — ADDENDUM NOTE
Addendum  created 02/01/22 1344 by Violetta Maxwell MD    Attestation recorded in Intraprocedure, Intraprocedure Attestations filed

## 2022-02-03 LAB
LAB AP CASE REPORT: NORMAL
LAB AP SPECIAL STAINS: NORMAL
LAB AP SYNOPTIC CHECKLIST: NORMAL
PATH REPORT.FINAL DX SPEC: NORMAL
PATH REPORT.GROSS SPEC: NORMAL

## 2022-02-07 ENCOUNTER — TELEPHONE (OUTPATIENT)
Dept: SURGERY | Facility: CLINIC | Age: 62
End: 2022-02-07

## 2022-02-07 NOTE — TELEPHONE ENCOUNTER
I called Ms. Lima to discuss her pathology report, which unfortunately showed a small focus invasive lobular cancer. I will discuss her case at tumor board to decide whether or not we need to return for axillary staging.      Debora Zambrano MD

## 2022-02-09 ENCOUNTER — MDT ASSESSMENT (OUTPATIENT)
Dept: OTHER | Facility: HOSPITAL | Age: 62
End: 2022-02-09

## 2022-02-09 DIAGNOSIS — C50.812 MALIGNANT NEOPLASM OF OVERLAPPING SITES OF LEFT BREAST IN FEMALE, ESTROGEN RECEPTOR POSITIVE: Primary | ICD-10-CM

## 2022-02-09 DIAGNOSIS — Z17.0 MALIGNANT NEOPLASM OF OVERLAPPING SITES OF LEFT BREAST IN FEMALE, ESTROGEN RECEPTOR POSITIVE: Primary | ICD-10-CM

## 2022-02-09 NOTE — PROGRESS NOTES
MULTIDISCIPLINARY TREATMENT PLANNING CONFERENCE  DATE: 2/11/2022       SPECIALTY: Breast Conference    PRESENTER: Debora Zambrano MD    SITE: Left Breast         Please discuss clinical/working stage, TNM, Stage Group, National Treatment Guidelines, and Prognostic Indicators.       CONFERENCE SUMMARY:  Omit axillary staging, proceed with radiation, continue Arimidex                          AJCC STAGE: pT1aNx        REFERRAL SUPPORT   Psychosocial Assessment:  []                Clinical Trials:  []                Genetic Testing:  []                Geriatric Assessment:  []                Smoking Cessation:  []                Nutrition Assessment:  []                Social Work Evaluation/Barriers to Care:  []                Behavioral Oncology Evaluation:  []                Palliative Care:  []      EVIDENCE BASED NATIONAL TREATMENT GUIDELINES:  []                   SOCIAL HISTORY:   reports that she has been smoking cigarettes. She started smoking about 43 years ago. She has a 21.00 pack-year smoking history. She has never used smokeless tobacco. She reports current alcohol use. She reports previous drug use.                  PAST MEDICAL HISTORY:   has a past medical history of Acute cystitis, Allergic rhinitis, Breast cyst, Cyst of left nipple, Depression, Dry skin, Dysuria, Hemorrhoid, Hidradenitis, History of bronchitis, Hyperlipidemia, Hypertension, Incontinence in female, Insomnia, Nonspecific abnormal electrocardiogram (ECG) (EKG), Obesity, Overactive bladder, Pregnancy, Sleep apnea, UTI (urinary tract infection) (02/2021), Vitamin B12 deficiency, Vitamin D insufficiency, and Wound, breast.                  PAST SURGICAL HISTORY:  @                 IMAGING:  Mammo Breast Placement Device Initial Without Biopsy    Result Date: 2/1/2022  Technically successful mammographic guided left breast needle localization.  This report was finalized on 2/1/2022 11:16 AM by Dr. Felipe Brennan M.D.      XR Breast  Specimen    Result Date: 2/1/2022  Technically successful mammographic guided left breast needle localization.  This report was finalized on 2/1/2022 11:16 AM by Dr. Felipe Brennan M.D.                      SURGICAL PROCEDURE / PATHOLOGY:     2/22/2021:[id number] ([facility])    Final Diagnosis  1. Left Breast, Needle-Localized Excisional Biopsy (13 grams):  A. LOBULAR CARCINOMA IN SITU (LCIS).  B. Multiple sclerosed intraductal papillomas with focal calcifications (completely excised).  C. Clips and associated biopsy site changes are present and adjacent to papillomas.  D. Background breast parenchyma with clusters of apocrine cysts, usual ductal hyperplasia, and  calcifications associated with sclerosing adenosis.    12/22/2021: [id number] ([facility])    Final Diagnosis  1. Left Breast at 5 o'clock (not for calcifications) MRI-Guided Core Biopsy:  A. Extensive lobular carcinoma in situ (LCIS):  1. LCIS measures up to 7 mm in single greatest dimension focally involving 7 of 8  representative cores.  2. Low grade nuclear features without necrosis.  B. No invasive carcinoma identified by routine and/or immunostaining.  C. Associated intraductal papilloma with microcalcification, ductal cyst wall and fibrocystic change  Noted.      2/1/2022: [id number] ([facility])    Final Diagnosis  1. Breast, Left, Lumpectomy: Invasive lobular carcinoma, lobular carcinoma in situ and atypical lobular hyperplasia.  A. Invasive lobular carcinoma.  1. The invasive lobular carcinoma measures 4 mm on the slide.  2. The invasive lobular carcinoma is Bang[place] grade I (tubular grade 3, nuclear grade 1,  mitotic grade 1).  3. The foci is located amongst an area of lobular carcinoma in situ and near the biopsy cavity.  4. Biopsy site and clip identified (barbell shaped clip).  5. The invasive lobular carcinoma comes within 1.3 mm of the medial margin, 7.5 mm of the posterior  margin, 8.8 mm of the anterior margin, 12.5 mm of the lateral margin and 15 mm  of the superior and  inferior margins.  6. Microcalcifications are not associated with the invasive tumor.  B. Extensive lobular carcinoma in situ and atypical lobular hyperplasia.  C. Fibroadenoma with calcification, sclerosing adenosis, apocrine metaplasia, clustered cysts, area consistent  with radial scar and fat necrosis.  2. Breast, Left, Nipple, Excision: Breast tissue with  A. Simple cysts.  B. Calcifications in non-neoplastic tissue.  C. Apocrine metaplasia.  D. Intraductal papilloma with sclerosis.  E. Atypical lobular hyperplasia.                  Labs & Biomarkers:     12/22/2021:          2/1/2022:

## 2022-02-11 ENCOUNTER — TELEPHONE (OUTPATIENT)
Dept: SURGERY | Facility: CLINIC | Age: 62
End: 2022-02-11

## 2022-02-11 NOTE — TELEPHONE ENCOUNTER
I called Ms. Lima to let her know that the tumor board consensus was that she does not need axillary staging.  I placed a referral for rad/onc and I will see her at her scheduled follow-up appointment.     Debora Zambrano MD

## 2022-02-16 ENCOUNTER — OFFICE VISIT (OUTPATIENT)
Dept: SURGERY | Facility: CLINIC | Age: 62
End: 2022-02-16

## 2022-02-16 VITALS
HEIGHT: 66 IN | SYSTOLIC BLOOD PRESSURE: 140 MMHG | WEIGHT: 247 LBS | OXYGEN SATURATION: 98 % | DIASTOLIC BLOOD PRESSURE: 78 MMHG | RESPIRATION RATE: 18 BRPM | BODY MASS INDEX: 39.7 KG/M2 | HEART RATE: 58 BPM

## 2022-02-16 DIAGNOSIS — Z17.0 MALIGNANT NEOPLASM OF OVERLAPPING SITES OF LEFT BREAST IN FEMALE, ESTROGEN RECEPTOR POSITIVE: Primary | ICD-10-CM

## 2022-02-16 DIAGNOSIS — Z12.31 ENCOUNTER FOR SCREENING MAMMOGRAM FOR MALIGNANT NEOPLASM OF BREAST: ICD-10-CM

## 2022-02-16 DIAGNOSIS — C50.812 MALIGNANT NEOPLASM OF OVERLAPPING SITES OF LEFT BREAST IN FEMALE, ESTROGEN RECEPTOR POSITIVE: Primary | ICD-10-CM

## 2022-02-16 DIAGNOSIS — Z48.89 POSTOPERATIVE VISIT: ICD-10-CM

## 2022-02-16 PROCEDURE — 99024 POSTOP FOLLOW-UP VISIT: CPT | Performed by: SURGERY

## 2022-02-16 NOTE — PROGRESS NOTES
[facility]     Referring Provider: Tad Lowe MD     Chief complaint: Postoperative visit, newly diagnosed breast cancer    HPI:   8/7/20:  Ms. Kirsten Lima is a 59 yo woman, seen at the request of Dr. Tad Lowe, for a new diagnosis of left breast intraductal papillomas. These were initially detected as an imaging abnormality on routine screening. Her work-up is detailed in the breast history section below. Despite these being incidentally found, the patient reports that she has had intermittent, clear, left nipple discharge for years. 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 She denies any associated breast lumps or pain. She does report chronic issues with her breast skin due to hidradenitis and recurrent skin abscesses, however she has not had any acute problems lately. She has a past history of a benign left breast stereotactic biopsy in 2012. She denies any family history of breast or ovarian cancer.      1/7/21:  At her last visit, I recommended excision of both of the left breast papillomas due the associated nipple discharge. She wanted to think about it and ultimately decided to hold off on surgery. She underwent follow-up ultrasound prior to her appointment, which showed stable left breast intraductal masses (see report details below). She has not tried to elicit any nipple discharge since the last visit and has not noticed any spontaneously.     3/10/21:  She underwent left breast excisional biopsy x 2 on 2/22/21, which showed LCIS. See surgery & pathology details in breast history section below. She has been recovering well and has no complaints.      6/28/21, Visit with YAN Dill :  She returns today for follow up with no breast complaints   In 3/2021 she met with Dr Dong and discussed starting tamoxifen, referral to nutritionist and smoking cessation  were made.  Due to risk for blood clots she has reservations about taking tamoxifen. She will follow up with Dr Dong in 9/21 to discuss again.   Screening mammogram with tomosynthesis was completed 6/11/2021 at [facility] (see full report below)    1/12/22:  She saw Regina Gilbert in June with a normal screening mammogram. She saw Dr. Dong who initially prescribed low-dose tamoxifen, however the patient never started taking it because she was concerned about blood clots. Dr. Dong then prescribed anastrozole in September, however the patient just started taking this 3 weeks ago.  She underwent her first screening MRI in December, which showed an area of non-mass enhancement in the left lower outer breast and a left nipple mass. She subsequently underwent an MR biopsy of the non-mass enhancement and this showed extensive LCIS (see imaging and pathology report details below).    2/16/22, Interval History:  She underwent left breast excisional biopsy and left nipple mass excision on 2/1/22. The excisional biopsy showed an incidental focus of low-grade invasive lobular carcinoma measuring 4 mm with negative margins (see report details below). I have already discussed her case in tumor board and the consensus was that axillary staging is not necessary. She has been recovering well from surgery.       Oncology/Hematology History Overview Note   5/28/19, Screening MMG with [facility] ([facility]):  Scattered areas of fibroglandular density.  1. There are small asymmetries seen in both breasts. The parenchyma includes stable nodular asymmetries. No suspicious masses, suspicious microcalcifications or areas of architectural distortion are identified. There has been no significant change from the prior exams.  2. There are stable punctate and spherical lucent-centered calcifications with diffuse/scattered distribution seen in both breast.  BI-RADS 2: Benign.     6/2/20, Screening MMG with Hola ([facility]):  Scattered areas of fibroglandular density.  1. There are small asymmetries seen in both breasts. There has been no significant change from the prior exams.  2. There are stable punctate and spherical lucent-centered calcifications with diffuse distribution seen in both breasts.  3. There is a new small focal asymmetry measuring 12 mm seen in the central region of the left breast. This is best visualized on tomosynthesis MLO view slice # 78. This is best seen on the MLO View.  BI-RADS 0: Incomplete.     20, Left Diagnostic MMG with Hola & Left Breast US ([facility]):  MM. On the present examination, focal asymmetry in the left breast, central does not persist. The asymmetry noted on the recent screening mammogram resolves into stroma on additional views.  US:  1. There is no sonographic correlate. There is no evidence of any solid mass or abnormal cystic elements.  2. There are three oval intraductal masses with partially defined margins measuring 5 mm to 9 mm seen in the anterior one-third subareolar region of the left breast. The patient reports history of intermittent clear left nipple discharge for the past 10 years. These three structures are adjacent and would be included within the same biopsy site.  3. There is an oval solid mass with partially defined margins measuring 6 x 5 x 5 mm seen in the anterior one-third subareolar region of the left breast. This is located 2 to 3 cm away from the above described masses.   BI-RADS 4: Suspicious      20, Left Breast, US-Guided Biopsy x 2 ([facility])  1. Left Subareolar mass (5 x 5 x 6 cm), Ultrasound guided biopsy:   Benign sclerosing intraductal papilloma with calcifications.  -Tophat clip. Concordant.  2. Left Subareolar mass, ultrasound guided biopsy:   Benign sclerosing intraductal papilloma with usual duct hyperplasia and microcalcifications.   -Hydromark clip. Concordant.     20, Left Breast US ([facility]):  There are two oval masses and biopsy clips  with circumscribed margins seen in the sub-areolar region of the left breast. There are no significant changes from the prior exam(s). This finding represents biopsy proven benign breast disease. These measure less than 1 cm.  BI-RADS  2: Benign.     02/22/21, Left breast needle-localized excisional biopsy and left breast ultrasound-guided excisional biopsy:  1. Left Breast, Needle-Localized Excisional Biopsy (13 grams):               A. LOBULAR CARCINOMA IN SITU (LCIS).               B. Multiple sclerosed intraductal papillomas with focal calcifications (completely excised).               C. Clips and associated biopsy site changes are present and adjacent to papillomas.               D. Background breast parenchyma with clusters of apocrine cysts, usual ductal hyperplasia, and       calcifications associated with sclerosing adenosis.    6/11/2021, Screening MMG with Hola ([facility]):  Scattered areas of fibroglandular density. There is a new postsurgical scar seen subareolar region of the left breast.  There are no suspicious masses, calcifications, or areas of architectural distortion. In the right breast, no suspicious masses, significant calcifications or other abnormalities are seen.  BI-RADS 2: Benign.     Malignant neoplasm of overlapping sites of left breast in female, estrogen receptor positive (HCC)   12/9/2021 Initial Diagnosis    Malignant neoplasm of overlapping sites of left breast in female, estrogen receptor positive (HCC)     12/10/2021 Imaging    Bilateral Breast MRI ([facility]):  RIGHT BREAST:    No suspicious enhancing mass or area of non-mass enhancement is identified. The visualized axilla is within normal limits.    LEFT BREAST:    There are post surgical changes in the anterior left breast. At 5:00 in the anterior left breast, 3.4 cm posterior to the nipple,  there is a 1.8 cm AP dimension somewhat linear branching nonmass enhancement, which is suspicious.   There is abnormal asymmetric  enhancement within the left nipple with an approximately 1.2 cm AP dimension, 1.7 cm transverse dimension, 1.0 cm craniocaudal dimension mass involving the nipple itself and extending slightly deeper into the subareolar breast, which is associated with rapid initial and washout delayed phase enhancement on kinetic analysis. This is suspicious.  No suspicious enhancement is identified in the left chest wall. The visualized axilla is within normal limits.    EXTRAMAMMARY FINDINGS:   There are no abnormally enlarged internal mammary chain lymph nodes on either side.     BI-RADS 4: Suspicious.     12/22/2021 Biopsy    Left Breast, MR-Guided Biopsy ([facility]):    1. Left Breast at 5 o'clock (not for calcifications) MRI-Guided Core Biopsy:                A.  Extensive lobular carcinoma in situ (LCIS):                            1.  LCIS measures up to 7 mm in single greatest dimension focally involving 7 of 8                      representative cores.                2.  Low grade nuclear features without necrosis.  B.  No invasive carcinoma identified by routine and/or immunostaining.  C.  Associated intraductal papilloma with microcalcification, ductal cyst wall and fibrocystic change noted.      2/1/2022 Surgery    Left breast needle-localized excisional biopsy and left nipple mass excision:    1. Breast, Left, Lumpectomy: Invasive lobular carcinoma, lobular carcinoma in situ and atypical lobular hyperplasia.               A. Invasive lobular carcinoma.                            1. The invasive lobular carcinoma measures 4 mm on the slide.                            2. The invasive lobular carcinoma is [place] grade I (tubular grade 3, nuclear grade 1, mitotic grade 1).                            3. The foci is located amongst an area of lobular carcinoma in situ and near the biopsy cavity.                            4. Biopsy site and clip identified (barbell shaped clip).                            5. The invasive  lobular carcinoma comes within 1.3 mm of the medial margin, 7.5 mm of the posterior margin, 8.8 mm of the anterior margin, 12.5 mm of the lateral margin and 15 mm of the superior and inferior margins.                            6. Microcalcifications are not associated with the invasive tumor.               B. Extensive lobular carcinoma in situ and atypical lobular hyperplasia.  C. Fibroadenoma with calcification, sclerosing adenosis, apocrine metaplasia, clustered cysts, area consistent with radial scar and fat necrosis.     2. Breast, Left, Nipple, Excision:  Breast tissue with               A. Simple cysts.               B. Calcifications in non-neoplastic tissue.               C. Apocrine metaplasia.               D. Intraductal papilloma with sclerosis.               E. Atypical lobular hyperplasia.    ER+ (80%, strong)  CO+ (3%, moderate)  Her2 negative (IHC 1+)  Ki-67 2%         Review of Systems:  See interval history.       Medications:    Current Outpatient Medications:   •  amLODIPine (NORVASC) 10 MG tablet, TAKE 1 TABLET DAILY (Patient taking differently: Take 10 mg by mouth Daily.), Disp: 90 tablet, Rfl: 1  •  anastrozole (ARIMIDEX) 1 MG tablet, TAKE 1 TABLET BY MOUTH EVERY DAY (Patient taking differently: Take 1 mg by mouth Daily.), Disp: 90 tablet, Rfl: 1  •  cetirizine (ZyrTEC) 10 MG tablet, Take 1 tablet by mouth daily., Disp: , Rfl:   •  Chlorhexidine Gluconate (HIBICLENS EX), Apply 1 each topically Take As Directed. AS DIRECTED PREOP , Disp: , Rfl:   •  Cholecalciferol (VITAMIN D) 2000 units capsule, Take 2,000 Units by mouth Daily., Disp: , Rfl:   •  citalopram (CeleXA) 10 MG tablet, TAKE 1 TABLET BY MOUTH EVERY DAY (Patient taking differently: Take 10 mg by mouth Daily.), Disp: 90 tablet, Rfl: 1  •  clotrimazole-betamethasone (LOTRISONE) 1-0.05 % lotion, Apply  topically to the appropriate area as directed 2 (Two) Times a Day. (Patient taking differently: Apply 1 application topically to the  appropriate area as directed 2 (Two) Times a Day As Needed.), Disp: 30 mL, Rfl: 1  •  Efinaconazole 10 % solution, Apply 1 drop topically Daily. (Patient taking differently: Apply 1 drop topically Daily As Needed.), Disp: 8 mL, Rfl: 3  •  lisinopril-hydrochlorothiazide (PRINZIDE,ZESTORETIC) 20-12.5 MG per tablet, TAKE 2 TABLETS DAILY (Patient taking differently: Take 2 tablets by mouth Daily.), Disp: 180 tablet, Rfl: 1  •  metoprolol succinate XL (TOPROL-XL) 200 MG 24 hr tablet, TAKE 1 TABLET DAILY (Patient taking differently: Take 200 mg by mouth Daily.), Disp: 90 tablet, Rfl: 1  •  oxybutynin XL (DITROPAN-XL) 10 MG 24 hr tablet, TAKE 1 TABLET BY MOUTH EVERY DAY (Patient taking differently: Take 10 mg by mouth Every Evening.), Disp: 90 tablet, Rfl: 1  •  rosuvastatin (CRESTOR) 5 MG tablet, TAKE 1 TABLET DAILY (Patient taking differently: Take 5 mg by mouth Every Night.), Disp: 90 tablet, Rfl: 3  •  traZODone (DESYREL) 50 MG tablet, TAKE 1 TABLET EVERY NIGHT (Patient taking differently: Take 50 mg by mouth Every Night.), Disp: 90 tablet, Rfl: 1      Allergies:  No Known Allergies      Family History   Problem Relation Age of Onset   • Hypertension Mother    • COPD Father    • Heart failure Father    • Hypertension Father    • Pancreatic cancer Brother         1 brother  from pancreatic cancer   • Other Sister         1 sister  renal failure   • Alcohol abuse Sister    • Liver disease Sister    • Malig Hyperthermia Neg Hx        PHYSICAL EXAMINATION:   Vitals:    22 1455   BP: 140/78   Pulse: 58   Resp: 18   SpO2: 98%     ECOG 0 - Asymptomatic  General: NAD, well appearing, obese  Psych: a&o x 3, normal mood and affect  Eyes: EOMI, no scleral icterus  ENMT: neck supple without masses or thyromegaly, mucus membranes moist  MSK: normal gait, normal ROM in bilateral shoulders  Lymph nodes: Bilateral axillary scar; no cervical, supraclavicular or axillary lymphadenopathy  Breast: symmetric, very  large size, pendulous  Right: deferred.  Left: Well-healed medial periareolar scar and well-healing lower outer periareolar incision with Dermabond intact.  No concavity or fullness at the surgical site.      Assessment:  61 y.o. F with a new diagnosis of left breast cancer: Low grade, invasive lobular carcinoma, ER/GA positive, HER-2 negative. This was diagnosed after excisional biopsy for LCIS on 2/1/22, pT1aNx.    -She has a past history of left breast LCIS, which was diagnosed after left breast excisional biopsy x2 on 2/22/21 for intraductal papillomas associated with pathologic nipple discharge.   -Because of her increased lifetime risk of breast cancer (47.1%, TCv8, calculated 3/10/21) and history of LCIS, she was in high risk screening and on anastrozole since 1/2022.    Discussion:  We reviewed her new pathology report. I explained that because the tumor was small and low-grade, it was felt that axillary staging was not necessary by the multidisciplinary conference. Since she was already in high risk screening, we will plan to continue this in the future.    Plan:  -Radiation oncology referral. Appointment scheduled with Dr. Tucker on 2/28/22.  -Continue follow-up with Dr. Dong.  -Follow-up in 8/2022 with mammogram. I moved this from 6/2022 to allow her to recover from radiation. Will plan on breast MRI in 2/2023.  -She was instructed to call sooner with any questions, concerns or changes on BSE.    Debora Zambrano MD      CC:  MD Verenice Donaldson MD Ann Grider, MD

## 2022-02-17 ENCOUNTER — TELEPHONE (OUTPATIENT)
Dept: SURGERY | Facility: CLINIC | Age: 62
End: 2022-02-17

## 2022-02-17 NOTE — TELEPHONE ENCOUNTER
Pt notified her appt with [facility] has been moved out for radiation healing time.  Dr Marks first available is 09/20/2022 at 2:20.  Pts follow up appt with Dr Zambrano is scheduled on 09/28/2022 at 10:00.  Pt verbalized understanding.    CMA

## 2022-03-02 ENCOUNTER — APPOINTMENT (OUTPATIENT)
Dept: RADIATION ONCOLOGY | Facility: HOSPITAL | Age: 62
End: 2022-03-02

## 2022-03-02 ENCOUNTER — CONSULT (OUTPATIENT)
Dept: RADIATION ONCOLOGY | Facility: HOSPITAL | Age: 62
End: 2022-03-02

## 2022-03-02 VITALS — DIASTOLIC BLOOD PRESSURE: 84 MMHG | OXYGEN SATURATION: 97 % | HEART RATE: 52 BPM | SYSTOLIC BLOOD PRESSURE: 132 MMHG

## 2022-03-02 DIAGNOSIS — C50.812 MALIGNANT NEOPLASM OF OVERLAPPING SITES OF LEFT BREAST IN FEMALE, ESTROGEN RECEPTOR POSITIVE: Primary | ICD-10-CM

## 2022-03-02 DIAGNOSIS — Z17.0 MALIGNANT NEOPLASM OF OVERLAPPING SITES OF LEFT BREAST IN FEMALE, ESTROGEN RECEPTOR POSITIVE: Primary | ICD-10-CM

## 2022-03-02 PROCEDURE — 77370 RADIATION PHYSICS CONSULT: CPT | Performed by: RADIOLOGY

## 2022-03-02 PROCEDURE — 77333 RADIATION TREATMENT AID(S): CPT | Performed by: RADIOLOGY

## 2022-03-02 PROCEDURE — 77263 THER RADIOLOGY TX PLNG CPLX: CPT | Performed by: RADIOLOGY

## 2022-03-02 PROCEDURE — 77290 THER RAD SIMULAJ FIELD CPLX: CPT | Performed by: RADIOLOGY

## 2022-03-02 PROCEDURE — 99205 OFFICE O/P NEW HI 60 MIN: CPT | Performed by: RADIOLOGY

## 2022-03-02 NOTE — PROGRESS NOTES
Subjective     Debora Zambrano MD    Cancer Staging  No matching staging information was found for the patient.    CC:pT1aNx invasive lobular carcinoma of left breast ER W[place] Pos Her 2 neg                                Dear Debora Zambrano MD    I had the pleasure of seeing Kirsten Lima  [facility].   The patient is a 61 y.o. female with recently diagnosed left breast cancer, invasive lobular carcinoma, pT1aNx.  She has a hx of left breast intraductal papilloma for which she underwent excisional biopsy on 21.  She had a bilateral screening mammogram on 21 which showed post surgical scar in left breast benign.  She had breast screening mri on 12/10/21 which showed in left anterior breast at 5 oclock 3.4cm posterior to nipple a 1.8cm linear branching nonmass ehnancement and a 1.7cm enhancing left nipple mass.  She had a mri guided biopsy of left breast on 21 which showed extensive lobular carcinoma in situ 7mm low grade ER % WV 41-50% ki67 5%.    She underwent a left breast excisional biopsy and excision of nipple mass on 22.  Pathology revealed invasive lobular carcinoma measuirng 4mm, grade 1, in area of lcis.  The lcis comes within 1.3mm of medial margin, extensive lobular carcinoma in situ.  Left nipple excision revealed intraductal papilloma with sclerosis and atypical lobular hyperplasia.  The invasive component was ER 80% WV 3% Her 2 negative and ki 67 2%.  Her pathologic stage was pT1cNx.      Her case was presented at the Multidisciplinary Breast Conference on 22 with consensus recommendation to proceed with radiation without axillary staging. She met with Dr. Dong with medical oncology in 2021 at time of her first diagnosis of intraductal papilloma with lcis.  She started anastrazole in 2021.       She is  with menarche age 13 and menopause in her 50s.  She  Took estradiol patch x 2 years.  She had hysterectomy but still has  ovaries.  She has a maternal cousin with breast cancer in her 40s and sister with ovarian cancer.              Review of Systems   Constitutional: Negative.    HENT: Negative.    Respiratory: Negative.    Cardiovascular: Negative.    Gastrointestinal: Positive for constipation.   Genitourinary: Negative.    Musculoskeletal: Negative.    Skin: Negative.    Neurological: Negative.    Psychiatric/Behavioral: Negative.          Past Medical History:   Diagnosis Date   • Acute cystitis    • Allergic rhinitis    • Breast cyst    • Cyst of left nipple    • Depression    • Dry skin    • Dysuria    • Hemorrhoid    • Hidradenitis    • History of bronchitis    • Hyperlipidemia    • Hypertension    • Incontinence in female     wears pads   • Insomnia    • Nonspecific abnormal electrocardiogram (ECG) (EKG)    • Obesity    • Overactive bladder    • Pregnancy     G-2, P-0   • Sleep apnea    • UTI (urinary tract infection) 02/2021   • Vitamin B12 deficiency    • Vitamin D insufficiency    • Wound, breast     LEFT BREAST; PT INSTRUCTED TO NOTIFY DR ZAMBRANO PRIOR TO SURGERY         Past Surgical History:   Procedure Laterality Date   • AXILLARY HIDRADENITIS EXCISION Bilateral    • BREAST BIOPSY Left 2/22/2021    Procedure: Left breast needle-localized excisional biopsy (tophat clip) and left breast ultrasound-guided excisional biopsy (hydromark clip);  Surgeon: Debora Zambrano MD;  Location: [facility];  Service: General;  Laterality: Left;   • BREAST BIOPSY Left 2/1/2022    Procedure: Left breast needle-localized excisional biopsy, Left nipple mass excision;  Surgeon: Debora Zambrano MD;  Location: [facility];  Service: General;  Laterality: Left;   • CARDIAC CATHETERIZATION     • COLONOSCOPY     • HYSTERECTOMY  2006    [facility]         Social History     Socioeconomic History   • Marital status: Single   • Number of children: 0   Tobacco Use   • Smoking status: Current Every Day Smoker     Packs/day: 0.50     Years:  42.00     Pack years: 21.00     Types: Cigarettes     Start date:    • Smokeless tobacco: Never Used   Vaping Use   • Vaping Use: Never used   Substance and Sexual Activity   • Alcohol use: Yes     Comment: 3 drinks a month   • Drug use: Not Currently     Comment: marijuana in her twenties         Family History   Problem Relation Age of Onset   • Hypertension Mother    • COPD Father    • Heart failure Father    • Hypertension Father    • Pancreatic cancer Brother         1 brother  from pancreatic cancer   • Other Sister         1 sister  renal failure   • Alcohol abuse Sister    • Liver disease Sister    • Malig Hyperthermia Neg Hx           Objective    Physical Exam  Constitutional:       Appearance: Normal appearance. She is obese.   Eyes:      Extraocular Movements: Extraocular movements intact.   Pulmonary:      Effort: Pulmonary effort is normal.   Chest:          Comments: Breasts are large in size, well healed incision left periareolar region, no palpable masses in either breast or axilla  Musculoskeletal:         General: Normal range of motion.   Neurological:      General: No focal deficit present.      Mental Status: She is alert.   Psychiatric:         Mood and Affect: Mood normal.           Current Outpatient Medications on File Prior to Visit   Medication Sig Dispense Refill   • amLODIPine (NORVASC) 10 MG tablet TAKE 1 TABLET DAILY (Patient taking differently: Take 10 mg by mouth Daily.) 90 tablet 1   • anastrozole (ARIMIDEX) 1 MG tablet TAKE 1 TABLET BY MOUTH EVERY DAY (Patient taking differently: Take 1 mg by mouth Daily.) 90 tablet 1   • cetirizine (ZyrTEC) 10 MG tablet Take 1 tablet by mouth daily.     • Chlorhexidine Gluconate (HIBICLENS EX) Apply 1 each topically Take As Directed. AS DIRECTED PREOP      • Cholecalciferol (VITAMIN D) 2000 units capsule Take 2,000 Units by mouth Daily.     • citalopram (CeleXA) 10 MG tablet TAKE 1 TABLET BY MOUTH EVERY DAY (Patient taking  differently: Take 10 mg by mouth Daily.) 90 tablet 1   • clotrimazole-betamethasone (LOTRISONE) 1-0.05 % lotion Apply  topically to the appropriate area as directed 2 (Two) Times a Day. (Patient taking differently: Apply 1 application topically to the appropriate area as directed 2 (Two) Times a Day As Needed.) 30 mL 1   • Efinaconazole 10 % solution Apply 1 drop topically Daily. (Patient taking differently: Apply 1 drop topically Daily As Needed.) 8 mL 3   • lisinopril-hydrochlorothiazide (PRINZIDE,ZESTORETIC) 20-12.5 MG per tablet TAKE 2 TABLETS DAILY (Patient taking differently: Take 2 tablets by mouth Daily.) 180 tablet 1   • metoprolol succinate XL (TOPROL-XL) 200 MG 24 hr tablet TAKE 1 TABLET DAILY (Patient taking differently: Take 200 mg by mouth Daily.) 90 tablet 1   • oxybutynin XL (DITROPAN-XL) 10 MG 24 hr tablet TAKE 1 TABLET BY MOUTH EVERY DAY (Patient taking differently: Take 10 mg by mouth Every Evening.) 90 tablet 1   • rosuvastatin (CRESTOR) 5 MG tablet TAKE 1 TABLET DAILY (Patient taking differently: Take 5 mg by mouth Every Night.) 90 tablet 3   • traZODone (DESYREL) 50 MG tablet TAKE 1 TABLET EVERY NIGHT (Patient taking differently: Take 50 mg by mouth Every Night.) 90 tablet 1     No current facility-administered medications on file prior to visit.       ALLERGIES:  No Known Allergies    There were no vitals taken for this visit.     Current Status 3/18/2021   ECOG score 0         Assessment/Plan     61 year old female with pT1aNx invasive lobular carcinoma of left breast, pT1aNx, ER NV pos.I discussed with her my recommendation for post-operative radiation therapy to the left breast to decrease the risk of local recurrence.      I discussed with her in detail the risks, benefits and rationale of radiation therapy to the left breast to include but not limited to the following:    Acute: skin erythema, breakdown/moist desquamation, swelling or discomfort of the breast, fatigue, pneumonitis  resulting in shortness of breath, cough or pain    Late: Permanent skin changes including hyperpigmentation, telangiectasias, fibrosis of the breast resulting in smaller size or poor cosmetic outcome, late edema or cellulitis, late rib fracture, late pulmonary fibrosis and the remote risk of late cardiac damage resulting in increased risk of heart attack or second malignancies.      She voiced understanding and was given an opportunity to ask questions which I believe were answered to her satisfaction.  We will proceed with CT simulation for treatment planning today in anticipation of starting her treatments next week. I plan to treat the left breast with tangential fields to a dose of approximately 3990cGy in 15 fractions followed by a boost to the tumor bed for an additional 1000cGy in 5 fractions.    I personally spent greater than 60 minutes today assessing, managing, discussing and documenting my visit with the patient. That time includes review of records, imaging and pathology reports, obtaining my own history, performing a medically appropriate evaluation, counseling and educating the patient, discussing goals, logistics, alternatives and risks of my recommendations, surveillance options and potential outcomes. It also includes the time documenting the clinical information in the EMR and communicating my recommendations to the other involved physicians.           Thank you very much for allowing me to participate in the care of this very pleasant patient.    Sincerely,      Prerna Tucker MD

## 2022-03-07 PROCEDURE — 77295 3-D RADIOTHERAPY PLAN: CPT | Performed by: RADIOLOGY

## 2022-03-07 PROCEDURE — 77300 RADIATION THERAPY DOSE PLAN: CPT | Performed by: RADIOLOGY

## 2022-03-07 PROCEDURE — 77334 RADIATION TREATMENT AID(S): CPT | Performed by: RADIOLOGY

## 2022-03-09 ENCOUNTER — APPOINTMENT (OUTPATIENT)
Dept: RADIATION ONCOLOGY | Facility: HOSPITAL | Age: 62
End: 2022-03-09

## 2022-03-09 PROCEDURE — 77280 THER RAD SIMULAJ FIELD SMPL: CPT | Performed by: RADIOLOGY

## 2022-03-09 PROCEDURE — 77412 RADIATION TX DELIVERY LVL 3: CPT | Performed by: RADIOLOGY

## 2022-03-10 ENCOUNTER — APPOINTMENT (OUTPATIENT)
Dept: RADIATION ONCOLOGY | Facility: HOSPITAL | Age: 62
End: 2022-03-10

## 2022-03-10 PROCEDURE — 77412 RADIATION TX DELIVERY LVL 3: CPT | Performed by: RADIOLOGY

## 2022-03-10 PROCEDURE — 77387 GUIDANCE FOR RADJ TX DLVR: CPT | Performed by: RADIOLOGY

## 2022-03-10 PROCEDURE — 77427 RADIATION TX MANAGEMENT X5: CPT | Performed by: RADIOLOGY

## 2022-03-11 ENCOUNTER — APPOINTMENT (OUTPATIENT)
Dept: RADIATION ONCOLOGY | Facility: HOSPITAL | Age: 62
End: 2022-03-11

## 2022-03-11 PROCEDURE — 77387 GUIDANCE FOR RADJ TX DLVR: CPT | Performed by: RADIOLOGY

## 2022-03-11 PROCEDURE — 77412 RADIATION TX DELIVERY LVL 3: CPT | Performed by: RADIOLOGY

## 2022-03-14 ENCOUNTER — APPOINTMENT (OUTPATIENT)
Dept: RADIATION ONCOLOGY | Facility: HOSPITAL | Age: 62
End: 2022-03-14

## 2022-03-14 PROCEDURE — 77387 GUIDANCE FOR RADJ TX DLVR: CPT | Performed by: RADIOLOGY

## 2022-03-14 PROCEDURE — 77412 RADIATION TX DELIVERY LVL 3: CPT | Performed by: RADIOLOGY

## 2022-03-15 ENCOUNTER — RADIATION ONCOLOGY WEEKLY ASSESSMENT (OUTPATIENT)
Dept: RADIATION ONCOLOGY | Facility: HOSPITAL | Age: 62
End: 2022-03-15

## 2022-03-15 ENCOUNTER — APPOINTMENT (OUTPATIENT)
Dept: RADIATION ONCOLOGY | Facility: HOSPITAL | Age: 62
End: 2022-03-15

## 2022-03-15 VITALS
HEART RATE: 57 BPM | BODY MASS INDEX: 41.16 KG/M2 | OXYGEN SATURATION: 100 % | WEIGHT: 255 LBS | DIASTOLIC BLOOD PRESSURE: 85 MMHG | SYSTOLIC BLOOD PRESSURE: 133 MMHG

## 2022-03-15 DIAGNOSIS — D05.02 LOBULAR CARCINOMA IN SITU (LCIS) OF LEFT BREAST: Primary | ICD-10-CM

## 2022-03-15 DIAGNOSIS — I10 ESSENTIAL HYPERTENSION: ICD-10-CM

## 2022-03-15 PROCEDURE — 77336 RADIATION PHYSICS CONSULT: CPT | Performed by: RADIOLOGY

## 2022-03-15 PROCEDURE — 77387 GUIDANCE FOR RADJ TX DLVR: CPT | Performed by: RADIOLOGY

## 2022-03-15 PROCEDURE — 77412 RADIATION TX DELIVERY LVL 3: CPT | Performed by: RADIOLOGY

## 2022-03-15 RX ORDER — LISINOPRIL AND HYDROCHLOROTHIAZIDE 20; 12.5 MG/1; MG/1
2 TABLET ORAL DAILY
Qty: 180 TABLET | Refills: 1 | Status: SHIPPED | OUTPATIENT
Start: 2022-03-15 | End: 2022-06-22

## 2022-03-15 NOTE — PROGRESS NOTES
Physician Weekly Management Note    Diagnosis:     Diagnosis Plan   1. Lobular carcinoma in situ (LCIS) of left breast         RT Details:  fx 5/[id number]    Notes on Treatment course, Films, Medical progress:  Doing well, mild hyperpigmentation no fatigue    Weekly Management:  Medication reviewed?   Yes  New medications given?   No  Problemlist reviewed?   Yes  Problem added?   No  Issues raised requiring referral to support services - task assigned to:  kelly    Technical aspects reviewed:  Weekly OBI approved?   Yes  Weekly port films approved?   Yes  Change requests noted on port film?   No  Patient setup and plan reviewed?   Yes    Chart Reviewed:  Continue current treatment plan?   Yes  Treatment plan change requested?   No  CBC reviewed?   No  Concurrent Chemo?   No    Objective     Toxicities:   none     Review of Systems   Constitutional: Negative.    Skin: Negative for color change.          There were no vitals filed for this visit.    Current Status 3/18/2021   ECOG score 0       Physical Exam  Constitutional:       Appearance: Normal appearance.   Chest:          Comments: No erythema or hyperpigmentation  Neurological:      Mental Status: She is alert.             Problem Summary List    Diagnosis:     Diagnosis Plan   1. Lobular carcinoma in situ (LCIS) of left breast       Pathology:   breast    Past Medical History:   Diagnosis Date   • Acute cystitis    • Allergic rhinitis    • Breast cancer (HCC)    • Breast cyst    • Cyst of left nipple    • Depression    • Dry skin    • Dysuria    • Hemorrhoid    • Hidradenitis    • History of bronchitis    • Hyperlipidemia    • Hypertension    • Incontinence in female     wears pads   • Insomnia    • Nonspecific abnormal electrocardiogram (ECG) (EKG)    • Obesity    • Overactive bladder    • Pregnancy     G-2, P-0   • Sleep apnea    • UTI (urinary tract infection) 02/2021   • Vitamin B12 deficiency    • Vitamin D insufficiency    • Wound, breast     LEFT BREAST;  PT INSTRUCTED TO NOTIFY DR ZAMBRANO PRIOR TO SURGERY         Past Surgical History:   Procedure Laterality Date   • AXILLARY HIDRADENITIS EXCISION Bilateral    • BREAST BIOPSY Left 2/22/2021    Procedure: Left breast needle-localized excisional biopsy (tophat clip) and left breast ultrasound-guided excisional biopsy (hydromark clip);  Surgeon: Debora Zambrano MD;  Location: [facility] OR;  Service: General;  Laterality: Left;   • BREAST BIOPSY Left 2/1/2022    Procedure: Left breast needle-localized excisional biopsy, Left nipple mass excision;  Surgeon: Debora Zambrano MD;  Location: [facility] OR;  Service: General;  Laterality: Left;   • CARDIAC CATHETERIZATION     • COLONOSCOPY     • HYSTERECTOMY  2006    [facility]         Current Outpatient Medications on File Prior to Visit   Medication Sig Dispense Refill   • amLODIPine (NORVASC) 10 MG tablet TAKE 1 TABLET DAILY (Patient taking differently: Take 10 mg by mouth Daily.) 90 tablet 1   • anastrozole (ARIMIDEX) 1 MG tablet TAKE 1 TABLET BY MOUTH EVERY DAY (Patient taking differently: Take 1 mg by mouth Daily.) 90 tablet 1   • cetirizine (ZyrTEC) 10 MG tablet Take 1 tablet by mouth daily.     • Chlorhexidine Gluconate (HIBICLENS EX) Apply 1 each topically Take As Directed. AS DIRECTED PREOP      • Cholecalciferol (VITAMIN D) 2000 units capsule Take 2,000 Units by mouth Daily.     • citalopram (CeleXA) 10 MG tablet TAKE 1 TABLET BY MOUTH EVERY DAY (Patient taking differently: Take 10 mg by mouth Daily.) 90 tablet 1   • clotrimazole-betamethasone (LOTRISONE) 1-0.05 % lotion Apply  topically to the appropriate area as directed 2 (Two) Times a Day. (Patient taking differently: Apply 1 application topically to the appropriate area as directed 2 (Two) Times a Day As Needed.) 30 mL 1   • Efinaconazole 10 % solution Apply 1 drop topically Daily. (Patient taking differently: Apply 1 drop topically Daily As Needed.) 8 mL 3   • lisinopril-hydrochlorothiazide  (PRINZIDE,ZESTORETIC) 20-12.5 MG per tablet TAKE 2 TABLETS DAILY (Patient taking differently: Take 2 tablets by mouth Daily.) 180 tablet 1   • metoprolol succinate XL (TOPROL-XL) 200 MG 24 hr tablet TAKE 1 TABLET DAILY (Patient taking differently: Take 200 mg by mouth Daily.) 90 tablet 1   • oxybutynin XL (DITROPAN-XL) 10 MG 24 hr tablet TAKE 1 TABLET BY MOUTH EVERY DAY (Patient taking differently: Take 10 mg by mouth Every Evening.) 90 tablet 1   • rosuvastatin (CRESTOR) 5 MG tablet TAKE 1 TABLET DAILY (Patient taking differently: Take 5 mg by mouth Every Night.) 90 tablet 3   • traZODone (DESYREL) 50 MG tablet TAKE 1 TABLET EVERY NIGHT (Patient taking differently: Take 50 mg by mouth Every Night.) 90 tablet 1     No current facility-administered medications on file prior to visit.       No Known Allergies      Referring Provider:    No referring provider defined for this encounter.    Oncologist:  No primary care provider on file.      Seen and approved by:  Prerna Tucker MD  03/15/2022

## 2022-03-16 ENCOUNTER — APPOINTMENT (OUTPATIENT)
Dept: RADIATION ONCOLOGY | Facility: HOSPITAL | Age: 62
End: 2022-03-16

## 2022-03-16 PROCEDURE — 77412 RADIATION TX DELIVERY LVL 3: CPT | Performed by: RADIOLOGY

## 2022-03-16 PROCEDURE — 77387 GUIDANCE FOR RADJ TX DLVR: CPT | Performed by: RADIOLOGY

## 2022-03-17 ENCOUNTER — APPOINTMENT (OUTPATIENT)
Dept: RADIATION ONCOLOGY | Facility: HOSPITAL | Age: 62
End: 2022-03-17

## 2022-03-17 PROCEDURE — 77412 RADIATION TX DELIVERY LVL 3: CPT | Performed by: RADIOLOGY

## 2022-03-17 PROCEDURE — 77427 RADIATION TX MANAGEMENT X5: CPT | Performed by: RADIOLOGY

## 2022-03-17 PROCEDURE — 77387 GUIDANCE FOR RADJ TX DLVR: CPT | Performed by: RADIOLOGY

## 2022-03-18 ENCOUNTER — APPOINTMENT (OUTPATIENT)
Dept: LAB | Facility: HOSPITAL | Age: 62
End: 2022-03-18

## 2022-03-18 ENCOUNTER — APPOINTMENT (OUTPATIENT)
Dept: RADIATION ONCOLOGY | Facility: HOSPITAL | Age: 62
End: 2022-03-18

## 2022-03-18 PROCEDURE — 77412 RADIATION TX DELIVERY LVL 3: CPT | Performed by: RADIOLOGY

## 2022-03-18 PROCEDURE — 77387 GUIDANCE FOR RADJ TX DLVR: CPT | Performed by: RADIOLOGY

## 2022-03-21 ENCOUNTER — APPOINTMENT (OUTPATIENT)
Dept: RADIATION ONCOLOGY | Facility: HOSPITAL | Age: 62
End: 2022-03-21

## 2022-03-21 PROCEDURE — 77387 GUIDANCE FOR RADJ TX DLVR: CPT | Performed by: RADIOLOGY

## 2022-03-21 PROCEDURE — 77412 RADIATION TX DELIVERY LVL 3: CPT | Performed by: RADIOLOGY

## 2022-03-22 ENCOUNTER — RADIATION ONCOLOGY WEEKLY ASSESSMENT (OUTPATIENT)
Dept: RADIATION ONCOLOGY | Facility: HOSPITAL | Age: 62
End: 2022-03-22

## 2022-03-22 ENCOUNTER — APPOINTMENT (OUTPATIENT)
Dept: RADIATION ONCOLOGY | Facility: HOSPITAL | Age: 62
End: 2022-03-22

## 2022-03-22 VITALS — SYSTOLIC BLOOD PRESSURE: 139 MMHG | OXYGEN SATURATION: 99 % | HEART RATE: 61 BPM | DIASTOLIC BLOOD PRESSURE: 60 MMHG

## 2022-03-22 DIAGNOSIS — C50.812 MALIGNANT NEOPLASM OF OVERLAPPING SITES OF LEFT BREAST IN FEMALE, ESTROGEN RECEPTOR POSITIVE: Primary | ICD-10-CM

## 2022-03-22 DIAGNOSIS — Z17.0 MALIGNANT NEOPLASM OF OVERLAPPING SITES OF LEFT BREAST IN FEMALE, ESTROGEN RECEPTOR POSITIVE: Primary | ICD-10-CM

## 2022-03-22 PROCEDURE — 77412 RADIATION TX DELIVERY LVL 3: CPT | Performed by: RADIOLOGY

## 2022-03-22 PROCEDURE — 77336 RADIATION PHYSICS CONSULT: CPT | Performed by: RADIOLOGY

## 2022-03-22 PROCEDURE — 77387 GUIDANCE FOR RADJ TX DLVR: CPT | Performed by: RADIOLOGY

## 2022-03-22 NOTE — PROGRESS NOTES
Physician Weekly Management Note    Diagnosis:     Diagnosis Plan   1. Malignant neoplasm of overlapping sites of left breast in female, estrogen receptor positive (HCC)         RT Details:  fx 10/15 left breast plus boost x 5, 2660cGy    Notes on Treatment course, Films, Medical progress:  Doing well, mild hyperpigmentation, cont on    Weekly Management:  Medication reviewed?   Yes  New medications given?   No  Problemlist reviewed?   Yes  Problem added?   No  Issues raised requiring referral to support services - task assigned to:  na    Technical aspects reviewed:  Weekly OBI approved?   Yes  Weekly port films approved?   Yes  Change requests noted on port film?   No  Patient setup and plan reviewed?   Yes    Chart Reviewed:  Continue current treatment plan?   Yes  Treatment plan change requested?   No  CBC reviewed?   No  Concurrent Chemo?   No    Objective     Toxicities:   none     Review of Systems   Constitutional: Negative.    Skin: Positive for color change.          There were no vitals filed for this visit.    Current Status 3/18/2021   ECOG score 0       Physical Exam  Constitutional:       Appearance: Normal appearance.   Chest:          Comments: Very mild hyperpigmentation over left breast  Neurological:      Mental Status: She is alert.             Problem Summary List    Diagnosis:     Diagnosis Plan   1. Malignant neoplasm of overlapping sites of left breast in female, estrogen receptor positive (HCC)       Pathology:   breast    Past Medical History:   Diagnosis Date   • Acute cystitis    • Allergic rhinitis    • Breast cancer (HCC)    • Breast cyst    • Cyst of left nipple    • Depression    • Dry skin    • Dysuria    • Hemorrhoid    • Hidradenitis    • History of bronchitis    • Hyperlipidemia    • Hypertension    • Incontinence in female     wears pads   • Insomnia    • Nonspecific abnormal electrocardiogram (ECG) (EKG)    • Obesity    • Overactive bladder    • Pregnancy     G-2, P-0   • Sleep  apnea    • UTI (urinary tract infection) 02/2021   • Vitamin B12 deficiency    • Vitamin D insufficiency    • Wound, breast     LEFT BREAST; PT INSTRUCTED TO NOTIFY DR ZAMBRANO PRIOR TO SURGERY         Past Surgical History:   Procedure Laterality Date   • AXILLARY HIDRADENITIS EXCISION Bilateral    • BREAST BIOPSY Left 2/22/2021    Procedure: Left breast needle-localized excisional biopsy (tophat clip) and left breast ultrasound-guided excisional biopsy (hydromark clip);  Surgeon: Debora Zambrano MD;  Location: [facility];  Service: General;  Laterality: Left;   • BREAST BIOPSY Left 2/1/2022    Procedure: Left breast needle-localized excisional biopsy, Left nipple mass excision;  Surgeon: Debora Zambrano MD;  Location: [facility];  Service: General;  Laterality: Left;   • CARDIAC CATHETERIZATION     • COLONOSCOPY     • HYSTERECTOMY  2006    [facility]         Current Outpatient Medications on File Prior to Visit   Medication Sig Dispense Refill   • amLODIPine (NORVASC) 10 MG tablet TAKE 1 TABLET DAILY (Patient taking differently: Take 10 mg by mouth Daily.) 90 tablet 1   • anastrozole (ARIMIDEX) 1 MG tablet TAKE 1 TABLET BY MOUTH EVERY DAY (Patient taking differently: Take 1 mg by mouth Daily.) 90 tablet 1   • cetirizine (ZyrTEC) 10 MG tablet Take 1 tablet by mouth daily.     • Chlorhexidine Gluconate (HIBICLENS EX) Apply 1 each topically Take As Directed. AS DIRECTED PREOP      • Cholecalciferol (VITAMIN D) 2000 units capsule Take 2,000 Units by mouth Daily.     • citalopram (CeleXA) 10 MG tablet TAKE 1 TABLET BY MOUTH EVERY DAY (Patient taking differently: Take 10 mg by mouth Daily.) 90 tablet 1   • clotrimazole-betamethasone (LOTRISONE) 1-0.05 % lotion Apply  topically to the appropriate area as directed 2 (Two) Times a Day. (Patient taking differently: Apply 1 application topically to the appropriate area as directed 2 (Two) Times a Day As Needed.) 30 mL 1   • Efinaconazole 10 % solution Apply 1 drop  topically Daily. (Patient taking differently: Apply 1 drop topically Daily As Needed.) 8 mL 3   • lisinopril-hydrochlorothiazide (PRINZIDE,ZESTORETIC) 20-12.5 MG per tablet Take 2 tablets by mouth Daily. 180 tablet 1   • metoprolol succinate XL (TOPROL-XL) 200 MG 24 hr tablet TAKE 1 TABLET DAILY (Patient taking differently: Take 200 mg by mouth Daily.) 90 tablet 1   • oxybutynin XL (DITROPAN-XL) 10 MG 24 hr tablet TAKE 1 TABLET BY MOUTH EVERY DAY (Patient taking differently: Take 10 mg by mouth Every Evening.) 90 tablet 1   • rosuvastatin (CRESTOR) 5 MG tablet TAKE 1 TABLET DAILY (Patient taking differently: Take 5 mg by mouth Every Night.) 90 tablet 3   • traZODone (DESYREL) 50 MG tablet TAKE 1 TABLET EVERY NIGHT (Patient taking differently: Take 50 mg by mouth Every Night.) 90 tablet 1     No current facility-administered medications on file prior to visit.       No Known Allergies      Referring Provider:    No referring provider defined for this encounter.    Oncologist:  No primary care provider on file.      Seen and approved by:  Prerna Tucker MD  03/22/2022

## 2022-03-23 ENCOUNTER — APPOINTMENT (OUTPATIENT)
Dept: RADIATION ONCOLOGY | Facility: HOSPITAL | Age: 62
End: 2022-03-23

## 2022-03-23 PROCEDURE — 77427 RADIATION TX MANAGEMENT X5: CPT | Performed by: RADIOLOGY

## 2022-03-23 PROCEDURE — 77412 RADIATION TX DELIVERY LVL 3: CPT | Performed by: RADIOLOGY

## 2022-03-23 PROCEDURE — 77387 GUIDANCE FOR RADJ TX DLVR: CPT | Performed by: RADIOLOGY

## 2022-03-24 ENCOUNTER — APPOINTMENT (OUTPATIENT)
Dept: RADIATION ONCOLOGY | Facility: HOSPITAL | Age: 62
End: 2022-03-24

## 2022-03-24 PROCEDURE — 77387 GUIDANCE FOR RADJ TX DLVR: CPT | Performed by: RADIOLOGY

## 2022-03-24 PROCEDURE — 77412 RADIATION TX DELIVERY LVL 3: CPT | Performed by: RADIOLOGY

## 2022-03-25 ENCOUNTER — APPOINTMENT (OUTPATIENT)
Dept: RADIATION ONCOLOGY | Facility: HOSPITAL | Age: 62
End: 2022-03-25

## 2022-03-25 ENCOUNTER — LAB (OUTPATIENT)
Dept: LAB | Facility: HOSPITAL | Age: 62
End: 2022-03-25

## 2022-03-25 ENCOUNTER — OFFICE VISIT (OUTPATIENT)
Dept: ONCOLOGY | Facility: CLINIC | Age: 62
End: 2022-03-25

## 2022-03-25 VITALS
BODY MASS INDEX: 40.08 KG/M2 | RESPIRATION RATE: 16 BRPM | OXYGEN SATURATION: 99 % | HEIGHT: 66 IN | SYSTOLIC BLOOD PRESSURE: 122 MMHG | WEIGHT: 249.4 LBS | TEMPERATURE: 97.7 F | DIASTOLIC BLOOD PRESSURE: 78 MMHG | HEART RATE: 51 BPM

## 2022-03-25 DIAGNOSIS — Z79.811 USE OF AROMATASE INHIBITORS: ICD-10-CM

## 2022-03-25 DIAGNOSIS — D05.02 LOBULAR CARCINOMA IN SITU (LCIS) OF LEFT BREAST: ICD-10-CM

## 2022-03-25 DIAGNOSIS — Z91.89 INCREASED RISK OF BREAST CANCER: ICD-10-CM

## 2022-03-25 DIAGNOSIS — D05.02 LOBULAR CARCINOMA IN SITU (LCIS) OF LEFT BREAST: Primary | ICD-10-CM

## 2022-03-25 LAB
ALBUMIN SERPL-MCNC: 4 G/DL (ref 3.5–5.2)
ALBUMIN/GLOB SERPL: 1.1 G/DL (ref 1.1–2.4)
ALP SERPL-CCNC: 94 U/L (ref 38–116)
ALT SERPL W P-5'-P-CCNC: 11 U/L (ref 0–33)
ANION GAP SERPL CALCULATED.3IONS-SCNC: 10.2 MMOL/L (ref 5–15)
AST SERPL-CCNC: 15 U/L (ref 0–32)
BASOPHILS # BLD AUTO: 0.04 10*3/MM3 (ref 0–0.2)
BASOPHILS NFR BLD AUTO: 0.6 % (ref 0–1.5)
BILIRUB SERPL-MCNC: 0.4 MG/DL (ref 0.2–1.2)
BUN SERPL-MCNC: 6 MG/DL (ref 6–20)
BUN/CREAT SERPL: 7.1 (ref 7.3–30)
CALCIUM SPEC-SCNC: 9.8 MG/DL (ref 8.5–10.2)
CHLORIDE SERPL-SCNC: 101 MMOL/L (ref 98–107)
CO2 SERPL-SCNC: 28.8 MMOL/L (ref 22–29)
CREAT SERPL-MCNC: 0.85 MG/DL (ref 0.6–1.1)
DEPRECATED RDW RBC AUTO: 40.6 FL (ref 37–54)
EGFRCR SERPLBLD CKD-EPI 2021: 78.1 ML/MIN/1.73
EOSINOPHIL # BLD AUTO: 0.11 10*3/MM3 (ref 0–0.4)
EOSINOPHIL NFR BLD AUTO: 1.6 % (ref 0.3–6.2)
ERYTHROCYTE [DISTWIDTH] IN BLOOD BY AUTOMATED COUNT: 13.2 % (ref 12.3–15.4)
GLOBULIN UR ELPH-MCNC: 3.7 GM/DL (ref 1.8–3.5)
GLUCOSE SERPL-MCNC: 98 MG/DL (ref 74–124)
HCT VFR BLD AUTO: 45.3 % (ref 34–46.6)
HGB BLD-MCNC: 15.2 G/DL (ref 12–15.9)
IMM GRANULOCYTES # BLD AUTO: 0.05 10*3/MM3 (ref 0–0.05)
IMM GRANULOCYTES NFR BLD AUTO: 0.7 % (ref 0–0.5)
LYMPHOCYTES # BLD AUTO: 1.46 10*3/MM3 (ref 0.7–3.1)
LYMPHOCYTES NFR BLD AUTO: 21.2 % (ref 19.6–45.3)
MCH RBC QN AUTO: 28.4 PG (ref 26.6–33)
MCHC RBC AUTO-ENTMCNC: 33.6 G/DL (ref 31.5–35.7)
MCV RBC AUTO: 84.7 FL (ref 79–97)
MONOCYTES # BLD AUTO: 0.68 10*3/MM3 (ref 0.1–0.9)
MONOCYTES NFR BLD AUTO: 9.9 % (ref 5–12)
NEUTROPHILS NFR BLD AUTO: 4.55 10*3/MM3 (ref 1.7–7)
NEUTROPHILS NFR BLD AUTO: 66 % (ref 42.7–76)
NRBC BLD AUTO-RTO: 0 /100 WBC (ref 0–0.2)
PLATELET # BLD AUTO: 254 10*3/MM3 (ref 140–450)
PMV BLD AUTO: 9 FL (ref 6–12)
POTASSIUM SERPL-SCNC: 3.7 MMOL/L (ref 3.5–4.7)
PROT SERPL-MCNC: 7.7 G/DL (ref 6.3–8)
RBC # BLD AUTO: 5.35 10*6/MM3 (ref 3.77–5.28)
SODIUM SERPL-SCNC: 140 MMOL/L (ref 134–145)
WBC NRBC COR # BLD: 6.89 10*3/MM3 (ref 3.4–10.8)

## 2022-03-25 PROCEDURE — 85025 COMPLETE CBC W/AUTO DIFF WBC: CPT

## 2022-03-25 PROCEDURE — 99215 OFFICE O/P EST HI 40 MIN: CPT | Performed by: INTERNAL MEDICINE

## 2022-03-25 PROCEDURE — 36415 COLL VENOUS BLD VENIPUNCTURE: CPT

## 2022-03-25 PROCEDURE — 77412 RADIATION TX DELIVERY LVL 3: CPT | Performed by: RADIOLOGY

## 2022-03-25 PROCEDURE — 77387 GUIDANCE FOR RADJ TX DLVR: CPT | Performed by: RADIOLOGY

## 2022-03-25 PROCEDURE — 80053 COMPREHEN METABOLIC PANEL: CPT

## 2022-03-25 NOTE — PROGRESS NOTES
Subjective   Kirsten Lima is a 61 y.o. female. Referred by  for LCIS     History of Present Illness   Ms. Lima is a 61-year-old -American lady who presents with a screen detected abnormality of the left breast.    6/2/2020-screening mammogram  Scattered areas of fibroglandular density.  There are small asymmetry seen in both breasts.  No significant change from prior exams.  Stable punctate and spherical lucent centered calcifications with diffuse distribution in both breasts.  There is a new focal asymmetry measuring 12 mm in the central region of the left breast.    6/24/2020-left diagnostic mammogram  Focal asymmetry in the left breast does not persist.  Ultrasound-no sonographic correlate.  3 oval intraductal masses with partially defined margins measuring 5 mm to 9 mm in the anterior one third subareolar region of the left breast.  Patient reported history of intermittent clear nipple discharge for about 10 years.  These were considered suspicious and ultrasound-guided biopsy was recommended.    6/29/2020-left breast ultrasound-guided biopsy x2  1.left subareolar mass measuring 5 x 5 x 6 mm-benign sclerosing intraductal papilloma with calcifications.  2.left subareolar mass-benign sclerosing intraductal papilloma with usual ductal hyperplasia and microcalcifications.    She was evaluated by Dr. Zambrano on 8/7/2020 and recommended to undergo excisional biopsy of both intraductal papillomas.    2/22/2021-left breast needle localized excisional biopsy-pathology was consistent with lobular carcinoma in situ.  Multiple sclerosed intraductal papillomas with focal calcifications which were completely excised.  Background breast parenchyma showed usual ductal hyperplasia and calcifications with sclerosing adenosis.    She has been referred to medical oncology to discuss risk reduction given findings of lobular carcinoma in situ.    Patient reports chronic nipple discharge from the left.  She also  had significant issues with the breast skin due to hidradenitis and recurrent skin abscesses.  She had a past history of benign breast biopsy , unable to recall the date.  Denies any family history of breast or ovarian cancer.    6/11/2021-bilateral screening mammogram-benign.    12/10/2021-bilateral breast MRI  Right breast-no suspicious findings.    Left breast-  Postsurgical changes in the anterior left breast.  At 5:00, 3.4 cm posterior to the nipple there is a 1.8 cm linear branching non-mass enhancement which is suspicious.  Abnormal enhancement in the left nipple with approximately 1.2 x 1.7 x 1 cm mass involving the nipple itself and extending slightly deeper into the subareolar breast.  This is suspicious.    No extramammary findings    Recommendations  1.suspicious 1.7 cm enhancing left nipple mass, surgical excision recommended as this is not amenable to imaging guided biopsy.  2.suspicious 1.8 cm linear branching non-mass enhancement at 5:00 in the left breast, MR guided biopsy recommended.    12/22/2021-left breast MRI guided biopsy of the 5:00.  A.extensive lobular carcinoma in situ  B.no invasive carcinoma identified by routine or immunostaining  C.associated intraductal papilloma with microcalcifications, ductal cyst wall and fibrocystic change.    Patient was prescribed tamoxifen for risk reduction which she initially did not take and subsequently this was changed to anastrozole at her visit in September which she only started taking in December 2021.    1/12/2022-she was evaluated by Dr. Zambrano and scheduled for left breast needle localized excisional biopsy and left nipple mass excision.    2/1/2022-left breast needle localized excisional biopsy left nipple mass excision  1.left breast lumpectomy-invasive lobular carcinoma, lobular carcinoma in situ and deep typical lobular hyperplasia.  A.invasive lobular carcinoma  Measures 4 mm  Grade 1  The focus is located amongst the area of lobular  carcinoma in situ  Extensive lobular carcinoma in situ and atypical lobular hyperplasia  Fibroadenoma with calcification, sclerosing adenosis, apocrine metaplasia, clustered cysts in area consistent with radial scar and fat necrosis    2.left nipple excision  Simple cyst  Apocrine metaplasia  Intraductal papilloma  Atypical lobular hyperplasia    The lobular carcinoma is ER +80% strong, OH 3% moderate, HER-2 negative, Ki-67 2%    Axillary lymph nodes not evaluated    Case was discussed in multidisciplinary conference and decided to omit axillary staging and proceed with radiation and continue Arimidex.    Interval history  Ms. Lima presents to the clinic today for follow-up.  She continues on Arimidex and seems to be tolerating that fairly well without any significant issues except for mild hot flashes.  She is recovering well from surgery.  She has been seen by radiation oncology and has started adjuvant radiation to the left breast and has about a week of radiation left.  No new complaints at this time.    The following portions of the patient's history were reviewed and updated as appropriate: allergies, current medications, past family history, past medical history, past social history, past surgical history and problem list.    Past Medical History:   Diagnosis Date   • Acute cystitis    • Allergic rhinitis    • Breast cancer (HCC)    • Breast cyst    • Cyst of left nipple    • Depression    • Dry skin    • Dysuria    • Hemorrhoid    • Hidradenitis    • History of bronchitis    • Hyperlipidemia    • Hypertension    • Incontinence in female     wears pads   • Insomnia    • Nonspecific abnormal electrocardiogram (ECG) (EKG)    • Obesity    • Overactive bladder    • Pregnancy     G-2, P-0   • Sleep apnea    • UTI (urinary tract infection) 02/2021   • Vitamin B12 deficiency    • Vitamin D insufficiency    • Wound, breast     LEFT BREAST; PT INSTRUCTED TO NOTIFY DR GARCIA PRIOR TO SURGERY        Past Surgical  History:   Procedure Laterality Date   • AXILLARY HIDRADENITIS EXCISION Bilateral    • BREAST BIOPSY Left 2021    Procedure: Left breast needle-localized excisional biopsy (tophat clip) and left breast ultrasound-guided excisional biopsy (hydromark clip);  Surgeon: Debora Zambrano MD;  Location: [facility];  Service: General;  Laterality: Left;   • BREAST BIOPSY Left 2022    Procedure: Left breast needle-localized excisional biopsy, Left nipple mass excision;  Surgeon: Debora Zambrano MD;  Location: [facility];  Service: General;  Laterality: Left;   • CARDIAC CATHETERIZATION     • COLONOSCOPY     • HYSTERECTOMY      [facility]        Family History   Problem Relation Age of Onset   • Hypertension Mother    • COPD Father    • Heart failure Father    • Hypertension Father    • Pancreatic cancer Brother         1 brother  from pancreatic cancer   • Other Sister         1 sister  renal failure   • Alcohol abuse Sister    • Liver disease Sister    • Malig Hyperthermia Neg Hx         Social History     Socioeconomic History   • Marital status: Single   • Number of children: 0   Tobacco Use   • Smoking status: Current Every Day Smoker     Packs/day: 0.50     Years: 42.00     Pack years: 21.00     Types: Cigarettes     Start date:    • Smokeless tobacco: Never Used   Vaping Use   • Vaping Use: Never used   Substance and Sexual Activity   • Alcohol use: Yes     Comment: 3 drinks a month   • Drug use: Not Currently     Comment: marijuana in her twenties        OB History    No obstetric history on file.      Age at menarche-13  Age at menopause- she is status post hysterectomy.  Still has both ovaries  She does not have any children  Total period of oral contraceptive pill use 8 years      No Known Allergies         Review of Systems   Constitutional: Negative.    HENT: Negative.    Eyes: Negative.    Respiratory: Negative.    Cardiovascular: Negative.    Gastrointestinal:  "Negative.    Genitourinary: Positive for amenorrhea.   Allergic/Immunologic: Negative.    Neurological: Negative.    Hematological: Negative.    Psychiatric/Behavioral: Positive for sleep disturbance.     Review of systems as mentioned in the HPI  Objective   Resp. rate 16, height 167.6 cm (65.98\"), weight 113 kg (249 lb 6.4 oz), not currently breastfeeding.   Physical Exam  Vitals reviewed.   Constitutional:       Appearance: Normal appearance. She is obese.   HENT:      Head: Normocephalic and atraumatic.      Right Ear: External ear normal.      Left Ear: External ear normal.      Nose: Nose normal. No congestion or rhinorrhea.      Mouth/Throat:      Mouth: Mucous membranes are moist.      Pharynx: Oropharynx is clear. No oropharyngeal exudate or posterior oropharyngeal erythema.   Eyes:      General:         Right eye: No discharge.         Left eye: No discharge.      Conjunctiva/sclera: Conjunctivae normal.      Pupils: Pupils are equal, round, and reactive to light.   Cardiovascular:      Rate and Rhythm: Normal rate.   Pulmonary:      Effort: Pulmonary effort is normal.      Breath sounds: Normal breath sounds.   Abdominal:      General: Abdomen is flat. Bowel sounds are normal.      Palpations: Abdomen is soft.   Musculoskeletal:         General: No swelling, tenderness or deformity. Normal range of motion.      Cervical back: Normal range of motion.   Skin:     General: Skin is warm.      Coloration: Skin is not jaundiced or pale.   Neurological:      General: No focal deficit present.      Mental Status: She is alert and oriented to person, place, and time.      Cranial Nerves: No cranial nerve deficit.      Sensory: No sensory deficit.   Psychiatric:         Mood and Affect: Mood normal.         Behavior: Behavior normal.         Thought Content: Thought content normal.         Judgment: Judgment normal.       Breast Exam: Right breast appears normal inspection no palpable abnormalities of the right " breast.  Left breast on inspection there is a periareolar incision which is well-healed and also another periareolar incision which is well-healed.      Lab on 03/25/2022   Component Date Value Ref Range Status   • WBC 03/25/2022 6.89  3.40 - 10.80 10*3/mm3 Final   • RBC 03/25/2022 5.35 (A) 3.77 - 5.28 10*6/mm3 Final   • Hemoglobin 03/25/2022 15.2  12.0 - 15.9 g/dL Final   • Hematocrit 03/25/2022 45.3  34.0 - 46.6 % Final   • MCV 03/25/2022 84.7  79.0 - 97.0 fL Final   • MCH 03/25/2022 28.4  26.6 - 33.0 pg Final   • MCHC 03/25/2022 33.6  31.5 - 35.7 g/dL Final   • RDW 03/25/2022 13.2  12.3 - 15.4 % Final   • RDW-SD 03/25/2022 40.6  37.0 - 54.0 fl Final   • MPV 03/25/2022 9.0  6.0 - 12.0 fL Final   • Platelets 03/25/2022 254  140 - 450 10*3/mm3 Final   • Neutrophil % 03/25/2022 66.0  42.7 - 76.0 % Final   • Lymphocyte % 03/25/2022 21.2  19.6 - 45.3 % Final   • Monocyte % 03/25/2022 9.9  5.0 - 12.0 % Final   • Eosinophil % 03/25/2022 1.6  0.3 - 6.2 % Final   • Basophil % 03/25/2022 0.6  0.0 - 1.5 % Final   • Immature Grans % 03/25/2022 0.7 (A) 0.0 - 0.5 % Final   • Neutrophils, Absolute 03/25/2022 4.55  1.70 - 7.00 10*3/mm3 Final   • Lymphocytes, Absolute 03/25/2022 1.46  0.70 - 3.10 10*3/mm3 Final   • Monocytes, Absolute 03/25/2022 0.68  0.10 - 0.90 10*3/mm3 Final   • Eosinophils, Absolute 03/25/2022 0.11  0.00 - 0.40 10*3/mm3 Final   • Basophils, Absolute 03/25/2022 0.04  0.00 - 0.20 10*3/mm3 Final   • Immature Grans, Absolute 03/25/2022 0.05  0.00 - 0.05 10*3/mm3 Final   • nRBC 03/25/2022 0.0  0.0 - 0.2 /100 WBC Final        No radiology results for the last 30 days.     3/25/2022  CBC-WBC 6.89, hemoglobin 15.2 and platelets 254,000  CMP BUN 6, creatinine 0.85, LFTs normal    Assessment/Plan       61-year-old lady with a previous hysterectomy with uncertain menopausal status presents for management of lobular carcinoma in situ.     *Lobular carcinoma in situ  · 2/22/2021-left breast needle localized excisional  biopsy shows lobular carcinoma in situ with multiple intraductal papillomas.  · She was offered tamoxifen 5 mg daily for risk reduction however did not start taking that.  · She was seen in September 2021 and treatment was changed to anastrozole as she was concerned about the risk of blood clots with tamoxifen.  · She finally started taking anastrozole in December 2021.  · Had an abnormal MRI in December 2021 requiring a left breast biopsy and left breast excision.  · Diagnosed with invasive lobular carcinoma  · Continues on anastrozole    *Invasive lobular carcinoma of the left breast  · Invasive lobular carcinoma small focus diagnosed with an extensive areas of lobular carcinoma in situ  · pT1 a Nx M0  · Status post excision of the left breast mass  · Currently receiving adjuvant radiation to the left breast  · Continue anastrozole  · We discussed about the new diagnosis,      *Lifestyle changes  · She has not been exercising or modified her diet  · We had a lengthy discussion again today about the importance of the same    *Obesity  · BMI 40.3  · She has met with a dietitian before  · She knows what to do however she has not been able to implement dietary changes successfully.  · We discussed regarding daily exercise.  · She plans on trying to exercise more.     *Tobacco cessation  · Referral made to [facility] to help with smoking cessation  · Insurance does not cover the programs  · Prescribed nicotine patches today  · She needs an annual lung CT  · I will discuss this at her follow-up.     *Surveillance  · Continue annual mammograms due in June 2022  · Annual MRI due in December 2022        *Hypertension  · Blood pressure 122/78  · Continue current medication       *Hyperlipidemia  · Continue Crestor  · Unchanged     *Bladder incontinence  · Continue oxybutynin  · Continue follow-up with Dr. Cantu her gynecologist     *Follow-up-4 months    42 minutes have been spent on the encounter excluding  documentation.  Greater than 50% of the time spent in counseling.

## 2022-03-28 ENCOUNTER — APPOINTMENT (OUTPATIENT)
Dept: RADIATION ONCOLOGY | Facility: HOSPITAL | Age: 62
End: 2022-03-28

## 2022-03-28 PROCEDURE — 77387 GUIDANCE FOR RADJ TX DLVR: CPT | Performed by: RADIOLOGY

## 2022-03-28 PROCEDURE — 77412 RADIATION TX DELIVERY LVL 3: CPT | Performed by: RADIOLOGY

## 2022-03-29 ENCOUNTER — APPOINTMENT (OUTPATIENT)
Dept: RADIATION ONCOLOGY | Facility: HOSPITAL | Age: 62
End: 2022-03-29

## 2022-03-29 ENCOUNTER — RADIATION ONCOLOGY WEEKLY ASSESSMENT (OUTPATIENT)
Dept: RADIATION ONCOLOGY | Facility: HOSPITAL | Age: 62
End: 2022-03-29

## 2022-03-29 VITALS — DIASTOLIC BLOOD PRESSURE: 74 MMHG | HEART RATE: 57 BPM | OXYGEN SATURATION: 99 % | SYSTOLIC BLOOD PRESSURE: 127 MMHG

## 2022-03-29 DIAGNOSIS — Z17.0 MALIGNANT NEOPLASM OF OVERLAPPING SITES OF LEFT BREAST IN FEMALE, ESTROGEN RECEPTOR POSITIVE: Primary | ICD-10-CM

## 2022-03-29 DIAGNOSIS — C50.812 MALIGNANT NEOPLASM OF OVERLAPPING SITES OF LEFT BREAST IN FEMALE, ESTROGEN RECEPTOR POSITIVE: Primary | ICD-10-CM

## 2022-03-29 PROCEDURE — 77387 GUIDANCE FOR RADJ TX DLVR: CPT | Performed by: RADIOLOGY

## 2022-03-29 PROCEDURE — 77412 RADIATION TX DELIVERY LVL 3: CPT | Performed by: RADIOLOGY

## 2022-03-29 PROCEDURE — 77336 RADIATION PHYSICS CONSULT: CPT | Performed by: RADIOLOGY

## 2022-03-29 NOTE — PROGRESS NOTES
Physician Weekly Management Note    Diagnosis:     Diagnosis Plan   1. Malignant neoplasm of overlapping sites of left breast in female, estrogen receptor positive (HCC)         RT Details:  fx 15/20 left breast 3990cGy plus boost 1000cGy start tomroow    Notes on Treatment course, Films, Medical progress:  Doing well, mild hyperpigmentation, cont on     Weekly Management:  Medication reviewed?   Yes  New medications given?   No  Problemlist reviewed?   Yes  Problem added?   No  Issues raised requiring referral to support services - task assigned to:  kelly    Technical aspects reviewed:  Weekly OBI approved?   Yes  Weekly port films approved?   Yes  Change requests noted on port film?   No  Patient setup and plan reviewed?   Yes    Chart Reviewed:  Continue current treatment plan?   Yes  Treatment plan change requested?   No  CBC reviewed?   Yes  Concurrent Chemo?   No    Objective     Toxicities:   none     Review of Systems   Constitutional: Negative.    Skin: Positive for color change.          There were no vitals filed for this visit.    Current Status 3/18/2021   ECOG score 0       Physical Exam  Constitutional:       Appearance: She is obese.   Chest:          Comments: Mild hyperpigmentation  Neurological:      Mental Status: She is alert.             Problem Summary List    Diagnosis:     Diagnosis Plan   1. Malignant neoplasm of overlapping sites of left breast in female, estrogen receptor positive (HCC)       Pathology:   breast    Past Medical History:   Diagnosis Date   • Acute cystitis    • Allergic rhinitis    • Breast cancer (HCC)    • Breast cyst    • Cyst of left nipple    • Depression    • Dry skin    • Dysuria    • Hemorrhoid    • Hidradenitis    • History of bronchitis    • Hyperlipidemia    • Hypertension    • Incontinence in female     wears pads   • Insomnia    • Nonspecific abnormal electrocardiogram (ECG) (EKG)    • Obesity    • Overactive bladder    • Pregnancy     G-2, P-0   • Sleep apnea     • UTI (urinary tract infection) 02/2021   • Vitamin B12 deficiency    • Vitamin D insufficiency    • Wound, breast     LEFT BREAST; PT INSTRUCTED TO NOTIFY DR ZAMBRANO PRIOR TO SURGERY         Past Surgical History:   Procedure Laterality Date   • AXILLARY HIDRADENITIS EXCISION Bilateral    • BREAST BIOPSY Left 2/22/2021    Procedure: Left breast needle-localized excisional biopsy (tophat clip) and left breast ultrasound-guided excisional biopsy (hydromark clip);  Surgeon: Debora Zambrano MD;  Location: [facility];  Service: General;  Laterality: Left;   • BREAST BIOPSY Left 2/1/2022    Procedure: Left breast needle-localized excisional biopsy, Left nipple mass excision;  Surgeon: Debora Zambrano MD;  Location: [facility];  Service: General;  Laterality: Left;   • CARDIAC CATHETERIZATION     • COLONOSCOPY     • HYSTERECTOMY  2006    [facility]         Current Outpatient Medications on File Prior to Visit   Medication Sig Dispense Refill   • amLODIPine (NORVASC) 10 MG tablet TAKE 1 TABLET DAILY (Patient taking differently: Take 10 mg by mouth Daily.) 90 tablet 1   • anastrozole (ARIMIDEX) 1 MG tablet TAKE 1 TABLET BY MOUTH EVERY DAY (Patient taking differently: Take 1 mg by mouth Daily.) 90 tablet 1   • cetirizine (ZyrTEC) 10 MG tablet Take 1 tablet by mouth daily.     • Chlorhexidine Gluconate (HIBICLENS EX) Apply 1 each topically Take As Directed. AS DIRECTED PREOP     • Cholecalciferol (VITAMIN D) 2000 units capsule Take 2,000 Units by mouth Daily.     • citalopram (CeleXA) 10 MG tablet TAKE 1 TABLET BY MOUTH EVERY DAY (Patient taking differently: Take 10 mg by mouth Daily.) 90 tablet 1   • clotrimazole-betamethasone (LOTRISONE) 1-0.05 % lotion Apply  topically to the appropriate area as directed 2 (Two) Times a Day. (Patient taking differently: Apply 1 application topically to the appropriate area as directed 2 (Two) Times a Day As Needed.) 30 mL 1   • Efinaconazole 10 % solution Apply 1 drop  topically Daily. (Patient taking differently: Apply 1 drop topically Daily As Needed.) 8 mL 3   • lisinopril-hydrochlorothiazide (PRINZIDE,ZESTORETIC) 20-12.5 MG per tablet Take 2 tablets by mouth Daily. 180 tablet 1   • metoprolol succinate XL (TOPROL-XL) 200 MG 24 hr tablet TAKE 1 TABLET DAILY (Patient taking differently: Take 200 mg by mouth Daily.) 90 tablet 1   • oxybutynin XL (DITROPAN-XL) 10 MG 24 hr tablet TAKE 1 TABLET BY MOUTH EVERY DAY (Patient taking differently: Take 10 mg by mouth Every Evening.) 90 tablet 1   • rosuvastatin (CRESTOR) 5 MG tablet TAKE 1 TABLET DAILY (Patient taking differently: Take 5 mg by mouth Every Night.) 90 tablet 3   • traZODone (DESYREL) 50 MG tablet TAKE 1 TABLET EVERY NIGHT (Patient taking differently: Take 50 mg by mouth Every Night.) 90 tablet 1     No current facility-administered medications on file prior to visit.       No Known Allergies      Referring Provider:    No referring provider defined for this encounter.    Oncologist:  No primary care provider on file.      Seen and approved by:  Prerna Tucker MD  03/29/2022

## 2022-03-30 ENCOUNTER — APPOINTMENT (OUTPATIENT)
Dept: RADIATION ONCOLOGY | Facility: HOSPITAL | Age: 62
End: 2022-03-30

## 2022-03-30 PROCEDURE — 77280 THER RAD SIMULAJ FIELD SMPL: CPT | Performed by: RADIOLOGY

## 2022-03-30 PROCEDURE — 77427 RADIATION TX MANAGEMENT X5: CPT | Performed by: RADIOLOGY

## 2022-03-30 PROCEDURE — 77412 RADIATION TX DELIVERY LVL 3: CPT | Performed by: RADIOLOGY

## 2022-03-31 ENCOUNTER — APPOINTMENT (OUTPATIENT)
Dept: RADIATION ONCOLOGY | Facility: HOSPITAL | Age: 62
End: 2022-03-31

## 2022-03-31 PROCEDURE — 77387 GUIDANCE FOR RADJ TX DLVR: CPT | Performed by: RADIOLOGY

## 2022-03-31 PROCEDURE — 77412 RADIATION TX DELIVERY LVL 3: CPT | Performed by: RADIOLOGY

## 2022-04-01 ENCOUNTER — APPOINTMENT (OUTPATIENT)
Dept: RADIATION ONCOLOGY | Facility: HOSPITAL | Age: 62
End: 2022-04-01

## 2022-04-01 PROCEDURE — 77412 RADIATION TX DELIVERY LVL 3: CPT | Performed by: RADIOLOGY

## 2022-04-01 PROCEDURE — 77387 GUIDANCE FOR RADJ TX DLVR: CPT | Performed by: RADIOLOGY

## 2022-04-04 ENCOUNTER — RADIATION ONCOLOGY WEEKLY ASSESSMENT (OUTPATIENT)
Dept: RADIATION ONCOLOGY | Facility: HOSPITAL | Age: 62
End: 2022-04-04

## 2022-04-04 ENCOUNTER — APPOINTMENT (OUTPATIENT)
Dept: RADIATION ONCOLOGY | Facility: HOSPITAL | Age: 62
End: 2022-04-04

## 2022-04-04 DIAGNOSIS — D05.02 LOBULAR CARCINOMA IN SITU (LCIS) OF LEFT BREAST: Primary | ICD-10-CM

## 2022-04-04 PROCEDURE — 77412 RADIATION TX DELIVERY LVL 3: CPT | Performed by: RADIOLOGY

## 2022-04-04 PROCEDURE — 77387 GUIDANCE FOR RADJ TX DLVR: CPT | Performed by: RADIOLOGY

## 2022-04-04 NOTE — PROGRESS NOTES
Physician Weekly Management Note    Diagnosis:     Diagnosis Plan   1. Lobular carcinoma in situ (LCIS) of left breast         RT Details:  Treatment #19/20    Notes on Treatment course, Films, Medical progress:  Demonstrating good tolerance, some patchy skin tanning in the treatment portal no peeling.  No new breast tenderness.  She will see Dr. Tucker routine first follow-up in 5 weeks.      [facility] ECOG Status: (0) Fully active, able to carry on all predisease performance without restriction      Weekly Management:  Medication reviewed?   Yes  New medications given?   No  Problemlist reviewed?   Yes  Problem added?   No      Technical aspects reviewed:  Weekly OBI approved?   Yes  Weekly port films approved?   Yes  Change requests noted on port film?   No  Patient setup and plan reviewed?   Yes    Chart Reviewed:  Continue current treatment plan?   Yes  Treatment plan change requested?   No  CBC reviewed?   No  Concurrent Chemo?   No    Objective     Toxicities:   As above     Review of Systems   As above    There were no vitals filed for this visit.    Current Status 3/18/2021   ECOG score 0       Physical Exam  As above      Problem Summary List    Diagnosis:     Diagnosis Plan   1. Lobular carcinoma in situ (LCIS) of left breast       Pathology:       Past Medical History:   Diagnosis Date   • Acute cystitis    • Allergic rhinitis    • Breast cancer (HCC)    • Breast cyst    • Cyst of left nipple    • Depression    • Dry skin    • Dysuria    • Hemorrhoid    • Hidradenitis    • History of bronchitis    • Hyperlipidemia    • Hypertension    • Incontinence in female     wears pads   • Insomnia    • Nonspecific abnormal electrocardiogram (ECG) (EKG)    • Obesity    • Overactive bladder    • Pregnancy     G-2, P-0   • Sleep apnea    • UTI (urinary tract infection) 02/2021   • Vitamin B12 deficiency    • Vitamin D insufficiency    • Wound, breast     LEFT BREAST; PT INSTRUCTED TO NOTIFY DR GARCIA PRIOR  TO SURGERY         Past Surgical History:   Procedure Laterality Date   • AXILLARY HIDRADENITIS EXCISION Bilateral    • BREAST BIOPSY Left 2/22/2021    Procedure: Left breast needle-localized excisional biopsy (tophat clip) and left breast ultrasound-guided excisional biopsy (hydromark clip);  Surgeon: Debora Zambrano MD;  Location: [facility];  Service: General;  Laterality: Left;   • BREAST BIOPSY Left 2/1/2022    Procedure: Left breast needle-localized excisional biopsy, Left nipple mass excision;  Surgeon: Debora Zambrano MD;  Location: [facility];  Service: General;  Laterality: Left;   • CARDIAC CATHETERIZATION     • COLONOSCOPY     • HYSTERECTOMY  2006    [facility]         Current Outpatient Medications on File Prior to Visit   Medication Sig Dispense Refill   • amLODIPine (NORVASC) 10 MG tablet TAKE 1 TABLET DAILY (Patient taking differently: Take 10 mg by mouth Daily.) 90 tablet 1   • anastrozole (ARIMIDEX) 1 MG tablet TAKE 1 TABLET BY MOUTH EVERY DAY (Patient taking differently: Take 1 mg by mouth Daily.) 90 tablet 1   • cetirizine (ZyrTEC) 10 MG tablet Take 1 tablet by mouth daily.     • Chlorhexidine Gluconate (HIBICLENS EX) Apply 1 each topically Take As Directed. AS DIRECTED PREOP     • Cholecalciferol (VITAMIN D) 2000 units capsule Take 2,000 Units by mouth Daily.     • citalopram (CeleXA) 10 MG tablet TAKE 1 TABLET BY MOUTH EVERY DAY (Patient taking differently: Take 10 mg by mouth Daily.) 90 tablet 1   • clotrimazole-betamethasone (LOTRISONE) 1-0.05 % lotion Apply  topically to the appropriate area as directed 2 (Two) Times a Day. (Patient taking differently: Apply 1 application topically to the appropriate area as directed 2 (Two) Times a Day As Needed.) 30 mL 1   • Efinaconazole 10 % solution Apply 1 drop topically Daily. (Patient taking differently: Apply 1 drop topically Daily As Needed.) 8 mL 3   • lisinopril-hydrochlorothiazide (PRINZIDE,ZESTORETIC) 20-12.5 MG per tablet Take 2  tablets by mouth Daily. 180 tablet 1   • metoprolol succinate XL (TOPROL-XL) 200 MG 24 hr tablet TAKE 1 TABLET DAILY (Patient taking differently: Take 200 mg by mouth Daily.) 90 tablet 1   • oxybutynin XL (DITROPAN-XL) 10 MG 24 hr tablet TAKE 1 TABLET BY MOUTH EVERY DAY (Patient taking differently: Take 10 mg by mouth Every Evening.) 90 tablet 1   • rosuvastatin (CRESTOR) 5 MG tablet TAKE 1 TABLET DAILY (Patient taking differently: Take 5 mg by mouth Every Night.) 90 tablet 3   • sulfamethoxazole-trimethoprim (BACTRIM DS,SEPTRA DS) 800-160 MG per tablet Take 1 tablet by mouth 2 (Two) Times a Day. 14 tablet 0   • traZODone (DESYREL) 50 MG tablet TAKE 1 TABLET EVERY NIGHT (Patient taking differently: Take 50 mg by mouth Every Night.) 90 tablet 1     No current facility-administered medications on file prior to visit.       No Known Allergies     Primary care MD:    Verenice Valdez MD    Oncologist:  Patient Care Team:  Debora Zambrano MD as Referring Physician (Breast Surgery)  Prerna Tucker MD as Consulting Physician (Radiation Oncology)       Seen and approved by:  Brandan Martínez MD  04/04/2022

## 2022-04-05 ENCOUNTER — DOCUMENTATION (OUTPATIENT)
Dept: RADIATION ONCOLOGY | Facility: HOSPITAL | Age: 62
End: 2022-04-05

## 2022-04-05 ENCOUNTER — APPOINTMENT (OUTPATIENT)
Dept: RADIATION ONCOLOGY | Facility: HOSPITAL | Age: 62
End: 2022-04-05

## 2022-04-05 DIAGNOSIS — C50.812 MALIGNANT NEOPLASM OF OVERLAPPING SITES OF LEFT BREAST IN FEMALE, ESTROGEN RECEPTOR POSITIVE: Primary | ICD-10-CM

## 2022-04-05 DIAGNOSIS — Z17.0 MALIGNANT NEOPLASM OF OVERLAPPING SITES OF LEFT BREAST IN FEMALE, ESTROGEN RECEPTOR POSITIVE: Primary | ICD-10-CM

## 2022-04-05 PROCEDURE — 77412 RADIATION TX DELIVERY LVL 3: CPT | Performed by: RADIOLOGY

## 2022-04-05 PROCEDURE — 77336 RADIATION PHYSICS CONSULT: CPT | Performed by: RADIOLOGY

## 2022-04-05 PROCEDURE — 77387 GUIDANCE FOR RADJ TX DLVR: CPT | Performed by: RADIOLOGY

## 2022-04-14 ENCOUNTER — TELEPHONE (OUTPATIENT)
Dept: OTHER | Facility: HOSPITAL | Age: 62
End: 2022-04-14

## 2022-04-14 NOTE — TELEPHONE ENCOUNTER
Oncology Social Work  [facility]    Referral received from Dr. Tucker to see for questions pertaining to her medial bills.  OSW called and spoke to patient via phone.  Explained role of OSW and services we can assist with.   Patient with anticipatory stress and anxiety about final bills from treatment and what will be her responsibility to pay.  Answered many questions for patient.  Explained that [facility] does have a financial assistance program for medical bills.  Asked her to wait and see what her final bill will be and to wait and see what [facility] has paid before we go to the next step and apply through the hospital. Emailed patient my contact information.  She will reach out if needed.    Ree Silverio, DARCYW, CSW

## 2022-04-20 DIAGNOSIS — I10 ESSENTIAL HYPERTENSION: ICD-10-CM

## 2022-04-20 RX ORDER — AMLODIPINE BESYLATE 10 MG/1
TABLET ORAL
Qty: 90 TABLET | Refills: 1 | Status: SHIPPED | OUTPATIENT
Start: 2022-04-20 | End: 2022-10-17

## 2022-04-20 RX ORDER — METOPROLOL SUCCINATE 200 MG/1
TABLET, EXTENDED RELEASE ORAL
Qty: 90 TABLET | Refills: 1 | Status: SHIPPED | OUTPATIENT
Start: 2022-04-20 | End: 2022-10-17

## 2022-05-03 RX ORDER — LORATADINE 10 MG/1
10 TABLET ORAL DAILY
Status: ON HOLD | COMMUNITY
End: 2022-05-06

## 2022-05-03 NOTE — SIGNIFICANT NOTE
Education provided the Patient on the following:    - Nothing to Eat or Drink after MN the night before the procedure    - Avoid red/purple fluids while completing their bowel prep as ordered by physician  -Contact Gastrointerologist office for any questions about specific details regarding colon prep    -You will need to have someone drive you home after your colonoscopy and remain with you for 24 hours after the procedure  - The date of your Surgery, you may have one visitor at bedside or within 10-15 minutes of [facility]  -Please wear warm socks when you arrive for your colonoscopy  -Remove all jewelry and leave any valuables before arriving the day of your procedure (all will have to be removed before leaving preop)  -You will need to arrive at 0730 on 5/6/22 for your colonoscopy    -Feel free to contact us at: [phone] with any additional questions/concerns

## 2022-05-06 ENCOUNTER — HOSPITAL ENCOUNTER (OUTPATIENT)
Facility: SURGERY CENTER | Age: 62
Setting detail: HOSPITAL OUTPATIENT SURGERY
Discharge: HOME | End: 2022-05-06
Attending: SURGERY | Admitting: SURGERY

## 2022-05-06 ENCOUNTER — ANESTHESIA EVENT (OUTPATIENT)
Dept: SURGERY | Facility: SURGERY CENTER | Age: 62
End: 2022-05-06

## 2022-05-06 ENCOUNTER — ANESTHESIA (OUTPATIENT)
Dept: SURGERY | Facility: SURGERY CENTER | Age: 62
End: 2022-05-06

## 2022-05-06 VITALS
RESPIRATION RATE: 14 BRPM | HEART RATE: 61 BPM | WEIGHT: 249 LBS | DIASTOLIC BLOOD PRESSURE: 128 MMHG | TEMPERATURE: 98 F | OXYGEN SATURATION: 94 % | HEIGHT: 66 IN | SYSTOLIC BLOOD PRESSURE: 161 MMHG | BODY MASS INDEX: 40.02 KG/M2

## 2022-05-06 DIAGNOSIS — Z12.11 COLON CANCER SCREENING: ICD-10-CM

## 2022-05-06 PROCEDURE — 25010000002 PROPOFOL 10 MG/ML EMULSION: Performed by: ANESTHESIOLOGY

## 2022-05-06 PROCEDURE — 45385 COLONOSCOPY W/LESION REMOVAL: CPT | Performed by: SURGERY

## 2022-05-06 PROCEDURE — S0260 H&P FOR SURGERY: HCPCS | Performed by: SURGERY

## 2022-05-06 PROCEDURE — 88305 TISSUE EXAM BY PATHOLOGIST: CPT | Performed by: SURGERY

## 2022-05-06 RX ORDER — LIDOCAINE HYDROCHLORIDE 20 MG/ML
INJECTION, SOLUTION INFILTRATION; PERINEURAL AS NEEDED
Status: DISCONTINUED | OUTPATIENT
Start: 2022-05-06 | End: 2022-05-06 | Stop reason: SURG

## 2022-05-06 RX ORDER — PROPOFOL 10 MG/ML
VIAL (ML) INTRAVENOUS CONTINUOUS PRN
Status: DISCONTINUED | OUTPATIENT
Start: 2022-05-06 | End: 2022-05-06 | Stop reason: SURG

## 2022-05-06 RX ORDER — MAGNESIUM HYDROXIDE 1200 MG/15ML
LIQUID ORAL AS NEEDED
Status: DISCONTINUED | OUTPATIENT
Start: 2022-05-06 | End: 2022-05-06 | Stop reason: HOSPADM

## 2022-05-06 RX ORDER — SODIUM CHLORIDE 0.9 % (FLUSH) 0.9 %
10 SYRINGE (ML) INJECTION AS NEEDED
Status: DISCONTINUED | OUTPATIENT
Start: 2022-05-06 | End: 2022-05-06 | Stop reason: HOSPADM

## 2022-05-06 RX ORDER — SODIUM CHLORIDE, SODIUM LACTATE, POTASSIUM CHLORIDE, CALCIUM CHLORIDE 600; 310; 30; 20 MG/100ML; MG/100ML; MG/100ML; MG/100ML
1000 INJECTION, SOLUTION INTRAVENOUS CONTINUOUS
Status: DISCONTINUED | OUTPATIENT
Start: 2022-05-06 | End: 2022-05-06 | Stop reason: HOSPADM

## 2022-05-06 RX ORDER — LIDOCAINE HYDROCHLORIDE 10 MG/ML
0.5 INJECTION, SOLUTION INFILTRATION; PERINEURAL ONCE AS NEEDED
Status: DISCONTINUED | OUTPATIENT
Start: 2022-05-06 | End: 2022-05-06 | Stop reason: HOSPADM

## 2022-05-06 RX ORDER — EPHEDRINE SULFATE 50 MG/ML
INJECTION INTRAVENOUS AS NEEDED
Status: DISCONTINUED | OUTPATIENT
Start: 2022-05-06 | End: 2022-05-06 | Stop reason: SURG

## 2022-05-06 RX ADMIN — EPHEDRINE SULFATE 5 MG: 50 INJECTION, SOLUTION INTRAVENOUS at 08:26

## 2022-05-06 RX ADMIN — PROPOFOL 100 MG: 10 INJECTION, EMULSION INTRAVENOUS at 07:57

## 2022-05-06 RX ADMIN — EPHEDRINE SULFATE 5 MG: 50 INJECTION, SOLUTION INTRAVENOUS at 08:15

## 2022-05-06 RX ADMIN — SODIUM CHLORIDE, POTASSIUM CHLORIDE, SODIUM LACTATE AND CALCIUM CHLORIDE 1000 ML: 600; 310; 30; 20 INJECTION, SOLUTION INTRAVENOUS at 07:08

## 2022-05-06 RX ADMIN — LIDOCAINE HYDROCHLORIDE 60 MG: 20 INJECTION, SOLUTION INFILTRATION; PERINEURAL at 07:54

## 2022-05-06 RX ADMIN — EPHEDRINE SULFATE 10 MG: 50 INJECTION, SOLUTION INTRAVENOUS at 08:34

## 2022-05-06 RX ADMIN — PROPOFOL INJECTABLE EMULSION 140 MCG/KG/MIN: 10 INJECTION, EMULSION INTRAVENOUS at 07:57

## 2022-05-06 NOTE — OP NOTE
Operative Note:    Pre-op dx: Screening Colonoscopy    Post-op dx:   1.  Diverticulosis  2. Sigmoid polyp  3.  Descending colon polyp  4. Splenic Flexure polyp  5. Ascending Colon polyps x 3  6. Cecal polyp     Procedure: Colonoscopy x 7 hot snare polypectomy     Surgeon: Mirtha Davis MD    Anesthesia: MAC    EBL: minium    Specimen:    Order Name Source Comment Collection Info Order Time   TISSUE PATHOLOGY EXAM Large Intestine, Sigmoid Colon  Collected By: Mirtha Davis MD 5/6/2022  8:08 AM     Release to patient   Immediate            Complications: none    Procedure:    Colonoscopy:  A digital rectal examination was performed which revealed no rectal masses.  Scope was introduced into the rectum and advanced into the sigmoid, descending colon, splenic flexure, transverse colon, hepatic flexure, ascending colon and into the cecum.  Cecum was identified by the ileocecal valve and appendiceal orifice.  Patient had a large 12 mm polyp in the cecum.  The polyp was removed in its entirety using a hot snare and submitted to pathology.  Patient also had 3 ascending colon polyps that were on stocks all of them were greater than 1 cm and they were all removed with a hot snare polypectomy and submitted to pathology.  Patient had a splenic flexure polyp that measured 14 mm.  It was removed with a hot snare polypectomy and submitted to pathology.  Patient had a large polyp in the descending colon that was 12 mm polyp.  It was removed with a hot snare polypectomy in its entirety and submitted to pathology.  Patient had diverticulosis throughout the colon.  There was no evidence of any masses, AV malformation or inflammation.  The bowel preparation was excellent. The scope was slowly withdrawn and a second look was performed.  Upon the second look, there were no new findings.  The colon was desufflated and the procedure was terminated.   Patient was transferred to recovery room in stable condition.       Assessment/Plan:  Repeat colonoscopy in 1 years.    1.  Diverticulosis  2. Sigmoid polyp  3.  Descending colon polyp  4. Splenic Flexure polyp  5. Ascending Colon polyps x 3  6. Cecal polyp     Mirtha CIERRA Davis MD  General, Minimally Invasive and Endoscopic Surgery  [facility]    [address]    [address]  [address] [address]    P: [phone]  F: [phone]    Cc:  Verenice Valdez MD

## 2022-05-06 NOTE — ANESTHESIA PREPROCEDURE EVALUATION
" Anesthesia Evaluation     Patient summary reviewed and Nursing notes reviewed   no history of anesthetic complications:  NPO Solid Status: > 8 hours  NPO Liquid Status: > 2 hours           Airway   Mallampati: II  TM distance: >3 FB  Neck ROM: full  No difficulty expected and Large neck circumference  Dental    (+) implants    Pulmonary - normal exam   (+) a smoker Current, sleep apnea,   Cardiovascular - normal exam    (+) hypertension, hyperlipidemia,       Neuro/Psych  (+) psychiatric history Depression,    GI/Hepatic/Renal/Endo    (+) obesity, morbid obesity,      Musculoskeletal     Abdominal   (+) obese,    Substance History      OB/GYN          Other      history of cancer (Left breast)                    Anesthesia Plan    ASA 3     MAC   (I have reviewed the patient's history and chart with the patient, including all pertinent laboratory results and imaging. I have explained the risks of anesthesia including but not limited to dental damage, corneal abrasion, nerve injury, MI, stroke, aspiration, and death. Patient has agreed to proceed.     /83 (BP Location: Right arm, Patient Position: Lying)   Pulse 58   Temp 36.4 °C (97.5 °F) (Temporal)   Resp 16   Ht 167.6 cm (66\")   Wt 113 kg (249 lb)   SpO2 98%   BMI 40.19 kg/m²   )  intravenous induction     Anesthetic plan, all risks, benefits, and alternatives have been provided, discussed and informed consent has been obtained with: patient.        CODE STATUS:       "

## 2022-05-06 NOTE — DISCHARGE INSTRUCTIONS
Colonoscopy, Adult, Care After      Repeat colonoscopy in one year.  Please give patient information on diverticulosis.  Have patient call my office on Wednesday for pathology report.    This sheet gives you information about how to care for yourself after your procedure. Your doctor may also give you more specific instructions. If you have problems or questions, call your doctor.  What can I expect after the procedure?  After the procedure, it is common to have:  A small amount of blood in your poop for 24 hours.  Some gas.  Mild cramping or bloating in your belly.  Follow these instructions at home:  General instructions    ***************DO NOT DRIVE TODAY*******************    For the first 24 hours after the procedure:  Do not sign important documents.  Do not drink alcohol.  Do your daily activities more slowly than normal.  Eat foods that are soft and easy to digest.  Take over-the-counter or prescription medicines only as told by your doctor.  To help cramping and bloating:       Try walking around.  Put heat on your belly (abdomen) as told by your doctor. Use a heat source that your doctor recommends, such as a moist heat pack or a heating pad.  Put a towel between your skin and the heat source.  Leave the heat on for 20-30 minutes.  Remove the heat if your skin turns bright red. This is especially important if you cannot feel pain, heat, or cold. You can get burned.  Eating and drinking

## 2022-05-06 NOTE — H&P
Date: 2022     Patient: Kirsten Lima    : 1960   [id number]     CC:  Screening Colonoscopy    History:   The patient is a 61 y.o. female of Verenice Valdez MD  who presents to discuss screening colonoscopy. The patient currently has no complaints.  Patient denies any history of nausea, abdominal pain, weight loss, change in bowel habits or rectal bleeding.  Patient denies melena, hematochezia or BRBPR.  No family history of ulcerative colitis, Crohn's disease, familial polyposis or colon cancer.    The following portions of the patient's history were reviewed and updated as appropriate: allergies, current medications, past family history, past medical history, past social history, past surgical history and problem list.    Past Medical History:   Diagnosis Date   • Acute cystitis    • Allergic rhinitis    • Breast cancer (HCC)    • Breast cyst    • Cyst of left nipple    • Depression    • Dry skin    • Dysuria    • Hemorrhoid    • Hidradenitis    • History of bronchitis    • Hyperlipidemia    • Hypertension    • Incontinence in female     wears pads   • Insomnia    • Nonspecific abnormal electrocardiogram (ECG) (EKG)    • Obesity    • Overactive bladder    • Pregnancy     G-2, P-0   • Sleep apnea    • UTI (urinary tract infection) 2021   • Vitamin B12 deficiency    • Vitamin D insufficiency    • Wound, breast     LEFT BREAST; PT INSTRUCTED TO NOTIFY DR ZAMBRANO PRIOR TO SURGERY      Past Surgical History:   Procedure Laterality Date   • AXILLARY HIDRADENITIS EXCISION Bilateral    • BREAST BIOPSY Left 2021    Procedure: Left breast needle-localized excisional biopsy (tophat clip) and left breast ultrasound-guided excisional biopsy (hydromark clip);  Surgeon: Debora Zambrano MD;  Location: [facility];  Service: General;  Laterality: Left;   • BREAST BIOPSY Left 2022    Procedure: Left breast needle-localized excisional biopsy, Left nipple mass excision;  Surgeon: Debora Zambrano MD;   Location: [facility] MAIN OR;  Service: General;  Laterality: Left;   • CARDIAC CATHETERIZATION     • COLONOSCOPY     • HYSTERECTOMY  2006    [facility]     Medications:   Medications Prior to Admission   Medication Sig Dispense Refill Last Dose   • amLODIPine (NORVASC) 10 MG tablet TAKE 1 TABLET DAILY 90 tablet 1 5/6/2022 at Unknown time   • anastrozole (ARIMIDEX) 1 MG tablet TAKE 1 TABLET BY MOUTH EVERY DAY (Patient taking differently: Take 1 mg by mouth Daily.) 90 tablet 1 5/5/2022 at Unknown time   • cetirizine (ZyrTEC) 10 MG tablet Take 1 tablet by mouth daily.   5/5/2022 at Unknown time   • citalopram (CeleXA) 10 MG tablet TAKE 1 TABLET BY MOUTH EVERY DAY (Patient taking differently: Take 10 mg by mouth Daily.) 90 tablet 1 5/5/2022 at Unknown time   • clotrimazole-betamethasone (LOTRISONE) 1-0.05 % lotion Apply  topically to the appropriate area as directed 2 (Two) Times a Day. (Patient taking differently: Apply 1 application topically to the appropriate area as directed 2 (Two) Times a Day As Needed.) 30 mL 1 Past Week at Unknown time   • Efinaconazole 10 % solution Apply 1 drop topically Daily. (Patient taking differently: Apply 1 drop topically Daily As Needed.) 8 mL 3 Past Month at Unknown time   • lisinopril-hydrochlorothiazide (PRINZIDE,ZESTORETIC) 20-12.5 MG per tablet Take 2 tablets by mouth Daily. 180 tablet 1 5/6/2022 at Unknown time   • metoprolol succinate XL (TOPROL-XL) 200 MG 24 hr tablet TAKE 1 TABLET DAILY 90 tablet 1 5/6/2022 at Unknown time   • oxybutynin XL (DITROPAN-XL) 10 MG 24 hr tablet TAKE 1 TABLET BY MOUTH EVERY DAY (Patient taking differently: Take 10 mg by mouth Every Evening.) 90 tablet 1 5/5/2022 at Unknown time   • rosuvastatin (CRESTOR) 5 MG tablet TAKE 1 TABLET DAILY (Patient taking differently: Take 5 mg by mouth Every Night.) 90 tablet 3 5/5/2022 at Unknown time   • traZODone (DESYREL) 50 MG tablet TAKE 1 TABLET EVERY NIGHT (Patient taking differently: Take 50 mg by mouth Every Night.)  90 tablet 1 2022 at Unknown time   • Cholecalciferol (VITAMIN D) 2000 units capsule Take 2,000 Units by mouth Daily.   2022     Allergies: Patient has no known allergies.   Social History:  Social History     Socioeconomic History   • Marital status: Single   • Number of children: 0   Tobacco Use   • Smoking status: Current Every Day Smoker     Packs/day: 0.50     Years: 42.00     Pack years: 21.00     Types: Cigarettes     Start date:    • Smokeless tobacco: Never Used   Vaping Use   • Vaping Use: Never used   Substance and Sexual Activity   • Alcohol use: Yes     Comment: 1-2 drinks a month   • Drug use: Not Currently     Comment: marijuana in her twenties      Family History   Problem Relation Age of Onset   • Hypertension Mother    • COPD Father    • Heart failure Father    • Hypertension Father    • Pancreatic cancer Brother         1 brother  from pancreatic cancer   • Other Sister         1 sister  renal failure   • Alcohol abuse Sister    • Liver disease Sister    • Malig Hyperthermia Neg Hx         Review of Systems:   General: Patient reports good health  Eyes: No eye problems  Ears, nose, mouth and throat: No rhinitis, no hearing problems, no chronic cough  Cardiovascular/heart: Denies palpitations, syncope or chest pain  Respiratory/lung: Denies shortness of breath, hemoptysis, or dyspnea on exertion   Genital/urinary: No frequency, hematuria or dysuria  Hematological/lymphatic: Denies anemia or other problems  Musculoskeletal: No joint pain, no defects  Skin: No psoriasis or other skin issues  Neurological: No seizures or other neurological problems  Psychiatric: None  Endocrine: Negative  Gastro-intestinal: No constipation, no diarrhea, no melena, no hematochezia    Physical Examination:  General: Alert and oriented x3 in no acute distress  HEENT: Normal cephalic, atraumatic, PERRLA, EOMI, sclera anicteric, moist mucous membranes, neck is supple, no JVD, no carotid bruits,  "no thyromegaly no adenopathy  Chest: CTA and percussion  CVA: RRR, normal S1-S2, no murmurs, no gallops or rubs  Abdomen: Positive BS, soft, nondistended, nontender, no rebound, no guarding, no hernias, no organomegaly and no masses  Extremities: Full range of motion, no clubbing, no cyanosis or edema  Neurovascular: Grossly intact  Debilities: none  Emotional behavior: appropriate     Vitals:    05/03/22 0942 05/06/22 0705   BP:  145/83   BP Location:  Right arm   Patient Position:  Lying   Pulse:  58   Resp:  16   Temp:  97.5 °F (36.4 °C)   TempSrc:  Temporal   SpO2:  98%   Weight: 113 kg (250 lb) 113 kg (249 lb)   Height: 167.6 cm (66\")      Impression:  61 y.o. female for screening colonoscopy.    Plan:  Patient is presenting for screening colonoscopy.  I have recommended that the patient undergo a screening colonoscopy in accordance of American Cancer Society's guidelines.  I have discussed this procedure in detail with the patient.  I have discussed the risks, benefits and alternatives.  I have discussed the risk of anesthesia, bleeding and perforation.  Patient understands these risks, benefits and alternatives and wishes to proceed.      Mirtha Davis MD  General, Minimally Invasive and Endoscopic Surgery  [facility]    [address] [facility] [address] [place] [address]   [address]    [address]  [place], [place] [address] [place] [id number]    P: [phone]  F: [phone]    Cc:  Verenice Valdez MD  "

## 2022-05-06 NOTE — ANESTHESIA POSTPROCEDURE EVALUATION
Patient: Kirsten Lima    Procedure Summary     Date: 05/06/22 Room / Location: [facility]    Anesthesia Start: 0752 Anesthesia Stop: 0850    Procedure: COLONOSCOPY (N/A ) Diagnosis:       Colon cancer screening      (Colon cancer screening [Z12.11])    Surgeons: Mirtha Davis MD Provider: Ashanti Pace MD    Anesthesia Type: MAC ASA Status: 3          Anesthesia Type: MAC    Vitals  Vitals Value Taken Time   /79 05/06/22 0935   Temp 36.7 °C (98 °F) 05/06/22 0852   Pulse 62 05/06/22 0938   Resp 14 05/06/22 0920   SpO2 93 % 05/06/22 0938   Vitals shown include unvalidated device data.        Post Anesthesia Care and Evaluation    Patient location during evaluation: bedside  Patient participation: complete - patient participated  Level of consciousness: awake and alert  Pain management: adequate  Airway patency: patent  Anesthetic complications: No anesthetic complications  PONV Status: controlled  Cardiovascular status: acceptable  Respiratory status: acceptable  Hydration status: acceptable

## 2022-05-09 ENCOUNTER — OFFICE VISIT (OUTPATIENT)
Dept: RADIATION ONCOLOGY | Facility: HOSPITAL | Age: 62
End: 2022-05-09

## 2022-05-09 ENCOUNTER — TELEPHONE (OUTPATIENT)
Dept: SURGERY | Facility: CLINIC | Age: 62
End: 2022-05-09

## 2022-05-09 ENCOUNTER — TELEPHONE (OUTPATIENT)
Dept: INTERNAL MEDICINE | Facility: CLINIC | Age: 62
End: 2022-05-09

## 2022-05-09 VITALS
TEMPERATURE: 98.9 F | DIASTOLIC BLOOD PRESSURE: 89 MMHG | OXYGEN SATURATION: 96 % | HEART RATE: 64 BPM | BODY MASS INDEX: 40.74 KG/M2 | SYSTOLIC BLOOD PRESSURE: 143 MMHG | WEIGHT: 252.4 LBS | RESPIRATION RATE: 18 BRPM

## 2022-05-09 DIAGNOSIS — D05.02 LOBULAR CARCINOMA IN SITU (LCIS) OF LEFT BREAST: Primary | ICD-10-CM

## 2022-05-09 LAB
LAB AP CASE REPORT: NORMAL
PATH REPORT.FINAL DX SPEC: NORMAL
PATH REPORT.GROSS SPEC: NORMAL

## 2022-05-09 PROCEDURE — 99212-NC PR NO CHARGE CBC OFFICE OUTPATIENT VISIT 10 MINUTES: Performed by: RADIOLOGY

## 2022-05-09 NOTE — PROGRESS NOTES
Subjective     No ref. provider found    Cancer Staging  No matching staging information was found for the patient.   No chief complaint on file.   4 week follow up                              S:      I had the pleasure of seeing Kirsten Lima  today in the Radiation Center.  61 year old female with pT1aNx invasive lobular carcinoma of left breast, pT1aNx, ER [place] pos. She returns today for follow up now 4 weeks out from completion of her radiation therapy to the breast.  She completed a dose of 3990cGy ot the left breast followed by a tumor bed boost for an additional 1000cGy on 4/5/22. She has been doing well since I last saw her.      Review of Systems   Constitutional: Negative.    Skin: Positive for color change.         Past Medical History:   Diagnosis Date   • Acute cystitis    • Allergic rhinitis    • Breast cancer (HCC)    • Breast cyst    • Cyst of left nipple    • Depression    • Dry skin    • Dysuria    • Hemorrhoid    • Hidradenitis    • History of bronchitis    • Hyperlipidemia    • Hypertension    • Incontinence in female     wears pads   • Insomnia    • Nonspecific abnormal electrocardiogram (ECG) (EKG)    • Obesity    • Overactive bladder    • Pregnancy     G-2, P-0   • Sleep apnea    • UTI (urinary tract infection) 02/2021   • Vitamin B12 deficiency    • Vitamin D insufficiency    • Wound, breast     LEFT BREAST; PT INSTRUCTED TO NOTIFY DR ZAMBRANO PRIOR TO SURGERY         Past Surgical History:   Procedure Laterality Date   • AXILLARY HIDRADENITIS EXCISION Bilateral    • BREAST BIOPSY Left 2/22/2021    Procedure: Left breast needle-localized excisional biopsy (tophat clip) and left breast ultrasound-guided excisional biopsy (hydromark clip);  Surgeon: Debora Zambrano MD;  Location: [facility];  Service: General;  Laterality: Left;   • BREAST BIOPSY Left 2/1/2022    Procedure: Left breast needle-localized excisional biopsy, Left nipple mass excision;  Surgeon: Debora Zambrano MD;   Location: [facility] OR;  Service: General;  Laterality: Left;   • CARDIAC CATHETERIZATION     • COLONOSCOPY     • HYSTERECTOMY      [facility]         Social History     Socioeconomic History   • Marital status: Single   • Number of children: 0   Tobacco Use   • Smoking status: Current Every Day Smoker     Packs/day: 0.50     Years: 42.00     Pack years: 21.00     Types: Cigarettes     Start date:    • Smokeless tobacco: Never Used   Vaping Use   • Vaping Use: Never used   Substance and Sexual Activity   • Alcohol use: Yes     Comment: 1-2 drinks a month   • Drug use: Not Currently     Comment: marijuana in her twenties         Family History   Problem Relation Age of Onset   • Hypertension Mother    • COPD Father    • Heart failure Father    • Hypertension Father    • Pancreatic cancer Brother         1 brother  from pancreatic cancer   • Other Sister         1 sister  renal failure   • Alcohol abuse Sister    • Liver disease Sister    • Malig Hyperthermia Neg Hx           Objective    Physical Exam  Constitutional:       Appearance: Normal appearance.   Chest:          Comments: Moderate hyperpigmentation over left breast, slightly smalle rin size, no palpable masses  Neurological:      Mental Status: She is alert.           Current Outpatient Medications on File Prior to Visit   Medication Sig Dispense Refill   • amLODIPine (NORVASC) 10 MG tablet TAKE 1 TABLET DAILY 90 tablet 1   • anastrozole (ARIMIDEX) 1 MG tablet TAKE 1 TABLET BY MOUTH EVERY DAY (Patient taking differently: Take 1 mg by mouth Daily.) 90 tablet 1   • cetirizine (ZyrTEC) 10 MG tablet Take 1 tablet by mouth daily.     • Cholecalciferol (VITAMIN D) 2000 units capsule Take 2,000 Units by mouth Daily.     • citalopram (CeleXA) 10 MG tablet TAKE 1 TABLET BY MOUTH EVERY DAY (Patient taking differently: Take 10 mg by mouth Daily.) 90 tablet 1   • clotrimazole-betamethasone (LOTRISONE) 1-0.05 % lotion Apply  topically to the  appropriate area as directed 2 (Two) Times a Day. (Patient taking differently: Apply 1 application topically to the appropriate area as directed 2 (Two) Times a Day As Needed.) 30 mL 1   • Efinaconazole 10 % solution Apply 1 drop topically Daily. (Patient taking differently: Apply 1 drop topically Daily As Needed.) 8 mL 3   • lisinopril-hydrochlorothiazide (PRINZIDE,ZESTORETIC) 20-12.5 MG per tablet Take 2 tablets by mouth Daily. 180 tablet 1   • metoprolol succinate XL (TOPROL-XL) 200 MG 24 hr tablet TAKE 1 TABLET DAILY 90 tablet 1   • oxybutynin XL (DITROPAN-XL) 10 MG 24 hr tablet TAKE 1 TABLET BY MOUTH EVERY DAY (Patient taking differently: Take 10 mg by mouth Every Evening.) 90 tablet 1   • rosuvastatin (CRESTOR) 5 MG tablet TAKE 1 TABLET DAILY (Patient taking differently: Take 5 mg by mouth Every Night.) 90 tablet 3   • traZODone (DESYREL) 50 MG tablet TAKE 1 TABLET EVERY NIGHT (Patient taking differently: Take 50 mg by mouth Every Night.) 90 tablet 1     No current facility-administered medications on file prior to visit.       ALLERGIES:  No Known Allergies    /89   Pulse 64   Temp 98.9 °F (37.2 °C)   Resp 18   Wt 114 kg (252 lb 6.4 oz)   SpO2 96%   BMI 40.74 kg/m²      Current Status 3/18/2021   ECOG score 0         Assessment/Plan     61 year old female with pT1aNx invasive lobular carcinoma of left breast, pT1aNx, ER [place] pos. She has her mammgoram scheduled for early August 2022 and follow up with  Shortly after.  She will have regular follow up with her medical oncologist.  I will see her back in one year for follow up.  She knows to call for this appointment.             Thank you very much for allowing me to participate in the care of this very pleasant patient.    Sincerely,      Prerna Tucker MD

## 2022-05-09 NOTE — SIGNIFICANT NOTE
Patient had blood in stool with first bowel movement. Dr. Davis is aware. Patient is feeling better.

## 2022-05-09 NOTE — TELEPHONE ENCOUNTER
Called pt to discuss her colonoscopy and let her know that there was no  evidence of any masses, AV malformation or inflammation, but pt did have  a large 12 mm polyp in the cecum.  The polyp was removed in its entirety using a hot snare and submitted to pathology.  Patient also had 3 ascending colon polyps that were on stocks all of them were greater than 1 cm and they were all removed with a hot snare polypectomy and submitted to pathology.  Patient had a splenic flexure polyp that measured 14 mm.  It was removed with a hot snare polypectomy and submitted to pathology.  Patient had a large polyp in the descending colon that was 12 mm polyp.  It was removed with a hot snare polypectomy in its entirety and submitted to pathology.  Patient had diverticulosis throughout the colon. Pt's pathology read Sigmoid Colon, Polyp, Polypectomy Tubular adenoma.    Descending Colon, Polyp, Polypectomy: Diminutive tubular adenoma.   Colon, Splenic Flexure Polyp, Polypectomy: Tubular adenoma.    Cecum, Polyp, Polypectomy: Tubular adenoma.    Ascending Colon, Polyps, Polypectomies: Tubular adenomas. Pt is to repeat colonoscopy in 1 year. Pt understands and did not have any questions at this time

## 2022-05-12 ENCOUNTER — TELEPHONE (OUTPATIENT)
Dept: INTERNAL MEDICINE | Facility: CLINIC | Age: 62
End: 2022-05-12

## 2022-05-12 DIAGNOSIS — I10 ESSENTIAL HYPERTENSION: Primary | ICD-10-CM

## 2022-05-12 DIAGNOSIS — R73.09 ELEVATED GLUCOSE: ICD-10-CM

## 2022-05-12 DIAGNOSIS — E78.00 PURE HYPERCHOLESTEROLEMIA: ICD-10-CM

## 2022-05-12 NOTE — TELEPHONE ENCOUNTER
Caller: Kirsten Lima    Relationship to patient: Self    Best call back number: [phone]    Patient is needing:PATIENT IS NEEDING LABS ENTERED SO SHE CAN COMPLETE HER LABS FIRST THING TOMORROW MORNING. SHE CALLED Monday AND REQUESTED THIS.

## 2022-05-14 LAB
ALBUMIN SERPL-MCNC: 4.1 G/DL (ref 3.8–4.8)
ALBUMIN/GLOB SERPL: 1.4 {RATIO} (ref 1.2–2.2)
ALP SERPL-CCNC: 90 IU/L (ref 44–121)
ALT SERPL-CCNC: 12 IU/L (ref 0–32)
AST SERPL-CCNC: 16 IU/L (ref 0–40)
BASOPHILS # BLD AUTO: 0 X10E3/UL (ref 0–0.2)
BASOPHILS NFR BLD AUTO: 1 %
BILIRUB SERPL-MCNC: 0.3 MG/DL (ref 0–1.2)
BUN SERPL-MCNC: 6 MG/DL (ref 8–27)
BUN/CREAT SERPL: 7 (ref 12–28)
CALCIUM SERPL-MCNC: 9.9 MG/DL (ref 8.7–10.3)
CHLORIDE SERPL-SCNC: 101 MMOL/L (ref 96–106)
CHOLEST SERPL-MCNC: 182 MG/DL (ref 100–199)
CO2 SERPL-SCNC: 25 MMOL/L (ref 20–29)
CREAT SERPL-MCNC: 0.81 MG/DL (ref 0.57–1)
EGFRCR SERPLBLD CKD-EPI 2021: 83 ML/MIN/1.73
EOSINOPHIL # BLD AUTO: 0.1 X10E3/UL (ref 0–0.4)
EOSINOPHIL NFR BLD AUTO: 1 %
ERYTHROCYTE [DISTWIDTH] IN BLOOD BY AUTOMATED COUNT: 13.8 % (ref 11.7–15.4)
GLOBULIN SER CALC-MCNC: 2.9 G/DL (ref 1.5–4.5)
GLUCOSE SERPL-MCNC: 110 MG/DL (ref 65–99)
HBA1C MFR BLD: 5.8 % (ref 4.8–5.6)
HCT VFR BLD AUTO: 42.4 % (ref 34–46.6)
HDLC SERPL-MCNC: 58 MG/DL
HGB BLD-MCNC: 14.4 G/DL (ref 11.1–15.9)
IMM GRANULOCYTES # BLD AUTO: 0 X10E3/UL (ref 0–0.1)
IMM GRANULOCYTES NFR BLD AUTO: 1 %
LDLC SERPL CALC-MCNC: 87 MG/DL (ref 0–99)
LDLC/HDLC SERPL: 1.5 RATIO (ref 0–3.2)
LYMPHOCYTES # BLD AUTO: 1.6 X10E3/UL (ref 0.7–3.1)
LYMPHOCYTES NFR BLD AUTO: 24 %
MCH RBC QN AUTO: 28.3 PG (ref 26.6–33)
MCHC RBC AUTO-ENTMCNC: 34 G/DL (ref 31.5–35.7)
MCV RBC AUTO: 84 FL (ref 79–97)
MONOCYTES # BLD AUTO: 0.6 X10E3/UL (ref 0.1–0.9)
MONOCYTES NFR BLD AUTO: 8 %
NEUTROPHILS # BLD AUTO: 4.6 X10E3/UL (ref 1.4–7)
NEUTROPHILS NFR BLD AUTO: 65 %
PLATELET # BLD AUTO: 274 X10E3/UL (ref 150–450)
POTASSIUM SERPL-SCNC: 3.9 MMOL/L (ref 3.5–5.2)
PROT SERPL-MCNC: 7 G/DL (ref 6–8.5)
RBC # BLD AUTO: 5.08 X10E6/UL (ref 3.77–5.28)
SODIUM SERPL-SCNC: 142 MMOL/L (ref 134–144)
TRIGL SERPL-MCNC: 224 MG/DL (ref 0–149)
TSH SERPL DL<=0.005 MIU/L-ACNC: 0.83 UIU/ML (ref 0.45–4.5)
VLDLC SERPL CALC-MCNC: 37 MG/DL (ref 5–40)
WBC # BLD AUTO: 6.9 X10E3/UL (ref 3.4–10.8)

## 2022-05-20 ENCOUNTER — OFFICE VISIT (OUTPATIENT)
Dept: INTERNAL MEDICINE | Facility: CLINIC | Age: 62
End: 2022-05-20

## 2022-05-20 VITALS
HEART RATE: 63 BPM | OXYGEN SATURATION: 99 % | SYSTOLIC BLOOD PRESSURE: 132 MMHG | DIASTOLIC BLOOD PRESSURE: 80 MMHG | TEMPERATURE: 96.9 F | HEIGHT: 66 IN | BODY MASS INDEX: 40.03 KG/M2 | WEIGHT: 249.1 LBS

## 2022-05-20 DIAGNOSIS — C50.812 MALIGNANT NEOPLASM OF OVERLAPPING SITES OF LEFT BREAST IN FEMALE, ESTROGEN RECEPTOR POSITIVE: ICD-10-CM

## 2022-05-20 DIAGNOSIS — E78.00 PURE HYPERCHOLESTEROLEMIA: ICD-10-CM

## 2022-05-20 DIAGNOSIS — N32.81 OVERACTIVE BLADDER: ICD-10-CM

## 2022-05-20 DIAGNOSIS — Z17.0 MALIGNANT NEOPLASM OF OVERLAPPING SITES OF LEFT BREAST IN FEMALE, ESTROGEN RECEPTOR POSITIVE: ICD-10-CM

## 2022-05-20 DIAGNOSIS — I10 PRIMARY HYPERTENSION: Primary | ICD-10-CM

## 2022-05-20 PROCEDURE — 99214 OFFICE O/P EST MOD 30 MIN: CPT | Performed by: INTERNAL MEDICINE

## 2022-05-20 RX ORDER — VIBEGRON 75 MG/1
75 TABLET, FILM COATED ORAL DAILY
Qty: 30 TABLET | Refills: 1 | COMMUNITY
Start: 2022-05-20 | End: 2022-06-22 | Stop reason: SDUPTHER

## 2022-05-20 NOTE — PROGRESS NOTES
Subjective   Kirsten Lima is a 61 y.o. female here today to f/u on HTN, hyperlipidemia, elevated glucose and insomnia.      History of Present Illness   S/p left side breast cancer dx   S/p lumpectomy and xrt  Pt has been taking BP meds as prescribed without any problems.  No HA  No episodes of orthostasis  Pt has been taking cholesterol meds as prescribed.  No difficulties with myalgias.   She cont ot struggle with oab  Getting up 2-3 times a night      The following portions of the patient's history were reviewed and updated as appropriate: allergies, current medications, past medical history, past social history and problem list.  No tob no etoh    Review of Systems    Objective   Physical Exam  Vitals reviewed.   Constitutional:       Appearance: She is well-developed.   HENT:      Head: Normocephalic and atraumatic.      Right Ear: External ear normal.      Left Ear: External ear normal.   Eyes:      Conjunctiva/sclera: Conjunctivae normal.      Pupils: Pupils are equal, round, and reactive to light.   Neck:      Thyroid: No thyromegaly.      Trachea: No tracheal deviation.   Cardiovascular:      Rate and Rhythm: Normal rate and regular rhythm.      Heart sounds: Normal heart sounds.   Pulmonary:      Effort: Pulmonary effort is normal.      Breath sounds: Normal breath sounds.   Abdominal:      General: Bowel sounds are normal. There is no distension.      Palpations: Abdomen is soft.      Tenderness: There is no abdominal tenderness.   Musculoskeletal:         General: No deformity. Normal range of motion.      Cervical back: Normal range of motion.   Skin:     General: Skin is warm and dry.   Neurological:      Mental Status: She is alert and oriented to person, place, and time.   Psychiatric:         Behavior: Behavior normal.         Thought Content: Thought content normal.         Judgment: Judgment normal.         Vitals:    05/20/22 1115   BP: 132/80   Pulse: 63   Temp: 96.9 °F (36.1 °C)   SpO2: 99%      Body mass index is 40.21 kg/m².       Telephone on 05/12/2022   Component Date Value Ref Range Status   • Glucose 05/13/2022 110 (A) 65 - 99 mg/dL Final   • BUN 05/13/2022 6 (A) 8 - 27 mg/dL Final   • Creatinine 05/13/2022 0.81  0.57 - 1.00 mg/dL Final   • EGFR Result 05/13/2022 83  >59 mL/min/1.73 Final   • BUN/Creatinine Ratio 05/13/2022 7 (A) 12 - 28 Final   • Sodium 05/13/2022 142  134 - 144 mmol/L Final   • Potassium 05/13/2022 3.9  3.5 - 5.2 mmol/L Final   • Chloride 05/13/2022 101  96 - 106 mmol/L Final   • Total CO2 05/13/2022 25  20 - 29 mmol/L Final   • Calcium 05/13/2022 9.9  8.7 - 10.3 mg/dL Final   • Total Protein 05/13/2022 7.0  6.0 - 8.5 g/dL Final   • Albumin 05/13/2022 4.1  3.8 - 4.8 g/dL Final   • Globulin 05/13/2022 2.9  1.5 - 4.5 g/dL Final   • A/G Ratio 05/13/2022 1.4  1.2 - 2.2 Final   • Total Bilirubin 05/13/2022 0.3  0.0 - 1.2 mg/dL Final   • Alkaline Phosphatase 05/13/2022 90  44 - 121 IU/L Final   • AST (SGOT) 05/13/2022 16  0 - 40 IU/L Final   • ALT (SGPT) 05/13/2022 12  0 - 32 IU/L Final   • Total Cholesterol 05/13/2022 182  100 - 199 mg/dL Final   • Triglycerides 05/13/2022 224 (A) 0 - 149 mg/dL Final   • HDL Cholesterol 05/13/2022 58  >39 mg/dL Final   • VLDL Cholesterol Avel 05/13/2022 37  5 - 40 mg/dL Final   • LDL Chol Calc (NIH) 05/13/2022 87  0 - 99 mg/dL Final   • LDL/HDL RATIO 05/13/2022 1.5  0.0 - 3.2 ratio Final    Comment:                                     LDL/HDL Ratio                                              Men  Women                                1/2 Avg.Risk  1.0    1.5                                    Avg.Risk  3.6    3.2                                 2X Avg.Risk  6.2    5.0                                 3X Avg.Risk  8.0    6.1     • TSH 05/13/2022 0.834  0.450 - 4.500 uIU/mL Final   • WBC 05/13/2022 6.9  3.4 - 10.8 x10E3/uL Final   • RBC 05/13/2022 5.08  3.77 - 5.28 x10E6/uL Final   • Hemoglobin 05/13/2022 14.4  11.1 - 15.9 g/dL Final   • Hematocrit  05/13/2022 42.4  34.0 - 46.6 % Final   • MCV 05/13/2022 84  79 - 97 fL Final   • MCH 05/13/2022 28.3  26.6 - 33.0 pg Final   • MCHC 05/13/2022 34.0  31.5 - 35.7 g/dL Final   • RDW 05/13/2022 13.8  11.7 - 15.4 % Final   • Platelets 05/13/2022 274  150 - 450 x10E3/uL Final   • Neutrophil Rel % 05/13/2022 65  Not Estab. % Final   • Lymphocyte Rel % 05/13/2022 24  Not Estab. % Final   • Monocyte Rel % 05/13/2022 8  Not Estab. % Final   • Eosinophil Rel % 05/13/2022 1  Not Estab. % Final   • Basophil Rel % 05/13/2022 1  Not Estab. % Final   • Neutrophils Absolute 05/13/2022 4.6  1.4 - 7.0 x10E3/uL Final   • Lymphocytes Absolute 05/13/2022 1.6  0.7 - 3.1 x10E3/uL Final   • Monocytes Absolute 05/13/2022 0.6  0.1 - 0.9 x10E3/uL Final   • Eosinophils Absolute 05/13/2022 0.1  0.0 - 0.4 x10E3/uL Final   • Basophils Absolute 05/13/2022 0.0  0.0 - 0.2 x10E3/uL Final   • Immature Granulocyte Rel % 05/13/2022 1  Not Estab. % Final   • Immature Grans Absolute 05/13/2022 0.0  0.0 - 0.1 x10E3/uL Final   • Hemoglobin A1C 05/13/2022 5.8 (A) 4.8 - 5.6 % Final    Comment:          Prediabetes: 5.7 - 6.4           Diabetes: >6.4           Glycemic control for adults with diabetes: <7.0       Current Outpatient Medications   Medication Instructions   • amLODIPine (NORVASC) 10 MG tablet TAKE 1 TABLET DAILY   • anastrozole (ARIMIDEX) 1 MG tablet TAKE 1 TABLET BY MOUTH EVERY DAY   • cetirizine (ZyrTEC) 10 MG tablet 1 tablet, Oral, Daily   • citalopram (CeleXA) 10 MG tablet TAKE 1 TABLET BY MOUTH EVERY DAY   • clotrimazole-betamethasone (LOTRISONE) 1-0.05 % lotion Topical, 2 Times Daily   • Efinaconazole 10 % solution 1 drop, Apply externally, Daily   • lisinopril-hydrochlorothiazide (PRINZIDE,ZESTORETIC) 20-12.5 MG per tablet 2 tablets, Oral, Daily   • metoprolol succinate XL (TOPROL-XL) 200 MG 24 hr tablet TAKE 1 TABLET DAILY   • oxybutynin XL (DITROPAN-XL) 10 MG 24 hr tablet TAKE 1 TABLET BY MOUTH EVERY DAY   • rosuvastatin (CRESTOR) 5 MG  tablet TAKE 1 TABLET DAILY   • traZODone (DESYREL) 50 MG tablet TAKE 1 TABLET EVERY NIGHT   • Vitamin D 2,000 Units, Oral, Daily         Assessment & Plan   Diagnoses and all orders for this visit:    1. Primary hypertension (Primary)    2. Pure hypercholesterolemia    3. Malignant neoplasm of overlapping sites of left breast in female, estrogen receptor positive (HCC)    4. Overactive bladder      1.  HPL-  Ok with current meds  2.  HTN- ok with current meds  3.  OAB-  We will try gemtesa  4.  Breast cancer- s/p xrt and sx  She is doing well   5.   Back pain-  She wants to see pain manaegement

## 2022-05-23 RX ORDER — CITALOPRAM 10 MG/1
10 TABLET ORAL DAILY
Qty: 90 TABLET | Refills: 1 | Status: SHIPPED | OUTPATIENT
Start: 2022-05-23 | End: 2022-09-20

## 2022-05-27 RX ORDER — OXYBUTYNIN CHLORIDE 10 MG/1
TABLET, EXTENDED RELEASE ORAL
Qty: 90 TABLET | Refills: 1 | OUTPATIENT
Start: 2022-05-27

## 2022-06-13 RX ORDER — ANASTROZOLE 1 MG/1
TABLET ORAL
Qty: 90 TABLET | Refills: 0 | Status: SHIPPED | OUTPATIENT
Start: 2022-06-13 | End: 2022-09-15

## 2022-06-14 ENCOUNTER — OFFICE VISIT (OUTPATIENT)
Dept: PAIN MEDICINE | Facility: CLINIC | Age: 62
End: 2022-06-14

## 2022-06-14 ENCOUNTER — PREP FOR SURGERY (OUTPATIENT)
Dept: SURGERY | Facility: SURGERY CENTER | Age: 62
End: 2022-06-14

## 2022-06-14 VITALS
RESPIRATION RATE: 16 BRPM | HEIGHT: 66 IN | SYSTOLIC BLOOD PRESSURE: 160 MMHG | OXYGEN SATURATION: 96 % | WEIGHT: 252 LBS | BODY MASS INDEX: 40.5 KG/M2 | HEART RATE: 70 BPM | DIASTOLIC BLOOD PRESSURE: 84 MMHG | TEMPERATURE: 96.2 F

## 2022-06-14 DIAGNOSIS — M54.16 LUMBAR RADICULOPATHY: Primary | ICD-10-CM

## 2022-06-14 DIAGNOSIS — M48.062 SPINAL STENOSIS OF LUMBAR REGION WITH NEUROGENIC CLAUDICATION: ICD-10-CM

## 2022-06-14 DIAGNOSIS — M54.16 LUMBAR RADICULOPATHY: ICD-10-CM

## 2022-06-14 DIAGNOSIS — M48.062 SPINAL STENOSIS OF LUMBAR REGION WITH NEUROGENIC CLAUDICATION: Primary | ICD-10-CM

## 2022-06-14 PROCEDURE — 99214 OFFICE O/P EST MOD 30 MIN: CPT | Performed by: NURSE PRACTITIONER

## 2022-06-14 RX ORDER — SODIUM CHLORIDE 0.9 % (FLUSH) 0.9 %
10 SYRINGE (ML) INJECTION EVERY 12 HOURS SCHEDULED
Status: CANCELLED | OUTPATIENT
Start: 2022-06-14

## 2022-06-14 RX ORDER — SODIUM CHLORIDE 0.9 % (FLUSH) 0.9 %
10 SYRINGE (ML) INJECTION AS NEEDED
Status: CANCELLED | OUTPATIENT
Start: 2022-06-14

## 2022-06-14 NOTE — PROGRESS NOTES
"CHIEF COMPLAINT  FOLLOW UP LUMBAR PAIN  Patient in office reports bilateral lower back pain along with throbbing sensation and bilateral leg pain . Experiences aching pain when walking (lower back) States she  was doing lumbar PT but COVID hit and they cancelled PT and she never followd up. Mentioned she then gained moderate relief from PT . Wants to discuss epidural treatment.     Subjective   Kirsten Lima is a 61 y.o. female  who presents for follow-up.  She has a history of back pain.  Today her pain is 6/10VAS in severity. She states that her low back pain has begun to worsen. Her pain is located in her low back and with intermittent radiation into her anterior thighs, this does not radiate below the knees. Her pain is worsened by standing and walking.  Her pain was improved previously by PT, her pain is also improved with rest/sitting. Nothing else particularly improves her pain.      She denies any numbness/tingling of her lower extremities.    Patient remained masked during entire encounter. No cough present. I donned a mask and eye protection throughout entire visit. Prior to donning mask and eye protection, hand hygiene was performed, as well as when it was doffed.  I was closer than 6 feet, but not for an extended period of time. No obvious exposure to any bodily fluids.    Back Pain  This is a chronic problem. The current episode started more than 1 year ago. The problem occurs daily. The problem has been gradually worsening since onset. The pain is present in the lumbar spine. The quality of the pain is described as aching and burning (\"tightness\"). The pain radiates to the left thigh and right thigh. The pain is at a severity of 3/10. The pain is the same all the time. The symptoms are aggravated by standing and sitting (walking). Pertinent negatives include no abdominal pain, chest pain, dysuria, fever, headaches, numbness or weakness. Treatments tried: PT.      PEG Assessment   What number best " describes your pain on average in the past week?5  What number best describes how, during the past week, pain has interfered with your enjoyment of life?5  What number best describes how, during the past week, pain has interfered with your general activity?  5    Review of Pertinent Medical Data ---  Office visit from 3/16/2020 with YAN Eller reviewed.  Patient was seen in follow-up for chronic back pain which was improving since her previous office visit.  She was initially evaluated as a new patient by Jaci after being referred by Dr. Juliet Valdez for low back pain.  She complained of low back pain, buttocks, hips, and leg pain.  She previously failed meloxicam.  Dr. Valdez had ordered a lumbar MRI and PT.  Helen discussed consideration for an LESI at L5-S1 but patient wanted to wait.  She also offered a neurosurgical referral, patient declined.  It was recommended that she continue with PT at that time with follow-up 4 to 6 weeks or sooner if needed.          Labs reviewed from 5/13/2022:  Platelets 274  Creatinine 0.81    The following portions of the patient's history were reviewed and updated as appropriate: allergies, current medications, past family history, past medical history, past social history, past surgical history and problem list.    Review of Systems   Constitutional: Positive for activity change (less). Negative for fatigue and fever.   HENT: Negative for congestion.    Eyes: Negative for visual disturbance.   Respiratory: Negative for cough and chest tightness.    Cardiovascular: Negative for chest pain.   Gastrointestinal: Negative for abdominal pain, constipation and diarrhea.   Genitourinary: Negative for difficulty urinating and dysuria.   Musculoskeletal: Positive for back pain.   Neurological: Negative for dizziness, weakness, light-headedness, numbness and headaches.   Psychiatric/Behavioral: Positive for sleep disturbance. Negative for agitation and suicidal ideas. The  "patient is not nervous/anxious.      --  The aforementioned information the Chief Complaint section and above subjective data including any HPI data, and also the Review of Systems data, has been personally reviewed and affirmed.  --    Vitals:    06/14/22 1050   BP: 160/84   BP Location: Right arm   Patient Position: Sitting   Pulse: 70   Resp: 16   Temp: 96.2 °F (35.7 °C)   SpO2: 96%   Weight: 114 kg (252 lb)   Height: 167.6 cm (66\")   PainSc:   6   PainLoc: Back     Objective   Physical Exam  Vitals and nursing note reviewed.   Constitutional:       Appearance: Normal appearance. She is well-developed.   Eyes:      General: Lids are normal.   Cardiovascular:      Rate and Rhythm: Normal rate.   Pulmonary:      Effort: Pulmonary effort is normal.   Musculoskeletal:      Lumbar back: Tenderness present. Decreased range of motion. Negative right straight leg raise test and negative left straight leg raise test.   Neurological:      Mental Status: She is alert and oriented to person, place, and time.      Gait: Gait normal.   Psychiatric:         Attention and Perception: Attention normal.         Mood and Affect: Mood normal.         Speech: Speech normal.         Behavior: Behavior normal.         Judgment: Judgment normal.       Assessment & Plan   Diagnoses and all orders for this visit:    1. Spinal stenosis of lumbar region with neurogenic claudication (Primary)  -     Ambulatory Referral to Physical Therapy Evaluate and treat    2. Lumbar radiculopathy      --- Proceed with LESI at L5-S1  Reviewed the procedure at length with the patient.  Included in the review was expectations, complications, risk and benefits.The procedure was described in detail and the risks, benefits and alternatives were discussed with the patient (including but not limited to: bleeding, infection, nerve damage, worsening of pain, inability to perform injection, paralysis, seizures, coma, no pain relief and death) who agreed to proceed. "  Discussed the potential for sedation if warranted/wanted.  The procedure will plan to be performed at [facility] with fluoroscopic guidance(unless ultrasound is indicated) and could potentially have steroids and contrast dye used. Questions were answered and in a way the patient could understand.  Patient verbalized understanding and wishes to proceed.  This intervention will be ordered.  Discussed with patient that all procedures are part of a multimodal plan of care and include either formal PT or a home exercise program.  Patient has no evidence of coagulopathy or current infection.    --- Return to PT as this has been helpful in the past.   --- Follow-up after procedure     DWAYNE REPORT    As the clinician, I personally reviewed the DWAYNE from 6/14/2022 while the patient was in the office today.    Dictated utilizing Dragon dictation.     This document is intended for medical expert use only. Reading of this document by patients and/or patient's family without participating medical staff guidance may result in misinterpretation and unintended morbidity.   Any interpretation of such data is the responsibility of the patient and/or family member responsible for the patient in concert with their primary or specialist providers, not to be left for sources of online searches such as [employer], [employer] or similar queries. Relying on these approaches to knowledge may result in misinterpretation, misguided goals of care and even death should patients or family members try recommendations outside of the realm of professional medical care in a supervised way.

## 2022-06-15 ENCOUNTER — TRANSCRIBE ORDERS (OUTPATIENT)
Dept: SURGERY | Facility: SURGERY CENTER | Age: 62
End: 2022-06-15

## 2022-06-15 DIAGNOSIS — M54.16 LUMBAR RADICULOPATHY: Primary | ICD-10-CM

## 2022-06-16 ENCOUNTER — TELEPHONE (OUTPATIENT)
Dept: PAIN MEDICINE | Facility: CLINIC | Age: 62
End: 2022-06-16

## 2022-06-16 NOTE — TELEPHONE ENCOUNTER
Provider: SONYA NAM  Caller: SHELLY NOBLE  Relationship to Patient: SELF  Phone Number: [phone]  Reason for Call: PATIENT CALLED WANTING TO KNOW WHY SHE WAS REFERRED TO [facility] FOR PHYSICAL THERAPY INSTEAD OF [facility] PHYSICAL THERAPY. PATIENT STATES SHE IS CONCERNED ABOUT [facility] NOT BEING IN NETWORK WITH HER INSURANCE COMPANY. PLEASE ADVISE.

## 2022-06-21 DIAGNOSIS — I10 ESSENTIAL HYPERTENSION: ICD-10-CM

## 2022-06-22 RX ORDER — TRAZODONE HYDROCHLORIDE 50 MG/1
TABLET ORAL
Qty: 90 TABLET | Refills: 1 | Status: SHIPPED | OUTPATIENT
Start: 2022-06-22 | End: 2022-11-16 | Stop reason: SDUPTHER

## 2022-06-22 RX ORDER — ROSUVASTATIN CALCIUM 5 MG/1
TABLET, COATED ORAL
Qty: 90 TABLET | Refills: 1 | Status: SHIPPED | OUTPATIENT
Start: 2022-06-22 | End: 2022-11-16 | Stop reason: SDUPTHER

## 2022-06-22 RX ORDER — LISINOPRIL AND HYDROCHLOROTHIAZIDE 20; 12.5 MG/1; MG/1
TABLET ORAL
Qty: 180 TABLET | Refills: 1 | Status: SHIPPED | OUTPATIENT
Start: 2022-06-22 | End: 2022-11-16 | Stop reason: SDUPTHER

## 2022-06-22 NOTE — TELEPHONE ENCOUNTER
Caller: Kirsten Lima    Relationship: Self    Best call back number: [phone]    Requested Prescriptions:   Requested Prescriptions     Pending Prescriptions Disp Refills   • Vibegron (Gemtesa) 75 MG tablet 30 tablet 1     Sig: Take 1 tablet by mouth Daily.        Pharmacy where request should be sent: [facility]ARMACY #[id number] - [address] AT [facility] - [phone] [facility] [phone]      Additional details provided by patient: PATIENT IS NEEDING TO FILL THIS PRESCRIPTION. THE SAMPLES WORKED FOR HER.     Does the patient have less than a 3 day supply:  [x] Yes  [] No    Meir Pyle Rep   06/22/22 09:24 EDT

## 2022-06-23 RX ORDER — VIBEGRON 75 MG/1
75 TABLET, FILM COATED ORAL DAILY
Qty: 90 TABLET | Refills: 1 | Status: SHIPPED | OUTPATIENT
Start: 2022-06-23 | End: 2022-11-16 | Stop reason: SDUPTHER

## 2022-07-05 ENCOUNTER — HOSPITAL ENCOUNTER (OUTPATIENT)
Dept: PHYSICAL THERAPY | Facility: HOSPITAL | Age: 62
Setting detail: THERAPIES SERIES
Discharge: HOME | End: 2022-07-05

## 2022-07-05 DIAGNOSIS — G89.29 CHRONIC BILATERAL LOW BACK PAIN WITH BILATERAL SCIATICA: ICD-10-CM

## 2022-07-05 DIAGNOSIS — M48.062 LUMBAR STENOSIS WITH NEUROGENIC CLAUDICATION: ICD-10-CM

## 2022-07-05 DIAGNOSIS — M54.16 LUMBAR RADICULOPATHY: Primary | ICD-10-CM

## 2022-07-05 DIAGNOSIS — M54.41 CHRONIC BILATERAL LOW BACK PAIN WITH BILATERAL SCIATICA: ICD-10-CM

## 2022-07-05 DIAGNOSIS — M54.42 CHRONIC BILATERAL LOW BACK PAIN WITH BILATERAL SCIATICA: ICD-10-CM

## 2022-07-05 PROCEDURE — 97110 THERAPEUTIC EXERCISES: CPT

## 2022-07-05 PROCEDURE — 97161 PT EVAL LOW COMPLEX 20 MIN: CPT

## 2022-07-05 NOTE — THERAPY EVALUATION
Outpatient Physical Therapy Ortho Initial Evaluation  [facility]     Patient Name: Kirsten Lima  : 1960  MRN: [id number]  Today's Date: 2022      Visit Date: 2022    Patient Active Problem List   Diagnosis   • Abnormal thyroid stimulating hormone (TSH) level   • Atopic rhinitis   • Hypertension   • Insomnia   • Overactive bladder   • Knee pain   • Cobalamin deficiency   • Vitamin D deficiency   • Hyperlipidemia   • Other chronic pain   • Lumbar facet arthropathy   • Spinal stenosis of lumbar region   • Intraductal papilloma of left breast   • Lobular carcinoma in situ (LCIS) of left breast   • Increased risk of breast cancer   • Tobacco use disorder   • Elevated glucose   • Mass of nipple   • Colon cancer screening   • Malignant neoplasm of overlapping sites of left breast in female, estrogen receptor positive (HCC)   • Lumbar radiculopathy        Past Medical History:   Diagnosis Date   • Acute cystitis    • Allergic rhinitis    • Breast cancer (HCC)    • Breast cyst    • Colon polyp May 6, 2022    7   • Cyst of left nipple    • Depression    • Diverticulosis May 6, 2022   • Dry skin    • Dysuria    • Hemorrhoid    • Hidradenitis    • History of bronchitis    • Hyperlipidemia    • Hypertension    • Incontinence in female     wears pads   • Insomnia    • Nonspecific abnormal electrocardiogram (ECG) (EKG)    • Obesity    • Overactive bladder    • Pregnancy     G-2, P-0   • Sleep apnea    • UTI (urinary tract infection) 2021   • Vitamin B12 deficiency    • Vitamin D insufficiency    • Wound, breast     LEFT BREAST; PT INSTRUCTED TO NOTIFY DR ZAMBRANO PRIOR TO SURGERY        Past Surgical History:   Procedure Laterality Date   • AXILLARY HIDRADENITIS EXCISION Bilateral    • BREAST BIOPSY Left 2021    Procedure: Left breast needle-localized excisional biopsy (tophat clip) and left breast ultrasound-guided excisional biopsy (hydromark clip);  Surgeon: Debora Zambrano MD;  Location:  [facility] MAIN OR;  Service: General;  Laterality: Left;   • BREAST BIOPSY Left 02/01/2022    Procedure: Left breast needle-localized excisional biopsy, Left nipple mass excision;  Surgeon: Debora Zambrano MD;  Location: [facility] MAIN OR;  Service: General;  Laterality: Left;   • CARDIAC CATHETERIZATION     • COLONOSCOPY     • COLONOSCOPY N/A 05/06/2022    Procedure: COLONOSCOPY;  Surgeon: Mirtha Davis MD;  [place] [facility] [place];  Service: Gastroenterology;  Laterality: N/A;  diverticulosis, polyps   • HYSTERECTOMY  2006    [facility]   • SUBTOTAL HYSTERECTOMY  2006       Visit Dx:     ICD-10-CM ICD-9-CM   1. Lumbar radiculopathy  M54.16 724.4   2. Chronic bilateral low back pain with bilateral sciatica  M54.42 724.2    M54.41 724.3    G89.29 338.29   3. Lumbar stenosis with neurogenic claudication  M48.062 724.03          Patient History     Row Name 07/05/22 0900             History    Chief Complaint Pain  -LW      Type of Pain Back pain  Chronic intermittent bilateral low back pain with bilateral lower extremity numbness, tingling, and heaviness tto heels.  -LW      Date Current Problem(s) Began 02/20/19  -LW      Brief Description of Current Complaint Kirsten Lima is a 61 y.o. female who presents today with chronic intermittent bilateral low back pain with bilateral lower extremity numbness, tingling, and heaviness to heels. Her legs started aching intermittently with numbness and tingling and low back pain in 2019 after a slip and fall on ice. She started PT for her symptoms in 2020, with which she started to see improvements, but her episode of care was disrupted by COVID closures and she has not returned since. She has since retired from her job in 2020 and is more sedentary than she would like. She has had an MRI showing L3-5 stenosis. Kirsten Lima reports difficulty/increased pain with shopping at the grocery and mall, standing to cook, exercising for weight control, and general mobility. Pain  relieving factors include sitting and lying down. PMH includes Breast cancer/L lumpectomy in 2022, partial hysterectomy, Hidradenitis Suppurativa surgery in bilateral sides/underarms, and HTN.  -LW      Patient/Caregiver Goals Improve strength;Improve mobility;Return to prior level of function;Relieve pain;Know what to do to help the symptoms  -LW      Patient/Caregiver Goals Comment Would like to be able to participate more in shopping (grocery and mall), cooking.  -LW      Occupation/sports/leisure activities Retired, shopping, cooking.  -LW      Results of Clinical Tests MRI in 2019 or 2020 (stenosis in L3-5)  -LW              Pain     Pain Location Back  bilateral lower back, intermittent tingling down both legs to the heels  -LW      Pain at Present 0  -LW      Pain at Best 0  -LW      Pain at Worst 6  -LW      Pain Frequency Intermittent  -LW      Pain Description Aching;Tingling;Numbness;Heaviness  -LW      What Performance Factors Make the Current Problem(s) WORSE? Standing, walking  -LW      What Performance Factors Make the Current Problem(s) BETTER? Sitting  -LW      Tolerance Time- Standing 15-20  -LW      Tolerance Time- Sitting No limit  -LW      Tolerance Time- Walking 5-10  -LW      Is your sleep disturbed? No  Increased soreness waking up.  -LW      Difficulties at work? N/A  -LW      Difficulties with ADL's? Cooking, shopping  -LW      Difficulties with recreational activities? Shopping, cooking, exercising  -LW              Fall Risk Assessment    Any falls in the past year: No  -LW      Does patient have a fear of falling No  -LW              Daily Activities    Primary Language English  -LW      How does patient learn best? Reading;Demonstration;Pictures/Video;Listening  -LW      Pt Participated in POC and Goals Yes  -LW              Safety    Are you being hurt, hit, or frightened by anyone at home or in your life? No  -LW      Are you being neglected by a caregiver No  -LW            User Key   (r) = Recorded By, (t) = Taken By, (c) = Cosigned By    Initials Name Provider Type    Violetta Yost PT Physical Therapist                 PT Ortho     Row Name 07/05/22 0900       Subjective Pain    Able to rate subjective pain? --  -LW       Vital Signs    Pre Systolic BP Rehab 150  -LW    Pre Treatment Diastolic BP 95  -LW    Vitals Comment Educated patient on monitoring and following up with physician.  -LW       Posture/Observations    Posture/Observations Comments Increased lordosis in standing  -LW       Myotomal Screen- Lower Quarter Clearing    Hip flexion (L2) Bilateral:;4 (Good)  -LW    Knee extension (L3) Bilateral:;5 (Normal)  -LW    Ankle DF (L4) Bilateral:;5 (Normal)  -LW    Ankle PF (S1) Bilateral:;5 (Normal)  -LW    Knee flexion (S2) Bilateral:;5 (Normal)  -LW       Lumbar ROM Screen- Lower Quarter Clearing    Lumbar Flexion Impaired  Reach 4' from floor, hs tightness  -LW    Lumbar Extension Impaired  Minimal motion, left radicular sx  -LW    Lumbar Lateral Flexion Impaired  L>right, symptoms on left hip with L lean  -LW    Lumbar Rotation Impaired  more motion left, but left buttock sx.  -LW       SI/Hip Screen- Lower Quarter Clearing    Rachele's/Willy's test Bilateral:;Positive  hip tightness  -LW       Lumbar/SI Special Tests    Slump Test (Neural Tension) Bilateral:;Positive  mildly  -LW       MMT (Manual Muscle Testing)    General MMT Comments chris hip abd/add in sitting 5/5. Decreased core stability during MMT.  -LW       Sensation    Sensation WNL? WNL  in sitting  -LW       Lower Extremity Flexibility    Hamstrings Bilateral:;Mildly limited  -LW    LE Other Flexibility Bilateral:;Mildly limited  RACHELE and piriformis  -LW       Gait/Stairs (Locomotion)    Comment, (Gait/Stairs) --  -LW          User Key  (r) = Recorded By, (t) = Taken By, (c) = Cosigned By    Initials Name Provider Type    Violetta Yost PT Physical Therapist                            Therapy Education  Education  Details: Educated on anatomy/pathology, evaluation findings, POC and HEP  Given: HEP, Symptoms/condition management, Posture/body mechanics, Pain management  Program: New  How Provided: Verbal, Written, Demonstration  Provided to: Patient  Level of Understanding: Verbalized, Demonstrated      PT OP Goals     Row Name 07/05/22 1100          PT Short Term Goals    STG Date to Achieve 08/04/22  -LW     STG 1 The pt will demonstrate IND with initial HEP focused on improved lumbar mobility/stability and decreased pain.  -LW     STG 1 Progress New  -LW     STG 2 Patient will demonstrate symmetrical and pain-free spinal AROM to decrease pain and improve ability to exercise.  -LW     STG 2 Progress New  -LW            Long Term Goals    LTG Date to Achieve 09/03/22  -LW     LTG 1 The pt will demonstrate IND with finalized HEP focused on return to PLOF and IND condition management.  -LW     LTG 1 Progress New  -LW     LTG 2 Patient will score no more than 15% disability per the Oswestry to demonstrate decreased limitations on daily activities.  -LW     LTG 2 Progress New  -LW     LTG 3 Patient will report ability to stand and walk  for at least 30 min each before onset of symptoms to allow her to perform increased cooking and shopping before needing to sit as well as allow for improved ability to exercise.  -     LTG 3 Progress New  -            Time Calculation    PT Goal Re-Cert Due Date 10/03/22  -           User Key  (r) = Recorded By, (t) = Taken By, (c) = Cosigned By    Initials Name Provider Type    Violetta Yost, PT Physical Therapist                 PT Assessment/Plan     Row Name 07/05/22 1100          PT Assessment    Functional Limitations Impaired gait;Limitations in community activities;Limitations in functional capacity and performance;Limitation in home management  -LW     Impairments Pain;Range of motion;Poor body mechanics;Muscle strength;Posture  -LW     Assessment Comments Kirsten Lima is a 61  y.o. female referred to physical therapy for Spinal stenosis of lumbar region with neurogenic claudication. She presents with a stable clinical presentation, along with  comorbidities of hypertension, previous partial hysterectomy and left breast cancer/lumpectomy and personal factors of sedentary lifestyle and obesity.  that may impact her progress in the plan of care. Pt presents today with intermittent pain and bilateral LE paresthesia, limited spinal range of motion, mild decreased flexibility in bilateral LEs, bilateral hip weakness, mild chris LE neural tension, decreased core stability, and increased blood pressure. Her signs and symptoms are consistent with referring diagnosis. The previous impairments limit her ability to stand, walk, and perform activities such as shopping, cooking, and exercising. Pt will benefit from skilled PT to address the previous impairments and return to PLOF.  -LW     Please refer to paper survey for additional self-reported information Yes  -LW     Rehab Potential Excellent  -LW     Patient/caregiver participated in establishment of treatment plan and goals Yes  -LW     Patient would benefit from skilled therapy intervention Yes  -LW            PT Plan    PT Frequency 2x/week  -LW     Predicted Duration of Therapy Intervention (PT) 10-12 visits for 8 weeks  -LW     Planned CPT's? PT EVAL LOW COMPLEXITY: 29573;PT RE-EVAL: 61620;PT THER PROC EA 15 MIN: 84175;PT THER ACT EA 15 MIN: 00914;PT MANUAL THERAPY EA 15 MIN: 00236;PT NEUROMUSC RE-EDUCATION EA 15 MIN: 84043;PT GAIT TRAINING EA 15 MIN: 39435;PT SELF CARE/HOME MGMT/TRAIN EA 15: 67421;PT HOT OR COLD PACK TREAT MCARE  -LW     PT Plan Comments Review and progress HEP for core stabilization: consider ISSARHEATHER on sball, core stab HL march, standing PPT, Palof press.  -LW           User Key  (r) = Recorded By, (t) = Taken By, (c) = Cosigned By    Initials Name Provider Type    Violetta Yost, PT Physical Therapist                    OP Exercises     Row Name 07/05/22 0900             Subjective Pain    Able to rate subjective pain? --  -LW              Total Minutes    71653 - PT Therapeutic Exercise Minutes 15  Including education on HEP and activity limitation  -LW              Exercise 1    Exercise Name 1 Nustep  -LW      Additional Comments Next session  -LW              Exercise 2    Exercise Name 2 PPT  -LW      Cueing 2 Verbal;Tactile;Demo  -LW      Sets 2 1  -LW      Reps 2 10  -LW              Exercise 3    Exercise Name 3 LTR  -LW      Cueing 3 Verbal;Tactile  -LW      Sets 3 1  -LW      Reps 3 10  -LW            User Key  (r) = Recorded By, (t) = Taken By, (c) = Cosigned By    Initials Name Provider Type    Violetta Yost, PT Physical Therapist                              Outcome Measure Options: Modified Oswestry  Modified Oswestry  Modified Oswestry Score/Comments: 24/50=48% disability      Time Calculation:     Start Time: 0942  Stop Time: 1033  Time Calculation (min): 51 min  Total Timed Code Minutes- PT: 15 minute(s)  Timed Charges  23639 - PT Therapeutic Exercise Minutes: 15 (Including education on HEP and activity limitation)  Untimed Charges  PT Eval/Re-eval Minutes: 36  Total Minutes  Timed Charges Total Minutes: 15  Untimed Charges Total Minutes: 36   Total Minutes: 51     Therapy Charges for Today     Code Description Service Date Service Provider Modifiers Qty    00934162216 HC PT THER PROC EA 15 MIN 7/5/2022 Violetta Deluna, PT GP 1    56766227847 HC PT EVAL LOW COMPLEXITY 2 7/5/2022 Violetta Deluna, PT GP 1          PT G-Codes  Outcome Measure Options: Modified Oswestry  Modified Oswestry Score/Comments: 24/50=48% disability         Violetta Deluna, PT  7/5/2022

## 2022-07-15 ENCOUNTER — TELEPHONE (OUTPATIENT)
Dept: SURGERY | Facility: CLINIC | Age: 62
End: 2022-07-15

## 2022-07-15 NOTE — TELEPHONE ENCOUNTER
"I discussed the following with Dr. Zambrano.   Per Dr. Zambrano, This is caused by radiation changes and scar tissue.     We will move her imaging and follow up appointment up sooner.     Patient expressed v/u, I let her know we will call with the new appointment date and times.       ..  --\" Patient called with c/o left breast soreness and hardness around the surgical site.   The left nipple, seems to be enlarged and painful. No d/c or discoloration.   This has progressed within the last couple of weeks.\"    No fever or shortness of breath.     Patient is sending a picture.     I have notified Dr. Zambrano    "

## 2022-07-18 ENCOUNTER — TELEPHONE (OUTPATIENT)
Dept: SURGERY | Facility: CLINIC | Age: 62
End: 2022-07-18

## 2022-07-18 NOTE — TELEPHONE ENCOUNTER
Patient has been rescheduled for her annual mammogram with [facility] on  15th of August at 9:20 am with a follow up with Dr. Zambrano for the 24 at 8:30 am due to Dr. Zambrano out of office the previous week    Patient is aware and confirmed with Verbal understanding

## 2022-07-19 ENCOUNTER — HOSPITAL ENCOUNTER (OUTPATIENT)
Dept: PHYSICAL THERAPY | Facility: HOSPITAL | Age: 62
Setting detail: THERAPIES SERIES
Discharge: HOME | End: 2022-07-19

## 2022-07-19 DIAGNOSIS — M54.42 CHRONIC BILATERAL LOW BACK PAIN WITH BILATERAL SCIATICA: ICD-10-CM

## 2022-07-19 DIAGNOSIS — M48.062 LUMBAR STENOSIS WITH NEUROGENIC CLAUDICATION: ICD-10-CM

## 2022-07-19 DIAGNOSIS — G89.29 CHRONIC BILATERAL LOW BACK PAIN WITH BILATERAL SCIATICA: ICD-10-CM

## 2022-07-19 DIAGNOSIS — M54.41 CHRONIC BILATERAL LOW BACK PAIN WITH BILATERAL SCIATICA: ICD-10-CM

## 2022-07-19 DIAGNOSIS — M54.16 LUMBAR RADICULOPATHY: Primary | ICD-10-CM

## 2022-07-19 PROCEDURE — 97110 THERAPEUTIC EXERCISES: CPT

## 2022-07-21 ENCOUNTER — APPOINTMENT (OUTPATIENT)
Dept: PHYSICAL THERAPY | Facility: HOSPITAL | Age: 62
End: 2022-07-21

## 2022-07-26 ENCOUNTER — APPOINTMENT (OUTPATIENT)
Dept: LAB | Facility: HOSPITAL | Age: 62
End: 2022-07-26

## 2022-07-26 ENCOUNTER — OFFICE VISIT (OUTPATIENT)
Dept: ONCOLOGY | Facility: CLINIC | Age: 62
End: 2022-07-26

## 2022-07-26 ENCOUNTER — LAB (OUTPATIENT)
Dept: LAB | Facility: HOSPITAL | Age: 62
End: 2022-07-26

## 2022-07-26 ENCOUNTER — HOSPITAL ENCOUNTER (OUTPATIENT)
Dept: PHYSICAL THERAPY | Facility: HOSPITAL | Age: 62
Setting detail: THERAPIES SERIES
Discharge: HOME | End: 2022-07-26

## 2022-07-26 VITALS
OXYGEN SATURATION: 94 % | WEIGHT: 253.3 LBS | HEIGHT: 66 IN | BODY MASS INDEX: 40.71 KG/M2 | DIASTOLIC BLOOD PRESSURE: 78 MMHG | HEART RATE: 60 BPM | TEMPERATURE: 96.9 F | SYSTOLIC BLOOD PRESSURE: 134 MMHG | RESPIRATION RATE: 16 BRPM

## 2022-07-26 DIAGNOSIS — M48.062 LUMBAR STENOSIS WITH NEUROGENIC CLAUDICATION: ICD-10-CM

## 2022-07-26 DIAGNOSIS — Z79.811 USE OF AROMATASE INHIBITORS: ICD-10-CM

## 2022-07-26 DIAGNOSIS — Z12.2 ENCOUNTER FOR SCREENING FOR LUNG CANCER: ICD-10-CM

## 2022-07-26 DIAGNOSIS — D05.02 LOBULAR CARCINOMA IN SITU (LCIS) OF LEFT BREAST: Primary | ICD-10-CM

## 2022-07-26 DIAGNOSIS — M54.42 CHRONIC BILATERAL LOW BACK PAIN WITH BILATERAL SCIATICA: ICD-10-CM

## 2022-07-26 DIAGNOSIS — M54.41 CHRONIC BILATERAL LOW BACK PAIN WITH BILATERAL SCIATICA: ICD-10-CM

## 2022-07-26 DIAGNOSIS — M54.16 LUMBAR RADICULOPATHY: Primary | ICD-10-CM

## 2022-07-26 DIAGNOSIS — G89.29 CHRONIC BILATERAL LOW BACK PAIN WITH BILATERAL SCIATICA: ICD-10-CM

## 2022-07-26 DIAGNOSIS — D05.02 LOBULAR CARCINOMA IN SITU (LCIS) OF LEFT BREAST: ICD-10-CM

## 2022-07-26 LAB
ALBUMIN SERPL-MCNC: 3.9 G/DL (ref 3.5–5.2)
ALBUMIN/GLOB SERPL: 1.1 G/DL (ref 1.1–2.4)
ALP SERPL-CCNC: 92 U/L (ref 38–116)
ALT SERPL W P-5'-P-CCNC: 11 U/L (ref 0–33)
ANION GAP SERPL CALCULATED.3IONS-SCNC: 13.7 MMOL/L (ref 5–15)
AST SERPL-CCNC: 13 U/L (ref 0–32)
BASOPHILS # BLD AUTO: 0.02 10*3/MM3 (ref 0–0.2)
BASOPHILS NFR BLD AUTO: 0.3 % (ref 0–1.5)
BILIRUB SERPL-MCNC: 0.4 MG/DL (ref 0.2–1.2)
BUN SERPL-MCNC: 7 MG/DL (ref 6–20)
BUN/CREAT SERPL: 8.2 (ref 7.3–30)
CALCIUM SPEC-SCNC: 9.7 MG/DL (ref 8.5–10.2)
CHLORIDE SERPL-SCNC: 100 MMOL/L (ref 98–107)
CO2 SERPL-SCNC: 27.3 MMOL/L (ref 22–29)
CREAT SERPL-MCNC: 0.85 MG/DL (ref 0.6–1.1)
DEPRECATED RDW RBC AUTO: 40.3 FL (ref 37–54)
EGFRCR SERPLBLD CKD-EPI 2021: 77.6 ML/MIN/1.73
EOSINOPHIL # BLD AUTO: 0.08 10*3/MM3 (ref 0–0.4)
EOSINOPHIL NFR BLD AUTO: 1.1 % (ref 0.3–6.2)
ERYTHROCYTE [DISTWIDTH] IN BLOOD BY AUTOMATED COUNT: 13.1 % (ref 12.3–15.4)
GLOBULIN UR ELPH-MCNC: 3.5 GM/DL (ref 1.8–3.5)
GLUCOSE SERPL-MCNC: 125 MG/DL (ref 74–124)
HCT VFR BLD AUTO: 45.6 % (ref 34–46.6)
HGB BLD-MCNC: 14.8 G/DL (ref 12–15.9)
IMM GRANULOCYTES # BLD AUTO: 0.04 10*3/MM3 (ref 0–0.05)
IMM GRANULOCYTES NFR BLD AUTO: 0.6 % (ref 0–0.5)
LYMPHOCYTES # BLD AUTO: 1.56 10*3/MM3 (ref 0.7–3.1)
LYMPHOCYTES NFR BLD AUTO: 22.2 % (ref 19.6–45.3)
MCH RBC QN AUTO: 27.7 PG (ref 26.6–33)
MCHC RBC AUTO-ENTMCNC: 32.5 G/DL (ref 31.5–35.7)
MCV RBC AUTO: 85.4 FL (ref 79–97)
MONOCYTES # BLD AUTO: 0.4 10*3/MM3 (ref 0.1–0.9)
MONOCYTES NFR BLD AUTO: 5.7 % (ref 5–12)
NEUTROPHILS NFR BLD AUTO: 4.92 10*3/MM3 (ref 1.7–7)
NEUTROPHILS NFR BLD AUTO: 70.1 % (ref 42.7–76)
NRBC BLD AUTO-RTO: 0 /100 WBC (ref 0–0.2)
PLATELET # BLD AUTO: 273 10*3/MM3 (ref 140–450)
PMV BLD AUTO: 9 FL (ref 6–12)
POTASSIUM SERPL-SCNC: 3.4 MMOL/L (ref 3.5–4.7)
PROT SERPL-MCNC: 7.4 G/DL (ref 6.3–8)
RBC # BLD AUTO: 5.34 10*6/MM3 (ref 3.77–5.28)
SODIUM SERPL-SCNC: 141 MMOL/L (ref 134–145)
WBC NRBC COR # BLD: 7.02 10*3/MM3 (ref 3.4–10.8)

## 2022-07-26 PROCEDURE — 36415 COLL VENOUS BLD VENIPUNCTURE: CPT

## 2022-07-26 PROCEDURE — 80053 COMPREHEN METABOLIC PANEL: CPT

## 2022-07-26 PROCEDURE — 97110 THERAPEUTIC EXERCISES: CPT

## 2022-07-26 PROCEDURE — 85025 COMPLETE CBC W/AUTO DIFF WBC: CPT

## 2022-07-26 PROCEDURE — 99215 OFFICE O/P EST HI 40 MIN: CPT | Performed by: INTERNAL MEDICINE

## 2022-07-26 RX ORDER — CEPHALEXIN 500 MG/1
500 CAPSULE ORAL 4 TIMES DAILY
Qty: 20 CAPSULE | Refills: 0 | Status: SHIPPED | OUTPATIENT
Start: 2022-07-26 | End: 2022-07-31

## 2022-07-26 NOTE — PROGRESS NOTES
October 9, 2017      John Skelton III, PA-C  [address] [place] [address]           [facility] - [facility]oped[facility]  [address] [place] [address]  Phone: [phone]  Fax: [phone]          Patient: Viral Alves    MR Number: [id number]   YOB: 1974   Date of Visit: 10/9/2017       Dear John Skelton III:    Thank you for referring Viral Alves  to me for evaluation. Attached you will find relevant portions of my assessment and plan of care.    If you have questions, please do not hesitate to call me. I look forward to following Viral Alves  along with you.    Sincerely,    Jr Mantilla MD    Enclosure  CC:  No Recipients    If you would like to receive this communication electronically, please contact [email or web address] or [phone] to request more information on [employer] access.    For providers and/or their staff who would like to refer a patient to [facility], please contact us through our one-stop-shop provider referral line, [facility], at [phone].    If you feel you have received this communication in error or would no longer like to receive these types of communications, please e-mail [email or web address]          Subjective   Kirsten Lima is a 62 y.o. female. Referred by  for LCIS     History of Present Illness   Ms. Lima is a 61-year-old -American lady who presents with a screen detected abnormality of the left breast.    6/2/2020-screening mammogram  Scattered areas of fibroglandular density.  There are small asymmetry seen in both breasts.  No significant change from prior exams.  Stable punctate and spherical lucent centered calcifications with diffuse distribution in both breasts.  There is a new focal asymmetry measuring 12 mm in the central region of the left breast.    6/24/2020-left diagnostic mammogram  Focal asymmetry in the left breast does not persist.  Ultrasound-no sonographic correlate.  3 oval intraductal masses with partially defined margins measuring 5 mm to 9 mm in the anterior one third subareolar region of the left breast.  Patient reported history of intermittent clear nipple discharge for about 10 years.  These were considered suspicious and ultrasound-guided biopsy was recommended.    6/29/2020-left breast ultrasound-guided biopsy x2  1.left subareolar mass measuring 5 x 5 x 6 mm-benign sclerosing intraductal papilloma with calcifications.  2.left subareolar mass-benign sclerosing intraductal papilloma with usual ductal hyperplasia and microcalcifications.    She was evaluated by Dr. Zambrano on 8/7/2020 and recommended to undergo excisional biopsy of both intraductal papillomas.    2/22/2021-left breast needle localized excisional biopsy-pathology was consistent with lobular carcinoma in situ.  Multiple sclerosed intraductal papillomas with focal calcifications which were completely excised.  Background breast parenchyma showed usual ductal hyperplasia and calcifications with sclerosing adenosis.    She has been referred to medical oncology to discuss risk reduction given findings of lobular carcinoma in situ.    Patient reports chronic nipple discharge from the left.  She also  had significant issues with the breast skin due to hidradenitis and recurrent skin abscesses.  She had a past history of benign breast biopsy , unable to recall the date.  Denies any family history of breast or ovarian cancer.    6/11/2021-bilateral screening mammogram-benign.    12/10/2021-bilateral breast MRI  Right breast-no suspicious findings.    Left breast-  Postsurgical changes in the anterior left breast.  At 5:00, 3.4 cm posterior to the nipple there is a 1.8 cm linear branching non-mass enhancement which is suspicious.  Abnormal enhancement in the left nipple with approximately 1.2 x 1.7 x 1 cm mass involving the nipple itself and extending slightly deeper into the subareolar breast.  This is suspicious.    No extramammary findings    Recommendations  1.suspicious 1.7 cm enhancing left nipple mass, surgical excision recommended as this is not amenable to imaging guided biopsy.  2.suspicious 1.8 cm linear branching non-mass enhancement at 5:00 in the left breast, MR guided biopsy recommended.    12/22/2021-left breast MRI guided biopsy of the 5:00.  A.extensive lobular carcinoma in situ  B.no invasive carcinoma identified by routine or immunostaining  C.associated intraductal papilloma with microcalcifications, ductal cyst wall and fibrocystic change.    Patient was prescribed tamoxifen for risk reduction which she initially did not take and subsequently this was changed to anastrozole at her visit in September which she only started taking in December 2021.    1/12/2022-she was evaluated by Dr. Zambrano and scheduled for left breast needle localized excisional biopsy and left nipple mass excision.    2/1/2022-left breast needle localized excisional biopsy left nipple mass excision  1.left breast lumpectomy-invasive lobular carcinoma, lobular carcinoma in situ and deep typical lobular hyperplasia.  A.invasive lobular carcinoma  Measures 4 mm  Grade 1  The focus is located amongst the area of lobular  carcinoma in situ  Extensive lobular carcinoma in situ and atypical lobular hyperplasia  Fibroadenoma with calcification, sclerosing adenosis, apocrine metaplasia, clustered cysts in area consistent with radial scar and fat necrosis    2.left nipple excision  Simple cyst  Apocrine metaplasia  Intraductal papilloma  Atypical lobular hyperplasia    The lobular carcinoma is ER +80% strong, MA 3% moderate, HER-2 negative, Ki-67 2%    Axillary lymph nodes not evaluated    Case was discussed in multidisciplinary conference and decided to omit axillary staging and proceed with radiation and continue Arimidex.    Interval history  Ms. Lima presents to the clinic today for follow-up.  She continues on Arimidex and has been tolerating that fairly well.  Her mammogram has been scheduled for August 2022 and she has a follow-up with Dr. Zambrano subsequently.  Completed radiation in April 2022 to the left breast.  Reports firmness of the left breast since radiation.  Also some tenderness to palpation.  No new bone pains, abdominal pain nausea vomiting  She has had a cough for about a month.  Continues to smoke about half pack per day.    The following portions of the patient's history were reviewed and updated as appropriate: allergies, current medications, past family history, past medical history, past social history, past surgical history and problem list.    Past Medical History:   Diagnosis Date   • Acute cystitis    • Allergic rhinitis    • Breast cancer (HCC)    • Breast cyst    • Colon polyp May 6, 2022    7   • Cyst of left nipple    • Depression    • Diverticulosis May 6, 2022   • Dry skin    • Dysuria    • Hemorrhoid    • Hidradenitis    • History of bronchitis    • Hyperlipidemia    • Hypertension    • Incontinence in female     wears pads   • Insomnia    • Nonspecific abnormal electrocardiogram (ECG) (EKG)    • Obesity    • Overactive bladder    • Pregnancy     G-2, P-0   • Sleep apnea    • UTI (urinary tract  infection) 2021   • Vitamin B12 deficiency    • Vitamin D insufficiency    • Wound, breast     LEFT BREAST; PT INSTRUCTED TO NOTIFY DR ZAMBRANO PRIOR TO SURGERY        Past Surgical History:   Procedure Laterality Date   • AXILLARY HIDRADENITIS EXCISION Bilateral    • BREAST BIOPSY Left 2021    Procedure: Left breast needle-localized excisional biopsy (tophat clip) and left breast ultrasound-guided excisional biopsy (hydromark clip);  Surgeon: Debora Zambrano MD;  Location: [facility] MAIN OR;  Service: General;  Laterality: Left;   • BREAST BIOPSY Left 2022    Procedure: Left breast needle-localized excisional biopsy, Left nipple mass excision;  Surgeon: Debora Zambrano MD;  Location: [facility] OR;  Service: General;  Laterality: Left;   • CARDIAC CATHETERIZATION     • COLONOSCOPY     • COLONOSCOPY N/A 2022    Procedure: COLONOSCOPY;  Surgeon: Mirtha Davis MD;  [facility];  Service: Gastroenterology;  Laterality: N/A;  diverticulosis, polyps   • HYSTERECTOMY  2006    LAVH   • SUBTOTAL HYSTERECTOMY          Family History   Problem Relation Age of Onset   • Hypertension Mother    • COPD Father             • Heart failure Father    • Hypertension Father    • Hyperlipidemia Father    • Vision loss Father    • Pancreatic cancer Brother         1 brother  from pancreatic cancer   • Alcohol abuse Brother             • Other Sister    • Alcohol abuse Sister             • Liver disease Sister    • Alcohol abuse Brother             • Cancer Brother         Liver   • Malig Hyperthermia Neg Hx         Social History     Socioeconomic History   • Marital status: Single   • Number of children: 0   Tobacco Use   • Smoking status: Current Every Day Smoker     Packs/day: 0.50     Years: 42.00     Pack years: 21.00     Types: Cigarettes     Start date: 1979   • Smokeless tobacco: Never Used   Vaping Use   • Vaping Use: Never used  "  Substance and Sexual Activity   • Alcohol use: Yes     Comment: 1-2 drinks a month   • Drug use: Never     Comment: marijuana in her twenties   • Sexual activity: Not Currently     Partners: Male     Birth control/protection: Abstinence        OB History    No obstetric history on file.      Age at menarche-13  Age at menopause-2006 she is status post hysterectomy.  Still has both ovaries  She does not have any children  Total period of oral contraceptive pill use 8 years      No Known Allergies         Review of Systems   Constitutional: Negative.    HENT: Negative.    Eyes: Negative.    Respiratory: Negative.    Cardiovascular: Negative.    Gastrointestinal: Negative.    Genitourinary: Positive for amenorrhea.   Allergic/Immunologic: Negative.    Neurological: Negative.    Hematological: Negative.    Psychiatric/Behavioral: Positive for sleep disturbance.   Review of systems as mentioned in the HPI    Objective   Blood pressure 134/78, pulse 60, temperature 96.9 °F (36.1 °C), temperature source Temporal, resp. rate 16, height 167.6 cm (65.98\"), weight 115 kg (253 lb 4.8 oz), SpO2 94 %, not currently breastfeeding.   Physical Exam  Vitals reviewed.   Constitutional:       Appearance: Normal appearance. She is obese.   HENT:      Head: Normocephalic and atraumatic.      Right Ear: External ear normal.      Left Ear: External ear normal.      Nose: Nose normal. No congestion or rhinorrhea.      Mouth/Throat:      Mouth: Mucous membranes are moist.      Pharynx: Oropharynx is clear. No oropharyngeal exudate or posterior oropharyngeal erythema.   Eyes:      General:         Right eye: No discharge.         Left eye: No discharge.      Conjunctiva/sclera: Conjunctivae normal.      Pupils: Pupils are equal, round, and reactive to light.   Cardiovascular:      Rate and Rhythm: Normal rate.   Pulmonary:      Effort: Pulmonary effort is normal.      Breath sounds: Normal breath sounds.   Abdominal:      General: Abdomen " is flat. Bowel sounds are normal.      Palpations: Abdomen is soft.   Musculoskeletal:         General: No swelling, tenderness or deformity. Normal range of motion.      Cervical back: Normal range of motion.   Skin:     General: Skin is warm.      Coloration: Skin is not jaundiced or pale.   Neurological:      General: No focal deficit present.      Mental Status: She is alert and oriented to person, place, and time.      Cranial Nerves: No cranial nerve deficit.      Sensory: No sensory deficit.   Psychiatric:         Mood and Affect: Mood normal.         Behavior: Behavior normal.         Thought Content: Thought content normal.         Judgment: Judgment normal.       Breast Exam: Right breast appears normal inspection no palpable abnormalities of the right breast.  Left breast on inspection there is a periareolar incision which is well-healed and also another periareolar incision which is well-healed.  The left breast is hyperpigmented, thickened secondary to radiation changes.  There is tenderness to palpation in the left breast.      Lab on 07/26/2022   Component Date Value Ref Range Status   • WBC 07/26/2022 7.02  3.40 - 10.80 10*3/mm3 Final   • RBC 07/26/2022 5.34 (A) 3.77 - 5.28 10*6/mm3 Final   • Hemoglobin 07/26/2022 14.8  12.0 - 15.9 g/dL Final   • Hematocrit 07/26/2022 45.6  34.0 - 46.6 % Final   • MCV 07/26/2022 85.4  79.0 - 97.0 fL Final   • MCH 07/26/2022 27.7  26.6 - 33.0 pg Final   • MCHC 07/26/2022 32.5  31.5 - 35.7 g/dL Final   • RDW 07/26/2022 13.1  12.3 - 15.4 % Final   • RDW-SD 07/26/2022 40.3  37.0 - 54.0 fl Final   • MPV 07/26/2022 9.0  6.0 - 12.0 fL Final   • Platelets 07/26/2022 273  140 - 450 10*3/mm3 Final   • Neutrophil % 07/26/2022 70.1  42.7 - 76.0 % Final   • Lymphocyte % 07/26/2022 22.2  19.6 - 45.3 % Final   • Monocyte % 07/26/2022 5.7  5.0 - 12.0 % Final   • Eosinophil % 07/26/2022 1.1  0.3 - 6.2 % Final   • Basophil % 07/26/2022 0.3  0.0 - 1.5 % Final   • Immature Grans %  07/26/2022 0.6 (A) 0.0 - 0.5 % Final   • Neutrophils, Absolute 07/26/2022 4.92  1.70 - 7.00 10*3/mm3 Final   • Lymphocytes, Absolute 07/26/2022 1.56  0.70 - 3.10 10*3/mm3 Final   • Monocytes, Absolute 07/26/2022 0.40  0.10 - 0.90 10*3/mm3 Final   • Eosinophils, Absolute 07/26/2022 0.08  0.00 - 0.40 10*3/mm3 Final   • Basophils, Absolute 07/26/2022 0.02  0.00 - 0.20 10*3/mm3 Final   • Immature Grans, Absolute 07/26/2022 0.04  0.00 - 0.05 10*3/mm3 Final   • nRBC 07/26/2022 0.0  0.0 - 0.2 /100 WBC Final        No radiology results for the last 30 days.     CBC reviewed and within normal limits    Assessment & Plan       62-year-old lady with a previous hysterectomy with uncertain menopausal status presents for management of lobular carcinoma in situ.     *Lobular carcinoma in situ  · 2/22/2021-left breast needle localized excisional biopsy shows lobular carcinoma in situ with multiple intraductal papillomas.  · She was offered tamoxifen 5 mg daily for risk reduction however did not start taking that.  · She was seen in September 2021 and treatment was changed to anastrozole as she was concerned about the risk of blood clots with tamoxifen.  · She finally started taking anastrozole in December 2021.  · Had an abnormal MRI in December 2021 requiring a left breast biopsy and left breast excision.  · Diagnosed with invasive lobular carcinoma  · Continues on anastrozole    *Invasive lobular carcinoma of the left breast  · Invasive lobular carcinoma small focus diagnosed with an extensive areas of lobular carcinoma in situ  · pT1 a Nx M0  · Status post excision of the left breast mass  · Completed adjuvant radiation to the left breast in April 2022  · Continues on anastrozole  · No evidence of recurrent disease  · Scheduled for screening mammogram in August 2022  · MRI in December 2022     *Lifestyle changes  · She has not been exercising or modified her diet  · She has been going to physical therapy for back pain  · BMI  unchanged at 40.9    *Obesity  · BMI 40.9  · She has met with a dietitian before  · Still unable to implement dietary changes successfully.     *Tobacco cessation  · Referral made to [facility] to help with smoking cessation  · Insurance does not cover the programs  · Prescribed nicotine patches in the past but she has not used them  · Encouraged her to use the nicotine patches     *Surveillance  · Continue annual mammograms due in August 2022  · Annual MRI due in December 2022  · Screening lung CT ordered today     *Hypertension  · Blood pressure 134/78  · Continue current medication       *Hyperlipidemia  · Continue Crestor  · Unchanged     *Bladder incontinence  · Continue oxybutynin  · Continue follow-up with Dr. Cantu her gynecologist     *Follow-up-4 months    40 minutes spent on the encounter including reviewing the medical records, face-to-face time, documentation of the same day

## 2022-07-26 NOTE — THERAPY TREATMENT NOTE
Outpatient Physical Therapy [facility] Treatment [facility]  [facility]     Patient Name: Kirsten Lima  : 1960  MRN: [id number]  Today's Date: 2022      Visit Date: 2022    Visit Dx:    ICD-10-CM ICD-9-CM   1. Lumbar radiculopathy  M54.16 724.4   2. Chronic bilateral low back pain with bilateral sciatica  M54.42 724.2    M54.41 724.3    G89.29 338.29   3. Lumbar stenosis with neurogenic claudication  M48.062 724.03       Patient Active Problem List   Diagnosis   • Abnormal thyroid stimulating hormone (TSH) level   • Atopic rhinitis   • Hypertension   • Insomnia   • Overactive bladder   • Knee pain   • Cobalamin deficiency   • Vitamin D deficiency   • Hyperlipidemia   • Other chronic pain   • Lumbar facet arthropathy   • Spinal stenosis of lumbar region   • Intraductal papilloma of left breast   • Lobular carcinoma in situ (LCIS) of left breast   • Increased risk of breast cancer   • Tobacco use disorder   • Elevated glucose   • Mass of nipple   • Colon cancer screening   • Malignant neoplasm of overlapping sites of left breast in female, estrogen receptor positive (HCC)   • Lumbar radiculopathy        Past Medical History:   Diagnosis Date   • Acute cystitis    • Allergic rhinitis    • Breast cancer (HCC)    • Breast cyst    • Colon polyp May 6, 2022    7   • Cyst of left nipple    • Depression    • Diverticulosis May 6, 2022   • Dry skin    • Dysuria    • Hemorrhoid    • Hidradenitis    • History of bronchitis    • Hyperlipidemia    • Hypertension    • Incontinence in female     wears pads   • Insomnia    • Nonspecific abnormal electrocardiogram (ECG) (EKG)    • Obesity    • Overactive bladder    • Pregnancy     G-2, P-0   • Sleep apnea    • UTI (urinary tract infection) 2021   • Vitamin B12 deficiency    • Vitamin D insufficiency    • Wound, breast     LEFT BREAST; PT INSTRUCTED TO NOTIFY DR GARCIA PRIOR TO SURGERY        Past Surgical History:   Procedure Laterality Date   • AXILLARY  HIDRADENITIS EXCISION Bilateral    • BREAST BIOPSY Left 02/22/2021    Procedure: Left breast needle-localized excisional biopsy (tophat clip) and left breast ultrasound-guided excisional biopsy (hydromark clip);  Surgeon: Debora Zambrano MD;  Location: [facility];  Service: General;  Laterality: Left;   • BREAST BIOPSY Left 02/01/2022    Procedure: Left breast needle-localized excisional biopsy, Left nipple mass excision;  Surgeon: Debora Zambrano MD;  Location: [facility] OR;  Service: General;  Laterality: Left;   • CARDIAC CATHETERIZATION     • COLONOSCOPY     • COLONOSCOPY N/A 05/06/2022    Procedure: COLONOSCOPY;  Surgeon: Mirtha Davis MD;  Location: [facility] OR;  Service: Gastroenterology;  Laterality: N/A;  diverticulosis, polyps   • HYSTERECTOMY  2006    LAVH   • SUBTOTAL HYSTERECTOMY  2006                        PT Assessment/Plan     Row Name 07/26/22 1000          PT Assessment    Assessment Comments Reviewed HEP and progressed strengthening and mobility exercises for imprroved lumbopelvic stability. Patient requires minimal cuing for form but reports feeling the weakness in her hips. Patient educated on avoiding painful range with exercises.  -LW            PT Plan    PT Plan Comments Progress hip and core strength: consider side steps, rows, shoulder ext.  -LW           User Key  (r) = Recorded By, (t) = Taken By, (c) = Cosigned By    Initials Name Provider Type    Violetta Yost, PT Physical Therapist                   OP Exercises     Row Name 07/26/22 1000             Subjective Comments    Subjective Comments She has been able to walk a lot more since last session.  -LW              Subjective Pain    Able to rate subjective pain? --  -LW              Total Minutes    94735 - PT Therapeutic Exercise Minutes 38  -LW              Exercise 1    Exercise Name 1 Nustep  -LW      Cueing 1 Verbal;Tactile;Demo  -LW      Time 1 5  -LW              Exercise 2    Exercise Name 2 PPT   -LW      Cueing 2 Verbal;Tactile;Demo  -LW      Sets 2 1  -LW      Reps 2 10  -LW              Exercise 3    Exercise Name 3 LTR  -LW      Cueing 3 Verbal;Tactile  -LW      Sets 3 1  -LW      Reps 3 10  -LW              Exercise 4    Exercise Name 4 HL hip abd with  T-A  -LW      Cueing 4 Verbal;Tactile  -LW      Sets 4 2  -LW      Reps 4 10  -LW              Exercise 5    Exercise Name 5 HL hip add  -LW      Cueing 5 Verbal;Tactile  -LW      Sets 5 2  -LW      Reps 5 10  -LW      Time 5 5 sec  -LW      Additional Comments ball  -LW              Exercise 6    Exercise Name 6 fig 4 stretch  -LW      Cueing 6 Verbal;Demo  -LW      Sets 6 1 B  -LW      Reps 6 3  -LW      Time 6 20s  -LW              Exercise 7    Exercise Name 7 HS stretch at EOB  -LW      Cueing 7 Verbal;Tactile;Demo  -LW      Sets 7 1  -LW      Reps 7 3  -LW      Time 7 20s  -LW              Exercise 10    Exercise Name 10 HL march w/PPT  -LW      Cueing 10 Verbal;Tactile  -LW      Sets 10 2  -LW      Reps 10 10  -LW              Exercise 11    Exercise Name 11 Bridge with PPT, glute set  -LW      Cueing 11 Verbal;Tactile  -LW      Sets 11 1  -LW      Reps 11 10  -LW            User Key  (r) = Recorded By, (t) = Taken By, (c) = Cosigned By    Initials Name Provider Type    Violetta Yost, PT Physical Therapist                                                Time Calculation:   Start Time: 1040  Stop Time: 1118  Time Calculation (min): 38 min  Total Timed Code Minutes- PT: 38 minute(s)  Timed Charges  19470 - PT Therapeutic Exercise Minutes: 38  Total Minutes  Timed Charges Total Minutes: 38   Total Minutes: 38  Therapy Charges for Today     Code Description Service Date Service Provider Modifiers Qty    [id number]  PT THER PROC EA 15 MIN 7/26/2022 Violetta Deluna, PT GP 3                    Violetta Deluna, PT  7/26/2022

## 2022-08-02 ENCOUNTER — APPOINTMENT (OUTPATIENT)
Dept: PHYSICAL THERAPY | Facility: HOSPITAL | Age: 62
End: 2022-08-02

## 2022-08-04 ENCOUNTER — HOSPITAL ENCOUNTER (OUTPATIENT)
Dept: PHYSICAL THERAPY | Facility: HOSPITAL | Age: 62
Setting detail: THERAPIES SERIES
Discharge: HOME | End: 2022-08-04

## 2022-08-04 DIAGNOSIS — M54.42 CHRONIC BILATERAL LOW BACK PAIN WITH BILATERAL SCIATICA: ICD-10-CM

## 2022-08-04 DIAGNOSIS — M48.062 LUMBAR STENOSIS WITH NEUROGENIC CLAUDICATION: ICD-10-CM

## 2022-08-04 DIAGNOSIS — M54.41 CHRONIC BILATERAL LOW BACK PAIN WITH BILATERAL SCIATICA: ICD-10-CM

## 2022-08-04 DIAGNOSIS — M54.16 LUMBAR RADICULOPATHY: Primary | ICD-10-CM

## 2022-08-04 DIAGNOSIS — G89.29 CHRONIC BILATERAL LOW BACK PAIN WITH BILATERAL SCIATICA: ICD-10-CM

## 2022-08-04 PROCEDURE — 97110 THERAPEUTIC EXERCISES: CPT

## 2022-08-04 NOTE — THERAPY PROGRESS REPORT/RE-CERT
Outpatient Physical Therapy Ortho Progress Note  [facility]     Patient Name: Kirsten Lima  : 1960  MRN: [id number]  Today's Date: 2022      Visit Date: 2022    Patient Active Problem List   Diagnosis   • Abnormal thyroid stimulating hormone (TSH) level   • Atopic rhinitis   • Hypertension   • Insomnia   • Overactive bladder   • Knee pain   • Cobalamin deficiency   • Vitamin D deficiency   • Hyperlipidemia   • Other chronic pain   • Lumbar facet arthropathy   • Spinal stenosis of lumbar region   • Intraductal papilloma of left breast   • Lobular carcinoma in situ (LCIS) of left breast   • Increased risk of breast cancer   • Tobacco use disorder   • Elevated glucose   • Mass of nipple   • Colon cancer screening   • Malignant neoplasm of overlapping sites of left breast in female, estrogen receptor positive (HCC)   • Lumbar radiculopathy        Past Medical History:   Diagnosis Date   • Acute cystitis    • Allergic rhinitis    • Breast cancer (HCC)    • Breast cyst    • Colon polyp May 6, 2022    7   • Cyst of left nipple    • Depression    • Diverticulosis May 6, 2022   • Dry skin    • Dysuria    • Hemorrhoid    • Hidradenitis    • History of bronchitis    • Hyperlipidemia    • Hypertension    • Incontinence in female     wears pads   • Insomnia    • Nonspecific abnormal electrocardiogram (ECG) (EKG)    • Obesity    • Overactive bladder    • Pregnancy     G-2, P-0   • Sleep apnea    • UTI (urinary tract infection) 2021   • Vitamin B12 deficiency    • Vitamin D insufficiency    • Wound, breast     LEFT BREAST; PT INSTRUCTED TO NOTIFY DR ZAMBRANO PRIOR TO SURGERY        Past Surgical History:   Procedure Laterality Date   • AXILLARY HIDRADENITIS EXCISION Bilateral    • BREAST BIOPSY Left 2021    Procedure: Left breast needle-localized excisional biopsy (tophat clip) and left breast ultrasound-guided excisional biopsy (hydromark clip);  Surgeon: Debora Zambrano MD;  Location:   [facility];  Service: General;  Laterality: Left;   • BREAST BIOPSY Left 02/01/2022    Procedure: Left breast needle-localized excisional biopsy, Left nipple mass excision;  Surgeon: Debora Zambrano MD;  Location:  [facility];  Service: General;  Laterality: Left;   • CARDIAC CATHETERIZATION     • COLONOSCOPY     • COLONOSCOPY N/A 05/06/2022    Procedure: COLONOSCOPY;  Surgeon: Mirtha Davis MD;  [place] [facility];  Service: Gastroenterology;  Laterality: N/A;  diverticulosis, polyps   • HYSTERECTOMY  2006    [facility]   • SUBTOTAL HYSTERECTOMY  2006       Visit Dx:     ICD-10-CM ICD-9-CM   1. Lumbar radiculopathy  M54.16 724.4   2. Chronic bilateral low back pain with bilateral sciatica  M54.42 724.2    M54.41 724.3    G89.29 338.29   3. Lumbar stenosis with neurogenic claudication  M48.062 724.03                             Therapy Education  Education Details: Spent a lot of time working to improve pt's awareness and understanding of targeted muscles and correct form and intensity- she tends to be too intense and push to pain.  She demonsttrated increased awareness at end of session. Will benefit from review/reinforcement of PPT and TA. Encouraged her to add TA during ADLs.  Given: Symptoms/condition management, Pain management, Posture/body mechanics  Program: Reinforced  How Provided: Verbal, Demonstration  Provided to: Patient  Level of Understanding: Verbalized, Demonstrated      PT OP Goals     Row Name 08/04/22 1300          PT Short Term Goals    STG Date to Achieve 08/04/22  -JA     STG 1 The pt will demonstrate IND with initial HEP focused on improved lumbar mobility/stability and decreased pain.  -JA     STG 1 Progress Partially Met  -JA     STG 2 Patient will demonstrate symmetrical and pain-free spinal AROM to decrease pain and improve ability to exercise.  -JA     STG 2 Progress Ongoing  -            Long Term Goals    LTG Date to Achieve 09/03/22  -     LTG 1 The pt will  demonstrate IND with finalized HEP focused on return to PLOF and IND condition management.  -QAMAR     LTG 1 Progress Ongoing  -QAMAR     LTG 2 Patient will score no more than 15% disability per the Oswestry to demonstrate decreased limitations on daily activities.  -QAMAR     LTG 2 Progress Ongoing  -QAMAR     LTG 3 Patient will report ability to stand and walk  for at least 30 min each before onset of symptoms to allow her to perform increased cooking and shopping before needing to sit as well as allow for improved ability to exercise.  -QAMAR     LTG 3 Progress Ongoing  -QAMAR           User Key  (r) = Recorded By, (t) = Taken By, (c) = Cosigned By    Initials Name Provider Type    Josette Fragoso, PT Physical Therapist                 PT Assessment/Plan     Row Name 08/04/22 1300          PT Assessment    Assessment Comments Kirsten Lima has been seen for 4 visits for exacerbation of intermittent bilateral low back pain with bilateral lower extremity numbness and tingling secondary to spinal stenosis of lumbar region with neurogenic claudication. She has significant weakness in hip girdle and core muscles and has compensatory lumbar muscle guarding.  Partially met 1/2 STGs and 0/4 LTGs.  Today we focused on increasing awareness and recruitment of TA as well as correct form for PPT-she was co-milan lumbar extensors with PPT resulting in increased pain. She will benefit from continuing skilled therapy services.  -QAMAR            PT Plan    PT Plan Comments review/reinforce correct PPT and TA, work on combining them if ready/able without recruiting lumbar extensors; add standing row w (no band at first) focus on posture and mild PPT w/TA; consider side stepping without resistance but w/TA throughout  -QAMAR           User Key  (r) = Recorded By, (t) = Taken By, (c) = Cosigned By    Initials Name Provider Type    Josette Fragoso, PT Physical Therapist                   OP Exercises     Row Name 08/04/22 1000              Subjective Comments    Subjective Comments I'm not sure if I'm doing the tilt right.  -JA              Subjective Pain    Able to rate subjective pain? yes  -JA      Pre-Treatment Pain Level 3  -JA      Subjective Pain Comment 3/10 walking  -JA              Total Minutes    51074 - PT Therapeutic Exercise Minutes 44  -JA              Exercise 1    Exercise Name 1 Nustep  -JA      Cueing 1 Verbal;Tactile;Demo  -JA      Time 1 5 min  -JA      Additional Comments L5 seat @ 9  -JA              Exercise 2    Exercise Name 2 PPT (HL then also in Qped after working on TA)  -JA      Cueing 2 Verbal;Tactile;Demo  -JA      Sets 2 2  -JA      Reps 2 10  -JA      Time 2 spent 8 min working on increasing understanding and awareness of pelvis and pelvic movement, coipous demo and use of spine model  -JA              Exercise 3    Exercise Name 3 LTR  -JA      Cueing 3 Verbal;Tactile  -JA      Sets 3 1  -JA      Reps 3 10  -JA              Exercise 4    Exercise Name 4 TA in Qped and HL  -JA      Cueing 4 Verbal;Tactile  -JA      Sets 4 2  -JA      Reps 4 10  -JA      Time 4 spent 10 min working on activation, form; began in HL however she activated her lumbar extensors frequently so we moved to Qped to increase awareness of target muscle and then returned to HL  -JA              Exercise 5    Exercise Name 5 HL hip add  -JA      Cueing 5 Verbal;Tactile  -JA      Sets 5 2  -JA      Reps 5 10  -JA      Time 5 5 sec  -JA      Additional Comments ball  -JA              Exercise 6    Exercise Name 6 HL hip abd with  T-A  -JA      Cueing 6 Verbal;Demo  -JA      Sets 6 2  -JA      Reps 6 10  -JA      Additional Comments RTB  -JA              Exercise 7    Exercise Name 7 HS stretch at EOB  -JA      Cueing 7 Verbal;Tactile;Demo  -JA      Sets 7 1  -JA      Reps 7 3  -JA      Time 7 20s  -JA              Exercise 8    Exercise Name 8 fig 4 stretch  -JA      Cueing 8 Verbal;Demo  -JA      Sets 8 1  -JA      Reps 8 3  -JA      Time 8  20sec  -JA              Exercise 9    Exercise Name 9 log roll supine-sit  -JA      Reps 9 4  -JA              Exercise 10    Exercise Name 10 HL march w/PPT  -JA      Cueing 10 --  -JA      Sets 10 --  -JA      Reps 10 resume next visit  -JA              Exercise 11    Exercise Name 11 Bridge with PPT, glute set  -JA      Cueing 11 Verbal;Tactile  -JA      Sets 11 2  -JA      Reps 11 8  -JA      Additional Comments we worked on PPT and TA, activating glutes and pushing through legs to lift  hips and to NOT activate her back muscles (she has been activating extensors)  -JA              Exercise 12    Exercise Name 12 STS from elevated mat table  -JA      Reps 12 10  -JA      Additional Comments tip from hips; weight shifts from bottom to feet to stand  -QAMAR            User Key  (r) = Recorded By, (t) = Taken By, (c) = Cosigned By    Initials Name Provider Type    Josette Fragoso, PT Physical Therapist                                        Time Calculation:     Start Time: 1015  Stop Time: 1100  Time Calculation (min): 45 min  Timed Charges  56614 - PT Therapeutic Exercise Minutes: 44  Total Minutes  Timed Charges Total Minutes: 44   Total Minutes: 44     Therapy Charges for Today     Code Description Service Date Service Provider Modifiers Qty    58307200929 HC PT THER PROC EA 15 MIN 8/4/2022 Josette Goel, PT GP 3                    Josette Goel PT  8/4/2022

## 2022-08-09 ENCOUNTER — HOSPITAL ENCOUNTER (OUTPATIENT)
Dept: PHYSICAL THERAPY | Facility: HOSPITAL | Age: 62
Setting detail: THERAPIES SERIES
Discharge: HOME | End: 2022-08-09

## 2022-08-09 DIAGNOSIS — G89.29 CHRONIC BILATERAL LOW BACK PAIN WITH BILATERAL SCIATICA: ICD-10-CM

## 2022-08-09 DIAGNOSIS — M54.41 CHRONIC BILATERAL LOW BACK PAIN WITH BILATERAL SCIATICA: ICD-10-CM

## 2022-08-09 DIAGNOSIS — M48.062 LUMBAR STENOSIS WITH NEUROGENIC CLAUDICATION: ICD-10-CM

## 2022-08-09 DIAGNOSIS — M54.16 LUMBAR RADICULOPATHY: Primary | ICD-10-CM

## 2022-08-09 DIAGNOSIS — M54.42 CHRONIC BILATERAL LOW BACK PAIN WITH BILATERAL SCIATICA: ICD-10-CM

## 2022-08-09 PROCEDURE — 97110 THERAPEUTIC EXERCISES: CPT

## 2022-08-09 NOTE — THERAPY TREATMENT NOTE
Outpatient Physical Therapy Ortho Treatment Note  [facility]     Patient Name: Kirsten Lima  : 1960  MRN: [id number]  Today's Date: 2022      Visit Date: 2022    Visit Dx:    ICD-10-CM ICD-9-CM   1. Lumbar radiculopathy  M54.16 724.4   2. Chronic bilateral low back pain with bilateral sciatica  M54.42 724.2    M54.41 724.3    G89.29 338.29   3. Lumbar stenosis with neurogenic claudication  M48.062 724.03       Patient Active Problem List   Diagnosis   • Abnormal thyroid stimulating hormone (TSH) level   • Atopic rhinitis   • Hypertension   • Insomnia   • Overactive bladder   • Knee pain   • Cobalamin deficiency   • Vitamin D deficiency   • Hyperlipidemia   • Other chronic pain   • Lumbar facet arthropathy   • Spinal stenosis of lumbar region   • Intraductal papilloma of left breast   • Lobular carcinoma in situ (LCIS) of left breast   • Increased risk of breast cancer   • Tobacco use disorder   • Elevated glucose   • Mass of nipple   • Colon cancer screening   • Malignant neoplasm of overlapping sites of left breast in female, estrogen receptor positive (HCC)   • Lumbar radiculopathy        Past Medical History:   Diagnosis Date   • Acute cystitis    • Allergic rhinitis    • Breast cancer (HCC)    • Breast cyst    • Colon polyp May 6, 2022    7   • Cyst of left nipple    • Depression    • Diverticulosis May 6, 2022   • Dry skin    • Dysuria    • Hemorrhoid    • Hidradenitis    • History of bronchitis    • Hyperlipidemia    • Hypertension    • Incontinence in female     wears pads   • Insomnia    • Nonspecific abnormal electrocardiogram (ECG) (EKG)    • Obesity    • Overactive bladder    • Pregnancy     G-2, P-0   • Sleep apnea    • UTI (urinary tract infection) 2021   • Vitamin B12 deficiency    • Vitamin D insufficiency    • Wound, breast     LEFT BREAST; PT INSTRUCTED TO NOTIFY DR GARCIA PRIOR TO SURGERY        Past Surgical History:   Procedure Laterality Date   • AXILLARY  HIDRADENITIS EXCISION Bilateral    • BREAST BIOPSY Left 02/22/2021    Procedure: Left breast needle-localized excisional biopsy (tophat clip) and left breast ultrasound-guided excisional biopsy (hydromark clip);  Surgeon: Debora Zambrano MD;  Location: [facility] OR;  Service: General;  Laterality: Left;   • BREAST BIOPSY Left 02/01/2022    Procedure: Left breast needle-localized excisional biopsy, Left nipple mass excision;  Surgeon: Debora Zambrano MD;  Location: [facility];  Service: General;  Laterality: Left;   • CARDIAC CATHETERIZATION     • COLONOSCOPY     • COLONOSCOPY N/A 05/06/2022    Procedure: COLONOSCOPY;  Surgeon: Mirtha Davis MD;  [place] [facility] [place] [facility];  Service: Gastroenterology;  Laterality: N/A;  diverticulosis, polyps   • HYSTERECTOMY  2006    [facility]   • SUBTOTAL HYSTERECTOMY  2006                        PT Assessment/Plan     Row Name 08/09/22 1000          PT Assessment    Assessment Comments Reviewed PPT/TA activation and progressed into standing and dynamic positions. Patient able to tolerate several standing exercises before slight onset of radicular symptoms, though continued concentration and effort to coordinate a PPT. Tolerated sitting exercises well. Added standing PPT and side steps to HEP.  -LW            PT Plan    PT Plan Comments Review PPT in different positions, particularly standing. Perform standing exercises with PPT as tolerated: consider adding AR press, Wall walks.  -LW           User Key  (r) = Recorded By, (t) = Taken By, (c) = Cosigned By    Initials Name Provider Type    Violetta Yost, PT Physical Therapist                   OP Exercises     Row Name 08/09/22 1000             Subjective Comments    Subjective Comments She was hurting a lot this weekend in her back and legs, but not today. She did a lot of light housework over the weekend, but nothing extreme.  -LW              Subjective Pain    Able to rate subjective pain? yes  -LW       Pre-Treatment Pain Level 0  -LW              Total Minutes    22637 - PT Therapeutic Exercise Minutes 42  -LW              Exercise 1    Exercise Name 1 Nustep  -LW      Cueing 1 Verbal;Tactile;Demo  -LW      Time 1 5 min  -LW      Additional Comments L5 seat @ 9  -LW              Exercise 2    Exercise Name 2 PPT: HL, standing at wall, standing away rom wall  -LW      Cueing 2 Verbal;Tactile;Demo  -LW      Sets 2 3  -LW      Reps 2 10  -LW      Time 2 5 sec holds  -LW              Exercise 8    Exercise Name 8 fig 4 stretch  -LW      Cueing 8 Verbal;Demo  -LW      Sets 8 1  -LW      Reps 8 3  -LW      Time 8 20sec  -LW              Exercise 13    Exercise Name 13 Side steps with PPT  -LW      Cueing 13 Verbal;Demo  -LW      Sets 13 3 laps  -LW              Exercise 14    Exercise Name 14 Standing row, B ext with PPT  -LW      Cueing 14 Verbal;Demo  -LW      Sets 14 1 ea  -LW      Reps 14 15  -LW      Time 14 RTB  -LW              Exercise 15    Exercise Name 15 Seated chest press  -LW      Cueing 15 Verbal;Demo  -LW      Sets 15 2  -LW      Reps 15 15  -LW      Time 15 3# DBs  -LW            User Key  (r) = Recorded By, (t) = Taken By, (c) = Cosigned By    Initials Name Provider Type    LW Violetta Deluna, PT Physical Therapist                                                Time Calculation:   Start Time: 1001  Stop Time: 1043  Time Calculation (min): 42 min  Total Timed Code Minutes- PT: 42 minute(s)  Timed Charges  72055 - PT Therapeutic Exercise Minutes: 42  Total Minutes  Timed Charges Total Minutes: 42   Total Minutes: 42  Therapy Charges for Today     Code Description Service Date Service Provider Modifiers Qty    28464197465 HC PT THER PROC EA 15 MIN 8/9/2022 Violetta Deluna, PT GP 3                    Violetta Deluna, PT  8/9/2022

## 2022-08-11 ENCOUNTER — HOSPITAL ENCOUNTER (OUTPATIENT)
Dept: PHYSICAL THERAPY | Facility: HOSPITAL | Age: 62
Setting detail: THERAPIES SERIES
Discharge: HOME | End: 2022-08-11

## 2022-08-11 DIAGNOSIS — M54.16 LUMBAR RADICULOPATHY: Primary | ICD-10-CM

## 2022-08-11 DIAGNOSIS — G89.29 CHRONIC BILATERAL LOW BACK PAIN WITH BILATERAL SCIATICA: ICD-10-CM

## 2022-08-11 DIAGNOSIS — M54.42 CHRONIC BILATERAL LOW BACK PAIN WITH BILATERAL SCIATICA: ICD-10-CM

## 2022-08-11 DIAGNOSIS — M54.41 CHRONIC BILATERAL LOW BACK PAIN WITH BILATERAL SCIATICA: ICD-10-CM

## 2022-08-11 DIAGNOSIS — M48.062 LUMBAR STENOSIS WITH NEUROGENIC CLAUDICATION: ICD-10-CM

## 2022-08-11 PROCEDURE — 97110 THERAPEUTIC EXERCISES: CPT

## 2022-08-11 NOTE — THERAPY TREATMENT NOTE
Outpatient Physical Therapy Ortho Treatment Note  [facility]     Patient Name: Kirsten Lima  : 1960  MRN: [id number]  Today's Date: 2022      Visit Date: 2022    Visit Dx:    ICD-10-CM ICD-9-CM   1. Lumbar radiculopathy  M54.16 724.4   2. Chronic bilateral low back pain with bilateral sciatica  M54.42 724.2    M54.41 724.3    G89.29 338.29   3. Lumbar stenosis with neurogenic claudication  M48.062 724.03       Patient Active Problem List   Diagnosis   • Abnormal thyroid stimulating hormone (TSH) level   • Atopic rhinitis   • Hypertension   • Insomnia   • Overactive bladder   • Knee pain   • Cobalamin deficiency   • Vitamin D deficiency   • Hyperlipidemia   • Other chronic pain   • Lumbar facet arthropathy   • Spinal stenosis of lumbar region   • Intraductal papilloma of left breast   • Lobular carcinoma in situ (LCIS) of left breast   • Increased risk of breast cancer   • Tobacco use disorder   • Elevated glucose   • Mass of nipple   • Colon cancer screening   • Malignant neoplasm of overlapping sites of left breast in female, estrogen receptor positive (HCC)   • Lumbar radiculopathy        Past Medical History:   Diagnosis Date   • Acute cystitis    • Allergic rhinitis    • Breast cancer (HCC)    • Breast cyst    • Colon polyp May 6, 2022    7   • Cyst of left nipple    • Depression    • Diverticulosis May 6, 2022   • Dry skin    • Dysuria    • Hemorrhoid    • Hidradenitis    • History of bronchitis    • Hyperlipidemia    • Hypertension    • Incontinence in female     wears pads   • Insomnia    • Nonspecific abnormal electrocardiogram (ECG) (EKG)    • Obesity    • Overactive bladder    • Pregnancy     G-2, P-0   • Sleep apnea    • UTI (urinary tract infection) 2021   • Vitamin B12 deficiency    • Vitamin D insufficiency    • Wound, breast     LEFT BREAST; PT INSTRUCTED TO NOTIFY DR GARCIA PRIOR TO SURGERY        Past Surgical History:   Procedure Laterality Date   • AXILLARY  HIDRADENITIS EXCISION Bilateral    • BREAST BIOPSY Left 02/22/2021    Procedure: Left breast needle-localized excisional biopsy (tophat clip) and left breast ultrasound-guided excisional biopsy (hydromark clip);  Surgeon: Debora Zambrano MD;  Location: [facility] OR;  Service: General;  Laterality: Left;   • BREAST BIOPSY Left 02/01/2022    Procedure: Left breast needle-localized excisional biopsy, Left nipple mass excision;  Surgeon: Debora Zambrano MD;  Location: [facility];  Service: General;  Laterality: Left;   • CARDIAC CATHETERIZATION     • COLONOSCOPY     • COLONOSCOPY N/A 05/06/2022    Procedure: COLONOSCOPY;  Surgeon: Mirtha Davis MD;  [place] [facility] [facility]  Service: Gastroenterology;  Laterality: N/A;  diverticulosis, polyps   • HYSTERECTOMY  2006    [facility]   • SUBTOTAL HYSTERECTOMY  2006                        PT Assessment/Plan     Row Name 08/11/22 1000          PT Assessment    Assessment Comments Regressed exercises to supine again with increased duration/resistance for increased support due to increased pain with trial of standing last session. Patient tolerated well this session. Trialed hip mobs at end of session due to feelings of catching in groin with R ER in HL. Patient reports symptoms improved but not resolved.  -LW            PT Plan    PT Plan Comments Progress supine and sitting core stability: SB march/LAQ, dead bug progression, lateral distraction with belt.  -LW           User Key  (r) = Recorded By, (t) = Taken By, (c) = Cosigned By    Initials Name Provider Type    LW Violetta Deluna, PT Physical Therapist                   OP Exercises     Row Name 08/11/22 1000 08/11/22 0900          Subjective Comments    Subjective Comments After last time her low back was aching, especially with trying standing PPTat home, so she stopped and did her previous exercises instead.  -LW --            Subjective Pain    Able to rate subjective pain? yes  -LW --     Pre-Treatment  Pain Level 3  -LW --            Total Minutes    03074 - PT Therapeutic Exercise Minutes 40  -LW --     90592 - PT Manual Therapy Minutes 3  -LW --  -LW            Exercise 1    Exercise Name 1 Nustep  -LW --     Cueing 1 Verbal;Tactile;Demo  -LW --     Time 1 5 min  -LW --     Additional Comments L5 seat @ 9  -LW --            Exercise 2    Exercise Name 2 PPT: HL, standing at wall  -LW --     Cueing 2 Verbal;Tactile;Demo  -LW --     Sets 2 3  -LW --     Reps 2 10  -LW --     Time 2 10 sec holds  -LW --            Exercise 3    Exercise Name 3 LTR  -LW --     Cueing 3 Verbal;Tactile  -LW --     Sets 3 1  -LW --     Reps 3 10  -LW --            Exercise 5    Exercise Name 5 HL hip add  -LW --     Cueing 5 Verbal;Tactile  -LW --     Sets 5 2  -LW --     Reps 5 10  -LW --     Time 5 10 sec  -LW --            Exercise 6    Exercise Name 6 HL hip alternating abd  -LW --     Cueing 6 Verbal;Demo  -LW --     Sets 6 2  -LW --     Reps 6 10  -LW --     Additional Comments GTB  -LW --            Exercise 7    Exercise Name 7 HS stretch at EOB  -LW --     Cueing 7 Verbal;Tactile;Demo  -LW --     Sets 7 1  -LW --     Reps 7 3  -LW --     Time 7 20s  -LW --            Exercise 8    Exercise Name 8 fig 4 stretch  -LW --     Cueing 8 Verbal;Demo  -LW --     Sets 8 1  -LW --     Reps 8 3  -LW --     Time 8 20sec  -LW --            Exercise 10    Exercise Name 10 HL march w/PPT  -LW --     Cueing 10 Verbal;Tactile  -LW --     Sets 10 2  -LW --     Reps 10 10  -LW --           User Key  (r) = Recorded By, (t) = Taken By, (c) = Cosigned By    Initials Name Provider Type    Violetta Yost, PT Physical Therapist                         Manual Rx (last 36 hours)     Manual Treatments     Row Name 08/11/22 1000 08/11/22 0900          Total Minutes    67983 - PT Manual Therapy Minutes 3  -LW --  -LW            Manual Rx 1    Manual Rx 1 Location R hip  -LW --  -LW     Manual Rx 1 Type lateral distraction with belt  -LW --  -LW            User Key  (r) = Recorded By, (t) = Taken By, (c) = Cosigned By    Initials Name Provider Type    LW Violetta Deluna, PT Physical Therapist                                   Time Calculation:   Start Time: 1005  Stop Time: 1048  Time Calculation (min): 43 min  Total Timed Code Minutes- PT: 43 minute(s)  Timed Charges  11965 - PT Therapeutic Exercise Minutes: 40  80673 - PT Manual Therapy Minutes: 3  Total Minutes  Timed Charges Total Minutes: 43   Total Minutes: 43  Therapy Charges for Today     Code Description Service Date Service Provider Modifiers Qty    09587959028  PT THER PROC EA 15 MIN 8/11/2022 Violetta Deluna, PT GP 3                    Violetta Deluna, PT  8/11/2022

## 2022-08-15 ENCOUNTER — APPOINTMENT (OUTPATIENT)
Dept: WOMENS IMAGING | Facility: HOSPITAL | Age: 62
End: 2022-08-15

## 2022-08-15 PROCEDURE — 77067 SCR MAMMO BI INCL CAD: CPT | Performed by: RADIOLOGY

## 2022-08-15 PROCEDURE — 77063 BREAST TOMOSYNTHESIS BI: CPT | Performed by: RADIOLOGY

## 2022-08-16 ENCOUNTER — OFFICE VISIT (OUTPATIENT)
Dept: OTHER | Facility: HOSPITAL | Age: 62
End: 2022-08-16

## 2022-08-16 ENCOUNTER — HOSPITAL ENCOUNTER (OUTPATIENT)
Dept: PHYSICAL THERAPY | Facility: HOSPITAL | Age: 62
Setting detail: THERAPIES SERIES
Discharge: HOME | End: 2022-08-16

## 2022-08-16 DIAGNOSIS — Z17.0 MALIGNANT NEOPLASM OF OVERLAPPING SITES OF LEFT BREAST IN FEMALE, ESTROGEN RECEPTOR POSITIVE: Primary | ICD-10-CM

## 2022-08-16 DIAGNOSIS — M54.42 CHRONIC BILATERAL LOW BACK PAIN WITH BILATERAL SCIATICA: ICD-10-CM

## 2022-08-16 DIAGNOSIS — M54.41 CHRONIC BILATERAL LOW BACK PAIN WITH BILATERAL SCIATICA: ICD-10-CM

## 2022-08-16 DIAGNOSIS — M48.062 LUMBAR STENOSIS WITH NEUROGENIC CLAUDICATION: ICD-10-CM

## 2022-08-16 DIAGNOSIS — C50.812 MALIGNANT NEOPLASM OF OVERLAPPING SITES OF LEFT BREAST IN FEMALE, ESTROGEN RECEPTOR POSITIVE: Primary | ICD-10-CM

## 2022-08-16 DIAGNOSIS — M54.16 LUMBAR RADICULOPATHY: Primary | ICD-10-CM

## 2022-08-16 DIAGNOSIS — F34.1 PERSISTENT DEPRESSIVE DISORDER WITH ATYPICAL FEATURES, CURRENTLY MILD: ICD-10-CM

## 2022-08-16 DIAGNOSIS — F17.200 TOBACCO USE DISORDER: ICD-10-CM

## 2022-08-16 DIAGNOSIS — G89.29 CHRONIC BILATERAL LOW BACK PAIN WITH BILATERAL SCIATICA: ICD-10-CM

## 2022-08-16 PROCEDURE — 99215 OFFICE O/P EST HI 40 MIN: CPT | Performed by: NURSE PRACTITIONER

## 2022-08-16 PROCEDURE — 97110 THERAPEUTIC EXERCISES: CPT

## 2022-08-16 NOTE — THERAPY TREATMENT NOTE
Outpatient Physical Therapy Ortho Treatment Note  [facility]     Patient Name: Kirsten Lima  : 1960  MRN: [id number]  Today's Date: 2022      Visit Date: 2022    Visit Dx:    ICD-10-CM ICD-9-CM   1. Lumbar radiculopathy  M54.16 724.4   2. Chronic bilateral low back pain with bilateral sciatica  M54.42 724.2    M54.41 724.3    G89.29 338.29   3. Lumbar stenosis with neurogenic claudication  M48.062 724.03       Patient Active Problem List   Diagnosis   • Abnormal thyroid stimulating hormone (TSH) level   • Atopic rhinitis   • Hypertension   • Insomnia   • Overactive bladder   • Knee pain   • Cobalamin deficiency   • Vitamin D deficiency   • Hyperlipidemia   • Other chronic pain   • Lumbar facet arthropathy   • Spinal stenosis of lumbar region   • Intraductal papilloma of left breast   • Lobular carcinoma in situ (LCIS) of left breast   • Increased risk of breast cancer   • Tobacco use disorder   • Elevated glucose   • Mass of nipple   • Colon cancer screening   • Malignant neoplasm of overlapping sites of left breast in female, estrogen receptor positive (HCC)   • Lumbar radiculopathy        Past Medical History:   Diagnosis Date   • Acute cystitis    • Allergic rhinitis    • Breast cancer (HCC)    • Breast cyst    • Colon polyp May 6, 2022    7   • Cyst of left nipple    • Depression    • Diverticulosis May 6, 2022   • Dry skin    • Dysuria    • Hemorrhoid    • Hidradenitis    • History of bronchitis    • Hyperlipidemia    • Hypertension    • Incontinence in female     wears pads   • Insomnia    • Nonspecific abnormal electrocardiogram (ECG) (EKG)    • Obesity    • Overactive bladder    • Pregnancy     G-2, P-0   • Sleep apnea    • UTI (urinary tract infection) 2021   • Vitamin B12 deficiency    • Vitamin D insufficiency    • Wound, breast     LEFT BREAST; PT INSTRUCTED TO NOTIFY DR GARCIA PRIOR TO SURGERY        Past Surgical History:   Procedure Laterality Date   • AXILLARY  HIDRADENITIS EXCISION Bilateral    • BREAST BIOPSY Left 02/22/2021    Procedure: Left breast needle-localized excisional biopsy (tophat clip) and left breast ultrasound-guided excisional biopsy (hydromark clip);  Surgeon: Debora Zambrano MD;  Location: [facility] OR;  Service: General;  Laterality: Left;   • BREAST BIOPSY Left 02/01/2022    Procedure: Left breast needle-localized excisional biopsy, Left nipple mass excision;  Surgeon: Debora Zambrano MD;  Location: [facility] OR;  Service: General;  Laterality: Left;   • CARDIAC CATHETERIZATION     • COLONOSCOPY     • COLONOSCOPY N/A 05/06/2022    Procedure: COLONOSCOPY;  Surgeon: Mirtha Davis MD;  Location: [facility] MAIN OR;  Service: Gastroenterology;  Laterality: N/A;  diverticulosis, polyps   • HYSTERECTOMY  2006    LAVH   • SUBTOTAL HYSTERECTOMY  2006                        PT Assessment/Plan     Row Name 08/16/22 1600          PT Assessment    Assessment Comments Kirsten presented to therapy discouraged about progress/response to therapy noting she continues to have pain with ADLs particularly in the kitchen.  We worked on body mechanics with ADLs practicing at counter and cabinets simulating functional activity and emphasizing importance of performing activities without adding stress/strain to lumbar spine.  She was able to demonstrate improved body mechanics and increased awareness of body position during ADLs.  She reports increased confidence with ability to perform the key core exercises more correctly today and in pain-free range.  She will benefit from continuing skilled therapy services to build core strength/lumbar stabilizaton to improve functional mobility.  -JA            PT Plan    PT Plan Comments reinforce body mechanics with ADLS, review and further work on reaching and bending, add lifting; cont ther ex  -JA           User Key  (r) = Recorded By, (t) = Taken By, (c) = Cosigned By    Initials Name Provider Type    QAMAR Goel  "Josette FIORE, PT Physical Therapist                   OP Exercises     Row Name 08/16/22 1100             Subjective Comments    Subjective Comments Not sure this is really helping. Sometimes it feels better then I get up to cook breakfast and it hurts down to my heel.  -JA              Subjective Pain    Able to rate subjective pain? yes  -JA      Pre-Treatment Pain Level 5  -JA              Total Minutes    22223 - PT Therapeutic Exercise Minutes 38  -JA              Exercise 1    Exercise Name 1 Nustep  -JA      Cueing 1 Verbal;Tactile;Demo  -JA      Time 1 5 min  -JA      Additional Comments L5 seat @ 9  -JA              Exercise 2    Exercise Name 2 Body mechanics education and practice for kitchen ADLs, reaching/bending, change position of feet to stride or WBOS and keep knees soft, tip forward at hips.  Very important to perform ADLs in best way that prevents strain from accumulating.  -JA      Time 2 10 min  -JA      Additional Comments used counter, lower cabinet to prop foot, upper cabinet to reach into  -JA              Exercise 3    Exercise Name 3 PPT standing with back to wall feet forward from wall 8-10\"  -JA      Cueing 3 Verbal;Tactile;Demo  -JA      Reps 3 10  -JA      Additional Comments copious cuing  -JA              Exercise 4    Exercise Name 4 Yoga breathing and TA in 90/90 decompression position w/legs over SB  -JA      Cueing 4 Verbal;Tactile  -JA      Time 4 8 min  -JA      Additional Comments worked on deep breathing (long exhale first) to relax the back then worked on activating TA without tilting pelvis  -JA              Exercise 5    Exercise Name 5 HL PPT -passively pushing through legs  -JA      Cueing 5 Verbal;Tactile  -JA      Sets 5 2  -JA      Reps 5 10  -JA      Time 5 3-5 sec  -JA              Exercise 6    Exercise Name 6 log roll and STS  -JA      Time 6 3 min  -JA      Additional Comments good log roll demo  -JA            User Key  (r) = Recorded By, (t) = Taken By, (c) = " Cosigned By    Initials Name Provider Type    Josette Fragoso, PT Physical Therapist                                 Therapy Education  Education Details: see in OP Ex  Given: Symptoms/condition management, Posture/body mechanics, Pain management, Mobility training  Program: Reinforced, Progressed  How Provided: Verbal, Demonstration, Written  Provided to: Patient  Level of Understanding: Verbalized, Demonstrated              Time Calculation:   Start Time: 1028  Stop Time: 1106  Time Calculation (min): 38 min  Timed Charges  88633 - PT Therapeutic Exercise Minutes: 38  Total Minutes  Timed Charges Total Minutes: 38   Total Minutes: 38  Therapy Charges for Today     Code Description Service Date Service Provider Modifiers Qty    [id number]  PT THER PROC EA 15 MIN 8/16/2022 Josette Goel, PT GP 3                    Josette Goel PT  8/16/2022

## 2022-08-16 NOTE — PROGRESS NOTES
[facility] MULTIDISCIPLINARY CLINIC  SURVIVORSHIP VISIT: IN CLINIC  Survivorship Treatment Summary Initial Visit        Kirsten Lima is a pleasant 62 y.o. female being followed by Irena Dong MD for invasive lobular carcinoma left breast and lobular carcinoma in situ left breast. Reviewed today in Multidisciplinary Clinic, for initial survivorship treatment summary visit.     HPI  Patient known to me from consultation last April for tobacco treatment. Patient is status post left breast incisional biopsy for, left breast radiation completed April 2022, currently on anastrozole. Currently coping with spinal stenosis causing radiating pain with numbness in her lower extremities. She starts seeing PT for this tomorrow.     Reports feelings of anhedonia, disassociation with little pleasure or enjoyment in activities. Continues to grieve recent losses of loved ones. Reports she feels depressed.    Currently smoking 10 cigarettes a day.    Smoking History:  Patient is a current every day smoker. Reports currently smoking less than 10 cigarettes per day. Typically smoking one pack every 2.5 days  Has smoked as less in the past, typically not more.   Approximately 21 pack year history.   Patient has quit smoking a few times in the past - once successfully with hypnosis.   They report relapse occurred due to stress.     TREATMENT HISTORY:     Oncology/Hematology History Overview Note   5/28/19, Screening MMG with [facility] ([facility]):  Scattered areas of fibroglandular density.  1. There are small asymmetries seen in both breasts. The parenchyma includes stable nodular asymmetries. No suspicious masses, suspicious microcalcifications or areas of architectural distortion are identified. There has been no significant change from the prior exams.  2. There are stable punctate and spherical lucent-centered calcifications with diffuse/scattered distribution seen in both breast.  BI-RADS 2: Benign.     6/2/20,  Screening MMG with [facility] ([facility]):  Scattered areas of fibroglandular density.  1. There are small asymmetries seen in both breasts. There has been no significant change from the prior exams.  2. There are stable punctate and spherical lucent-centered calcifications with diffuse distribution seen in both breasts.  3. There is a new small focal asymmetry measuring 12 mm seen in the central region of the left breast. This is best visualized on tomosynthesis MLO view slice # 78. This is best seen on the MLO View.  BI-RADS 0: Incomplete.     20, Left Diagnostic MMG with [facility] & Left Breast US ([facility]):  MM. On the present examination, focal asymmetry in the left breast, central does not persist. The asymmetry noted on the recent screening mammogram resolves into stroma on additional views.  US:  1. There is no sonographic correlate. There is no evidence of any solid mass or abnormal cystic elements.  2. There are three oval intraductal masses with partially defined margins measuring 5 mm to 9 mm seen in the anterior one-third subareolar region of the left breast. The patient reports history of intermittent clear left nipple discharge for the past 10 years. These three structures are adjacent and would be included within the same biopsy site.  3. There is an oval solid mass with partially defined margins measuring 6 x 5 x 5 mm seen in the anterior one-third subareolar region of the left breast. This is located 2 to 3 cm away from the above described masses.   BI-RADS 4: Suspicious      20, Left Breast, US-Guided Biopsy x 2 ([facility])  1. Left Subareolar mass (5 x 5 x 6 cm), Ultrasound guided biopsy:   Benign sclerosing intraductal papilloma with calcifications.  -Tophat clip. Concordant.  2. Left Subareolar mass, ultrasound guided biopsy:   Benign sclerosing intraductal papilloma with usual duct hyperplasia and microcalcifications.   -Hydromark clip. Concordant.     20, Left Breast US ([facility]):  There are  two oval masses and biopsy clips with circumscribed margins seen in the sub-areolar region of the left breast. There are no significant changes from the prior exam(s). This finding represents biopsy proven benign breast disease. These measure less than 1 cm.  BI-RADS  2: Benign.     02/22/21, Left breast needle-localized excisional biopsy and left breast ultrasound-guided excisional biopsy:  1. Left Breast, Needle-Localized Excisional Biopsy (13 grams):               A. LOBULAR CARCINOMA IN SITU (LCIS).               B. Multiple sclerosed intraductal papillomas with focal calcifications (completely excised).               C. Clips and associated biopsy site changes are present and adjacent to papillomas.               D. Background breast parenchyma with clusters of apocrine cysts, usual ductal hyperplasia, and       calcifications associated with sclerosing adenosis.    6/11/2021, Screening MMG with [facility] ([facility]):  Scattered areas of fibroglandular density. There is a new postsurgical scar seen subareolar region of the left breast.  There are no suspicious masses, calcifications, or areas of architectural distortion. In the right breast, no suspicious masses, significant calcifications or other abnormalities are seen.  BI-RADS 2: Benign.     Malignant neoplasm of overlapping sites of left breast in female, estrogen receptor positive (HCC)   12/9/2021 Initial Diagnosis    Malignant neoplasm of overlapping sites of left breast in female, estrogen receptor positive (HCC)     12/10/2021 Imaging    Bilateral Breast MRI ([facility]):  RIGHT BREAST:    No suspicious enhancing mass or area of non-mass enhancement is identified. The visualized axilla is within normal limits.    LEFT BREAST:    There are post surgical changes in the anterior left breast. At 5:00 in the anterior left breast, 3.4 cm posterior to the nipple,  there is a 1.8 cm AP dimension somewhat linear branching nonmass enhancement, which is suspicious.    There is abnormal asymmetric enhancement within the left nipple with an approximately 1.2 cm AP dimension, 1.7 cm transverse dimension, 1.0 cm craniocaudal dimension mass involving the nipple itself and extending slightly deeper into the subareolar breast, which is associated with rapid initial and washout delayed phase enhancement on kinetic analysis. This is suspicious.  No suspicious enhancement is identified in the left chest wall. The visualized axilla is within normal limits.    EXTRAMAMMARY FINDINGS:   There are no abnormally enlarged internal mammary chain lymph nodes on either side.     BI-RADS 4: Suspicious.     12/22/2021 Biopsy    Left Breast, MR-Guided Biopsy ([facility]):    1. Left Breast at 5 o'clock (not for calcifications) MRI-Guided Core Biopsy:                A.  Extensive lobular carcinoma in situ (LCIS):                            1.  LCIS measures up to 7 mm in single greatest dimension focally involving 7 of 8                      representative cores.                2.  Low grade nuclear features without necrosis.  B.  No invasive carcinoma identified by routine and/or immunostaining.  C.  Associated intraductal papilloma with microcalcification, ductal cyst wall and fibrocystic change noted.      2/1/2022 Surgery    Left breast needle-localized excisional biopsy and left nipple mass excision:    1. Breast, Left, Lumpectomy: Invasive lobular carcinoma, lobular carcinoma in situ and atypical lobular hyperplasia.               A. Invasive lobular carcinoma.                            1. The invasive lobular carcinoma measures 4 mm on the slide.                            2. The invasive lobular carcinoma is Addison grade I (tubular grade 3, nuclear grade 1, mitotic grade 1).                            3. The foci is located amongst an area of lobular carcinoma in situ and near the biopsy cavity.                            4. Biopsy site and clip identified (barbell shaped clip).                             5. The invasive lobular carcinoma comes within 1.3 mm of the medial margin, 7.5 mm of the posterior margin, 8.8 mm of the anterior margin, 12.5 mm of the lateral margin and 15 mm of the superior and inferior margins.                            6. Microcalcifications are not associated with the invasive tumor.               B. Extensive lobular carcinoma in situ and atypical lobular hyperplasia.  C. Fibroadenoma with calcification, sclerosing adenosis, apocrine metaplasia, clustered cysts, area consistent with radial scar and fat necrosis.     2. Breast, Left, Nipple, Excision:  Breast tissue with               A. Simple cysts.               B. Calcifications in non-neoplastic tissue.               C. Apocrine metaplasia.               D. Intraductal papilloma with sclerosis.               E. Atypical lobular hyperplasia.    ER+ (80%, strong)  MA+ (3%, moderate)  Her2 negative (IHC 1+)  Ki-67 2%     3/9/2022 - 4/5/2022 Radiation    Radiation OncologyTreatment Course:  Kirsten Lima received 4990 cGy in 20 fractions to LEFT breast.     4/6/2022 -  Hormonal Therapy    Anastrozole (Arimidex)         Past Medical History:   Diagnosis Date   • Acute cystitis    • Allergic rhinitis    • Breast cancer (HCC)    • Breast cyst    • Colon polyp May 6, 2022    7   • Cyst of left nipple    • Depression    • Diverticulosis May 6, 2022   • Dry skin    • Dysuria    • Hemorrhoid    • Hidradenitis    • History of bronchitis    • Hyperlipidemia    • Hypertension    • Incontinence in female     wears pads   • Insomnia    • Nonspecific abnormal electrocardiogram (ECG) (EKG)    • Obesity    • Overactive bladder    • Pregnancy     G-2, P-0   • Sleep apnea    • UTI (urinary tract infection) 02/2021   • Vitamin B12 deficiency    • Vitamin D insufficiency    • Wound, breast     LEFT BREAST; PT INSTRUCTED TO NOTIFY DR GARCIA PRIOR TO SURGERY       Past Surgical History:   Procedure Laterality Date   • AXILLARY HIDRADENITIS  EXCISION Bilateral    • BREAST BIOPSY Left 02/22/2021    Procedure: Left breast needle-localized excisional biopsy (tophat clip) and left breast ultrasound-guided excisional biopsy (hydromark clip);  Surgeon: Debora Zambrano MD;  Location: [facility] OR;  Service: General;  Laterality: Left;   • BREAST BIOPSY Left 02/01/2022    Procedure: Left breast needle-localized excisional biopsy, Left nipple mass excision;  Surgeon: Debora Zambrano MD;  Location: [facility] OR;  Service: General;  Laterality: Left;   • CARDIAC CATHETERIZATION     • COLONOSCOPY     • COLONOSCOPY N/A 05/06/2022    Procedure: COLONOSCOPY;  Surgeon: Mirtha Davis MD;  Location: [facility] MAIN OR;  Service: Gastroenterology;  Laterality: N/A;  diverticulosis, polyps   • HYSTERECTOMY  2006    [facility]   • SUBTOTAL HYSTERECTOMY  2006       Social History     Socioeconomic History   • Marital status: Single   • Number of children: 0   Tobacco Use   • Smoking status: Current Every Day Smoker     Packs/day: 0.50     Years: 43.00     Pack years: 21.50     Types: Cigarettes     Start date: 1/1/1979   • Smokeless tobacco: Never Used   Vaping Use   • Vaping Use: Never used   Substance and Sexual Activity   • Alcohol use: Yes     Comment: 1-2 drinks a month   • Drug use: Never     Comment: marijuana in her twenties   • Sexual activity: Not Currently     Partners: Male     Birth control/protection: Abstinence         Uric Acid   Date Value Ref Range Status   02/27/2018 4.9 2.4 - 5.7 mg/dL Final       Lab Results   Component Value Date    GLUCOSE 125 (H) 07/26/2022    BUN 7 07/26/2022    CREATININE 0.85 07/26/2022    EGFRIFNONA 76 11/19/2021    EGFRIFAFRI 84 01/27/2022    BCR 8.2 07/26/2022    K 3.4 (L) 07/26/2022    CO2 27.3 07/26/2022    CALCIUM 9.7 07/26/2022    PROTENTOTREF 7.0 05/13/2022    ALBUMIN 3.90 07/26/2022    LABIL2 1.4 05/13/2022    AST 13 07/26/2022    ALT 11 07/26/2022       CBC w/diff    CBC w/Diff 3/25/22 5/13/22 7/26/22   WBC 6.89  "6.9 7.02   RBC 5.35 (A) 5.08 5.34 (A)   Hemoglobin 15.2 14.4 14.8   Hematocrit 45.3 42.4 45.6   MCV 84.7 84 85.4   MCH 28.4 28.3 27.7   MCHC 33.6 34.0 32.5   RDW 13.2 13.8 13.1   Platelets 254 274 273   Neutrophil Rel % 66.0 65 70.1   Immature Granulocyte Rel % 0.7 (A)  0.6 (A)   Lymphocyte Rel % 21.2 24 22.2   Monocyte Rel % 9.9 8 5.7   Eosinophil Rel % 1.6 1 1.1   Basophil Rel % 0.6 1 0.3   (A) Abnormal value              Allergies as of 08/16/2022   • (No Known Allergies)       MEDICATIONS:  Medication list reviewed today and Most recent H&P dated 7/26/22 on record from Dr Dong reviewed today    Review of Systems   Constitutional: Positive for activity change and appetite change (increased at times). Negative for fatigue (\"just unmotivated\") and unexpected weight change.   Respiratory: Negative for chest tightness and shortness of breath.    Cardiovascular: Negative for chest pain and leg swelling.   Gastrointestinal: Negative for constipation and diarrhea.   Genitourinary:        Incontinence     Musculoskeletal: Positive for arthralgias and myalgias.        Paraesthesias in lower extremities    Neurological: Positive for numbness (in feet related to back pain). Negative for dizziness and light-headedness.   Psychiatric/Behavioral: Positive for decreased concentration and dysphoric mood. Negative for self-injury, sleep disturbance and suicidal ideas. The patient is not nervous/anxious.        /78   Pulse 55   Temp 96.4 °F (35.8 °C) (Temporal)   Resp 18   Wt 114 kg (252 lb 3.2 oz)   SpO2 98% Comment: room air  BMI 40.71 kg/m²     Wt Readings from Last 3 Encounters:   08/16/22 114 kg (252 lb 3.2 oz)   08/14/22 113 kg (250 lb)   07/26/22 115 kg (253 lb 4.8 oz)       Pain Score    08/16/22 1620   PainSc: Comment: low back radiating to both legs with radiculopathy       PHQ-9 Total Score: 12   GAD7 Total Score: 7  Distress Score: 3  Fatigue Severity Scale: 23      Physical Exam  Constitutional:       " Appearance: Normal appearance.   HENT:      Head: Normocephalic and atraumatic.   Cardiovascular:      Rate and Rhythm: Normal rate and regular rhythm.   Pulmonary:      Effort: Pulmonary effort is normal.   Abdominal:      Palpations: Abdomen is soft.   Skin:     General: Skin is warm and dry.   Neurological:      Mental Status: She is alert and oriented to person, place, and time.   Psychiatric:         Attention and Perception: Attention and perception normal.         Mood and Affect: Mood and affect normal.         Speech: Speech normal.         Behavior: Behavior normal. Behavior is cooperative.         Thought Content: Thought content normal.         Cognition and Memory: Cognition and memory normal.         Judgment: Judgment normal.           Advance Care Planning     Patient does have advance care planning complete, we do have a copy on file    Written information provided regarding advance care planning and appropriateness for all healthy adults, choosing a healthcare surrogate.    Information provided on upcoming Advance Care Planning classes offered virtually each month through [facility].      DISCUSSION HELD TODAY:   Discussed NCCN recommendations for all cancer survivors of 150 minutes/week moderate intensity exercise, achieve and maintain a healthy weight, plants-based whole-foods diet, avoid tobacco and second hand smoke, avoid alcohol or minimize alcohol intake - no more than 1 drink in a day for adults.    After review of the Survivorship Treatment Summary & Care Plan, the patient verbalized understanding of recommendations for follow-up. As outlined in the care plan, they were advised to continue with follow-up care in accordance with the NCCN surveillance guidelines while transitioning back to their primary care physician for continued general preventive and healthcare needs. We discussed the importance of healthy eating, exercise and weight management. We reviewed current  guidelines for routine screening of other cancers.     A copy of the Survivorship Treatment Summary & Care Plan for Ms. Lima was provided to and forwarded to the providers identified on the care team.    Problems identified:  1. Anastrozole: currently taking. She does have pre-existing low back pain and radiculopathy. Discussed strategies for preservation of bone mass while on AI therapy including calcium and vitamin D supplementation and importance of weight bearing exercise to include moderate intensity walking most days of the week, and smoking cessation  2. Lymphedema: reviewed lymphedema early signs and symptoms, prevention and management. She did not have a lymph node procedure at the time of surgery. She did have left breast radiation adjuvantly. Advised I would expect her risk is minimal. Reviewed resources at breast care clinic  3. Denies fatigue and feels she is just unmotivated. Is able to participate in social activities and continues to do so, however looks forward to returning home most time. Difficulty starting physical activity with spinal stenosis and lower extremities numbness. Starts PT tomorrow. Encouraged short walks daily as tolerated to begin to get in the routine of regular movement.  4. Grief: continues to mourn losses of family members. Had not really considered the role grief may be playing in her mood until our last visit in 2021. Continues to consider and process  5. Patient reports depressed mood. PHQ9 score of 12 today and does not rate severity of overeating behaviors. This has been present for over 14 months. We discussed treatment options including medication management and therapy with a behavioral health professional. She is interested in a referral to a community provider for now  6. Reviewed  Current patterns of smoking, potential impacts on health and benefits of quitting. Not ready to set a quit date today.       Plan and recommendations:  1. Encouraged start with small  daily walks to begin getting her body moving as tolerated  2. Starts PT tomorrow for symptoms related to spinal stenosis  3. [facility] [place] wellness programs and [facility] livestrong discussed  4. Will send patient referral sources to self schedule as well as questions to ask a new therapist tip sheet from social work. Prefers to meet with a female therapist.  5. Reviewed next colonoscopy planned for 5/2023 per Dr Davis's last note  6. Continue annual mammogram, pap no less than every three years if all prior paps normal, DEXA Q2 years review with Dr Dong while on anastrozole  7. I have offered to schedule follow up. She declines today and prefers to call as needed. Reminded of 1-800-QUIT-NOW and I remain available as a resource to discuss a quit attempt when ready  8. Based on current age >50, current every day smoker, smoking 10 cigarettes/day x 43 years = 22 pack year history she meets current criteria for low dose chest ct for lung cancer screening  9. Call my office as needed at [phone] for additional information, resources or support.        ICD-10-CM ICD-9-CM   1. Malignant neoplasm of overlapping sites of left breast in female, estrogen receptor positive (HCC)  C50.812 174.8    Z17.0 V86.0   2. Tobacco use disorder  F17.200 305.1   3. Persistent depressive disorder with atypical features, currently mild  F34.1 300.4       No orders of the defined types were placed in this encounter.      I spent 60 minutes caring for this patient on this date of service by face-to-face counseling. This time includes time spent by me in the following activities: preparing for the visit, reviewing tests, obtaining and/or reviewing a separately obtained history, performing a medically appropriate examination and/or evaluation, counseling and educating the patient/family/caregiver, referring and communicating with other health care professionals, documenting information in the medical record and care coordination     I spent  12 minutes on the separately reported service of smoking cessation including assessment of triggers, motivational interviewing, importance of management of co-occurring depression symptoms as part of quit plan. This time is not included in the time used to support the E/M service also reported today.

## 2022-08-18 ENCOUNTER — HOSPITAL ENCOUNTER (OUTPATIENT)
Dept: PHYSICAL THERAPY | Facility: HOSPITAL | Age: 62
Setting detail: THERAPIES SERIES
Discharge: HOME | End: 2022-08-18

## 2022-08-18 DIAGNOSIS — G89.29 CHRONIC BILATERAL LOW BACK PAIN WITH BILATERAL SCIATICA: ICD-10-CM

## 2022-08-18 DIAGNOSIS — M54.16 LUMBAR RADICULOPATHY: Primary | ICD-10-CM

## 2022-08-18 DIAGNOSIS — M54.42 CHRONIC BILATERAL LOW BACK PAIN WITH BILATERAL SCIATICA: ICD-10-CM

## 2022-08-18 DIAGNOSIS — M48.062 LUMBAR STENOSIS WITH NEUROGENIC CLAUDICATION: ICD-10-CM

## 2022-08-18 DIAGNOSIS — M54.41 CHRONIC BILATERAL LOW BACK PAIN WITH BILATERAL SCIATICA: ICD-10-CM

## 2022-08-18 PROCEDURE — 97110 THERAPEUTIC EXERCISES: CPT

## 2022-08-18 NOTE — THERAPY TREATMENT NOTE
Outpatient Physical Therapy Ortho Treatment Note  [facility]     Patient Name: Kirsten Lima  : 1960  MRN: [id number]  Today's Date: 2022      Visit Date: 2022    Visit Dx:    ICD-10-CM ICD-9-CM   1. Lumbar radiculopathy  M54.16 724.4   2. Chronic bilateral low back pain with bilateral sciatica  M54.42 724.2    M54.41 724.3    G89.29 338.29   3. Lumbar stenosis with neurogenic claudication  M48.062 724.03       Patient Active Problem List   Diagnosis   • Abnormal thyroid stimulating hormone (TSH) level   • Atopic rhinitis   • Hypertension   • Insomnia   • Overactive bladder   • Knee pain   • Cobalamin deficiency   • Vitamin D deficiency   • Hyperlipidemia   • Other chronic pain   • Lumbar facet arthropathy   • Spinal stenosis of lumbar region   • Intraductal papilloma of left breast   • Lobular carcinoma in situ (LCIS) of left breast   • Increased risk of breast cancer   • Tobacco use disorder   • Elevated glucose   • Mass of nipple   • Colon cancer screening   • Malignant neoplasm of overlapping sites of left breast in female, estrogen receptor positive (HCC)   • Lumbar radiculopathy        Past Medical History:   Diagnosis Date   • Acute cystitis    • Allergic rhinitis    • Breast cancer (HCC)    • Breast cyst    • Colon polyp May 6, 2022    7   • Cyst of left nipple    • Depression    • Diverticulosis May 6, 2022   • Dry skin    • Dysuria    • Hemorrhoid    • Hidradenitis    • History of bronchitis    • Hyperlipidemia    • Hypertension    • Incontinence in female     wears pads   • Insomnia    • Nonspecific abnormal electrocardiogram (ECG) (EKG)    • Obesity    • Overactive bladder    • Pregnancy     G-2, P-0   • Sleep apnea    • UTI (urinary tract infection) 2021   • Vitamin B12 deficiency    • Vitamin D insufficiency    • Wound, breast     LEFT BREAST; PT INSTRUCTED TO NOTIFY DR GARCIA PRIOR TO SURGERY        Past Surgical History:   Procedure Laterality Date   • AXILLARY  HIDRADENITIS EXCISION Bilateral    • BREAST BIOPSY Left 02/22/2021    Procedure: Left breast needle-localized excisional biopsy (tophat clip) and left breast ultrasound-guided excisional biopsy (hydromark clip);  Surgeon: Debora Zambrano MD;  Location: [facility] MAIN OR;  Service: General;  Laterality: Left;   • BREAST BIOPSY Left 02/01/2022    Procedure: Left breast needle-localized excisional biopsy, Left nipple mass excision;  Surgeon: Debora Zambrano MD;  Location: [facility] OR;  Service: General;  Laterality: Left;   • CARDIAC CATHETERIZATION     • COLONOSCOPY     • COLONOSCOPY N/A 05/06/2022    Procedure: COLONOSCOPY;  Surgeon: Mirtha Davis MD;  [place] [facility] [place];  Service: Gastroenterology;  Laterality: N/A;  diverticulosis, polyps   • HYSTERECTOMY  2006    LAVH   • SUBTOTAL HYSTERECTOMY  2006                        PT Assessment/Plan     Row Name 08/18/22 1500          PT Assessment    Assessment Comments Progressed gentle core acctivation and mobility in flexion. Patient continues to have difficulty with coordinating PPT or TA activation. Patient with knee flexion with PPT in standing consistently, so tested hip flexors and found significant tightness. Performed SKTC stretch in supine for hip flexor stretch in straight leg. Patient reports significant stretch without hanging leg off of table. Stretch added to HEP.  -LW            PT Plan    PT Plan Comments Review hip flexor stretch and progress if needed. progress HL strengthening for core and hips: consider  abd/add, dead bug, iso oblique hand to knee press.  -LW           User Key  (r) = Recorded By, (t) = Taken By, (c) = Cosigned By    Initials Name Provider Type    Violetta Yost, PT Physical Therapist                   OP Exercises     Row Name 08/18/22 1500             Subjective Comments    Subjective Comments Her back has been kind of achy. She walked out well last time.  -LW              Subjective Pain    Able to rate  "subjective pain? yes  -LW      Pre-Treatment Pain Level 3  -LW              Total Minutes    23456 - PT Therapeutic Exercise Minutes 42  -LW              Exercise 1    Exercise Name 1 Nustep  -LW      Cueing 1 Verbal;Tactile;Demo  -LW      Time 1 5 min  -LW      Additional Comments L5 seat @ 9  -LW              Exercise 3    Exercise Name 3 PPT standing with back to wall feet forward from wall 8-10\"  -LW      Cueing 3 Verbal;Tactile;Demo  -LW      Reps 3 15  -LW      Additional Comments copious cuing  -LW              Exercise 4    Exercise Name 4 Yoga breathing and TA in 90/90 decompression position w/legs over SB  -LW      Cueing 4 Verbal;Tactile  -LW      Time 4 5 min  -LW      Additional Comments worked on deep breathing (long exhale first) to relax the back then worked on activating TA without tilting pelvis  -LW              Exercise 5    Exercise Name 5 HL PPT -passively pushing through legs  -LW      Cueing 5 Verbal;Tactile  -LW      Sets 5 1  -LW      Reps 5 15  -LW      Time 5 3-5 sec  -LW              Exercise 7    Exercise Name 7 PPT in front of itrror with mini squat  -LW      Cueing 7 Verbal;Tactile;Demo  -LW      Reps 7 15  -LW      Additional Comments Copious cuing for pelvic tilt with mirror  -LW              Exercise 9    Exercise Name 9 DKTC and LTR on SB  -LW      Cueing 9 Verbal;Tactile  -LW      Sets 9 2 each  -LW      Reps 9 10  -LW              Exercise 10    Exercise Name 10 Supine SKTC hip flexor stretch  -LW      Cueing 10 Verbal;Tactile  -LW      Sets 10 1  -LW      Reps 10 3 B  -LW      Time 10 20 sec  -LW            User Key  (r) = Recorded By, (t) = Taken By, (c) = Cosigned By    Initials Name Provider Type    Violetta Yost, PT Physical Therapist                                                Time Calculation:   Start Time: 1516  Stop Time: 1558  Time Calculation (min): 42 min  Total Timed Code Minutes- PT: 42 minute(s)  Timed Charges  66817 - PT Therapeutic Exercise Minutes: " 42  Total Minutes  Timed Charges Total Minutes: 42   Total Minutes: 42  Therapy Charges for Today     Code Description Service Date Service Provider Modifiers Qty    65542201216 HC PT THER PROC EA 15 MIN 8/18/2022 Violetta Deluna, PT GP 3                    Violetta Deluna, PT  8/18/2022

## 2022-08-19 VITALS
TEMPERATURE: 96.4 F | OXYGEN SATURATION: 98 % | HEART RATE: 55 BPM | SYSTOLIC BLOOD PRESSURE: 138 MMHG | BODY MASS INDEX: 40.71 KG/M2 | DIASTOLIC BLOOD PRESSURE: 78 MMHG | RESPIRATION RATE: 18 BRPM | WEIGHT: 252.2 LBS

## 2022-08-19 PROBLEM — F43.21 COMPLICATED GRIEF: Status: ACTIVE | Noted: 2022-08-19

## 2022-08-19 PROBLEM — F34.1: Status: ACTIVE | Noted: 2022-08-19

## 2022-08-23 ENCOUNTER — IMMUNIZATION (OUTPATIENT)
Dept: VACCINE CLINIC | Facility: HOSPITAL | Age: 62
End: 2022-08-23

## 2022-08-23 DIAGNOSIS — Z23 NEED FOR VACCINATION: Primary | ICD-10-CM

## 2022-08-23 PROCEDURE — 0054A HC ADM SARSCV2 30MCG TRS-SUCR BOOSTER: CPT | Performed by: INTERNAL MEDICINE

## 2022-08-23 PROCEDURE — 91305 HC SARSCOV2 VAC 30 MCG TRS-SUCR PFIZER: CPT | Performed by: INTERNAL MEDICINE

## 2022-08-24 ENCOUNTER — OFFICE VISIT (OUTPATIENT)
Dept: SURGERY | Facility: CLINIC | Age: 62
End: 2022-08-24

## 2022-08-24 VITALS
DIASTOLIC BLOOD PRESSURE: 84 MMHG | SYSTOLIC BLOOD PRESSURE: 142 MMHG | HEART RATE: 74 BPM | WEIGHT: 252 LBS | HEIGHT: 66 IN | OXYGEN SATURATION: 98 % | BODY MASS INDEX: 40.5 KG/M2 | RESPIRATION RATE: 17 BRPM

## 2022-08-24 DIAGNOSIS — C50.812 MALIGNANT NEOPLASM OF OVERLAPPING SITES OF LEFT BREAST IN FEMALE, ESTROGEN RECEPTOR POSITIVE: Primary | ICD-10-CM

## 2022-08-24 DIAGNOSIS — Z17.0 MALIGNANT NEOPLASM OF OVERLAPPING SITES OF LEFT BREAST IN FEMALE, ESTROGEN RECEPTOR POSITIVE: Primary | ICD-10-CM

## 2022-08-24 DIAGNOSIS — Z91.89 INCREASED RISK OF BREAST CANCER: ICD-10-CM

## 2022-08-24 PROCEDURE — 99213 OFFICE O/P EST LOW 20 MIN: CPT | Performed by: SURGERY

## 2022-08-24 NOTE — PROGRESS NOTES
[facility]     Referring Provider: Tad Lowe MD     Chief complaint: Routine follow up breast cancer    HPI:   8/7/20:  Ms. Kirsten Lima is a 59 yo woman, seen at the request of Dr. Tad Lowe, for a new diagnosis of left breast intraductal papillomas. These were initially detected as an imaging abnormality on routine screening. Her work-up is detailed in the breast history section below. Despite these being incidentally found, the patient reports that she has had intermittent, clear, left nipple discharge for years. 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 She denies any associated breast lumps or pain. She does report chronic issues with her breast skin due to hidradenitis and recurrent skin abscesses, however she has not had any acute problems lately. She has a past history of a benign left breast stereotactic biopsy in 2012. She denies any family history of breast or ovarian cancer.      1/7/21:  At her last visit, I recommended excision of both of the left breast papillomas due the associated nipple discharge. She wanted to think about it and ultimately decided to hold off on surgery. She underwent follow-up ultrasound prior to her appointment, which showed stable left breast intraductal masses (see report details below). She has not tried to elicit any nipple discharge since the last visit and has not noticed any spontaneously.     3/10/21:  She underwent left breast excisional biopsy x 2 on 2/22/21, which showed LCIS. See surgery & pathology details in breast history section below. She has been recovering well and has no complaints.      6/28/21, Visit with YAN Dill :  She returns today for follow up with no breast complaints   In 3/2021 she met with Dr Dong and discussed starting tamoxifen, referral to nutritionist and smoking cessation were made.  Due to  risk for blood clots she has reservations about taking tamoxifen. She will follow up with Dr Dong in 9/21 to discuss again.   Screening mammogram with tomosynthesis was completed 6/11/2021 at [facility] (see full report below)    1/12/22:  She saw Regina Gilbert in June with a normal screening mammogram. She saw Dr. Dong who initially prescribed low-dose tamoxifen, however the patient never started taking it because she was concerned about blood clots. Dr. Dong then prescribed anastrozole in September, however the patient just started taking this 3 weeks ago.  She underwent her first screening MRI in December, which showed an area of non-mass enhancement in the left lower outer breast and a left nipple mass. She subsequently underwent an MR biopsy of the non-mass enhancement and this showed extensive LCIS (see imaging and pathology report details below).    2/16/22:  She underwent left breast excisional biopsy and left nipple mass excision on 2/1/22. The excisional biopsy showed an incidental focus of low-grade invasive lobular carcinoma measuring 4 mm with negative margins (see report details below). I have already discussed her case in tumor board and the consensus was that axillary staging is not necessary. She has been recovering well from surgery.    8/24/22, Interval History:  She returns today for scheduled follow-up. She completed radiation on 4/5/22 and tolerated this well. She remains on anastrozole and is still tolerating this well. She underwent MMG prior to her appointment which was benign.  She called the office about a month ago complaining of some left breast pain and nodular areas under her left nipple. She began wearing a sports bra day and night and the pain has resolved. She also noticed that the lower part of her left breast looked less swollen after wearing the bra.        Oncology/Hematology History Overview Note   5/28/19, Screening MMG with [facility] ([facility]):  Scattered  areas of fibroglandular density.  1. There are small asymmetries seen in both breasts. The parenchyma includes stable nodular asymmetries. No suspicious masses, suspicious microcalcifications or areas of architectural distortion are identified. There has been no significant change from the prior exams.  2. There are stable punctate and spherical lucent-centered calcifications with diffuse/scattered distribution seen in both breast.  BI-RADS 2: Benign.     20, Screening MMG with [facility] ([facility]):  Scattered areas of fibroglandular density.  1. There are small asymmetries seen in both breasts. There has been no significant change from the prior exams.  2. There are stable punctate and spherical lucent-centered calcifications with diffuse distribution seen in both breasts.  3. There is a new small focal asymmetry measuring 12 mm seen in the central region of the left breast. This is best visualized on tomosynthesis MLO view slice # 78. This is best seen on the MLO View.  BI-RADS 0: Incomplete.     20, Left Diagnostic MMG with [facility] & Left Breast US ([facility]):  MM. On the present examination, focal asymmetry in the left breast, central does not persist. The asymmetry noted on the recent screening mammogram resolves into stroma on additional views.  US:  1. There is no sonographic correlate. There is no evidence of any solid mass or abnormal cystic elements.  2. There are three oval intraductal masses with partially defined margins measuring 5 mm to 9 mm seen in the anterior one-third subareolar region of the left breast. The patient reports history of intermittent clear left nipple discharge for the past 10 years. These three structures are adjacent and would be included within the same biopsy site.  3. There is an oval solid mass with partially defined margins measuring 6 x 5 x 5 mm seen in the anterior one-third subareolar region of the left breast. This is located 2 to 3 cm away from the above described  masses.   BI-RADS 4: Suspicious      6/29/20, Left Breast, US-Guided Biopsy x 2 ([facility])  1. Left Subareolar mass (5 x 5 x 6 cm), Ultrasound guided biopsy:   Benign sclerosing intraductal papilloma with calcifications.  -Tophat clip. Concordant.  2. Left Subareolar mass, ultrasound guided biopsy:   Benign sclerosing intraductal papilloma with usual duct hyperplasia and microcalcifications.   -Hydromark clip. Concordant.     12/28/20, Left Breast US ([facility]):  There are two oval masses and biopsy clips with circumscribed margins seen in the sub-areolar region of the left breast. There are no significant changes from the prior exam(s). This finding represents biopsy proven benign breast disease. These measure less than 1 cm.  BI-RADS  2: Benign.     02/22/21, Left breast needle-localized excisional biopsy and left breast ultrasound-guided excisional biopsy:  1. Left Breast, Needle-Localized Excisional Biopsy (13 grams):               A. LOBULAR CARCINOMA IN SITU (LCIS).               B. Multiple sclerosed intraductal papillomas with focal calcifications (completely excised).               C. Clips and associated biopsy site changes are present and adjacent to papillomas.               D. Background breast parenchyma with clusters of apocrine cysts, usual ductal hyperplasia, and       calcifications associated with sclerosing adenosis.    6/11/2021, Screening MMG with [facility] ([facility]):  Scattered areas of fibroglandular density. There is a new postsurgical scar seen subareolar region of the left breast.  There are no suspicious masses, calcifications, or areas of architectural distortion. In the right breast, no suspicious masses, significant calcifications or other abnormalities are seen.  BI-RADS 2: Benign.     Malignant neoplasm of overlapping sites of left breast in female, estrogen receptor positive (HCC)   12/9/2021 Initial Diagnosis    Malignant neoplasm of overlapping sites of left breast in female, estrogen  receptor positive (HCC)     12/10/2021 Imaging    Bilateral Breast MRI ( [facility]):  RIGHT BREAST:    No suspicious enhancing mass or area of non-mass enhancement is identified. The visualized axilla is within normal limits.    LEFT BREAST:    There are post surgical changes in the anterior left breast. At 5:00 in the anterior left breast, 3.4 cm posterior to the nipple,  there is a 1.8 cm AP dimension somewhat linear branching nonmass enhancement, which is suspicious.   There is abnormal asymmetric enhancement within the left nipple with an approximately 1.2 cm AP dimension, 1.7 cm transverse dimension, 1.0 cm craniocaudal dimension mass involving the nipple itself and extending slightly deeper into the subareolar breast, which is associated with rapid initial and washout delayed phase enhancement on kinetic analysis. This is suspicious.  No suspicious enhancement is identified in the left chest wall. The visualized axilla is within normal limits.    EXTRAMAMMARY FINDINGS:   There are no abnormally enlarged internal mammary chain lymph nodes on either side.     BI-RADS 4: Suspicious.     12/22/2021 Biopsy    Left Breast, MR-Guided Biopsy ( [facility]):    1. Left Breast at 5 o'clock (not for calcifications) MRI-Guided Core Biopsy:                A.  Extensive lobular carcinoma in situ (LCIS):                            1.  LCIS measures up to 7 mm in single greatest dimension focally involving 7 of 8                      representative cores.                2.  Low grade nuclear features without necrosis.  B.  No invasive carcinoma identified by routine and/or immunostaining.  C.  Associated intraductal papilloma with microcalcification, ductal cyst wall and fibrocystic change noted.      2/1/2022 Surgery    Left breast needle-localized excisional biopsy and left nipple mass excision:    1. Breast, Left, Lumpectomy: Invasive lobular carcinoma, lobular carcinoma in situ and atypical lobular hyperplasia.               A.  Invasive lobular carcinoma.                            1. The invasive lobular carcinoma measures 4 mm on the slide.                            2. The invasive lobular carcinoma is Keene grade I (tubular grade 3, nuclear grade 1, mitotic grade 1).                            3. The foci is located amongst an area of lobular carcinoma in situ and near the biopsy cavity.                            4. Biopsy site and clip identified (barbell shaped clip).                            5. The invasive lobular carcinoma comes within 1.3 mm of the medial margin, 7.5 mm of the posterior margin, 8.8 mm of the anterior margin, 12.5 mm of the lateral margin and 15 mm of the superior and inferior margins.                            6. Microcalcifications are not associated with the invasive tumor.               B. Extensive lobular carcinoma in situ and atypical lobular hyperplasia.  C. Fibroadenoma with calcification, sclerosing adenosis, apocrine metaplasia, clustered cysts, area consistent with radial scar and fat necrosis.     2. Breast, Left, Nipple, Excision:  Breast tissue with               A. Simple cysts.               B. Calcifications in non-neoplastic tissue.               C. Apocrine metaplasia.               D. Intraductal papilloma with sclerosis.               E. Atypical lobular hyperplasia.    ER+ (80%, strong)  TN+ (3%, moderate)  Her2 negative (IHC 1+)  Ki-67 2%     3/9/2022 - 4/5/2022 Radiation    Radiation OncologyTreatment Course:  Kirsten Lima received 4990 cGy in 20 fractions to LEFT breast.     4/6/2022 -  Hormonal Therapy    Anastrozole (Arimidex)     8/15/2022 Imaging    Screening MMG with [facility] ([facility]):  Scattered areas of fibroglandular density.  There is a stable post-surgical scar with associated skin lesion and trabecular thickening seen in the central region of the left breast. Post-surgical scar is in an area of prior lumpectomy. Findings are consistent with post-surgical and  post-radiation therapy changes.  In the right breast, no suspicious masses, significant calcifications or other abnormalities are seen.  BI-RADS 2: Benign.         Review of Systems:  See interval history.       Medications:    Current Outpatient Medications:   •  amLODIPine (NORVASC) 10 MG tablet, TAKE 1 TABLET DAILY, Disp: 90 tablet, Rfl: 1  •  anastrozole (ARIMIDEX) 1 MG tablet, TAKE 1 TABLET BY MOUTH EVERY DAY, Disp: 90 tablet, Rfl: 0  •  cetirizine (ZyrTEC) 10 MG tablet, Take 1 tablet by mouth daily., Disp: , Rfl:   •  Cholecalciferol (VITAMIN D) 2000 units capsule, Take 2,000 Units by mouth Daily., Disp: , Rfl:   •  citalopram (CeleXA) 10 MG tablet, Take 1 tablet by mouth Daily., Disp: 90 tablet, Rfl: 1  •  clotrimazole-betamethasone (LOTRISONE) 1-0.05 % lotion, Apply  topically to the appropriate area as directed 2 (Two) Times a Day. (Patient taking differently: Apply 1 application topically to the appropriate area as directed 2 (Two) Times a Day As Needed.), Disp: 30 mL, Rfl: 1  •  Efinaconazole 10 % solution, Apply 1 drop topically Daily. (Patient taking differently: Apply 1 drop topically Daily As Needed.), Disp: 8 mL, Rfl: 3  •  erythromycin (ROMYCIN) 5 MG/GM ophthalmic ointment, Administer  into the left eye Every 4 (Four) Hours While Awake., Disp: 3.5 g, Rfl: 0  •  lisinopril-hydrochlorothiazide (PRINZIDE,ZESTORETIC) 20-12.5 MG per tablet, TAKE 2 TABLETS DAILY, Disp: 180 tablet, Rfl: 1  •  metoprolol succinate XL (TOPROL-XL) 200 MG 24 hr tablet, TAKE 1 TABLET DAILY, Disp: 90 tablet, Rfl: 1  •  nitrofurantoin, macrocrystal-monohydrate, (MACROBID) 100 MG capsule, Take 1 capsule by mouth 2 (Two) Times a Day., Disp: 14 capsule, Rfl: 0  •  nystatin-triamcinolone (MYCOLOG II) 986421-4.1 UNIT/GM-% cream, SMALL AMT CREAM APPLY A THIN ALYER TO EXTERNAL VAGINAL AREA FOR ITCHING TWICE A DAY, Disp: , Rfl:   •  rosuvastatin (CRESTOR) 5 MG tablet, TAKE 1 TABLET DAILY, Disp: 90 tablet, Rfl: 1  •  traZODone (DESYREL)  50 MG tablet, TAKE 1 TABLET EVERY NIGHT, Disp: 90 tablet, Rfl: 1  •  Vibegron (Gemtesa) 75 MG tablet, Take 1 tablet by mouth Daily., Disp: 90 tablet, Rfl: 1      Allergies:  No Known Allergies      Family History   Problem Relation Age of Onset   • Hypertension Mother    • COPD Father             • Heart failure Father    • Hypertension Father    • Hyperlipidemia Father    • Vision loss Father    • Pancreatic cancer Brother         1 brother  from pancreatic cancer   • Alcohol abuse Brother             • Other Sister    • Alcohol abuse Sister             • Liver disease Sister    • Alcohol abuse Brother             • Cancer Brother         Liver   • Malig Hyperthermia Neg Hx        PHYSICAL EXAMINATION:   Vitals:    22 0836   BP: 142/84   Pulse: 74   Resp: 17   SpO2: 98%     ECOG 0 - Asymptomatic  General: NAD, well appearing, obese  Psych: a&o x 3, normal mood and affect  Eyes: EOMI, no scleral icterus  ENMT: neck supple without masses or thyromegaly, mucus membranes moist  MSK: normal gait, normal ROM in bilateral shoulders  Lymph nodes: Bilateral axillary scar; no cervical, supraclavicular or axillary lymphadenopathy  Breast: very large size, pendulous  Right: No visible abnormalities on inspection while seated, with arms raised or hands on hips. No masses, skin changes, or nipple abnormalities.  Left: On inspection, there is hyperpigmentation and the left breast is smaller and uplifted vs the right. Well-healed medial periareolar and lower outer periareolar scars. There are some expected postsurgical changes in the periareolar region, but no discrete masses. No nipple abnormalities. There is mild edema of the inferior breast.      I have independently reviewed her imaging and here are my findings:   Expected left breast postsurgical and postradiation changes. No suspicious findings in either breast on mammogram.      Assessment:  62 y.o. F with a new diagnosis of  left breast cancer: Low grade, invasive lobular carcinoma, ER/IN positive, HER-2 negative. This was diagnosed after excisional biopsy for LCIS on 2/1/22, pT1aNx. Case was discussed at multidisciplinary tumor board and it was decided to omit axillary staging. She completed radiation on 4/5/22. She is currently on anastrozole.    -She has a past history of left breast LCIS, which was diagnosed after left breast excisional biopsy x2 on 2/22/21 for intraductal papillomas associated with pathologic nipple discharge.     -Because of her increased lifetime risk of breast cancer (47.1%, TCv8, calculated 3/10/21) and history of LCIS, she was in high risk screening and on anastrozole since 1/2022. Since she was already in high risk screening, we will plan to continue this in the future.    -She has mild left breast lymphedema.    Plan:  -Lymphedema clinic referral.  -Continue follow-up with Dr. Dong.  -Follow-up in 2/2023 with breast MRI with NP.  -She was instructed to call sooner with any questions, concerns or changes on BSE.    Debora Zambrano MD      CC:  MD Verenice Donaldson MD Ann Grider, MD

## 2022-08-25 ENCOUNTER — TRANSCRIBE ORDERS (OUTPATIENT)
Dept: SURGERY | Facility: CLINIC | Age: 62
End: 2022-08-25

## 2022-08-25 ENCOUNTER — APPOINTMENT (OUTPATIENT)
Dept: PHYSICAL THERAPY | Facility: HOSPITAL | Age: 62
End: 2022-08-25

## 2022-08-25 DIAGNOSIS — Z17.0 MALIGNANT NEOPLASM OF OVERLAPPING SITES OF LEFT BREAST IN FEMALE, ESTROGEN RECEPTOR POSITIVE: Primary | ICD-10-CM

## 2022-08-25 DIAGNOSIS — C50.812 MALIGNANT NEOPLASM OF OVERLAPPING SITES OF LEFT BREAST IN FEMALE, ESTROGEN RECEPTOR POSITIVE: Primary | ICD-10-CM

## 2022-08-25 DIAGNOSIS — I89.0 LYMPHEDEMA OF BREAST: ICD-10-CM

## 2022-08-30 ENCOUNTER — HOSPITAL ENCOUNTER (OUTPATIENT)
Dept: PHYSICAL THERAPY | Facility: HOSPITAL | Age: 62
Setting detail: THERAPIES SERIES
Discharge: HOME | End: 2022-08-30

## 2022-08-30 DIAGNOSIS — M54.42 CHRONIC BILATERAL LOW BACK PAIN WITH BILATERAL SCIATICA: ICD-10-CM

## 2022-08-30 DIAGNOSIS — M48.062 LUMBAR STENOSIS WITH NEUROGENIC CLAUDICATION: ICD-10-CM

## 2022-08-30 DIAGNOSIS — M54.16 LUMBAR RADICULOPATHY: Primary | ICD-10-CM

## 2022-08-30 DIAGNOSIS — M54.41 CHRONIC BILATERAL LOW BACK PAIN WITH BILATERAL SCIATICA: ICD-10-CM

## 2022-08-30 DIAGNOSIS — G89.29 CHRONIC BILATERAL LOW BACK PAIN WITH BILATERAL SCIATICA: ICD-10-CM

## 2022-08-30 PROCEDURE — 97110 THERAPEUTIC EXERCISES: CPT

## 2022-08-31 ENCOUNTER — HOSPITAL ENCOUNTER (OUTPATIENT)
Dept: PHYSICAL THERAPY | Facility: HOSPITAL | Age: 62
Setting detail: THERAPIES SERIES
Discharge: HOME | End: 2022-08-31

## 2022-08-31 DIAGNOSIS — M54.16 LUMBAR RADICULOPATHY: Primary | ICD-10-CM

## 2022-08-31 DIAGNOSIS — M48.062 LUMBAR STENOSIS WITH NEUROGENIC CLAUDICATION: ICD-10-CM

## 2022-08-31 DIAGNOSIS — M54.41 CHRONIC BILATERAL LOW BACK PAIN WITH BILATERAL SCIATICA: ICD-10-CM

## 2022-08-31 DIAGNOSIS — G89.29 CHRONIC BILATERAL LOW BACK PAIN WITH BILATERAL SCIATICA: ICD-10-CM

## 2022-08-31 DIAGNOSIS — M54.42 CHRONIC BILATERAL LOW BACK PAIN WITH BILATERAL SCIATICA: ICD-10-CM

## 2022-08-31 PROCEDURE — 97110 THERAPEUTIC EXERCISES: CPT

## 2022-09-06 ENCOUNTER — HOSPITAL ENCOUNTER (OUTPATIENT)
Dept: PHYSICAL THERAPY | Facility: HOSPITAL | Age: 62
Setting detail: THERAPIES SERIES
Discharge: HOME | End: 2022-09-06

## 2022-09-06 DIAGNOSIS — M54.42 CHRONIC BILATERAL LOW BACK PAIN WITH BILATERAL SCIATICA: ICD-10-CM

## 2022-09-06 DIAGNOSIS — G89.29 CHRONIC BILATERAL LOW BACK PAIN WITH BILATERAL SCIATICA: ICD-10-CM

## 2022-09-06 DIAGNOSIS — M48.062 LUMBAR STENOSIS WITH NEUROGENIC CLAUDICATION: ICD-10-CM

## 2022-09-06 DIAGNOSIS — M54.16 LUMBAR RADICULOPATHY: Primary | ICD-10-CM

## 2022-09-06 DIAGNOSIS — M54.41 CHRONIC BILATERAL LOW BACK PAIN WITH BILATERAL SCIATICA: ICD-10-CM

## 2022-09-06 PROCEDURE — 97110 THERAPEUTIC EXERCISES: CPT

## 2022-09-06 NOTE — THERAPY PROGRESS REPORT/RE-CERT
Outpatient Physical Therapy Ortho Progress Note  [facility]     Patient Name: Kirsten Lima  : 1960  MRN: [id number]  Today's Date: 2022      Visit Date: 2022    Visit Dx:    ICD-10-CM ICD-9-CM   1. Lumbar radiculopathy  M54.16 724.4   2. Chronic bilateral low back pain with bilateral sciatica  M54.42 724.2    M54.41 724.3    G89.29 338.29   3. Lumbar stenosis with neurogenic claudication  M48.062 724.03       Patient Active Problem List   Diagnosis   • Abnormal thyroid stimulating hormone (TSH) level   • Atopic rhinitis   • Hypertension   • Insomnia   • Overactive bladder   • Knee pain   • Cobalamin deficiency   • Vitamin D deficiency   • Hyperlipidemia   • Other chronic pain   • Lumbar facet arthropathy   • Spinal stenosis of lumbar region   • Intraductal papilloma of left breast   • Lobular carcinoma in situ (LCIS) of left breast   • Increased risk of breast cancer   • Tobacco use disorder   • Elevated glucose   • Mass of nipple   • Colon cancer screening   • Malignant neoplasm of overlapping sites of left breast in female, estrogen receptor positive (HCC)   • Lumbar radiculopathy   • Complicated grief   • Persistent depressive disorder with atypical features, currently mild        Past Medical History:   Diagnosis Date   • Acute cystitis    • Allergic rhinitis    • Breast cancer (HCC)    • Breast cyst    • Colon polyp May 6, 2022    7   • Cyst of left nipple    • Depression    • Diverticulosis May 6, 2022   • Dry skin    • Dysuria    • Hemorrhoid    • Hidradenitis    • History of bronchitis    • Hyperlipidemia    • Hypertension    • Incontinence in female     wears pads   • Insomnia    • Nonspecific abnormal electrocardiogram (ECG) (EKG)    • Obesity    • Overactive bladder    • Pregnancy     G-2, P-0   • Sleep apnea    • UTI (urinary tract infection) 2021   • Vitamin B12 deficiency    • Vitamin D insufficiency    • Wound, breast     LEFT BREAST; PT INSTRUCTED TO NOTIFY DR GARCIA  PRIOR TO SURGERY        Past Surgical History:   Procedure Laterality Date   • AXILLARY HIDRADENITIS EXCISION Bilateral    • BREAST BIOPSY Left 02/22/2021    Procedure: Left breast needle-localized excisional biopsy (tophat clip) and left breast ultrasound-guided excisional biopsy (hydromark clip);  Surgeon: Debora Zambrano MD;  Location: [facility] OR;  Service: General;  Laterality: Left;   • BREAST BIOPSY Left 02/01/2022    Procedure: Left breast needle-localized excisional biopsy, Left nipple mass excision;  Surgeon: Debora Zambrano MD;  Location: [facility] OR;  Service: General;  Laterality: Left;   • CARDIAC CATHETERIZATION     • COLONOSCOPY     • COLONOSCOPY N/A 05/06/2022    Procedure: COLONOSCOPY;  Surgeon: Mirtha Davis MD;  Location: [facility] MAIN OR;  Service: Gastroenterology;  Laterality: N/A;  diverticulosis, polyps   • HYSTERECTOMY  2006    [facility]   • SUBTOTAL HYSTERECTOMY  2006        PT Ortho     Row Name 09/06/22 1000       Lumbar ROM Screen- Lower Quarter Clearing    Lumbar Flexion Normal  Reaches floor, hs tightness  -LW    Lumbar Extension Impaired  mild motion, glute tightness.  -LW    Lumbar Lateral Flexion Normal  Reaches knee B, no back or radicular pain.  -LW    Lumbar Rotation Impaired  moderate mobility no pain, just stretch  -LW          User Key  (r) = Recorded By, (t) = Taken By, (c) = Cosigned By    Initials Name Provider Type    Violetta Yost, PT Physical Therapist                             PT Assessment/Plan     Row Name 09/06/22 1000          PT Assessment    Assessment Comments Re-assessed goals and objective finins this session for progress note. Patient has met 1/2 STGs and 0/3 LTGs at this time and is progressing on others. She is reporting improved pain control, walking tolerance, disability level per the Oswestry. Patient still challenged with coordination of PPT and TA activation. She continues to have some limitations with long distance walking. Educated  patient extensively on pathology, treatment goals, exercise tolerance progression, and activity monitoring for progression without increased symptoms. Patient verbalized understanding.  -LW            PT Plan    PT Frequency 2x/week  -LW     Predicted Duration of Therapy Intervention (PT) Another 8-10 visits over 4-6 weeks  -LW     PT Plan Comments Consider addressing lifting/squatting mechanics, continue to progress functional mobility with body awareness.  -LW           User Key  (r) = Recorded By, (t) = Taken By, (c) = Cosigned By    Initials Name Provider Type    Violetta Yost, PT Physical Therapist                   OP Exercises     Row Name 09/06/22 1000             Subjective Comments    Subjective Comments She has been doing well, with pain no more than 3/10 doing walking alternating with resting. She did not get the radicular symptoms like before.  -LW              Subjective Pain    Able to rate subjective pain? yes  -LW      Pre-Treatment Pain Level 2  -LW              Total Minutes    88214 - PT Therapeutic Exercise Minutes 43  -LW              Exercise 1    Exercise Name 1 Nustep  -LW      Cueing 1 Verbal;Tactile;Demo  -LW      Time 1 5 min  -LW      Additional Comments L5 seat @ 9  -LW              Exercise 14    Exercise Name 14 Objective findings and goals assessment; POC, pathology, and goals discussion.  -LW            User Key  (r) = Recorded By, (t) = Taken By, (c) = Cosigned By    Initials Name Provider Type    Violetta Yost, PT Physical Therapist                              PT OP Goals     Row Name 09/06/22 1000          PT Short Term Goals    STG Date to Achieve 08/04/22  -LW     STG 1 The pt will demonstrate IND with initial HEP focused on improved lumbar mobility/stability and decreased pain.  -LW     STG 1 Progress Met  -LW     STG 1 Progress Comments Patient reports consistently performing.  -LW     STG 2 Patient will demonstrate symmetrical and pain-free spinal AROM to decrease  pain and improve ability to exercise.  -LW     STG 2 Progress Progressing  -LW     STG 2 Progress Comments Pain-free and improved mobility.  -LW            Long Term Goals    LTG Date to Achieve 09/03/22  -LW     LTG 1 The pt will demonstrate IND with finalized HEP focused on return to PLOF and IND condition management.  -LW     LTG 1 Progress Progressing  -LW     LTG 1 Progress Comments Patient reports consistently performing.  -LW     LTG 2 Patient will score no more than 15% disability per the Oswestry to demonstrate decreased limitations on daily activities.  -LW     LTG 2 Progress Progressing  -LW     LTG 2 Progress Comments Patient scored 38% this session.  -LW     LTG 3 Patient will report ability to stand and walk  for at least 30 min each before onset of symptoms to allow her to perform increased cooking and shopping before needing to sit as well as allow for improved ability to exercise.  -LW     LTG 3 Progress Progressing  -LW     LTG 3 Progress Comments 15-20 min walking right now.  -           User Key  (r) = Recorded By, (t) = Taken By, (c) = Cosigned By    Initials Name Provider Type    Violetta Yost, PT Physical Therapist                     Outcome Measure Options: Modified Oswestry  Modified Oswestry  Modified Oswestry Score/Comments: 19/50=38% disability      Time Calculation:   Start Time: 1032  Stop Time: 1115  Time Calculation (min): 43 min  Total Timed Code Minutes- PT: 43 minute(s)  Timed Charges  19463 - PT Therapeutic Exercise Minutes: 43  Total Minutes  Timed Charges Total Minutes: 43   Total Minutes: 43  Therapy Charges for Today     Code Description Service Date Service Provider Modifiers Qty    11115353939 HC PT THER PROC EA 15 MIN 9/6/2022 Violetta Deluna, PT GP 3          PT G-Codes  Outcome Measure Options: Modified Oswestry  Modified Oswestry Score/Comments: 19/50=38% disability         Violetta Deluna PT  9/6/2022

## 2022-09-08 ENCOUNTER — HOSPITAL ENCOUNTER (OUTPATIENT)
Dept: PHYSICAL THERAPY | Facility: HOSPITAL | Age: 62
Setting detail: THERAPIES SERIES
Discharge: HOME | End: 2022-09-08

## 2022-09-08 DIAGNOSIS — M54.16 LUMBAR RADICULOPATHY: Primary | ICD-10-CM

## 2022-09-08 DIAGNOSIS — M48.062 LUMBAR STENOSIS WITH NEUROGENIC CLAUDICATION: ICD-10-CM

## 2022-09-08 DIAGNOSIS — M54.42 CHRONIC BILATERAL LOW BACK PAIN WITH BILATERAL SCIATICA: ICD-10-CM

## 2022-09-08 DIAGNOSIS — M54.41 CHRONIC BILATERAL LOW BACK PAIN WITH BILATERAL SCIATICA: ICD-10-CM

## 2022-09-08 DIAGNOSIS — G89.29 CHRONIC BILATERAL LOW BACK PAIN WITH BILATERAL SCIATICA: ICD-10-CM

## 2022-09-08 PROCEDURE — 97110 THERAPEUTIC EXERCISES: CPT

## 2022-09-08 NOTE — THERAPY TREATMENT NOTE
Outpatient Physical Therapy Ortho Treatment Note  [facility]     Patient Name: Kirsten Lima  : 1960  MRN: [id number]  Today's Date: 2022      Visit Date: 2022    Visit Dx:    ICD-10-CM ICD-9-CM   1. Lumbar radiculopathy  M54.16 724.4   2. Chronic bilateral low back pain with bilateral sciatica  M54.42 724.2    M54.41 724.3    G89.29 338.29   3. Lumbar stenosis with neurogenic claudication  M48.062 724.03       Patient Active Problem List   Diagnosis   • Abnormal thyroid stimulating hormone (TSH) level   • Atopic rhinitis   • Hypertension   • Insomnia   • Overactive bladder   • Knee pain   • Cobalamin deficiency   • Vitamin D deficiency   • Hyperlipidemia   • Other chronic pain   • Lumbar facet arthropathy   • Spinal stenosis of lumbar region   • Intraductal papilloma of left breast   • Lobular carcinoma in situ (LCIS) of left breast   • Increased risk of breast cancer   • Tobacco use disorder   • Elevated glucose   • Mass of nipple   • Colon cancer screening   • Malignant neoplasm of overlapping sites of left breast in female, estrogen receptor positive (HCC)   • Lumbar radiculopathy   • Complicated grief   • Persistent depressive disorder with atypical features, currently mild        Past Medical History:   Diagnosis Date   • Acute cystitis    • Allergic rhinitis    • Breast cancer (HCC)    • Breast cyst    • Colon polyp May 6, 2022    7   • Cyst of left nipple    • Depression    • Diverticulosis May 6, 2022   • Dry skin    • Dysuria    • Hemorrhoid    • Hidradenitis    • History of bronchitis    • Hyperlipidemia    • Hypertension    • Incontinence in female     wears pads   • Insomnia    • Nonspecific abnormal electrocardiogram (ECG) (EKG)    • Obesity    • Overactive bladder    • Pregnancy     G-2, P-0   • Sleep apnea    • UTI (urinary tract infection) 2021   • Vitamin B12 deficiency    • Vitamin D insufficiency    • Wound, breast     LEFT BREAST; PT INSTRUCTED TO NOTIFY DR GARCIA  PRIOR TO SURGERY        Past Surgical History:   Procedure Laterality Date   • AXILLARY HIDRADENITIS EXCISION Bilateral    • BREAST BIOPSY Left 02/22/2021    Procedure: Left breast needle-localized excisional biopsy (tophat clip) and left breast ultrasound-guided excisional biopsy (hydromark clip);  Surgeon: Debora Zambrano MD;  Location: [facility] OR;  Service: General;  Laterality: Left;   • BREAST BIOPSY Left 02/01/2022    Procedure: Left breast needle-localized excisional biopsy, Left nipple mass excision;  Surgeon: Debora Zambrano MD;  Location: [facility] OR;  Service: General;  Laterality: Left;   • CARDIAC CATHETERIZATION     • COLONOSCOPY     • COLONOSCOPY N/A 05/06/2022    Procedure: COLONOSCOPY;  Surgeon: Mirtha Davis MD;  Location: [facility] MA[facility] OR;  Service: Gastroenterology;  Laterality: N/A;  diverticulosis, polyps   • HYSTERECTOMY  2006    [facility]   • SUBTOTAL HYSTERECTOMY  2006                        PT Assessment/Plan     Row Name 09/08/22 1700          PT Assessment    Assessment Comments Kirsten reports decrease in chris leg/feet tingling that used to begin upon standing. She also notes increasing stamina and decreasing strain wihle walking.  She was recently able to walk in a mall for periods of time with seated restbreaks interspersed. During today's session Kirsten experienced lumbar pain/muscle spasm during practice of body mechanics, forward bend in stride, one foot forward- one foot back position, so we stopped to address how to handle this situation. Had her lie supine with LEs over swiss ball to relax her back, perform yoga breathing for pain control, then after a few minutes when she noted the spasm was decreasing, had her perform 10 TrA isometrics gently and then 10 PPT gently with legs still on swiss ball. She then transitioned to sit and stand and we practiced forward bend with good body mechanics (and she performed without pain) to reassure her of the correct form and to reduce  fear-avoidance. She remains good candidate for skilled therapy services.  -JA            PT Plan    PT Plan Comments assess response to last visit's exacerbation, cont body mechanics practice; discussed recommendation to continue therapy however pt is concerned about ins coverage and is going to check before scheduling additional appts  -QAMAR           User Key  (r) = Recorded By, (t) = Taken By, (c) = Cosigned By    Initials Name Provider Type    Josette Fragoso, PT Physical Therapist                   OP Exercises     Row Name 09/08/22 1000             Subjective Comments    Subjective Comments Pt states she dragged her laundry basket across the floor but wasn't thinking of her posture/position as she did that and it probably caused some soreness. She does note she has less of the chris leg/feet pain that she had initially with standing.  -QAMAR              Subjective Pain    Able to rate subjective pain? yes  -QAMAR      Pre-Treatment Pain Level 3  -JA      Subjective Pain Comment 5/10 exacerbation of pain  -QAMAR              Total Minutes    45750 - PT Therapeutic Exercise Minutes 42  -JA              Exercise 1    Exercise Name 1 Nustep  -JA      Cueing 1 Verbal;Tactile;Demo  -JA      Time 1 5 min  -JA      Additional Comments L5 seat @ 9  -JA              Exercise 2    Exercise Name 2 Standing PPT/TrA w/tband  -JA      Cueing 2 Verbal;Tactile;Demo  -JA      Reps 2 15  -JA              Exercise 3    Reps 3 --  -JA              Exercise 4    Exercise Name 4 --  -JA      Cueing 4 --  -JA      Time 4 --  -JA              Exercise 5    Exercise Name 5 HL PPT -passively pushing through legs  -JA      Cueing 5 Verbal;Tactile  -JA      Sets 5 1  -JA      Reps 5 15  -JA      Time 5 3-5 sec  -JA              Exercise 7    Exercise Name 7 PPT in front of mrror alt biceps curls standing with TrA held throughout  -JA      Sets 7 2  -JA      Reps 7 15  -JA      Time 7 3#, 1 set chris, 1 set alt  -JA              Exercise 9     Exercise Name 9 DKTC and LTR on SB  -JA      Cueing 9 Verbal;Tactile  -JA      Sets 9 1 set each  -JA      Reps 9 10  -JA              Exercise 10    Exercise Name 10 Supine SKTC hip flexor stretch  -JA      Cueing 10 Verbal;Tactile  -JA      Sets 10 1  -JA      Reps 10 5 B  -JA      Time 10 20 sec  -JA              Exercise 11    Exercise Name 11 Gait w/arm swing, carried weight, held TrA throughout.  -JA      Sets 11 reinforced  -JA      Reps 11 100'  -JA      Time 11 --  -JA              Exercise 12    Exercise Name 12 seated biceps curls w/ sustained TrA  -JA      Cueing 12 Verbal;Demo  -JA      Reps 12 2 x 10  -JA      Additional Comments one set uni, one set chris  -JA              Exercise 13    Exercise Name 13 beginner bridge with legs on green SB  -JA      Cueing 13 Verbal;Tactile  -JA      Sets 13 TrA, PPT, then lift hips a little  -JA      Reps 13 10  -JA      Additional Comments LEs on ball  -JA              Exercise 14    Exercise Name 14 added body mechanics training (demonstrated form, practice w/ several repetitions). Kirsten experienced lumbar pain/muscle spasm during practice of forward bend in stride (one foot forward, one foot back) position and we stopped to address how to handle this situation. Had her lie supine with LEs over swiss ball to relax her back, yoga breathing, then after a few minutes when she noted the spasm was decreasing, had her perform 10 TrA isometrics gently and then 10 PPT gently with legs still on swiss ball. She then transitioned to sit and stand and we practiced forward bend with good body mechanics (and she performed without pain) to reassure her of the correct form and to reduce fear-avoidance.  -JA      Time 14 8  -JA            User Key  (r) = Recorded By, (t) = Taken By, (c) = Cosigned By    Initials Name Provider Type    Josette Fragoso, PT Physical Therapist                                 Therapy Education  Education Details: Education for recovery position  after exacerbation of pain during ADLs. Practiced repeated forward bending with ADLS--will need more practice.  Given: Symptoms/condition management, Pain management, Posture/body mechanics, Mobility training  Program: Reinforced, Progressed  How Provided: Verbal, Demonstration  Provided to: Patient  Level of Understanding: Verbalized, Demonstrated              Time Calculation:   Start Time: 1020  Stop Time: 1105  Time Calculation (min): 45 min  Timed Charges  60953 - PT Therapeutic Exercise Minutes: 42  Total Minutes  Timed Charges Total Minutes: 42   Total Minutes: 42  Therapy Charges for Today     Code Description Service Date Service Provider Modifiers Qty    71969247070 HC PT THER PROC EA 15 MIN 9/8/2022 Josette Gole, PT GP 3                    Josette Goel PT  9/8/2022

## 2022-09-13 ENCOUNTER — HOSPITAL ENCOUNTER (OUTPATIENT)
Dept: OCCUPATIONAL THERAPY | Facility: HOSPITAL | Age: 62
Setting detail: THERAPIES SERIES
Discharge: HOME | End: 2022-09-13

## 2022-09-13 ENCOUNTER — HOSPITAL ENCOUNTER (OUTPATIENT)
Dept: PHYSICAL THERAPY | Facility: HOSPITAL | Age: 62
Setting detail: THERAPIES SERIES
Discharge: HOME | End: 2022-09-13

## 2022-09-13 DIAGNOSIS — M54.41 CHRONIC BILATERAL LOW BACK PAIN WITH BILATERAL SCIATICA: ICD-10-CM

## 2022-09-13 DIAGNOSIS — G89.29 CHRONIC BILATERAL LOW BACK PAIN WITH BILATERAL SCIATICA: ICD-10-CM

## 2022-09-13 DIAGNOSIS — M48.062 LUMBAR STENOSIS WITH NEUROGENIC CLAUDICATION: ICD-10-CM

## 2022-09-13 DIAGNOSIS — M54.42 CHRONIC BILATERAL LOW BACK PAIN WITH BILATERAL SCIATICA: ICD-10-CM

## 2022-09-13 DIAGNOSIS — M54.16 LUMBAR RADICULOPATHY: Primary | ICD-10-CM

## 2022-09-13 DIAGNOSIS — C50.812 MALIGNANT NEOPLASM OF OVERLAPPING SITES OF LEFT BREAST IN FEMALE, ESTROGEN RECEPTOR POSITIVE: ICD-10-CM

## 2022-09-13 DIAGNOSIS — Z17.0 MALIGNANT NEOPLASM OF OVERLAPPING SITES OF LEFT BREAST IN FEMALE, ESTROGEN RECEPTOR POSITIVE: ICD-10-CM

## 2022-09-13 DIAGNOSIS — N63.0 BREAST SWELLING: Primary | ICD-10-CM

## 2022-09-13 PROCEDURE — 97165 OT EVAL LOW COMPLEX 30 MIN: CPT

## 2022-09-13 PROCEDURE — 97535 SELF CARE MNGMENT TRAINING: CPT

## 2022-09-13 PROCEDURE — 97110 THERAPEUTIC EXERCISES: CPT

## 2022-09-14 NOTE — THERAPY TREATMENT NOTE
Outpatient Physical Therapy Ortho Treatment Note  [facility]     Patient Name: Kirsten Lima  : 1960  MRN: [id number]  Today's Date: 2022      Visit Date: 2022    Visit Dx:    ICD-10-CM ICD-9-CM   1. Lumbar radiculopathy  M54.16 724.4   2. Chronic bilateral low back pain with bilateral sciatica  M54.42 724.2    M54.41 724.3    G89.29 338.29   3. Lumbar stenosis with neurogenic claudication  M48.062 724.03       Patient Active Problem List   Diagnosis   • Abnormal thyroid stimulating hormone (TSH) level   • Atopic rhinitis   • Hypertension   • Insomnia   • Overactive bladder   • Knee pain   • Cobalamin deficiency   • Vitamin D deficiency   • Hyperlipidemia   • Other chronic pain   • Lumbar facet arthropathy   • Spinal stenosis of lumbar region   • Intraductal papilloma of left breast   • Lobular carcinoma in situ (LCIS) of left breast   • Increased risk of breast cancer   • Tobacco use disorder   • Elevated glucose   • Mass of nipple   • Colon cancer screening   • Malignant neoplasm of overlapping sites of left breast in female, estrogen receptor positive (HCC)   • Lumbar radiculopathy   • Complicated grief   • Persistent depressive disorder with atypical features, currently mild        Past Medical History:   Diagnosis Date   • Acute cystitis    • Allergic rhinitis    • Breast cancer (HCC)    • Breast cyst    • Colon polyp May 6, 2022    7   • Cyst of left nipple    • Depression    • Diverticulosis May 6, 2022   • Dry skin    • Dysuria    • Hemorrhoid    • Hidradenitis    • History of bronchitis    • Hyperlipidemia    • Hypertension    • Incontinence in female     wears pads   • Insomnia    • Nonspecific abnormal electrocardiogram (ECG) (EKG)    • Obesity    • Overactive bladder    • Pregnancy     G-2, P-0   • Sleep apnea    • UTI (urinary tract infection) 2021   • Vitamin B12 deficiency    • Vitamin D insufficiency    • Wound, breast     LEFT BREAST; PT INSTRUCTED TO NOTIFY DR GARCIA  PRIOR TO SURGERY        Past Surgical History:   Procedure Laterality Date   • AXILLARY HIDRADENITIS EXCISION Bilateral    • BREAST BIOPSY Left 02/22/2021    Procedure: Left breast needle-localized excisional biopsy (tophat clip) and left breast ultrasound-guided excisional biopsy (hydromark clip);  Surgeon: Debora Zambrano MD;  Location: [facility];  Service: General;  Laterality: Left;   • BREAST BIOPSY Left 02/01/2022    Procedure: Left breast needle-localized excisional biopsy, Left nipple mass excision;  Surgeon: Debora Zambrano MD;  Location: [facility] OR;  Service: General;  Laterality: Left;   • CARDIAC CATHETERIZATION     • COLONOSCOPY     • COLONOSCOPY N/A 05/06/2022    Procedure: COLONOSCOPY;  Surgeon: Mirtha Davis MD;  Location: [facility] OR;  Service: Gastroenterology;  Laterality: N/A;  diverticulosis, polyps   • HYSTERECTOMY  2006    [facility]   • SUBTOTAL HYSTERECTOMY  2006                        PT Assessment/Plan     Row Name 09/13/22 1000          PT Assessment    Assessment Comments Kirsten reports no lasting pain/irritation after last visit.  She presents today with c/o onset of soreness yesterday, rates it 3/10 on L LB, and relates that she had done a lot of bending and some squatting to clean up her yard over the weekend. Overall she verbalizes and demonstrates increasing body awareness and understanding of how ADLs can affect her LBP.  She will benefit from continuing skilled therapy services. Will consider decreasing frequency to facilitate independent self-management.  -QAMAR            PT Plan    PT Plan Comments consider adding 2-3 appts  -QAMAR           User Key  (r) = Recorded By, (t) = Taken By, (c) = Cosigned By    Initials Name Provider Type    Josette Fragoso, PT Physical Therapist                   OP Exercises     Row Name 09/13/22 1000             Subjective Comments    Subjective Comments Had some pain yesterday.  I did do a lot of things I don't normally do  this weekend, I picked up after the trees were cut and even did some squatting.  -JA              Subjective Pain    Able to rate subjective pain? yes  -JA      Pre-Treatment Pain Level 3  -JA              Total Minutes    00465 - PT Therapeutic Exercise Minutes 44  -JA              Exercise 1    Exercise Name 1 Nustep  -JA      Cueing 1 Verbal;Tactile;Demo  -JA      Time 1 5 min  -JA              Exercise 2    Exercise Name 2 Standing PPT/TrA w/tband  -JA      Cueing 2 Verbal;Tactile;Demo  -JA      Reps 2 15  -JA      Additional Comments gtb  -JA              Exercise 5    Exercise Name 5 HL PPT -passively pushing through legs  -JA      Cueing 5 Verbal;Tactile  -JA      Sets 5 1  -JA      Reps 5 15  -JA      Time 5 3-5 sec  -JA              Exercise 7    Exercise Name 7 PPT in front of mrror alt biceps curls standing with TrA held throughout  -JA      Sets 7 2  -JA      Reps 7 10  -JA      Time 7 4#, 1 set chris, 1 set alt  -JA              Exercise 9    Exercise Name 9 DKTC and LTR on SB  -JA      Cueing 9 Verbal;Tactile  -JA      Sets 9 1 set each  -JA      Reps 9 10  -JA      Additional Comments LEs on ball  -JA              Exercise 10    Exercise Name 10 Supine SKTC hip flexor stretch  -JA      Cueing 10 Verbal;Tactile  -JA      Sets 10 1  -JA      Reps 10 5 B  -JA      Time 10 20 sec  -JA              Exercise 11    Exercise Name 11 Gait w/arm swing, carried weight, held TrA throughout.  -JA      Sets 11 reinforced  -JA      Reps 11 100'  -JA              Exercise 12    Exercise Name 12 seated small range scaption (0-30 deg) w/ sustained TrA  -JA      Cueing 12 Verbal;Demo  -JA      Reps 12 2 x 10  -JA      Additional Comments 2#, one set uni, one set chris  -JA              Exercise 13    Exercise Name 13 beginner bridge with legs on green SB  -JA      Cueing 13 Verbal;Tactile  -JA      Sets 13 TrA, PPT, then lift hips a little  -JA      Reps 13 10  -JA      Additional Comments LEs on ball  -JA               Exercise 14    Exercise Name 14 body mechanics training (demonstrated form, practice w/ several repetitions). Lifted/lowered/carried bolster wrapped with 10#.  -JA      Time 14 8  -JA              Exercise 15    Exercise Name 15 Partial RDL  -JA      Cueing 15 Verbal;Tactile  -JA      Sets 15 2  -JA      Reps 15 5  -JA            User Key  (r) = Recorded By, (t) = Taken By, (c) = Cosigned By    Initials Name Provider Type    Josette Fragoso, PT Physical Therapist                                 Therapy Education  Education Details: Added partial RDL with cues to use hams when moving from trunk flexion to neutral, she was able to perform without increased LBP.  Given: Symptoms/condition management, Pain management, Posture/body mechanics, Mobility training  Program: Reinforced, Progressed  How Provided: Verbal, Demonstration  Provided to: Patient  Level of Understanding: Verbalized, Demonstrated              Time Calculation:   Start Time: 1015  Stop Time: 1100  Time Calculation (min): 45 min  Timed Charges  34211 - PT Therapeutic Exercise Minutes: 44  Total Minutes  Timed Charges Total Minutes: 44   Total Minutes: 44  Therapy Charges for Today     Code Description Service Date Service Provider Modifiers Qty    61295886290  PT THER PROC EA 15 MIN 9/13/2022 Josette Goel PT GP 3                    Josette Goel PT  9/13/2022

## 2022-09-14 NOTE — THERAPY EVALUATION
Outpatient Occupational Therapy Lymphedema Initial Evaluation  Psychiatric     Patient Name: Kirsten Lima  : 1960  MRN: [id number]  Today's Date: 2022      Visit Date: 2022    Patient Active Problem List   Diagnosis   • Abnormal thyroid stimulating hormone (TSH) level   • Atopic rhinitis   • Hypertension   • Insomnia   • Overactive bladder   • Knee pain   • Cobalamin deficiency   • Vitamin D deficiency   • Hyperlipidemia   • Other chronic pain   • Lumbar facet arthropathy   • Spinal stenosis of lumbar region   • Intraductal papilloma of left breast   • Lobular carcinoma in situ (LCIS) of left breast   • Increased risk of breast cancer   • Tobacco use disorder   • Elevated glucose   • Mass of nipple   • Colon cancer screening   • Malignant neoplasm of overlapping sites of left breast in female, estrogen receptor positive (HCC)   • Lumbar radiculopathy   • Complicated grief   • Persistent depressive disorder with atypical features, currently mild        Past Medical History:   Diagnosis Date   • Acute cystitis    • Allergic rhinitis    • Breast cancer (HCC)    • Breast cyst    • Colon polyp May 6, 2022    7   • Cyst of left nipple    • Depression    • Diverticulosis May 6, 2022   • Dry skin    • Dysuria    • Hemorrhoid    • Hidradenitis    • History of bronchitis    • Hyperlipidemia    • Hypertension    • Incontinence in female     wears pads   • Insomnia    • Nonspecific abnormal electrocardiogram (ECG) (EKG)    • Obesity    • Overactive bladder    • Pregnancy     G-2, P-0   • Sleep apnea    • UTI (urinary tract infection) 2021   • Vitamin B12 deficiency    • Vitamin D insufficiency    • Wound, breast     LEFT BREAST; PT INSTRUCTED TO NOTIFY DR GARCIA PRIOR TO SURGERY        Past Surgical History:   Procedure Laterality Date   • AXILLARY HIDRADENITIS EXCISION Bilateral    • BREAST BIOPSY Left 2021    Procedure: Left breast needle-localized excisional biopsy (tophat clip) and left  breast ultrasound-guided excisional biopsy (hydromark clip);  Surgeon: Debora Zambrano MD;  Location[facility] MAIN OR;  Service: General;  Laterality: Left;   • BREAST BIOPSY Left 02/01/2022    Procedure: Left breast needle-localized excisional biopsy, Left nipple mass excision;  Surgeon: Debora Zambrano MD;  Location: [facility] MAIN OR;  Service: General;  Laterality: Left;   • CARDIAC CATHETERIZATION     • COLONOSCOPY     • COLONOSCOPY N/A 05/06/2022    Procedure: COLONOSCOPY;  Surgeon: Mirtha Davis MD;  Location: [facility] MAIN OR;  Service: Gastroenterology;  Laterality: N/A;  diverticulosis, polyps   • HYSTERECTOMY  2006    [facility]   • SUBTOTAL HYSTERECTOMY  2006         Visit Dx:     ICD-10-CM ICD-9-CM   1. Breast swelling  N63.0 611.72   2. Malignant neoplasm of overlapping sites of left breast in female, estrogen receptor positive (HCC)  C50.812 174.8    Z17.0 V86.0        Patient History     Row Name 09/13/22 1100             History    Chief Complaint Swelling  -RE      Date Current Problem(s) Began 02/01/22  -RE      Brief Description of Current Complaint Patient presents for evaluation of L breast edema. She has a recent hx of lumpectomy and radiation but no LN sx.  -RE      Patient/Caregiver Goals Decrease swelling  -RE      Hand Dominance right-handed  -RE      Occupation/sports/leisure activities None  -RE      Patient seeing anyone else for problem(s)? No  -RE      Are you or can you be pregnant No  -RE              Pain     Pain at Present 0  -RE              Fall Risk Assessment    Any falls in the past year: No  -RE              Services    Are you currently receiving Home Health services No  -RE              Daily Activities    Primary Language English  -RE      Are you able to read Yes  -RE      Are you able to write Yes  -RE      How does patient learn best? Listening;Reading;Demonstration;Pictures/Video  -RE      Teaching needs identified Home Exercise Program;Management of Condition   "-RE      Does patient have problems with the following? None  -RE      Barriers to learning None  -RE      Pt Participated in POC and Goals Yes  -RE              Safety    Are you being hurt, hit, or frightened by anyone at home or in your life? No  -RE      Are you being neglected by a caregiver No  -RE      Have you had any of the following issues with N/A  -RE            User Key  (r) = Recorded By, (t) = Taken By, (c) = Cosigned By    Initials Name Provider Type    RE Kelsy Mccain OTR Occupational Therapist                 Lymphedema     Row Name 09/13/22 1100             Subjective Pain    Able to rate subjective pain? yes  -RE      Pre-Treatment Pain Level 0  -RE      Post-Treatment Pain Level 0  -RE      Subjective Pain Comment no breast pain  -RE              Subjective Comments    Subjective Comments Lateral aspect of breast and flank feel itchy at times  -RE              Lymphedema Assessment    Lymphedema Classification Trunk:;primary;stage 2 (Spontaneously Irreversible)  L breast  -RE      Lymphedema Cancer Related Sx left;lumpectomy  -RE      Lymphedema Surgery Comments no lymph node sx  -RE      Lymph Nodes Removed # 0  -RE      Chemo Received no  -RE      Radiation Therapy Received yes  -RE      Infections or Cellulitis? no  -RE      Lymphedema Assessment Comments hx of hidrandenitis with subsequent sx  -RE              LLIS - Physical Concerns    The amount of pain associated with my lymphedema is: 0  -RE      The amount of limb heaviness associated with my lymphedema is: 0  -RE      The amount of skin tightness associated with my lymphedema is: 0  -RE      The size of my swollen limb(s) seems: 0  -RE      Lymphedema affects the movement of my swollen limb(s): 0  -RE      The strength in my swollen limb(s) is: 0  -RE              LLIS - Psychosocial Concerns    Lymphedema affects my body image (i.e., \"how I think I look\"). 0  -RE      Lymphedema affects my socializing with others. 0  -RE      " "Lymphedema affects my intimate relations with spouse or partner (rate 0 if not applicable 0  -RE      Lymphedema \"gets me down\" (i.e., depression, frustration, or anger) 0  -RE      I must rely on others for help due to my lymphedema. 0  -RE      I know what to do to manage my lymphedema 4  -RE              LLIS - Functional Concerns    Lymphedema affects my ability to perform self-care activities (i.e. eating, dressing, hygiene) 0  -RE      Lymphedema affects my ability to perform routine home or work-related activities. 0  -RE      Lymphedema affects my performance of preferred leisure activities. 0  -RE      Lymphedema affects proper fit of clothing/shoes 0  -RE      Lymphedema affects my sleep 0  -RE              General ROM    GENERAL ROM COMMENTS UE AROM is WFL and symmetrical  -RE              Lymphedema Edema Assessment    Ptting Edema Category By grade out of 4  -RE      Pitting Edema + 2/4;Mild;Moderate  -RE      Edema Assessment Comment L breast  -RE              Skin Changes/Observations    Skin Observations Comment hyperpigmentation of the radiated area of the breast  -RE              Lymphedema Life Impact Scale Totals    A.  Total Q1 - Q17 (Do not include Q18) 4  -RE      B.  Total number of questions answered (Q1-Q17) 17  -RE      C. Divide A by B 0.24  -RE      D. Multiple C by 25 6  -RE            User Key  (r) = Recorded By, (t) = Taken By, (c) = Cosigned By    Initials Name Provider Type    Kelsy Cedeno OTR Occupational Therapist                        Therapy Education  Education Details: Discussed lymphedema treatment including estimated duration. Discussed disease process and what to expect from therapy.  Instructed in post radiation skin massage.  Given: Edema management, Symptoms/condition management  Program: New  How Provided: Verbal, Demonstration  Provided to: Patient  Level of Understanding: Teach back education performed, Verbalized  67455 - OT Self Care/Mgmt Minutes: 15         " OT Goals     Row Name 09/14/22 0900          OT Short Term Goals    STG Date to Achieve 09/28/22  -RE     STG 1 Patient will demonstrate proper awareness of “What is Lymphedema?” and “Healthy Habits” for improved prevention, management, care of symptoms and ease of transition to self-care of condition.  -RE     STG 1 Progress New  -RE     STG 2 Patient independent and compliant with self-massage techniques  for improved lymphatic drainage, decreased edema/symptoms, decreased risk of infection and improved transition to self-care of condition.  -RE     STG 2 Progress New  -RE            Long Term Goals    LTG Date to Achieve 10/12/22  -RE     LTG 1 Patient independent and compliant with self-care techniques for self-management of condition including use of her compression bra and swell spot as indicated.  -RE     LTG 1 Progress New  -RE     LTG 2 Patient to report decreased breast swelling per her perception.  -RE     LTG 2 Progress New  -RE     LTG 3 Patient to demonstrate decrease of breast edema to 1+.  -RE     LTG 3 Progress New  -RE            Time Calculation    OT Goal Re-Cert Due Date 12/14/22  -RE           User Key  (r) = Recorded By, (t) = Taken By, (c) = Cosigned By    Initials Name Provider Type    RE Kelsy Mccain, OTR Occupational Therapist                 OT Assessment/Plan     Row Name 09/14/22 0937          OT Assessment    Impairments Edema;Impaired lymphatic circulation  -RE     Assessment Comments Patient presents with signs and symptoms consistent with post lumpectomy left breast lymphedema. Edema is primarily located in the inferior and medial aspect of the breast. It is firm with an orange peel texture. She could benefit from Complete Decongestive Therapy to decrease edema, decrease the risk of infection and to learn self care strategies.  -RE     Please refer to paper survey for additional self-reported information Yes  -RE     OT Diagnosis left breast lymphedema  -RE     OT Rehab Potential  Good  -RE     Patient/caregiver participated in establishment of treatment plan and goals Yes  -RE     Patient would benefit from skilled therapy intervention Yes  -RE            OT Plan    OT Frequency 1x/week;2x/week  -RE     Predicted Duration of Therapy Intervention (OT) 4 weeks  -RE     Planned CPT's? OT THER PROC EA 15 MIN: 54522SS;OT SELF CARE/MGMT/TRAIN 15 MIN: 70039;OT MANUAL THERAPY EA 15 MIN: 44161;OT BIS XTRACELL FLUID ANALYSIS: 77413  -RE     Planned Therapy Interventions (Optional Details) manual therapy techniques;patient/family education  bioimpedance as indicated  -RE           User Key  (r) = Recorded By, (t) = Taken By, (c) = Cosigned By    Initials Name Provider Type    RE Kelsy Mccain OTR Occupational Therapist                          Time Calculation:   OT Start Time: 1105  OT Stop Time: 1145  OT Time Calculation (min): 40 min  Total Timed Code Minutes- OT: 15 minute(s)  Timed Charges  33974 - OT Self Care/Mgmt Minutes: 15  Untimed Charges  OT Eval/Re-eval Minutes: 25  Total Minutes  Timed Charges Total Minutes: 15  Untimed Charges Total Minutes: 25   Total Minutes: 40     Therapy Charges for Today     Code Description Service Date Service Provider Modifiers Qty    06172667407 HC OT SELF CARE/MGMT/TRAIN EA 15 MIN 9/13/2022 Kelsy Mccain OTR GO 1    25956626683 HC OT EVAL LOW COMPLEXITY 2 9/13/2022 Kelsy Mccain OTR GO 1                    FABIAN Patel  9/14/2022

## 2022-09-15 ENCOUNTER — HOSPITAL ENCOUNTER (OUTPATIENT)
Dept: PHYSICAL THERAPY | Facility: HOSPITAL | Age: 62
Setting detail: THERAPIES SERIES
Discharge: HOME | End: 2022-09-15

## 2022-09-15 DIAGNOSIS — M54.16 LUMBAR RADICULOPATHY: Primary | ICD-10-CM

## 2022-09-15 DIAGNOSIS — M48.062 LUMBAR STENOSIS WITH NEUROGENIC CLAUDICATION: ICD-10-CM

## 2022-09-15 DIAGNOSIS — M54.41 CHRONIC BILATERAL LOW BACK PAIN WITH BILATERAL SCIATICA: ICD-10-CM

## 2022-09-15 DIAGNOSIS — G89.29 CHRONIC BILATERAL LOW BACK PAIN WITH BILATERAL SCIATICA: ICD-10-CM

## 2022-09-15 DIAGNOSIS — M54.42 CHRONIC BILATERAL LOW BACK PAIN WITH BILATERAL SCIATICA: ICD-10-CM

## 2022-09-15 PROCEDURE — 97110 THERAPEUTIC EXERCISES: CPT

## 2022-09-15 RX ORDER — ANASTROZOLE 1 MG/1
TABLET ORAL
Qty: 90 TABLET | Refills: 0 | Status: ON HOLD | OUTPATIENT
Start: 2022-09-15 | End: 2022-12-15

## 2022-09-15 NOTE — THERAPY TREATMENT NOTE
Outpatient Physical Therapy Ortho Treatment Note  [facility]     Patient Name: Kirsten Lima  : 1960  MRN: [id number]  Today's Date: 9/15/2022      Visit Date: 09/15/2022    Visit Dx:    ICD-10-CM ICD-9-CM   1. Lumbar radiculopathy  M54.16 724.4   2. Chronic bilateral low back pain with bilateral sciatica  M54.42 724.2    M54.41 724.3    G89.29 338.29   3. Lumbar stenosis with neurogenic claudication  M48.062 724.03       Patient Active Problem List   Diagnosis   • Abnormal thyroid stimulating hormone (TSH) level   • Atopic rhinitis   • Hypertension   • Insomnia   • Overactive bladder   • Knee pain   • Cobalamin deficiency   • Vitamin D deficiency   • Hyperlipidemia   • Other chronic pain   • Lumbar facet arthropathy   • Spinal stenosis of lumbar region   • Intraductal papilloma of left breast   • Lobular carcinoma in situ (LCIS) of left breast   • Increased risk of breast cancer   • Tobacco use disorder   • Elevated glucose   • Mass of nipple   • Colon cancer screening   • Malignant neoplasm of overlapping sites of left breast in female, estrogen receptor positive (HCC)   • Lumbar radiculopathy   • Complicated grief   • Persistent depressive disorder with atypical features, currently mild        Past Medical History:   Diagnosis Date   • Acute cystitis    • Allergic rhinitis    • Breast cancer (HCC)    • Breast cyst    • Colon polyp May 6, 2022    7   • Cyst of left nipple    • Depression    • Diverticulosis May 6, 2022   • Dry skin    • Dysuria    • Hemorrhoid    • Hidradenitis    • History of bronchitis    • Hyperlipidemia    • Hypertension    • Incontinence in female     wears pads   • Insomnia    • Nonspecific abnormal electrocardiogram (ECG) (EKG)    • Obesity    • Overactive bladder    • Pregnancy     G-2, P-0   • Sleep apnea    • UTI (urinary tract infection) 2021   • Vitamin B12 deficiency    • Vitamin D insufficiency    • Wound, breast     LEFT BREAST; PT INSTRUCTED TO NOTIFY DR GARCIA  PRIOR TO SURGERY        Past Surgical History:   Procedure Laterality Date   • AXILLARY HIDRADENITIS EXCISION Bilateral    • BREAST BIOPSY Left 02/22/2021    Procedure: Left breast needle-localized excisional biopsy (tophat clip) and left breast ultrasound-guided excisional biopsy (hydromark clip);  Surgeon: Debora Zambrano MD;  Location: [facility];  Service: General;  Laterality: Left;   • BREAST BIOPSY Left 02/01/2022    Procedure: Left breast needle-localized excisional biopsy, Left nipple mass excision;  Surgeon: Debora Zambrano MD;  Location: [facility] OR;  Service: General;  Laterality: Left;   • CARDIAC CATHETERIZATION     • COLONOSCOPY     • COLONOSCOPY N/A 05/06/2022    Procedure: COLONOSCOPY;  Surgeon: Mirtha Davis MD;  Location: [facility] OR;  Service: Gastroenterology;  Laterality: N/A;  diverticulosis, polyps   • HYSTERECTOMY  2006    [facility]   • SUBTOTAL HYSTERECTOMY  2006                        PT Assessment/Plan     Row Name 09/15/22 1000          PT Assessment    Assessment Comments Educated patient on variable symptoms and being consistent with positioning and relief strategies. Due to mild symptoms today, returned to some basic exercises for muscle activation and positioning before reviewing some of her newer exercises. Performed seated core for increased upright control with continued flexion bias.  Trialed marching with mild increased symptoms. Decreased frequency for progression toward independent management.  -LW            PT Plan    PT Plan Comments Progress standing core control as tolerated.  -LW           User Key  (r) = Recorded By, (t) = Taken By, (c) = Cosigned By    Initials Name Provider Type    Violetta Yost, PT Physical Therapist                   OP Exercises     Row Name 09/15/22 1000             Subjective Comments    Subjective Comments She is having a little more pain in her L hip, but does not know why. She has been trying to use all of her  strategies.  -LW              Subjective Pain    Able to rate subjective pain? yes  -LW      Pre-Treatment Pain Level 3  -LW              Total Minutes    90373 - PT Therapeutic Exercise Minutes 40  -LW              Exercise 1    Exercise Name 1 Nustep  -LW      Cueing 1 Verbal;Tactile;Demo  -LW      Time 1 7 min  -LW              Exercise 5    Exercise Name 5 HL PPT -passively pushing through legs  -LW      Cueing 5 Verbal;Tactile  -LW      Sets 5 1  -LW      Reps 5 15  -LW      Time 5 3-5 sec  -LW              Exercise 6    Exercise Name 6 STS review  -LW      Additional Comments Patient with good awareness and form  -LW              Exercise 8    Exercise Name 8 B shoulder ext  -LW      Cueing 8 Verbal;Demo  -LW      Sets 8 1  -LW      Reps 8 15  -LW      Additional Comments RTB  -LW              Exercise 9    Exercise Name 9 DKTC and LTR on SB  -LW      Cueing 9 Verbal;Tactile  -LW      Sets 9 1 set each  -LW      Reps 9 15  -LW              Exercise 12    Exercise Name 12 seated small range scaption (0-30 deg) and chest press w/ sustained TrA  -LW      Cueing 12 Verbal;Demo  -LW      Reps 12 2x15 each  -LW      Additional Comments 3# dumbbells  -LW              Exercise 14    Exercise Name 14 Trial of walking marches  -LW      Cueing 14 Verbal;Demo  -LW      Sets 14 1 lap  -LW      Additional Comments Mild L sided symptoms  -LW              Exercise 15    Exercise Name 15 Partial RDL  -LW      Cueing 15 Verbal;Tactile  -LW      Sets 15 2  -LW      Reps 15 10  -LW            User Key  (r) = Recorded By, (t) = Taken By, (c) = Cosigned By    Initials Name Provider Type    LW Violetta Deluna, PT Physical Therapist                                                Time Calculation:   Start Time: 1035  Stop Time: 1115  Time Calculation (min): 40 min  Total Timed Code Minutes- PT: 40 minute(s)  Timed Charges  28325 - PT Therapeutic Exercise Minutes: 40  Total Minutes  Timed Charges Total Minutes: 40   Total Minutes:  40  Therapy Charges for Today     Code Description Service Date Service Provider Modifiers Qty    81573296973  PT THER PROC EA 15 MIN 9/15/2022 Violetta Deluna, PT GP 3                    Violetta Deluna, PT  9/15/2022

## 2022-09-20 ENCOUNTER — TELEPHONE (OUTPATIENT)
Dept: INTERNAL MEDICINE | Facility: CLINIC | Age: 62
End: 2022-09-20

## 2022-09-20 ENCOUNTER — HOSPITAL ENCOUNTER (OUTPATIENT)
Dept: OCCUPATIONAL THERAPY | Facility: HOSPITAL | Age: 62
Setting detail: THERAPIES SERIES
Discharge: HOME | End: 2022-09-20

## 2022-09-20 DIAGNOSIS — Z17.0 MALIGNANT NEOPLASM OF OVERLAPPING SITES OF LEFT BREAST IN FEMALE, ESTROGEN RECEPTOR POSITIVE: ICD-10-CM

## 2022-09-20 DIAGNOSIS — N63.0 BREAST SWELLING: Primary | ICD-10-CM

## 2022-09-20 DIAGNOSIS — C50.812 MALIGNANT NEOPLASM OF OVERLAPPING SITES OF LEFT BREAST IN FEMALE, ESTROGEN RECEPTOR POSITIVE: ICD-10-CM

## 2022-09-20 PROCEDURE — 97140 MANUAL THERAPY 1/> REGIONS: CPT

## 2022-09-20 RX ORDER — CITALOPRAM 10 MG/1
TABLET ORAL
Qty: 90 TABLET | Refills: 1 | Status: SHIPPED | OUTPATIENT
Start: 2022-09-20

## 2022-09-20 RX ORDER — OXYBUTYNIN CHLORIDE 10 MG/1
TABLET, EXTENDED RELEASE ORAL
Qty: 90 TABLET | Refills: 1 | OUTPATIENT
Start: 2022-09-20

## 2022-09-20 NOTE — THERAPY TREATMENT NOTE
Outpatient Occupational Therapy Lymphedema Treatment Note  [facility]     Patient Name: Kirsten Lima  : 1960  MRN: [id number]  Today's Date: 2022      Visit Date: 2022    Patient Active Problem List   Diagnosis   • Abnormal thyroid stimulating hormone (TSH) level   • Atopic rhinitis   • Hypertension   • Insomnia   • Overactive bladder   • Knee pain   • Cobalamin deficiency   • Vitamin D deficiency   • Hyperlipidemia   • Other chronic pain   • Lumbar facet arthropathy   • Spinal stenosis of lumbar region   • Intraductal papilloma of left breast   • Lobular carcinoma in situ (LCIS) of left breast   • Increased risk of breast cancer   • Tobacco use disorder   • Elevated glucose   • Mass of nipple   • Colon cancer screening   • Malignant neoplasm of overlapping sites of left breast in female, estrogen receptor positive (HCC)   • Lumbar radiculopathy   • Complicated grief   • Persistent depressive disorder with atypical features, currently mild        Past Medical History:   Diagnosis Date   • Acute cystitis    • Allergic rhinitis    • Breast cancer (HCC)    • Breast cyst    • Colon polyp May 6, 2022    7   • Cyst of left nipple    • Depression    • Diverticulosis May 6, 2022   • Dry skin    • Dysuria    • Hemorrhoid    • Hidradenitis    • History of bronchitis    • Hyperlipidemia    • Hypertension    • Incontinence in female     wears pads   • Insomnia    • Nonspecific abnormal electrocardiogram (ECG) (EKG)    • Obesity    • Overactive bladder    • Pregnancy     G-2, P-0   • Sleep apnea    • UTI (urinary tract infection) 2021   • Vitamin B12 deficiency    • Vitamin D insufficiency    • Wound, breast     LEFT BREAST; PT INSTRUCTED TO NOTIFY DR GARCIA PRIOR TO SURGERY        Past Surgical History:   Procedure Laterality Date   • AXILLARY HIDRADENITIS EXCISION Bilateral    • BREAST BIOPSY Left 2021    Procedure: Left breast needle-localized excisional biopsy (tophat clip) and left breast  ultrasound-guided excisional biopsy (hydromark clip);  Surgeon: Debora Zambrano MD;  Location: [facility] MAIN OR;  Service: General;  Laterality: Left;   • BREAST BIOPSY Left 02/01/2022    Procedure: Left breast needle-localized excisional biopsy, Left nipple mass excision;  Surgeon: Debora Zambrano MD;  Location: [facility] MAIN OR;  Service: General;  Laterality: Left;   • CARDIAC CATHETERIZATION     • COLONOSCOPY     • COLONOSCOPY N/A 05/06/2022    Procedure: COLONOSCOPY;  Surgeon: Mirtha Davis MD;  Location: [facility];  Service: Gastroenterology;  Laterality: N/A;  diverticulosis, polyps   • HYSTERECTOMY  2006    [facility]   • SUBTOTAL HYSTERECTOMY  2006         Visit Dx:      ICD-10-CM ICD-9-CM   1. Breast swelling  N63.0 611.72   2. Malignant neoplasm of overlapping sites of left breast in female, estrogen receptor positive (HCC)  C50.812 174.8    Z17.0 V86.0        Lymphedema     Row Name 09/20/22 1300             Subjective Pain    Able to rate subjective pain? yes  -RE      Pre-Treatment Pain Level 0  -RE      Post-Treatment Pain Level 0  -RE              Manual Lymphatic Drainage    Manual Lymphatic Drainage initial sequence;opened regional lymph nodes  -RE      Initial Sequence short neck  -RE      Opened Regional Lymph Nodes axillary  -RE      Axillary left  -RE      Manual Lymphatic Drainage Comments L breast and flank  -RE              Compression/Skin Care    Compression/Skin Care Comments SheFit bra  -RE            User Key  (r) = Recorded By, (t) = Taken By, (c) = Cosigned By    Initials Name Provider Type    RE Kelsy Mccain OTR Occupational Therapist                         OT Assessment/Plan     Row Name 09/20/22 1913          OT Assessment    Assessment Comments Following MlD edema in breast was softer indicating a decrease in swelling.  -RE            OT Plan    OT Plan Comments continue  -RE           User Key  (r) = Recorded By, (t) = Taken By, (c) = Cosigned By    Initials Name  Provider Type    Kelsy Cedeno OTR Occupational Therapist                    Manual Rx (last 36 hours)     Manual Treatments     Row Name 09/20/22 1914             Total Minutes    46343 - OT Manual Therapy Minutes 35  -RE            User Key  (r) = Recorded By, (t) = Taken By, (c) = Cosigned By    Initials Name Provider Type    Kelsy Cedeno OTR Occupational Therapist                  Therapy Education  Education Details: continue with sports bra or other supportive bra, self breast massage  Given: Edema management, Symptoms/condition management  Program: Reinforced  How Provided: Verbal, Demonstration  Provided to: Patient  Level of Understanding: Teach back education performed, Verbalized  68160 - OT Self Care/Mgmt Minutes: 5                Time Calculation:   OT Start Time: 1330  OT Stop Time: 1410  OT Time Calculation (min): 40 min  Total Timed Code Minutes- OT: 40 minute(s)  Timed Charges  80258 - OT Manual Therapy Minutes: 35  00477 - OT Self Care/Mgmt Minutes: 5  Total Minutes  Timed Charges Total Minutes: 40   Total Minutes: 40     Therapy Charges for Today     Code Description Service Date Service Provider Modifiers Qty    [id number]  OT MANUAL THERAPY EA 15 MIN 9/20/2022 Kelsy Mccain OTR GO 3                      FABIAN Patel  9/20/2022

## 2022-09-20 NOTE — TELEPHONE ENCOUNTER
Caller: Kirsten Lima    Relationship: Self    Best call back number: [phone]    What was the call regarding: PATIENT HAS TWO DAY SUPPLY LEFT OF GEMTESA, THE PHARMACY WILL NOT HAVE IT IN STOCK UNTIL MIDDLE OF NEXT WEEK. DO WE HAVE SAMPLES WE CAN GIVE HER?     Do you require a callback: YES

## 2022-09-27 ENCOUNTER — HOSPITAL ENCOUNTER (OUTPATIENT)
Dept: OCCUPATIONAL THERAPY | Facility: HOSPITAL | Age: 62
Setting detail: THERAPIES SERIES
Discharge: HOME | End: 2022-09-27

## 2022-09-27 DIAGNOSIS — Z17.0 MALIGNANT NEOPLASM OF OVERLAPPING SITES OF LEFT BREAST IN FEMALE, ESTROGEN RECEPTOR POSITIVE: ICD-10-CM

## 2022-09-27 DIAGNOSIS — C50.812 MALIGNANT NEOPLASM OF OVERLAPPING SITES OF LEFT BREAST IN FEMALE, ESTROGEN RECEPTOR POSITIVE: ICD-10-CM

## 2022-09-27 DIAGNOSIS — N63.0 BREAST SWELLING: Primary | ICD-10-CM

## 2022-09-27 PROCEDURE — 97140 MANUAL THERAPY 1/> REGIONS: CPT

## 2022-09-27 NOTE — THERAPY TREATMENT NOTE
Outpatient Occupational Therapy Lymphedema Treatment Note  [facility]     Patient Name: Kirsten Lima  : 1960  MRN: [id number]  Today's Date: 2022      Visit Date: 2022    Patient Active Problem List   Diagnosis   • Abnormal thyroid stimulating hormone (TSH) level   • Atopic rhinitis   • Hypertension   • Insomnia   • Overactive bladder   • Knee pain   • Cobalamin deficiency   • Vitamin D deficiency   • Hyperlipidemia   • Other chronic pain   • Lumbar facet arthropathy   • Spinal stenosis of lumbar region   • Intraductal papilloma of left breast   • Lobular carcinoma in situ (LCIS) of left breast   • Increased risk of breast cancer   • Tobacco use disorder   • Elevated glucose   • Mass of nipple   • Colon cancer screening   • Malignant neoplasm of overlapping sites of left breast in female, estrogen receptor positive (HCC)   • Lumbar radiculopathy   • Complicated grief   • Persistent depressive disorder with atypical features, currently mild        Past Medical History:   Diagnosis Date   • Acute cystitis    • Allergic rhinitis    • Breast cancer (HCC)    • Breast cyst    • Colon polyp May 6, 2022    7   • Cyst of left nipple    • Depression    • Diverticulosis May 6, 2022   • Dry skin    • Dysuria    • Hemorrhoid    • Hidradenitis    • History of bronchitis    • Hyperlipidemia    • Hypertension    • Incontinence in female     wears pads   • Insomnia    • Nonspecific abnormal electrocardiogram (ECG) (EKG)    • Obesity    • Overactive bladder    • Pregnancy     G-2, P-0   • Sleep apnea    • UTI (urinary tract infection) 2021   • Vitamin B12 deficiency    • Vitamin D insufficiency    • Wound, breast     LEFT BREAST; PT INSTRUCTED TO NOTIFY DR GARCIA PRIOR TO SURGERY        Past Surgical History:   Procedure Laterality Date   • AXILLARY HIDRADENITIS EXCISION Bilateral    • BREAST BIOPSY Left 2021    Procedure: Left breast needle-localized excisional biopsy (tophat clip) and left breast  ultrasound-guided excisional biopsy (hydromark clip);  Surgeon: Debora Zambrano MD;  Location: [facility] MAIN OR;  Service: General;  Laterality: Left;   • BREAST BIOPSY Left 02/01/2022    Procedure: Left breast needle-localized excisional biopsy, Left nipple mass excision;  Surgeon: Debora Zambrano MD;  Location: [facility] MAIN OR;  Service: General;  Laterality: Left;   • CARDIAC CATHETERIZATION     • COLONOSCOPY     • COLONOSCOPY N/A 05/06/2022    Procedure: COLONOSCOPY;  Surgeon: Mirtha Davis MD;  [place] [facility] [place];  Service: Gastroenterology;  Laterality: N/A;  diverticulosis, polyps   • HYSTERECTOMY  2006    LAV   • SUBTOTAL HYSTERECTOMY  2006         Visit Dx:      ICD-10-CM ICD-9-CM   1. Breast swelling  N63.0 611.72   2. Malignant neoplasm of overlapping sites of left breast in female, estrogen receptor positive (HCC)  C50.812 174.8    Z17.0 V86.0        Lymphedema     Row Name 09/27/22 0800             Subjective Pain    Able to rate subjective pain? yes  -RE      Pre-Treatment Pain Level 0  -RE      Post-Treatment Pain Level 0  -RE              Manual Lymphatic Drainage    Manual Lymphatic Drainage initial sequence;opened regional lymph nodes  -RE      Initial Sequence short neck  -RE      Opened Regional Lymph Nodes axillary  -RE      Axillary left  -RE      Manual Lymphatic Drainage Comments L breast and flank  -RE            User Key  (r) = Recorded By, (t) = Taken By, (c) = Cosigned By    Initials Name Provider Type    Kelsy Cedeno, FABIAN Occupational Therapist                         OT Assessment/Plan     Row Name 09/27/22 1008          OT Assessment    Assessment Comments Breast has remained soft since last treatment.  Minimal palpable edema today. Progressing well.  -RE            OT Plan    OT Plan Comments reassess next visit.  -RE           User Key  (r) = Recorded By, (t) = Taken By, (c) = Cosigned By    Initials Name Provider Type    Kelsy Cedeno OTR Occupational  Therapist                    Manual Rx (last 36 hours)     Manual Treatments     Row Name 09/27/22 1011             Total Minutes    84942 - OT Manual Therapy Minutes 35  -RE            User Key  (r) = Recorded By, (t) = Taken By, (c) = Cosigned By    Initials Name Provider Type    Kelsy Cedeno OTR Occupational Therapist                  Therapy Education  Education Details: continue breast massage  Given: Symptoms/condition management, Edema management  Program: Reinforced  How Provided: Verbal  Provided to: Patient  Level of Understanding: Teach back education performed, Verbalized  83773 - OT Self Care/Mgmt Minutes: 5                Time Calculation:   OT Start Time: 0815  OT Stop Time: 0855  OT Time Calculation (min): 40 min  Total Timed Code Minutes- OT: 40 minute(s)  Timed Charges  88841 - OT Manual Therapy Minutes: 35  15958 - OT Self Care/Mgmt Minutes: 5  Total Minutes  Timed Charges Total Minutes: 40   Total Minutes: 40     Therapy Charges for Today     Code Description Service Date Service Provider Modifiers Qty    39910061158 HC OT MANUAL THERAPY EA 15 MIN 9/27/2022 Kelsy Mccain OTR GO 3                      FABIAN Patel  9/27/2022

## 2022-10-04 ENCOUNTER — HOSPITAL ENCOUNTER (OUTPATIENT)
Dept: OCCUPATIONAL THERAPY | Facility: HOSPITAL | Age: 62
Setting detail: THERAPIES SERIES
Discharge: HOME | End: 2022-10-04

## 2022-10-04 DIAGNOSIS — C50.812 MALIGNANT NEOPLASM OF OVERLAPPING SITES OF LEFT BREAST IN FEMALE, ESTROGEN RECEPTOR POSITIVE: ICD-10-CM

## 2022-10-04 DIAGNOSIS — Z17.0 MALIGNANT NEOPLASM OF OVERLAPPING SITES OF LEFT BREAST IN FEMALE, ESTROGEN RECEPTOR POSITIVE: ICD-10-CM

## 2022-10-04 DIAGNOSIS — N63.0 BREAST SWELLING: Primary | ICD-10-CM

## 2022-10-04 PROCEDURE — 93702 BIS XTRACELL FLUID ANALYSIS: CPT

## 2022-10-04 PROCEDURE — 97535 SELF CARE MNGMENT TRAINING: CPT

## 2022-10-04 NOTE — THERAPY TREATMENT NOTE
Outpatient Occupational Therapy Lymphedema Treatment Note  [facility]     Patient Name: Kirsten Lima  : 1960  MRN: [id number]  Today's Date: 10/4/2022      Visit Date: 10/04/2022    Patient Active Problem List   Diagnosis   • Abnormal thyroid stimulating hormone (TSH) level   • Atopic rhinitis   • Hypertension   • Insomnia   • Overactive bladder   • Knee pain   • Cobalamin deficiency   • Vitamin D deficiency   • Hyperlipidemia   • Other chronic pain   • Lumbar facet arthropathy   • Spinal stenosis of lumbar region   • Intraductal papilloma of left breast   • Lobular carcinoma in situ (LCIS) of left breast   • Increased risk of breast cancer   • Tobacco use disorder   • Elevated glucose   • Mass of nipple   • Colon cancer screening   • Malignant neoplasm of overlapping sites of left breast in female, estrogen receptor positive (HCC)   • Lumbar radiculopathy   • Complicated grief   • Persistent depressive disorder with atypical features, currently mild        Past Medical History:   Diagnosis Date   • Acute cystitis    • Allergic rhinitis    • Breast cancer (HCC)    • Breast cyst    • Colon polyp May 6, 2022    7   • Cyst of left nipple    • Depression    • Diverticulosis May 6, 2022   • Dry skin    • Dysuria    • Hemorrhoid    • Hidradenitis    • History of bronchitis    • Hyperlipidemia    • Hypertension    • Incontinence in female     wears pads   • Insomnia    • Nonspecific abnormal electrocardiogram (ECG) (EKG)    • Obesity    • Overactive bladder    • Pregnancy     G-2, P-0   • Sleep apnea    • UTI (urinary tract infection) 2021   • Vitamin B12 deficiency    • Vitamin D insufficiency    • Wound, breast     LEFT BREAST; PT INSTRUCTED TO NOTIFY DR GARCIA PRIOR TO SURGERY        Past Surgical History:   Procedure Laterality Date   • AXILLARY HIDRADENITIS EXCISION Bilateral    • BREAST BIOPSY Left 2021    Procedure: Left breast needle-localized excisional biopsy (tophat clip) and left breast  ultrasound-guided excisional biopsy (hydromark clip);  Surgeon: Debora Zambrano MD;  [facility] MA[facility] OR;  Service: General;  Laterality: Left;   • BREAST BIOPSY Left 02/01/2022    Procedure: Left breast needle-localized excisional biopsy, Left nipple mass excision;  Surgeon: Debora Zambrano MD;  Location: [facility] MAIN OR;  Service: General;  Laterality: Left;   • CARDIAC CATHETERIZATION     • COLONOSCOPY     • COLONOSCOPY N/A 05/06/2022    Procedure: COLONOSCOPY;  Surgeon: Mirtha Davis MD;  [place] [facility] [place];  Service: Gastroenterology;  Laterality: N/A;  diverticulosis, polyps   • HYSTERECTOMY  2006    LAV   • SUBTOTAL HYSTERECTOMY  2006         Visit Dx:      ICD-10-CM ICD-9-CM   1. Breast swelling  N63.0 611.72   2. Malignant neoplasm of overlapping sites of left breast in female, estrogen receptor positive (HCC)  C50.812 174.8    Z17.0 V86.0        Lymphedema     Row Name 10/04/22 1700             Subjective Pain    Able to rate subjective pain? yes  -RE      Pre-Treatment Pain Level 0  -RE      Post-Treatment Pain Level 0  -RE              Compression/Skin Care    Compression/Skin Care Comments measured for Jobst kelly lite sleeve size large and medium gauntlet.  -RE              L-Dex Bioimpedence Screening    L-Dex Measurement Extremity LUE  -RE      L-Dex Patient Position Standing  -RE      L-Dex UE Dominate Side Right  -RE      L-Dex UE At Risk Side Left  -RE      L-Dex UE Pre Surgical Value No  -RE      L-Dex UE Score 5.2  -RE      L-Dex UE Comment WNL  -RE            User Key  (r) = Recorded By, (t) = Taken By, (c) = Cosigned By    Initials Name Provider Type    RE Kelsy Mccain, OTR Occupational Therapist                         OT Assessment/Plan     Row Name 10/04/22 1802          OT Assessment    Assessment Comments Gaols updated. Edema goals met. Mild breast edema persists but has improved significantly.  L-dex value today was 5.2 with is WNL but a high normal and we will  continue to monitor.  -RE            OT Plan    OT Plan Comments Follow up for bioimpedance in 3 months  -RE           User Key  (r) = Recorded By, (t) = Taken By, (c) = Cosigned By    Initials Name Provider Type    Kelsy Cedeno, OTR Occupational Therapist                 The patient had a baseline SOZO measurement which I reviewed today. The score is WNL, see in EMR. Bioimpedance spectroscopy helps identify the onset of lymphedema in an arm or leg before patients experience noticeable swelling. Research has shown that the early detection of lymphedema using L-Dex combined with treatment can reduce progression to chronic lymphedema by 95% in breast cancer patients. Whenever possible, patients are tested for baseline L-Dex score before cancer treatment begins and then are reassessed during regular follow-up visits using the SOZO device. Otherwise, this can be started postoperatively and continued during regular follow-up visits. If the patient's L-Dex score increases above normal levels, that is a sign that lymphedema is developing and a referral is made to occupational therapy for further evaluation and early compression treatment. Lymphedema assessment with the SOZO L-Dex score is recommended to be done every 3 months for the first 3 years and then every 6 months for years 4 and 5 followed by annually afterwards.       OT Goals     Row Name 10/04/22 1700          OT Short Term Goals    STG Date to Achieve --  -RE     STG 1 Patient will demonstrate proper awareness of “What is Lymphedema?” and “Healthy Habits” for improved prevention, management, care of symptoms and ease of transition to self-care of condition.  -RE     STG 1 Progress Met  -RE     STG 2 Patient independent and compliant with self-massage techniques  for improved lymphatic drainage, decreased edema/symptoms, decreased risk of infection and improved transition to self-care of condition.  -RE     STG 2 Progress Met  -RE            Long Term Goals     LTG Date to Achieve 11/04/22  -RE     LTG 1 Patient independent and compliant with self-care techniques for self-management of condition including use of her compression bra and swell spot as indicated.  -RE     LTG 1 Progress Ongoing  -RE     LTG 2 Patient to report decreased breast swelling per her perception.  -RE     LTG 2 Progress Met  -RE     LTG 3 Patient to demonstrate decrease of breast edema to 1+.  -RE     LTG 3 Progress Met  -RE     LTG 4 Patient will participate in bioimpedance scans every 3 months as a method of early detection of lymphedema to allow for early intervention.  -RE     LTG 4 Progress New  -RE     LTG 5 Patient's bioimpedance score to remain below 6.5 for decreased risk of stage II lymphedema.  -RE     LTG 5 Progress New  -RE     LTG 6 Pt will demonstrate understanding of use of compression sleeve for edema prevention, exercise and air travel.  -RE     LTG 6 Progress New  -RE            Time Calculation    OT Goal Re-Cert Due Date 12/14/22  -RE           User Key  (r) = Recorded By, (t) = Taken By, (c) = Cosigned By    Initials Name Provider Type    Kelsy Cedeno OTR Occupational Therapist                Therapy Education  Education Details: Biscussed purpose of bioimpedance and that her breast edema and axillary hidradenitis and excision do increase her risk of lymphedema. i recommeded she get a bra fitting and a silicone insert for the left side to allow  breast compression and support on the left while also providing a proper fit on  the R breast. I gave information on self MLD videos she can use at home. I will send her insurance info [facility] so she canget a compression sleeve foruse during exercise and air travel.  Given: Symptoms/condition management, Edema management, Other (comment)  Program: New  How Provided: Verbal, Written  Provided to: Patient  Level of Understanding: Teach back education performed, Verbalized  19602 - OT Self Care/Mgmt Minutes: 45                 Time Calculation:   OT Start Time: 0845  OT Stop Time: 0940  OT Time Calculation (min): 55 min  Total Timed Code Minutes- OT: 45 minute(s)  Timed Charges  26613 - OT Self Care/Mgmt Minutes: 45  Untimed Charges  14549 - OT Bioimpedence Rx Minutes: 10  Total Minutes  Timed Charges Total Minutes: 45  Untimed Charges Total Minutes: 10   Total Minutes: 55     Therapy Charges for Today     Code Description Service Date Service Provider Modifiers Qty    83926214592 HC OT SELF CARE/MGMT/TRAIN EA 15 MIN 10/4/2022 Kelsy Mccain OTR GO 3    [id number] HC OT BIS XTRACELL FLUID ANALYSIS 10/4/2022 Kelsy Mccain OTR GO 1                      FABIAN Patel  10/4/2022

## 2022-10-11 ENCOUNTER — APPOINTMENT (OUTPATIENT)
Dept: OCCUPATIONAL THERAPY | Facility: HOSPITAL | Age: 62
End: 2022-10-11

## 2022-10-13 ENCOUNTER — APPOINTMENT (OUTPATIENT)
Dept: PHYSICAL THERAPY | Facility: HOSPITAL | Age: 62
End: 2022-10-13

## 2022-10-17 DIAGNOSIS — I10 ESSENTIAL HYPERTENSION: ICD-10-CM

## 2022-10-17 RX ORDER — AMLODIPINE BESYLATE 10 MG/1
TABLET ORAL
Qty: 90 TABLET | Refills: 1 | Status: SHIPPED | OUTPATIENT
Start: 2022-10-17

## 2022-10-17 RX ORDER — METOPROLOL SUCCINATE 200 MG/1
TABLET, EXTENDED RELEASE ORAL
Qty: 90 TABLET | Refills: 1 | Status: SHIPPED | OUTPATIENT
Start: 2022-10-17

## 2022-10-18 ENCOUNTER — APPOINTMENT (OUTPATIENT)
Dept: OCCUPATIONAL THERAPY | Facility: HOSPITAL | Age: 62
End: 2022-10-18

## 2022-10-18 DIAGNOSIS — C50.812 MALIGNANT NEOPLASM OF OVERLAPPING SITES OF LEFT BREAST IN FEMALE, ESTROGEN RECEPTOR POSITIVE: ICD-10-CM

## 2022-10-18 DIAGNOSIS — N63.0 BREAST SWELLING: Primary | ICD-10-CM

## 2022-10-18 DIAGNOSIS — Z17.0 MALIGNANT NEOPLASM OF OVERLAPPING SITES OF LEFT BREAST IN FEMALE, ESTROGEN RECEPTOR POSITIVE: ICD-10-CM

## 2022-10-20 ENCOUNTER — APPOINTMENT (OUTPATIENT)
Dept: PHYSICAL THERAPY | Facility: HOSPITAL | Age: 62
End: 2022-10-20

## 2022-10-25 ENCOUNTER — APPOINTMENT (OUTPATIENT)
Dept: OCCUPATIONAL THERAPY | Facility: HOSPITAL | Age: 62
End: 2022-10-25

## 2022-10-27 ENCOUNTER — APPOINTMENT (OUTPATIENT)
Dept: PHYSICAL THERAPY | Facility: HOSPITAL | Age: 62
End: 2022-10-27

## 2022-10-31 DIAGNOSIS — E78.00 PURE HYPERCHOLESTEROLEMIA: ICD-10-CM

## 2022-10-31 DIAGNOSIS — C50.812 MALIGNANT NEOPLASM OF OVERLAPPING SITES OF LEFT BREAST IN FEMALE, ESTROGEN RECEPTOR POSITIVE: ICD-10-CM

## 2022-10-31 DIAGNOSIS — Z17.0 MALIGNANT NEOPLASM OF OVERLAPPING SITES OF LEFT BREAST IN FEMALE, ESTROGEN RECEPTOR POSITIVE: ICD-10-CM

## 2022-10-31 DIAGNOSIS — I10 PRIMARY HYPERTENSION: ICD-10-CM

## 2022-11-05 LAB
ALBUMIN SERPL-MCNC: 4.2 G/DL (ref 3.8–4.8)
ALBUMIN/GLOB SERPL: 1.4 {RATIO} (ref 1.2–2.2)
ALP SERPL-CCNC: 102 IU/L (ref 44–121)
ALT SERPL-CCNC: 11 IU/L (ref 0–32)
AST SERPL-CCNC: 16 IU/L (ref 0–40)
BASOPHILS # BLD AUTO: 0 X10E3/UL (ref 0–0.2)
BASOPHILS NFR BLD AUTO: 1 %
BILIRUB SERPL-MCNC: 0.3 MG/DL (ref 0–1.2)
BUN SERPL-MCNC: 10 MG/DL (ref 8–27)
BUN/CREAT SERPL: 12 (ref 12–28)
CALCIUM SERPL-MCNC: 9.9 MG/DL (ref 8.7–10.3)
CHLORIDE SERPL-SCNC: 100 MMOL/L (ref 96–106)
CHOLEST SERPL-MCNC: 180 MG/DL (ref 100–199)
CO2 SERPL-SCNC: 29 MMOL/L (ref 20–29)
CREAT SERPL-MCNC: 0.84 MG/DL (ref 0.57–1)
EGFRCR SERPLBLD CKD-EPI 2021: 79 ML/MIN/1.73
EOSINOPHIL # BLD AUTO: 0.1 X10E3/UL (ref 0–0.4)
EOSINOPHIL NFR BLD AUTO: 1 %
ERYTHROCYTE [DISTWIDTH] IN BLOOD BY AUTOMATED COUNT: 14.6 % (ref 11.7–15.4)
GLOBULIN SER CALC-MCNC: 2.9 G/DL (ref 1.5–4.5)
GLUCOSE SERPL-MCNC: 102 MG/DL (ref 70–99)
HBA1C MFR BLD: 5.7 % (ref 4.8–5.6)
HCT VFR BLD AUTO: 45.7 % (ref 34–46.6)
HDLC SERPL-MCNC: 56 MG/DL
HGB BLD-MCNC: 15.3 G/DL (ref 11.1–15.9)
IMM GRANULOCYTES # BLD AUTO: 0 X10E3/UL (ref 0–0.1)
IMM GRANULOCYTES NFR BLD AUTO: 1 %
LDLC SERPL CALC-MCNC: 96 MG/DL (ref 0–99)
LDLC/HDLC SERPL: 1.7 RATIO (ref 0–3.2)
LYMPHOCYTES # BLD AUTO: 1.6 X10E3/UL (ref 0.7–3.1)
LYMPHOCYTES NFR BLD AUTO: 29 %
MCH RBC QN AUTO: 27.1 PG (ref 26.6–33)
MCHC RBC AUTO-ENTMCNC: 33.5 G/DL (ref 31.5–35.7)
MCV RBC AUTO: 81 FL (ref 79–97)
MONOCYTES # BLD AUTO: 0.4 X10E3/UL (ref 0.1–0.9)
MONOCYTES NFR BLD AUTO: 8 %
NEUTROPHILS # BLD AUTO: 3.4 X10E3/UL (ref 1.4–7)
NEUTROPHILS NFR BLD AUTO: 60 %
PLATELET # BLD AUTO: 238 X10E3/UL (ref 150–450)
POTASSIUM SERPL-SCNC: 3.8 MMOL/L (ref 3.5–5.2)
PROT SERPL-MCNC: 7.1 G/DL (ref 6–8.5)
RBC # BLD AUTO: 5.64 X10E6/UL (ref 3.77–5.28)
SODIUM SERPL-SCNC: 141 MMOL/L (ref 134–144)
TRIGL SERPL-MCNC: 164 MG/DL (ref 0–149)
TSH SERPL DL<=0.005 MIU/L-ACNC: 0.6 UIU/ML (ref 0.45–4.5)
VLDLC SERPL CALC-MCNC: 28 MG/DL (ref 5–40)
WBC # BLD AUTO: 5.6 X10E3/UL (ref 3.4–10.8)

## 2022-11-08 ENCOUNTER — HOSPITAL ENCOUNTER (OUTPATIENT)
Dept: CT IMAGING | Facility: HOSPITAL | Age: 62
Discharge: HOME | End: 2022-11-08
Admitting: INTERNAL MEDICINE

## 2022-11-08 DIAGNOSIS — Z12.2 ENCOUNTER FOR SCREENING FOR LUNG CANCER: ICD-10-CM

## 2022-11-08 PROCEDURE — 71271 CT THORAX LUNG CANCER SCR C-: CPT

## 2022-11-11 ENCOUNTER — APPOINTMENT (OUTPATIENT)
Dept: LAB | Facility: HOSPITAL | Age: 62
End: 2022-11-11

## 2022-11-11 ENCOUNTER — OFFICE VISIT (OUTPATIENT)
Dept: ONCOLOGY | Facility: CLINIC | Age: 62
End: 2022-11-11

## 2022-11-11 VITALS
TEMPERATURE: 97.1 F | WEIGHT: 255.3 LBS | SYSTOLIC BLOOD PRESSURE: 116 MMHG | HEART RATE: 55 BPM | OXYGEN SATURATION: 98 % | HEIGHT: 66 IN | RESPIRATION RATE: 18 BRPM | DIASTOLIC BLOOD PRESSURE: 75 MMHG | BODY MASS INDEX: 41.03 KG/M2

## 2022-11-11 DIAGNOSIS — C50.812 MALIGNANT NEOPLASM OF OVERLAPPING SITES OF LEFT BREAST IN FEMALE, ESTROGEN RECEPTOR POSITIVE: ICD-10-CM

## 2022-11-11 DIAGNOSIS — Z17.0 MALIGNANT NEOPLASM OF OVERLAPPING SITES OF LEFT BREAST IN FEMALE, ESTROGEN RECEPTOR POSITIVE: ICD-10-CM

## 2022-11-11 DIAGNOSIS — D05.02 LOBULAR CARCINOMA IN SITU (LCIS) OF LEFT BREAST: Primary | ICD-10-CM

## 2022-11-11 PROCEDURE — 99215 OFFICE O/P EST HI 40 MIN: CPT | Performed by: INTERNAL MEDICINE

## 2022-11-11 NOTE — PROGRESS NOTES
Subjective   Kirsten Lima is a 62 y.o. female. Referred by  for LCIS     History of Present Illness   Ms. Lima is a 61-year-old -American lady who presents with a screen detected abnormality of the left breast.    6/2/2020-screening mammogram  Scattered areas of fibroglandular density.  There are small asymmetry seen in both breasts.  No significant change from prior exams.  Stable punctate and spherical lucent centered calcifications with diffuse distribution in both breasts.  There is a new focal asymmetry measuring 12 mm in the central region of the left breast.    6/24/2020-left diagnostic mammogram  Focal asymmetry in the left breast does not persist.  Ultrasound-no sonographic correlate.  3 oval intraductal masses with partially defined margins measuring 5 mm to 9 mm in the anterior one third subareolar region of the left breast.  Patient reported history of intermittent clear nipple discharge for about 10 years.  These were considered suspicious and ultrasound-guided biopsy was recommended.    6/29/2020-left breast ultrasound-guided biopsy x2  1.left subareolar mass measuring 5 x 5 x 6 mm-benign sclerosing intraductal papilloma with calcifications.  2.left subareolar mass-benign sclerosing intraductal papilloma with usual ductal hyperplasia and microcalcifications.    She was evaluated by Dr. Zambrano on 8/7/2020 and recommended to undergo excisional biopsy of both intraductal papillomas.    2/22/2021-left breast needle localized excisional biopsy-pathology was consistent with lobular carcinoma in situ.  Multiple sclerosed intraductal papillomas with focal calcifications which were completely excised.  Background breast parenchyma showed usual ductal hyperplasia and calcifications with sclerosing adenosis.    She has been referred to medical oncology to discuss risk reduction given findings of lobular carcinoma in situ.    Patient reports chronic nipple discharge from the left.  She also  had significant issues with the breast skin due to hidradenitis and recurrent skin abscesses.  She had a past history of benign breast biopsy , unable to recall the date.  Denies any family history of breast or ovarian cancer.    6/11/2021-bilateral screening mammogram-benign.    12/10/2021-bilateral breast MRI  Right breast-no suspicious findings.    Left breast-  Postsurgical changes in the anterior left breast.  At 5:00, 3.4 cm posterior to the nipple there is a 1.8 cm linear branching non-mass enhancement which is suspicious.  Abnormal enhancement in the left nipple with approximately 1.2 x 1.7 x 1 cm mass involving the nipple itself and extending slightly deeper into the subareolar breast.  This is suspicious.    No extramammary findings    Recommendations  1.suspicious 1.7 cm enhancing left nipple mass, surgical excision recommended as this is not amenable to imaging guided biopsy.  2.suspicious 1.8 cm linear branching non-mass enhancement at 5:00 in the left breast, MR guided biopsy recommended.    12/22/2021-left breast MRI guided biopsy of the 5:00.  A.extensive lobular carcinoma in situ  B.no invasive carcinoma identified by routine or immunostaining  C.associated intraductal papilloma with microcalcifications, ductal cyst wall and fibrocystic change.    Patient was prescribed tamoxifen for risk reduction which she initially did not take and subsequently this was changed to anastrozole at her visit in September which she only started taking in December 2021.    1/12/2022-she was evaluated by Dr. Zambrano and scheduled for left breast needle localized excisional biopsy and left nipple mass excision.    2/1/2022-left breast needle localized excisional biopsy left nipple mass excision  1.left breast lumpectomy-invasive lobular carcinoma, lobular carcinoma in situ and deep typical lobular hyperplasia.  A.invasive lobular carcinoma  Measures 4 mm  Grade 1  The focus is located amongst the area of lobular  carcinoma in situ  Extensive lobular carcinoma in situ and atypical lobular hyperplasia  Fibroadenoma with calcification, sclerosing adenosis, apocrine metaplasia, clustered cysts in area consistent with radial scar and fat necrosis    2.left nipple excision  Simple cyst  Apocrine metaplasia  Intraductal papilloma  Atypical lobular hyperplasia    The lobular carcinoma is ER +80% strong, SC 3% moderate, HER-2 negative, Ki-67 2%    Axillary lymph nodes not evaluated    Case was discussed in multidisciplinary conference and decided to omit axillary staging and proceed with radiation and continue Arimidex.    11/8/2022-low-dose CT of the chest with multiple pulmonary nodules bilateral measuring up to 7 mm.  There is also mediastinal lymph node which measures 1.7 cm and right paratracheal lymph node which measures 1 cm.    Interval history  Ms. Lima presents to the clinic today for follow-up.  She has reported a cough for about 2 months now.  She had a screening lung cancer CT and here to discuss the results of the same.  She has been compliant with anastrozole and reports no new complaints.  A new breast masses.  She feels like the left breast has compared to the right decreased in size significantly      The following portions of the patient's history were reviewed and updated as appropriate: allergies, current medications, past family history, past medical history, past social history, past surgical history and problem list.    Past Medical History:   Diagnosis Date   • Acute cystitis    • Allergic rhinitis    • Breast cancer (HCC)    • Breast cyst    • Colon polyp May 6, 2022    7   • Cyst of left nipple    • Depression    • Diverticulosis May 6, 2022   • Dry skin    • Dysuria    • Hemorrhoid    • Hidradenitis    • History of bronchitis    • Hyperlipidemia    • Hypertension    • Incontinence in female     wears pads   • Insomnia    • Nonspecific abnormal electrocardiogram (ECG) (EKG)    • Obesity    • Overactive  bladder    • Pregnancy     G-2, P-0   • Sleep apnea    • UTI (urinary tract infection) 2021   • Vitamin B12 deficiency    • Vitamin D insufficiency    • Wound, breast     LEFT BREAST; PT INSTRUCTED TO NOTIFY DR ZAMBRANO PRIOR TO SURGERY        Past Surgical History:   Procedure Laterality Date   • AXILLARY HIDRADENITIS EXCISION Bilateral    • BREAST BIOPSY Left 2021    Procedure: Left breast needle-localized excisional biopsy (tophat clip) and left breast ultrasound-guided excisional biopsy (hydromark clip);  Surgeon: Debora Zambrano MD;  Location: [facility];  Service: General;  Laterality: Left;   • BREAST BIOPSY Left 2022    Procedure: Left breast needle-localized excisional biopsy, Left nipple mass excision;  Surgeon: Debora Zambrano MD;  Location: [facility];  Service: General;  Laterality: Left;   • CARDIAC CATHETERIZATION     • COLONOSCOPY     • COLONOSCOPY N/A 2022    Procedure: COLONOSCOPY;  Surgeon: Mirtha Davis MD;  Location: [facility] OR;  Service: Gastroenterology;  Laterality: N/A;  diverticulosis, polyps   • HYSTERECTOMY  2006    LAVH   • SUBTOTAL HYSTERECTOMY          Family History   Problem Relation Age of Onset   • Hypertension Mother    • COPD Father             • Heart failure Father    • Hypertension Father    • Hyperlipidemia Father    • Vision loss Father    • Pancreatic cancer Brother         1 brother  from pancreatic cancer   • Alcohol abuse Brother             • Other Sister    • Alcohol abuse Sister             • Liver disease Sister    • Alcohol abuse Brother             • Cancer Brother         Liver   • Malig Hyperthermia Neg Hx         Social History     Socioeconomic History   • Marital status: Single   • Number of children: 0   Tobacco Use   • Smoking status: Every Day     Packs/day: 0.50     Years: 43.00     Pack years: 21.50     Types: Cigarettes     Start date: 1979   • Smokeless  "tobacco: Never   Vaping Use   • Vaping Use: Never used   Substance and Sexual Activity   • Alcohol use: Yes     Comment: 1-2 drinks a month   • Drug use: Never     Comment: marijuana in her twenties   • Sexual activity: Not Currently     Partners: Male     Birth control/protection: Abstinence        OB History    No obstetric history on file.      Age at menarche-13  Age at menopause-2006 she is status post hysterectomy.  Still has both ovaries  She does not have any children  Total period of oral contraceptive pill use 8 years      No Known Allergies         Review of Systems   Constitutional: Negative.    HENT: Negative.    Eyes: Negative.    Respiratory: Negative.    Cardiovascular: Negative.    Gastrointestinal: Negative.    Genitourinary: Positive for amenorrhea.   Allergic/Immunologic: Negative.    Neurological: Negative.    Hematological: Negative.    Psychiatric/Behavioral: Positive for sleep disturbance.     Review of systems mentioned in the HPI     Objective   Blood pressure 116/75, pulse 55, temperature 97.1 °F (36.2 °C), temperature source Temporal, resp. rate 18, height 167.6 cm (65.98\"), weight 116 kg (255 lb 4.8 oz), SpO2 98 %, not currently breastfeeding.   Physical Exam  Vitals reviewed.   Constitutional:       Appearance: Normal appearance. She is obese.   HENT:      Head: Normocephalic and atraumatic.      Right Ear: External ear normal.      Left Ear: External ear normal.      Nose: Nose normal. No congestion or rhinorrhea.      Mouth/Throat:      Mouth: Mucous membranes are moist.      Pharynx: Oropharynx is clear. No oropharyngeal exudate or posterior oropharyngeal erythema.   Eyes:      General:         Right eye: No discharge.         Left eye: No discharge.      Conjunctiva/sclera: Conjunctivae normal.      Pupils: Pupils are equal, round, and reactive to light.   Cardiovascular:      Rate and Rhythm: Normal rate.   Pulmonary:      Effort: Pulmonary effort is normal.      Breath sounds: " Normal breath sounds.   Abdominal:      General: Abdomen is flat. Bowel sounds are normal.      Palpations: Abdomen is soft.   Musculoskeletal:         General: No swelling, tenderness or deformity. Normal range of motion.      Cervical back: Normal range of motion.   Skin:     General: Skin is warm.      Coloration: Skin is not jaundiced or pale.   Neurological:      General: No focal deficit present.      Mental Status: She is alert and oriented to person, place, and time.      Cranial Nerves: No cranial nerve deficit.      Sensory: No sensory deficit.   Psychiatric:         Mood and Affect: Mood normal.         Behavior: Behavior normal.         Thought Content: Thought content normal.         Judgment: Judgment normal.       Breast Exam: Right breast appears normal inspection no palpable abnormalities of the right breast.  Left breast on inspection there is a periareolar incision which is well-healed and also another periareolar incision which is well-healed.  The left breast is hyperpigmented, thickened secondary to radiation changes.  There is tenderness to palpation in the left breast.    I have reexamined the patient and the results are consistent with the previously documented exam. Irean Dong MD       Orders Only on 10/31/2022   Component Date Value Ref Range Status   • Hemoglobin A1C 11/04/2022 5.7 (H)  4.8 - 5.6 % Final    Comment:          Prediabetes: 5.7 - 6.4           Diabetes: >6.4           Glycemic control for adults with diabetes: <7.0     • TSH 11/04/2022 0.596  0.450 - 4.500 uIU/mL Final   • Total Cholesterol 11/04/2022 180  100 - 199 mg/dL Final   • Triglycerides 11/04/2022 164 (H)  0 - 149 mg/dL Final   • HDL Cholesterol 11/04/2022 56  >39 mg/dL Final   • VLDL Cholesterol Avel 11/04/2022 28  5 - 40 mg/dL Final   • LDL Chol Calc ([facility]) 11/04/2022 96  0 - 99 mg/dL Final   • LDL/HDL RATIO 11/04/2022 1.7  0.0 - 3.2 ratio Final    Comment:                                     LDL/HDL Ratio                                               Men  Women                                1/2 Avg.Risk  1.0    1.5                                    Avg.Risk  3.6    3.2                                 2X Avg.Risk  6.2    5.0                                 3X Avg.Risk  8.0    6.1     • Glucose 11/04/2022 102 (H)  70 - 99 mg/dL Final   • BUN 11/04/2022 10  8 - 27 mg/dL Final   • Creatinine 11/04/2022 0.84  0.57 - 1.00 mg/dL Final   • EGFR Result 11/04/2022 79  >59 mL/min/1.73 Final   • BUN/Creatinine Ratio 11/04/2022 12  12 - 28 Final   • Sodium 11/04/2022 141  134 - 144 mmol/L Final   • Potassium 11/04/2022 3.8  3.5 - 5.2 mmol/L Final   • Chloride 11/04/2022 100  96 - 106 mmol/L Final   • Total CO2 11/04/2022 29  20 - 29 mmol/L Final   • Calcium 11/04/2022 9.9  8.7 - 10.3 mg/dL Final   • Total Protein 11/04/2022 7.1  6.0 - 8.5 g/dL Final   • Albumin 11/04/2022 4.2  3.8 - 4.8 g/dL Final   • Globulin 11/04/2022 2.9  1.5 - 4.5 g/dL Final   • A/G Ratio 11/04/2022 1.4  1.2 - 2.2 Final   • Total Bilirubin 11/04/2022 0.3  0.0 - 1.2 mg/dL Final   • Alkaline Phosphatase 11/04/2022 102  44 - 121 IU/L Final   • AST (SGOT) 11/04/2022 16  0 - 40 IU/L Final   • ALT (SGPT) 11/04/2022 11  0 - 32 IU/L Final   • WBC 11/04/2022 5.6  3.4 - 10.8 x10E3/uL Final   • RBC 11/04/2022 5.64 (H)  3.77 - 5.28 x10E6/uL Final   • Hemoglobin 11/04/2022 15.3  11.1 - 15.9 g/dL Final   • Hematocrit 11/04/2022 45.7  34.0 - 46.6 % Final   • MCV 11/04/2022 81  79 - 97 fL Final   • MCH 11/04/2022 27.1  26.6 - 33.0 pg Final   • MCHC 11/04/2022 33.5  31.5 - 35.7 g/dL Final   • RDW 11/04/2022 14.6  11.7 - 15.4 % Final   • Platelets 11/04/2022 238  150 - 450 x10E3/uL Final   • Neutrophil Rel % 11/04/2022 60  Not Estab. % Final   • Lymphocyte Rel % 11/04/2022 29  Not Estab. % Final   • Monocyte Rel % 11/04/2022 8  Not Estab. % Final   • Eosinophil Rel % 11/04/2022 1  Not Estab. % Final   • Basophil Rel % 11/04/2022 1  Not Estab. % Final   • Neutrophils Absolute  11/04/2022 3.4  1.4 - 7.0 x10E3/uL Final   • Lymphocytes Absolute 11/04/2022 1.6  0.7 - 3.1 x10E3/uL Final   • Monocytes Absolute 11/04/2022 0.4  0.1 - 0.9 x10E3/uL Final   • Eosinophils Absolute 11/04/2022 0.1  0.0 - 0.4 x10E3/uL Final   • Basophils Absolute 11/04/2022 0.0  0.0 - 0.2 x10E3/uL Final   • Immature Granulocyte Rel % 11/04/2022 1  Not Estab. % Final   • Immature Grans Absolute 11/04/2022 0.0  0.0 - 0.1 x10E3/uL Final        CT Chest Low Dose Cancer Screening WO    Result Date: 11/9/2022  1.Multiple bilateral subcentimeter pulmonary nodules are suspicious in this patient with a concomitant history of breast cancer. Recommend short-term follow-up in 2-3 months with contrast CT chest as lesions are likely below the resolution of PET. 2. Mediastinal adenopathy with differential considerations again including a reactive or neoplastic process. 3. Asymmetric soft tissue left breast, please correlate with clinical exam and prior imaging given history of breast cancer. 4. Please see above for additional findings/recommendations.    CTDI 2.9 mGy.  mGycm.  This report was finalized on 11/9/2022 7:42 PM by Dr. Fernando Reich M.D.             Assessment & Plan       62-year-old lady with a previous hysterectomy with uncertain menopausal status presents for management of lobular carcinoma in situ.     *Lobular carcinoma in situ  · 2/22/2021-left breast needle localized excisional biopsy shows lobular carcinoma in situ with multiple intraductal papillomas.  · She was offered tamoxifen 5 mg daily for risk reduction however did not start taking that.  · She was seen in September 2021 and treatment was changed to anastrozole as she was concerned about the risk of blood clots with tamoxifen.  · She finally started taking anastrozole in December 2021.  · Had an abnormal MRI in December 2021 requiring a left breast biopsy and left breast excision.  · Diagnosed with invasive lobular carcinoma  · Continues on  anastrozole    *Invasive lobular carcinoma of the left breast  · Invasive lobular carcinoma small focus diagnosed with an extensive areas of lobular carcinoma in situ  · pT1 a Nx M0  · Status post excision of the left breast mass  · Completed adjuvant radiation to the left breast in April 2022  · Continues on anastrozole  · No evidence of recurrent disease  · Scheduled for screening mammogram in August 2022  · MRI scheduled for 1/23/2022     *Lifestyle changes  · She has not been exercising or modified her diet  · She has been going to physical therapy for back pain  · BMI    *Obesity  · BMI  · She has met with a dietitian before  · Still unable to implement dietary changes successfully.     *Tobacco cessation  · Referral made to [facility] to help with smoking cessation  · Insurance does not cover the programs  · Prescribed nicotine patches in the past but she has not used them  · Encouraged her to use the nicotine patches  · Continues to smoke about 10 cigarettes/day     *Surveillance  · Continue annual mammograms due in August 2022  · Annual MRI due in December 2022  · Screening lung CT  11/8/2022 with pulmonary nodules and mediastinal lymphadenopathy    *Pulmonary nodules and gas lymphadenopathy  · Obtain PET/CT for further evaluation     *Hypertension  · Blood pressure stable  · Continue current medication       *Hyperlipidemia  · Continue Crestor  · Unchanged     *Bladder incontinence  · Continue oxybutynin  · Continue follow-up with Dr. Cantu her gynecologist     *Follow-up-4 months    Discussed with Dr. Roque with radiology regarding the CT scan and recommendation is to proceed with PET/CT.  Reviewed the images independently and also discussed with patient today.

## 2022-11-16 ENCOUNTER — OFFICE VISIT (OUTPATIENT)
Dept: INTERNAL MEDICINE | Facility: CLINIC | Age: 62
End: 2022-11-16

## 2022-11-16 VITALS
SYSTOLIC BLOOD PRESSURE: 134 MMHG | OXYGEN SATURATION: 99 % | HEART RATE: 53 BPM | TEMPERATURE: 97.3 F | BODY MASS INDEX: 40.42 KG/M2 | HEIGHT: 66 IN | DIASTOLIC BLOOD PRESSURE: 84 MMHG | WEIGHT: 251.5 LBS

## 2022-11-16 DIAGNOSIS — E78.00 PURE HYPERCHOLESTEROLEMIA: ICD-10-CM

## 2022-11-16 DIAGNOSIS — I10 ESSENTIAL HYPERTENSION: ICD-10-CM

## 2022-11-16 DIAGNOSIS — I10 PRIMARY HYPERTENSION: Primary | ICD-10-CM

## 2022-11-16 DIAGNOSIS — M48.061 SPINAL STENOSIS OF LUMBAR REGION, UNSPECIFIED WHETHER NEUROGENIC CLAUDICATION PRESENT: ICD-10-CM

## 2022-11-16 DIAGNOSIS — Z23 NEED FOR INFLUENZA VACCINATION: ICD-10-CM

## 2022-11-16 DIAGNOSIS — I73.9 CLAUDICATION: ICD-10-CM

## 2022-11-16 PROCEDURE — 90686 IIV4 VACC NO PRSV 0.5 ML IM: CPT | Performed by: INTERNAL MEDICINE

## 2022-11-16 PROCEDURE — 99214 OFFICE O/P EST MOD 30 MIN: CPT | Performed by: INTERNAL MEDICINE

## 2022-11-16 PROCEDURE — 90471 IMMUNIZATION ADMIN: CPT | Performed by: INTERNAL MEDICINE

## 2022-11-16 RX ORDER — VIBEGRON 75 MG/1
75 TABLET, FILM COATED ORAL DAILY
Qty: 90 TABLET | Refills: 3 | Status: SHIPPED | OUTPATIENT
Start: 2022-11-16

## 2022-11-16 RX ORDER — TRAZODONE HYDROCHLORIDE 50 MG/1
50 TABLET ORAL NIGHTLY
Qty: 90 TABLET | Refills: 3 | Status: SHIPPED | OUTPATIENT
Start: 2022-11-16 | End: 2022-12-09

## 2022-11-16 RX ORDER — LISINOPRIL AND HYDROCHLOROTHIAZIDE 20; 12.5 MG/1; MG/1
2 TABLET ORAL DAILY
Qty: 180 TABLET | Refills: 3 | Status: SHIPPED | OUTPATIENT
Start: 2022-11-16 | End: 2023-03-09

## 2022-11-16 RX ORDER — ROSUVASTATIN CALCIUM 5 MG/1
5 TABLET, COATED ORAL DAILY
Qty: 90 TABLET | Refills: 3 | Status: SHIPPED | OUTPATIENT
Start: 2022-11-16 | End: 2023-03-09

## 2022-11-16 NOTE — PROGRESS NOTES
Subjective   Kirsten Lima is a 62 y.o. female.   Fu htn and hpl    History of Present Illness   She is doing well after breast cancer treatment  Getting and pet scan  Pt has been taking cholesterol meds as prescribed.  No difficulties with myalgias.   Pt has been taking BP meds as prescribed without any problems.  No HA  No episodes of orthostasis  She has been having increasing back pain as she has been less active.  She has not been doing exercises  She also complains that her feet are cold all the time.  She also gets some pain in her lower calf that does seem to get better when she rests.  She thinks it is related to her heel pain but is not sure.    The following portions of the patient's history were reviewed and updated as appropriate: allergies, current medications, past medical history, past social history and problem list.  She is working on diet  Limited exercise due to back pain  She does still smoke but is attempting to quit  Review of Systems    Objective   Physical Exam  Vitals reviewed.   Constitutional:       Appearance: She is well-developed.   HENT:      Head: Normocephalic and atraumatic.      Right Ear: External ear normal.      Left Ear: External ear normal.   Eyes:      Conjunctiva/sclera: Conjunctivae normal.      Pupils: Pupils are equal, round, and reactive to light.   Neck:      Thyroid: No thyromegaly.      Trachea: No tracheal deviation.   Cardiovascular:      Rate and Rhythm: Normal rate and regular rhythm.      Heart sounds: Normal heart sounds.   Pulmonary:      Effort: Pulmonary effort is normal.      Breath sounds: Normal breath sounds.   Abdominal:      General: Bowel sounds are normal. There is no distension.      Palpations: Abdomen is soft.      Tenderness: There is no abdominal tenderness.   Musculoskeletal:         General: No deformity. Normal range of motion.      Cervical back: Normal range of motion.   Skin:     General: Skin is warm and dry.   Neurological:      Mental  Status: She is alert and oriented to person, place, and time.   Psychiatric:         Behavior: Behavior normal.         Thought Content: Thought content normal.         Judgment: Judgment normal.         Vitals:    11/16/22 1114   BP: 134/84   Pulse: 53   Temp: 97.3 °F (36.3 °C)   SpO2: 99%     Body mass index is 40.61 kg/m².         Assessment & Plan   Diagnoses and all orders for this visit:    1. Primary hypertension (Primary)    2. Pure hypercholesterolemia    3. Claudication (HCC)  -     Doppler Arterial Multi Level Lower Extremity - Bilateral CAR    4. Spinal stenosis of lumbar region, unspecified whether neurogenic claudication present    5. Essential hypertension  -     lisinopril-hydrochlorothiazide (PRINZIDE,ZESTORETIC) 20-12.5 MG per tablet; Take 2 tablets by mouth Daily.  Dispense: 180 tablet; Refill: 3    Other orders  -     rosuvastatin (CRESTOR) 5 MG tablet; Take 1 tablet by mouth Daily.  Dispense: 90 tablet; Refill: 3  -     traZODone (DESYREL) 50 MG tablet; Take 1 tablet by mouth Every Night.  Dispense: 90 tablet; Refill: 3  -     Vibegron (Gemtesa) 75 MG tablet; Take 1 tablet by mouth Daily.  Dispense: 90 tablet; Refill: 3      1.  Claudication-  She does have some pain in lower calf relieved with rest  Feet cold all the time  Check art studies  2.  Spinal stenosis-  Fu with pan management  3.  Breast cancer-  So far doing well   On arididex  4.  HTN-  Ok with current meds  5.  OAB-  Ok with gemtesa

## 2022-11-18 ENCOUNTER — HOSPITAL ENCOUNTER (OUTPATIENT)
Dept: PET IMAGING | Facility: HOSPITAL | Age: 62
Discharge: HOME | End: 2022-11-18

## 2022-11-18 DIAGNOSIS — C50.812 MALIGNANT NEOPLASM OF OVERLAPPING SITES OF LEFT BREAST IN FEMALE, ESTROGEN RECEPTOR POSITIVE: ICD-10-CM

## 2022-11-18 DIAGNOSIS — Z17.0 MALIGNANT NEOPLASM OF OVERLAPPING SITES OF LEFT BREAST IN FEMALE, ESTROGEN RECEPTOR POSITIVE: ICD-10-CM

## 2022-11-18 DIAGNOSIS — D05.02 LOBULAR CARCINOMA IN SITU (LCIS) OF LEFT BREAST: ICD-10-CM

## 2022-11-18 LAB — GLUCOSE BLDC GLUCOMTR-MCNC: 107 MG/DL (ref 70–130)

## 2022-11-18 PROCEDURE — A9552 F18 FDG: HCPCS | Performed by: INTERNAL MEDICINE

## 2022-11-18 PROCEDURE — 82962 GLUCOSE BLOOD TEST: CPT

## 2022-11-18 PROCEDURE — 0 FLUDEOXYGLUCOSE F18 SOLUTION: Performed by: INTERNAL MEDICINE

## 2022-11-18 PROCEDURE — 78815 PET IMAGE W/CT SKULL-THIGH: CPT

## 2022-11-18 RX ADMIN — FLUDEOXYGLUCOSE F18 1 DOSE: 300 INJECTION INTRAVENOUS at 10:38

## 2022-12-02 ENCOUNTER — OFFICE VISIT (OUTPATIENT)
Dept: ONCOLOGY | Facility: CLINIC | Age: 62
End: 2022-12-02

## 2022-12-02 VITALS
HEIGHT: 66 IN | TEMPERATURE: 97.1 F | WEIGHT: 252 LBS | RESPIRATION RATE: 18 BRPM | SYSTOLIC BLOOD PRESSURE: 136 MMHG | DIASTOLIC BLOOD PRESSURE: 82 MMHG | HEART RATE: 55 BPM | BODY MASS INDEX: 40.5 KG/M2 | OXYGEN SATURATION: 99 %

## 2022-12-02 DIAGNOSIS — R91.8 PULMONARY NODULES: ICD-10-CM

## 2022-12-02 DIAGNOSIS — J35.1 ENLARGED TONSILS: ICD-10-CM

## 2022-12-02 DIAGNOSIS — E04.1 THYROID NODULE: Primary | ICD-10-CM

## 2022-12-02 PROCEDURE — 99215 OFFICE O/P EST HI 40 MIN: CPT | Performed by: INTERNAL MEDICINE

## 2022-12-02 NOTE — PROGRESS NOTES
Subjective   Kirsten Lima is a 62 y.o. female. Referred by  for LCIS     History of Present Illness   Ms. Lima is a 61-year-old -American lady who presents with a screen detected abnormality of the left breast.    6/2/2020-screening mammogram  Scattered areas of fibroglandular density.  There are small asymmetry seen in both breasts.  No significant change from prior exams.  Stable punctate and spherical lucent centered calcifications with diffuse distribution in both breasts.  There is a new focal asymmetry measuring 12 mm in the central region of the left breast.    6/24/2020-left diagnostic mammogram  Focal asymmetry in the left breast does not persist.  Ultrasound-no sonographic correlate.  3 oval intraductal masses with partially defined margins measuring 5 mm to 9 mm in the anterior one third subareolar region of the left breast.  Patient reported history of intermittent clear nipple discharge for about 10 years.  These were considered suspicious and ultrasound-guided biopsy was recommended.    6/29/2020-left breast ultrasound-guided biopsy x2  1.left subareolar mass measuring 5 x 5 x 6 mm-benign sclerosing intraductal papilloma with calcifications.  2.left subareolar mass-benign sclerosing intraductal papilloma with usual ductal hyperplasia and microcalcifications.    She was evaluated by Dr. Zambrano on 8/7/2020 and recommended to undergo excisional biopsy of both intraductal papillomas.    2/22/2021-left breast needle localized excisional biopsy-pathology was consistent with lobular carcinoma in situ.  Multiple sclerosed intraductal papillomas with focal calcifications which were completely excised.  Background breast parenchyma showed usual ductal hyperplasia and calcifications with sclerosing adenosis.    She has been referred to medical oncology to discuss risk reduction given findings of lobular carcinoma in situ.    Patient reports chronic nipple discharge from the left.  She also  had significant issues with the breast skin due to hidradenitis and recurrent skin abscesses.  She had a past history of benign breast biopsy , unable to recall the date.  Denies any family history of breast or ovarian cancer.    6/11/2021-bilateral screening mammogram-benign.    12/10/2021-bilateral breast MRI  Right breast-no suspicious findings.    Left breast-  Postsurgical changes in the anterior left breast.  At 5:00, 3.4 cm posterior to the nipple there is a 1.8 cm linear branching non-mass enhancement which is suspicious.  Abnormal enhancement in the left nipple with approximately 1.2 x 1.7 x 1 cm mass involving the nipple itself and extending slightly deeper into the subareolar breast.  This is suspicious.    No extramammary findings    Recommendations  1.suspicious 1.7 cm enhancing left nipple mass, surgical excision recommended as this is not amenable to imaging guided biopsy.  2.suspicious 1.8 cm linear branching non-mass enhancement at 5:00 in the left breast, MR guided biopsy recommended.    12/22/2021-left breast MRI guided biopsy of the 5:00.  A.extensive lobular carcinoma in situ  B.no invasive carcinoma identified by routine or immunostaining  C.associated intraductal papilloma with microcalcifications, ductal cyst wall and fibrocystic change.    Patient was prescribed tamoxifen for risk reduction which she initially did not take and subsequently this was changed to anastrozole at her visit in September which she only started taking in December 2021.    1/12/2022-she was evaluated by Dr. Zambrano and scheduled for left breast needle localized excisional biopsy and left nipple mass excision.    2/1/2022-left breast needle localized excisional biopsy left nipple mass excision  1.left breast lumpectomy-invasive lobular carcinoma, lobular carcinoma in situ and deep typical lobular hyperplasia.  A.invasive lobular carcinoma  Measures 4 mm  Grade 1  The focus is located amongst the area of lobular  carcinoma in situ  Extensive lobular carcinoma in situ and atypical lobular hyperplasia  Fibroadenoma with calcification, sclerosing adenosis, apocrine metaplasia, clustered cysts in area consistent with radial scar and fat necrosis    2.left nipple excision  Simple cyst  Apocrine metaplasia  Intraductal papilloma  Atypical lobular hyperplasia    The lobular carcinoma is ER +80% strong, CA 3% moderate, HER-2 negative, Ki-67 2%    Axillary lymph nodes not evaluated    Case was discussed in multidisciplinary conference and decided to omit axillary staging and proceed with radiation and continue Arimidex.    11/8/2022-low-dose CT of the chest with multiple pulmonary nodules bilateral measuring up to 7 mm.  There is also mediastinal lymph node which measures 1.7 cm and right paratracheal lymph node which measures 1 cm.    11/18/2022-PET/CT  1.multiple bilateral pulmonary nodules which are below PET resolution.  Hypermetabolic mediastinal hilar and right axillary lymphadenopathy.  Right axilla lymph node is 1.2 cm with an SUV of 5.1.  Precavernous node 1.5 cm with an SUV of 5.7.  Asymmetric hypermetabolic thickening of the right palatine tonsil.  Direct visualization recommended.  Focal intense uptake in the rectum and anus is nonspecific.  Hypodense right thyroid lesion demonstrates mild uptake.  Asymmetric soft tissue thickening involving the left breast.    Interval history  Ms. Lima presents to the clinic today for follow-up to discuss the PET/CT results.  She reports feeling well with no new complaints.      The following portions of the patient's history were reviewed and updated as appropriate: allergies, current medications, past family history, past medical history, past social history, past surgical history and problem list.    Past Medical History:   Diagnosis Date   • Acute cystitis    • Allergic rhinitis    • Breast cancer (HCC)    • Breast cyst    • Colon polyp May 6, 2022    7   • Cyst of left nipple     • Depression    • Diverticulosis May 6, 2022   • Dry skin    • Dysuria    • Hemorrhoid    • Hidradenitis    • History of bronchitis    • Hyperlipidemia    • Hypertension    • Incontinence in female     wears pads   • Insomnia    • Low back pain    • Nonspecific abnormal electrocardiogram (ECG) (EKG)    • Obesity    • Overactive bladder    • Pregnancy     G-2, P-0   • Sleep apnea    • UTI (urinary tract infection) 2021   • Vitamin B12 deficiency    • Vitamin D insufficiency    • Wound, breast     LEFT BREAST; PT INSTRUCTED TO NOTIFY DR ZAMBRANO PRIOR TO SURGERY        Past Surgical History:   Procedure Laterality Date   • AXILLARY HIDRADENITIS EXCISION Bilateral    • BREAST BIOPSY Left 2021    Procedure: Left breast needle-localized excisional biopsy (tophat clip) and left breast ultrasound-guided excisional biopsy (hydromark clip);  Surgeon: Debora Zambrano MD;  Location: [facility];  Service: General;  Laterality: Left;   • BREAST BIOPSY Left 2022    Procedure: Left breast needle-localized excisional biopsy, Left nipple mass excision;  Surgeon: Debora Zambrano MD;  Location: [facility];  Service: General;  Laterality: Left;   • CARDIAC CATHETERIZATION     • COLONOSCOPY     • COLONOSCOPY N/A 2022    Procedure: COLONOSCOPY;  Surgeon: Mirtha Davis MD;  Location: [facility] OR;  Service: Gastroenterology;  Laterality: N/A;  diverticulosis, polyps   • HYSTERECTOMY  2006    LAV   • SUBTOTAL HYSTERECTOMY          Family History   Problem Relation Age of Onset   • Hypertension Mother    • COPD Father             • Heart failure Father    • Hypertension Father    • Hyperlipidemia Father    • Vision loss Father    • Pancreatic cancer Brother         1 brother  from pancreatic cancer   • Alcohol abuse Brother             • Other Sister    • Alcohol abuse Sister             • Liver disease Sister    • Alcohol abuse Brother            "  • Cancer Brother         Liver   • Malig Hyperthermia Neg Hx         Social History     Socioeconomic History   • Marital status: Single   • Number of children: 0   Tobacco Use   • Smoking status: Every Day     Packs/day: 0.50     Years: 42.00     Pack years: 21.00     Types: Cigarettes     Start date: 1/1/1979   • Smokeless tobacco: Never   Vaping Use   • Vaping Use: Never used   Substance and Sexual Activity   • Alcohol use: Yes     Comment: 1-2 drinks a month   • Drug use: Never     Comment: marijuana in her twenties   • Sexual activity: Not Currently     Partners: Male     Birth control/protection: Abstinence        OB History    No obstetric history on file.      Age at menarche-13  Age at menopause-2006 she is status post hysterectomy.  Still has both ovaries  She does not have any children  Total period of oral contraceptive pill use 8 years      No Known Allergies         Review of Systems   Constitutional: Negative.    HENT: Negative.    Eyes: Negative.    Respiratory: Negative.    Cardiovascular: Negative.    Gastrointestinal: Negative.    Genitourinary: Positive for amenorrhea.   Allergic/Immunologic: Negative.    Neurological: Negative.    Hematological: Negative.    Psychiatric/Behavioral: Positive for sleep disturbance.     Review of systems mentioned in the HPI     Objective   Blood pressure 136/82, pulse 55, temperature 97.1 °F (36.2 °C), temperature source Temporal, resp. rate 18, height 167.6 cm (65.98\"), weight 114 kg (252 lb), SpO2 99 %, not currently breastfeeding.   Physical Exam  Vitals reviewed.   Constitutional:       Appearance: Normal appearance. She is obese.   HENT:      Head: Normocephalic and atraumatic.      Right Ear: External ear normal.      Left Ear: External ear normal.      Nose: Nose normal. No congestion or rhinorrhea.      Mouth/Throat:      Mouth: Mucous membranes are moist.      Pharynx: Oropharynx is clear. No oropharyngeal exudate or posterior oropharyngeal erythema. "   Eyes:      General:         Right eye: No discharge.         Left eye: No discharge.      Conjunctiva/sclera: Conjunctivae normal.      Pupils: Pupils are equal, round, and reactive to light.   Cardiovascular:      Rate and Rhythm: Normal rate.   Pulmonary:      Effort: Pulmonary effort is normal.      Breath sounds: Normal breath sounds.   Abdominal:      General: Abdomen is flat. Bowel sounds are normal.      Palpations: Abdomen is soft.   Musculoskeletal:         General: No swelling, tenderness or deformity. Normal range of motion.      Cervical back: Normal range of motion.   Skin:     General: Skin is warm.      Coloration: Skin is not jaundiced or pale.   Neurological:      General: No focal deficit present.      Mental Status: She is alert and oriented to person, place, and time.      Cranial Nerves: No cranial nerve deficit.      Sensory: No sensory deficit.   Psychiatric:         Mood and Affect: Mood normal.         Behavior: Behavior normal.         Thought Content: Thought content normal.         Judgment: Judgment normal.       Breast Exam: Right breast appears normal inspection no palpable abnormalities of the right breast.  Left breast on inspection there is a periareolar incision which is well-healed and also another periareolar incision which is well-healed.  The left breast is hyperpigmented, thickened secondary to radiation changes.  There is tenderness to palpation in the left breast.    I have reexamined the patient and the results are consistent with the previously documented exam. Irena Dong MD       Hospital Outpatient Visit on 11/18/2022   Component Date Value Ref Range Status   • Glucose 11/18/2022 107  70 - 130 mg/dL Final    Meter: [id number] : [id number] [facility] Debora RTCT        CT Chest Low Dose Cancer Screening WO    Result Date: 11/9/2022  1.Multiple bilateral subcentimeter pulmonary nodules are suspicious in this patient with a concomitant history of breast cancer.  Recommend short-term follow-up in 2-3 months with contrast CT chest as lesions are likely below the resolution of PET. 2. Mediastinal adenopathy with differential considerations again including a reactive or neoplastic process. 3. Asymmetric soft tissue left breast, please correlate with clinical exam and prior imaging given history of breast cancer. 4. Please see above for additional findings/recommendations.    CTDI 2.9 mGy.  mGycm.  This report was finalized on 11/9/2022 7:42 PM by Dr. Fernando Reich M.D.      NM PET/CT Skull Base to Mid Thigh    Result Date: 11/21/2022  1.  Innumerable bilateral pulmonary nodules are present and largely below PET resolution. A nodule within the left upper lobe may have minimally increased in size since 11/08/2022; however comparison is difficult due to differences in slice thickness and respiratory motion. Additionally there is hypermetabolic mediastinal, hilar and right axillary adenopathy. Unfortunately, given patient history findings are indeterminate and metastatic disease cannot be excluded. 2.  Focal areas of hypermetabolic subcutaneous skin thickening inferior to the right globe and within the medial aspect of the left proximal thigh. While findings may be inflammatory, they're nonspecific and correlation with physical exam is recommended to exclude infection and/or underlying neoplasm in these areas. 3.  Asymmetric hypermetabolic thickening of the right palatine tonsil. Additionally, there is focal hypermetabolism within the right vocal fold. Correlation with direct visualization is recommended to exclude neoplasm. 4.  Focal intense uptake is present within the distal rectum/anus, nonspecific. Please ensure this patient is up-to-date on screening colonoscopies and if this patient has not received recommended colonoscopic screening per current guidelines, recommend correlation with colonoscopy to exclude neoplasm. 5.  Focal hypodense right thyroid lesion demonstrate  mild hypermetabolism. Findings are nonspecific as both benign and malignant thyroid neoplasms can demonstrate FDG uptake and correlation with thyroid sonogram is recommended. 6.  Asymmetric soft tissue thickening and overlying skin thickening involving the left breast. Please ensure this patient is receiving appropriate target breast imaging in the breast center as PET/CT findings are nonspecific. 7.  Other findings as above  This report was finalized on 11/21/2022 1:01 PM by Dr. Álvaro Morrison M.D.         PET/CT images independently reviewed and interpreted by me.    Assessment & Plan       62-year-old lady with a previous hysterectomy with uncertain menopausal status presents for management of lobular carcinoma in situ.     *Lobular carcinoma in situ  · 2/22/2021-left breast needle localized excisional biopsy shows lobular carcinoma in situ with multiple intraductal papillomas.  · She was offered tamoxifen 5 mg daily for risk reduction however did not start taking that.  · She was seen in September 2021 and treatment was changed to anastrozole as she was concerned about the risk of blood clots with tamoxifen.  · She finally started taking anastrozole in December 2021.  · Had an abnormal MRI in December 2021 requiring a left breast biopsy and left breast excision.  · Diagnosed with invasive lobular carcinoma  · Continues on anastrozole  · No evidence of recurrent disease    *Invasive lobular carcinoma of the left breast  · Invasive lobular carcinoma small focus diagnosed with an extensive areas of lobular carcinoma in situ  · pT1 a Nx M0  · Status post excision of the left breast mass  · Completed adjuvant radiation to the left breast in April 2022  · Continues on anastrozole  · No evidence of recurrent disease  · Scheduled for screening mammogram in August 2022  · MRI scheduled for 1/23/2022  · PET/CT shows right axillary lymphadenopathy  · We will try to move the MRI up to December 2022 and may need to biopsy  the right axillary lymph node.    *Multiple pulmonary nodules and mediastinal and hilar lymphadenopathy  · CT of the chest on 11/8/2022 shows multiple bilateral pulmonary nodules along with mediastinal hilar lymphadenopathy which is greater than 1 cm.  · PET/CT 11/18/2022 shows increased uptake in the mediastinal and the hilar lymph nodes.  · There is also a right axillary lymph node measuring 1.2 cm with an SUV of 5.1.  · She will be referred to pulmonary for possible bronchoscopy and biopsy.    *Asymmetric uptake of the palatine tonsil  · Refer to ENT for further evaluation    *Thyroid abnormalities on the PET/CT  · Obtain ultrasound of the thyroid     *Lifestyle changes  · She has not been exercising or modified her diet  · She has been going to physical therapy for back pain  · BMI 40.7    *Obesity  · BMI  · She has met with a dietitian before  · Still unable to implement dietary changes successfully.     *Tobacco cessation  · Referral made to [facility] to help with smoking cessation  · Insurance does not cover the programs  · Prescribed nicotine patches in the past but she has not used them  · Encouraged her to use the nicotine patches  · Continues to smoke about 10 cigarettes/day     *Surveillance  · Continue annual mammograms due in August 2022  · Annual MRI due in December 2022  · Screening lung CT  11/8/2022 with pulmonary nodules and mediastinal lymphadenopathy    *Pulmonary nodules and gas lymphadenopathy  · Obtain PET/CT for further evaluation     *Hypertension  · Blood pressure stable  · Continue current medication       *Hyperlipidemia  · Continue Crestor  · Unchanged     *Bladder incontinence  · Continue oxybutynin  · Continue follow-up with Dr. Cantu her gynecologist     *Follow-up-after imaging    42 minutes have been spent on the encounter including reviewing the images independently by myself as well as along with the patient, interpreting the images independently, face-to-face time and  documentation on the same day

## 2022-12-08 ENCOUNTER — HOSPITAL ENCOUNTER (OUTPATIENT)
Dept: ULTRASOUND IMAGING | Facility: HOSPITAL | Age: 62
Discharge: HOME | End: 2022-12-08
Admitting: INTERNAL MEDICINE

## 2022-12-08 DIAGNOSIS — E04.1 THYROID NODULE: ICD-10-CM

## 2022-12-08 PROCEDURE — 76536 US EXAM OF HEAD AND NECK: CPT

## 2022-12-09 ENCOUNTER — HOSPITAL ENCOUNTER (OUTPATIENT)
Dept: MRI IMAGING | Facility: HOSPITAL | Age: 62
Discharge: HOME | End: 2022-12-09
Admitting: SURGERY

## 2022-12-09 DIAGNOSIS — Z91.89 INCREASED RISK OF BREAST CANCER: ICD-10-CM

## 2022-12-09 LAB — CREAT BLDA-MCNC: 0.9 MG/DL (ref 0.6–1.3)

## 2022-12-09 PROCEDURE — 0 GADOBENATE DIMEGLUMINE 529 MG/ML SOLUTION: Performed by: SURGERY

## 2022-12-09 PROCEDURE — A9577 INJ MULTIHANCE: HCPCS | Performed by: SURGERY

## 2022-12-09 PROCEDURE — 82565 ASSAY OF CREATININE: CPT

## 2022-12-09 PROCEDURE — 77049 MRI BREAST C-+ W/CAD BI: CPT

## 2022-12-09 RX ORDER — TRAZODONE HYDROCHLORIDE 50 MG/1
TABLET ORAL
Qty: 90 TABLET | Refills: 3 | Status: SHIPPED | OUTPATIENT
Start: 2022-12-09

## 2022-12-09 RX ADMIN — GADOBENATE DIMEGLUMINE 20 ML: 529 INJECTION, SOLUTION INTRAVENOUS at 15:21

## 2022-12-13 ENCOUNTER — OFFICE VISIT (OUTPATIENT)
Dept: ONCOLOGY | Facility: CLINIC | Age: 62
End: 2022-12-13

## 2022-12-13 VITALS
OXYGEN SATURATION: 98 % | DIASTOLIC BLOOD PRESSURE: 70 MMHG | TEMPERATURE: 96.9 F | HEART RATE: 54 BPM | BODY MASS INDEX: 40.79 KG/M2 | SYSTOLIC BLOOD PRESSURE: 126 MMHG | HEIGHT: 66 IN | WEIGHT: 253.8 LBS | RESPIRATION RATE: 18 BRPM

## 2022-12-13 DIAGNOSIS — Z17.0 MALIGNANT NEOPLASM OF OVERLAPPING SITES OF LEFT BREAST IN FEMALE, ESTROGEN RECEPTOR POSITIVE: ICD-10-CM

## 2022-12-13 DIAGNOSIS — J35.1 ENLARGED TONSILS: ICD-10-CM

## 2022-12-13 DIAGNOSIS — C50.812 MALIGNANT NEOPLASM OF OVERLAPPING SITES OF LEFT BREAST IN FEMALE, ESTROGEN RECEPTOR POSITIVE: ICD-10-CM

## 2022-12-13 DIAGNOSIS — D05.02 LOBULAR CARCINOMA IN SITU (LCIS) OF LEFT BREAST: ICD-10-CM

## 2022-12-13 DIAGNOSIS — E04.1 THYROID NODULE: Primary | ICD-10-CM

## 2022-12-13 DIAGNOSIS — Z79.811 USE OF AROMATASE INHIBITORS: ICD-10-CM

## 2022-12-13 PROCEDURE — 99214 OFFICE O/P EST MOD 30 MIN: CPT | Performed by: INTERNAL MEDICINE

## 2022-12-13 NOTE — PROGRESS NOTES
Subjective   Kirsten Lima is a 62 y.o. female. Referred by  for LCIS     History of Present Illness   Ms. Lima is a 61-year-old -American lady who presents with a screen detected abnormality of the left breast.    6/2/2020-screening mammogram  Scattered areas of fibroglandular density.  There are small asymmetry seen in both breasts.  No significant change from prior exams.  Stable punctate and spherical lucent centered calcifications with diffuse distribution in both breasts.  There is a new focal asymmetry measuring 12 mm in the central region of the left breast.    6/24/2020-left diagnostic mammogram  Focal asymmetry in the left breast does not persist.  Ultrasound-no sonographic correlate.  3 oval intraductal masses with partially defined margins measuring 5 mm to 9 mm in the anterior one third subareolar region of the left breast.  Patient reported history of intermittent clear nipple discharge for about 10 years.  These were considered suspicious and ultrasound-guided biopsy was recommended.    6/29/2020-left breast ultrasound-guided biopsy x2  1.left subareolar mass measuring 5 x 5 x 6 mm-benign sclerosing intraductal papilloma with calcifications.  2.left subareolar mass-benign sclerosing intraductal papilloma with usual ductal hyperplasia and microcalcifications.    She was evaluated by Dr. Zambrano on 8/7/2020 and recommended to undergo excisional biopsy of both intraductal papillomas.    2/22/2021-left breast needle localized excisional biopsy-pathology was consistent with lobular carcinoma in situ.  Multiple sclerosed intraductal papillomas with focal calcifications which were completely excised.  Background breast parenchyma showed usual ductal hyperplasia and calcifications with sclerosing adenosis.    She has been referred to medical oncology to discuss risk reduction given findings of lobular carcinoma in situ.    Patient reports chronic nipple discharge from the left.  She also  had significant issues with the breast skin due to hidradenitis and recurrent skin abscesses.  She had a past history of benign breast biopsy , unable to recall the date.  Denies any family history of breast or ovarian cancer.    6/11/2021-bilateral screening mammogram-benign.    12/10/2021-bilateral breast MRI  Right breast-no suspicious findings.    Left breast-  Postsurgical changes in the anterior left breast.  At 5:00, 3.4 cm posterior to the nipple there is a 1.8 cm linear branching non-mass enhancement which is suspicious.  Abnormal enhancement in the left nipple with approximately 1.2 x 1.7 x 1 cm mass involving the nipple itself and extending slightly deeper into the subareolar breast.  This is suspicious.    No extramammary findings    Recommendations  1.suspicious 1.7 cm enhancing left nipple mass, surgical excision recommended as this is not amenable to imaging guided biopsy.  2.suspicious 1.8 cm linear branching non-mass enhancement at 5:00 in the left breast, MR guided biopsy recommended.    12/22/2021-left breast MRI guided biopsy of the 5:00.  A.extensive lobular carcinoma in situ  B.no invasive carcinoma identified by routine or immunostaining  C.associated intraductal papilloma with microcalcifications, ductal cyst wall and fibrocystic change.    Patient was prescribed tamoxifen for risk reduction which she initially did not take and subsequently this was changed to anastrozole at her visit in September which she only started taking in December 2021.    1/12/2022-she was evaluated by Dr. Zambrano and scheduled for left breast needle localized excisional biopsy and left nipple mass excision.    2/1/2022-left breast needle localized excisional biopsy left nipple mass excision  1.left breast lumpectomy-invasive lobular carcinoma, lobular carcinoma in situ and deep typical lobular hyperplasia.  A.invasive lobular carcinoma  Measures 4 mm  Grade 1  The focus is located amongst the area of lobular  carcinoma in situ  Extensive lobular carcinoma in situ and atypical lobular hyperplasia  Fibroadenoma with calcification, sclerosing adenosis, apocrine metaplasia, clustered cysts in area consistent with radial scar and fat necrosis    2.left nipple excision  Simple cyst  Apocrine metaplasia  Intraductal papilloma  Atypical lobular hyperplasia    The lobular carcinoma is ER +80% strong, OR 3% moderate, HER-2 negative, Ki-67 2%    Axillary lymph nodes not evaluated    Case was discussed in multidisciplinary conference and decided to omit axillary staging and proceed with radiation and continue Arimidex.    11/8/2022-low-dose CT of the chest with multiple pulmonary nodules bilateral measuring up to 7 mm.  There is also mediastinal lymph node which measures 1.7 cm and right paratracheal lymph node which measures 1 cm.    11/18/2022-PET/CT  1.multiple bilateral pulmonary nodules which are below PET resolution.  Hypermetabolic mediastinal hilar and right axillary lymphadenopathy.  Right axilla lymph node is 1.2 cm with an SUV of 5.1.  Precavernous node 1.5 cm with an SUV of 5.7.  Asymmetric hypermetabolic thickening of the right palatine tonsil.  Direct visualization recommended.  Focal intense uptake in the rectum and anus is nonspecific.  Hypodense right thyroid lesion demonstrates mild uptake.  Asymmetric soft tissue thickening involving the left breast.    Interval history  Ms. Lima presents to the clinic today for follow-up.  She has seen pulmonary  and has been advised to undergo bronchoscopy and biopsy which is scheduled for 12/15/2022.  12/9/2022 MRI of the breast was performed.  There were no suspicious findings for malignancy in either breast.  There was no right or left axillary lymphadenopathy.  12/8/2022-thyroid ultrasound performed with multiple bilateral thyroid nodules and none of them meeting criteria for recommendation for biopsy or FNA.  One of them was previously biopsied and  benign.  She has an ENT appointment coming up on 1/3/2023.  She reports being stressed out with all the findings on the PET/CT and all the work-up which is ongoing.     The following portions of the patient's history were reviewed and updated as appropriate: allergies, current medications, past family history, past medical history, past social history, past surgical history and problem list.    Past Medical History:   Diagnosis Date   • Acute cystitis    • Allergic rhinitis    • Breast cancer (HCC)    • Breast cyst    • Colon polyp May 6, 2022    7   • Cyst of left nipple    • Depression    • Diverticulosis May 6, 2022   • Dry skin    • Dysuria    • Hemorrhoid    • Hidradenitis    • History of bronchitis    • Hyperlipidemia    • Hypertension    • Incontinence in female     wears pads   • Insomnia    • Low back pain 2020   • Nonspecific abnormal electrocardiogram (ECG) (EKG)    • Obesity    • Overactive bladder    • Pregnancy     G-2, P-0   • Sleep apnea    • UTI (urinary tract infection) 02/2021   • Vitamin B12 deficiency    • Vitamin D insufficiency    • Wound, breast     LEFT BREAST; PT INSTRUCTED TO NOTIFY DR ZAMBRANO PRIOR TO SURGERY        Past Surgical History:   Procedure Laterality Date   • AXILLARY HIDRADENITIS EXCISION Bilateral    • BREAST BIOPSY Left 02/22/2021    Procedure: Left breast needle-localized excisional biopsy (tophat clip) and left breast ultrasound-guided excisional biopsy (hydromark clip);  Surgeon: Debora Zambrano MD;  Location: [facility];  Service: General;  Laterality: Left;   • BREAST BIOPSY Left 02/01/2022    Procedure: Left breast needle-localized excisional biopsy, Left nipple mass excision;  Surgeon: Debora Zambrano MD;  Location: [facility];  Service: General;  Laterality: Left;   • CARDIAC CATHETERIZATION     • COLONOSCOPY     • COLONOSCOPY N/A 05/06/2022    Procedure: COLONOSCOPY;  Surgeon: Mirtha Davis MD;  Location: [facility] OR;  Service:  "Gastroenterology;  Laterality: N/A;  diverticulosis, polyps   • HYSTERECTOMY  2006    LAVH   • SUBTOTAL HYSTERECTOMY  2006        Family History   Problem Relation Age of Onset   • Hypertension Mother    • COPD Father             • Heart failure Father    • Hypertension Father    • Hyperlipidemia Father    • Vision loss Father    • Pancreatic cancer Brother         1 brother  from pancreatic cancer   • Alcohol abuse Brother             • Other Sister    • Alcohol abuse Sister             • Liver disease Sister    • Alcohol abuse Brother          2018   • Cancer Brother         Liver   • Malig Hyperthermia Neg Hx         Social History     Socioeconomic History   • Marital status: Single   • Number of children: 0   Tobacco Use   • Smoking status: Every Day     Packs/day: 0.50     Years: 42.00     Pack years: 21.00     Types: Cigarettes     Start date: 1979   • Smokeless tobacco: Never   Vaping Use   • Vaping Use: Never used   Substance and Sexual Activity   • Alcohol use: Yes     Comment: 1-2 drinks a month   • Drug use: Never     Comment: marijuana in her twenties   • Sexual activity: Not Currently     Partners: Male     Birth control/protection: Abstinence        OB History    No obstetric history on file.      Age at menarche-13  Age at menopause- she is status post hysterectomy.  Still has both ovaries  She does not have any children  Total period of oral contraceptive pill use 8 years      No Known Allergies         Review of Systems   Constitutional: Negative.    HENT: Negative.    Eyes: Negative.    Respiratory: Negative.    Cardiovascular: Negative.    Gastrointestinal: Negative.    Genitourinary: Positive for amenorrhea.   Allergic/Immunologic: Negative.    Neurological: Negative.    Hematological: Negative.    Psychiatric/Behavioral: Positive for sleep disturbance.     Review of systems mentioned in the HPI     Objective   Resp. rate 18, height 167.6 cm (65.98\"), " weight 115 kg (253 lb 12.8 oz), not currently breastfeeding.   Physical Exam  Vitals reviewed.   Constitutional:       Appearance: Normal appearance. She is obese.   HENT:      Head: Normocephalic and atraumatic.      Right Ear: External ear normal.      Left Ear: External ear normal.      Nose: Nose normal. No congestion or rhinorrhea.      Mouth/Throat:      Mouth: Mucous membranes are moist.      Pharynx: Oropharynx is clear. No oropharyngeal exudate or posterior oropharyngeal erythema.   Eyes:      General:         Right eye: No discharge.         Left eye: No discharge.      Conjunctiva/sclera: Conjunctivae normal.      Pupils: Pupils are equal, round, and reactive to light.   Cardiovascular:      Rate and Rhythm: Normal rate.   Pulmonary:      Effort: Pulmonary effort is normal.      Breath sounds: Normal breath sounds.   Abdominal:      General: Abdomen is flat. Bowel sounds are normal.      Palpations: Abdomen is soft.   Musculoskeletal:         General: No swelling, tenderness or deformity. Normal range of motion.      Cervical back: Normal range of motion.   Skin:     General: Skin is warm.      Coloration: Skin is not jaundiced or pale.   Neurological:      General: No focal deficit present.      Mental Status: She is alert and oriented to person, place, and time.      Cranial Nerves: No cranial nerve deficit.      Sensory: No sensory deficit.   Psychiatric:         Mood and Affect: Mood normal.         Behavior: Behavior normal.         Thought Content: Thought content normal.         Judgment: Judgment normal.       Breast Exam: Right breast appears normal inspection no palpable abnormalities of the right breast.  Left breast on inspection there is a periareolar incision which is well-healed and also another periareolar incision which is well-healed.  The left breast is hyperpigmented, thickened secondary to radiation changes.  There is tenderness to palpation in the left breast.    I have reexamined  the patient and the results are consistent with the previously documented exam. Irena Dong MD       Hospital Outpatient Visit on 12/09/2022   Component Date Value Ref Range Status   • Creatinine 12/09/2022 0.90  0.60 - 1.30 mg/dL Final    Serial Number: [id number]:  [id number]   Hospital Outpatient Visit on 11/18/2022   Component Date Value Ref Range Status   • Glucose 11/18/2022 107  70 - 130 mg/dL Final    Meter: [id number] : [id number] [facility] RTCT        MRI Breast Bilateral With & Without Contrast    Result Date: 12/9/2022  There are no findings suspicious for malignancy in either breast. Routine follow-up imaging is recommended.  BI-RADS category 2: Benign.  This report was finalized on 12/9/2022 4:54 PM by Dr. Felipe Brennan M.D.      NM PET/CT Skull Base to Mid Thigh    Result Date: 11/21/2022  1.  Innumerable bilateral pulmonary nodules are present and largely below PET resolution. A nodule within the left upper lobe may have minimally increased in size since 11/08/2022; however comparison is difficult due to differences in slice thickness and respiratory motion. Additionally there is hypermetabolic mediastinal, hilar and right axillary adenopathy. Unfortunately, given patient history findings are indeterminate and metastatic disease cannot be excluded. 2.  Focal areas of hypermetabolic subcutaneous skin thickening inferior to the right globe and within the medial aspect of the left proximal thigh. While findings may be inflammatory, they're nonspecific and correlation with physical exam is recommended to exclude infection and/or underlying neoplasm in these areas. 3.  Asymmetric hypermetabolic thickening of the right palatine tonsil. Additionally, there is focal hypermetabolism within the right vocal fold. Correlation with direct visualization is recommended to exclude neoplasm. 4.  Focal intense uptake is present within the distal rectum/anus, nonspecific. Please ensure this patient  is up-to-date on screening colonoscopies and if this patient has not received recommended colonoscopic screening per current guidelines, recommend correlation with colonoscopy to exclude neoplasm. 5.  Focal hypodense right thyroid lesion demonstrate mild hypermetabolism. Findings are nonspecific as both benign and malignant thyroid neoplasms can demonstrate FDG uptake and correlation with thyroid sonogram is recommended. 6.  Asymmetric soft tissue thickening and overlying skin thickening involving the left breast. Please ensure this patient is receiving appropriate target breast imaging in the breast center as PET/CT findings are nonspecific. 7.  Other findings as above  This report was finalized on 11/21/2022 1:01 PM by Dr. Álvaro Morrison M.D.         MRI of the breast 12/9/2022-images independently reviewed and interpreted by me in detail summarized in the HPI.    Assessment & Plan       62-year-old lady with a previous hysterectomy with uncertain menopausal status presents for management of lobular carcinoma in situ.     *Lobular carcinoma in situ  · 2/22/2021-left breast needle localized excisional biopsy shows lobular carcinoma in situ with multiple intraductal papillomas.  · She was offered tamoxifen 5 mg daily for risk reduction however did not start taking that.  · She was seen in September 2021 and treatment was changed to anastrozole as she was concerned about the risk of blood clots with tamoxifen.  · She finally started taking anastrozole in December 2021.  · Had an abnormal MRI in December 2021 requiring a left breast biopsy and left breast excision.  · Diagnosed with invasive lobular carcinoma  · Continues on anastrozole  · MRI 12/9/2022 reviewed and benign.    *Invasive lobular carcinoma of the left breast  · Invasive lobular carcinoma small focus diagnosed with an extensive areas of lobular carcinoma in situ  · pT1 a Nx M0  · Status post excision of the left breast mass  · Completed adjuvant  radiation to the left breast in April 2022  · Continues on anastrozole  · PET/CT shows right axillary lymphadenopathy  · MRI 12/9/2022 without any evidence of axillary lymphadenopathy or new abnormalities in either breast.  · No clear evidence of recurrent disease.  · We will follow-up on the bronchoscopy and biopsy of the mediastinal and hilar lymph nodes.    *Multiple pulmonary nodules and mediastinal and hilar lymphadenopathy  · CT of the chest on 11/8/2022 shows multiple bilateral pulmonary nodules along with mediastinal hilar lymphadenopathy which is greater than 1 cm.  · PET/CT 11/18/2022 shows increased uptake in the mediastinal and the hilar lymph nodes.  · Evaluated by pulmonary and scheduled to undergo bronchoscopy on 12/15/2022  · Follow-up on pathology results    *Asymmetric uptake of the palatine tonsil  · Refer to ENT for further evaluation  · Appointment scheduled for 1/3/2023    *Thyroid abnormalities on the PET/CT  · Thyroid ultrasound 12/8/2022 without any concerning findings     *Lifestyle changes  · She has not been exercising or modified her diet  · She has been going to physical therapy for back pain  · BMI 41    *Obesity  · BMI 41  · She has met with a dietitian before  · Still unable to implement dietary changes successfully.     *Tobacco cessation  · Referral made to Prerna Lewis to help with smoking cessation  · Encouraged her to use the nicotine patches  · Decreased to 5 cigarettes/day  · Trying to quit completely.     *Surveillance  · Bilateral breast MRI December 9, 2022 benign  · Screening mammogram due August 2023  · Screening lung CT  11/8/2022 with pulmonary nodules and mediastinal lymphadenopathy     *Hypertension  · Blood pressure stable  · Continue current medications       *Hyperlipidemia  · Continue Crestor  · Unchanged     *Bladder incontinence  · Continue oxybutynin  · Continue follow-up with Dr. Cantu her gynecologist     *Follow-up-second week of January

## 2022-12-15 ENCOUNTER — ANESTHESIA EVENT (OUTPATIENT)
Dept: GASTROENTEROLOGY | Facility: HOSPITAL | Age: 62
End: 2022-12-15

## 2022-12-15 ENCOUNTER — HOSPITAL ENCOUNTER (OUTPATIENT)
Facility: HOSPITAL | Age: 62
Setting detail: HOSPITAL OUTPATIENT SURGERY
Discharge: HOME | End: 2022-12-15
Attending: INTERNAL MEDICINE | Admitting: INTERNAL MEDICINE

## 2022-12-15 ENCOUNTER — ANESTHESIA (OUTPATIENT)
Dept: GASTROENTEROLOGY | Facility: HOSPITAL | Age: 62
End: 2022-12-15

## 2022-12-15 VITALS
TEMPERATURE: 98.6 F | HEART RATE: 52 BPM | HEIGHT: 66 IN | DIASTOLIC BLOOD PRESSURE: 69 MMHG | WEIGHT: 250 LBS | BODY MASS INDEX: 40.18 KG/M2 | SYSTOLIC BLOOD PRESSURE: 111 MMHG | RESPIRATION RATE: 18 BRPM | OXYGEN SATURATION: 96 %

## 2022-12-15 DIAGNOSIS — R59.1 LYMPHADENOPATHY: ICD-10-CM

## 2022-12-15 PROCEDURE — 88184 FLOWCYTOMETRY/ TC 1 MARKER: CPT

## 2022-12-15 PROCEDURE — 88312 SPECIAL STAINS GROUP 1: CPT | Performed by: INTERNAL MEDICINE

## 2022-12-15 PROCEDURE — 25010000002 PROPOFOL 10 MG/ML EMULSION: Performed by: ANESTHESIOLOGY

## 2022-12-15 PROCEDURE — 88185 FLOWCYTOMETRY/TC ADD-ON: CPT

## 2022-12-15 PROCEDURE — C1726 CATH, BAL DIL, NON-VASCULAR: HCPCS | Performed by: INTERNAL MEDICINE

## 2022-12-15 PROCEDURE — 88173 CYTOPATH EVAL FNA REPORT: CPT | Performed by: INTERNAL MEDICINE

## 2022-12-15 PROCEDURE — 88305 TISSUE EXAM BY PATHOLOGIST: CPT | Performed by: INTERNAL MEDICINE

## 2022-12-15 RX ORDER — SODIUM CHLORIDE 9 MG/ML
40 INJECTION, SOLUTION INTRAVENOUS AS NEEDED
Status: DISCONTINUED | OUTPATIENT
Start: 2022-12-15 | End: 2022-12-15 | Stop reason: HOSPADM

## 2022-12-15 RX ORDER — LIDOCAINE HYDROCHLORIDE 10 MG/ML
INJECTION, SOLUTION EPIDURAL; INFILTRATION; INTRACAUDAL; PERINEURAL AS NEEDED
Status: DISCONTINUED | OUTPATIENT
Start: 2022-12-15 | End: 2022-12-15 | Stop reason: HOSPADM

## 2022-12-15 RX ORDER — SODIUM CHLORIDE 0.9 % (FLUSH) 0.9 %
10 SYRINGE (ML) INJECTION EVERY 12 HOURS SCHEDULED
Status: DISCONTINUED | OUTPATIENT
Start: 2022-12-15 | End: 2022-12-15 | Stop reason: HOSPADM

## 2022-12-15 RX ORDER — VIBEGRON 75 MG/1
TABLET, FILM COATED ORAL
Qty: 90 TABLET | Refills: 3 | OUTPATIENT
Start: 2022-12-15

## 2022-12-15 RX ORDER — SODIUM CHLORIDE 0.9 % (FLUSH) 0.9 %
10 SYRINGE (ML) INJECTION AS NEEDED
Status: DISCONTINUED | OUTPATIENT
Start: 2022-12-15 | End: 2022-12-15 | Stop reason: HOSPADM

## 2022-12-15 RX ORDER — ANASTROZOLE 1 MG/1
TABLET ORAL
Qty: 90 TABLET | Refills: 1 | Status: SHIPPED | OUTPATIENT
Start: 2022-12-15

## 2022-12-15 RX ORDER — SODIUM CHLORIDE, SODIUM LACTATE, POTASSIUM CHLORIDE, CALCIUM CHLORIDE 600; 310; 30; 20 MG/100ML; MG/100ML; MG/100ML; MG/100ML
30 INJECTION, SOLUTION INTRAVENOUS CONTINUOUS PRN
Status: DISCONTINUED | OUTPATIENT
Start: 2022-12-15 | End: 2022-12-15 | Stop reason: HOSPADM

## 2022-12-15 RX ORDER — LIDOCAINE HYDROCHLORIDE 20 MG/ML
INJECTION, SOLUTION INFILTRATION; PERINEURAL AS NEEDED
Status: DISCONTINUED | OUTPATIENT
Start: 2022-12-15 | End: 2022-12-15 | Stop reason: SURG

## 2022-12-15 RX ORDER — PROPOFOL 10 MG/ML
VIAL (ML) INTRAVENOUS AS NEEDED
Status: DISCONTINUED | OUTPATIENT
Start: 2022-12-15 | End: 2022-12-15 | Stop reason: SURG

## 2022-12-15 RX ADMIN — SODIUM CHLORIDE, POTASSIUM CHLORIDE, SODIUM LACTATE AND CALCIUM CHLORIDE 30 ML/HR: 600; 310; 30; 20 INJECTION, SOLUTION INTRAVENOUS at 09:06

## 2022-12-15 RX ADMIN — LIDOCAINE HYDROCHLORIDE 40 MG: 20 INJECTION, SOLUTION INFILTRATION; PERINEURAL at 09:49

## 2022-12-15 RX ADMIN — PROPOFOL 200 MG: 10 INJECTION, EMULSION INTRAVENOUS at 09:49

## 2022-12-15 RX ADMIN — PROPOFOL 100 MCG/KG/MIN: 10 INJECTION, EMULSION INTRAVENOUS at 09:50

## 2022-12-15 NOTE — ANESTHESIA PREPROCEDURE EVALUATION
Anesthesia Evaluation                  Airway   Mallampati: II  TM distance: >3 FB  Neck ROM: full  No difficulty expected  Dental - normal exam     Pulmonary - normal exam   (+) sleep apnea,   Cardiovascular - normal exam    (+) hypertension, hyperlipidemia,       Neuro/Psych  (+) numbness, psychiatric history Depression,    GI/Hepatic/Renal/Endo    (+) obesity,     (-) diabetes    Musculoskeletal     Abdominal    Substance History      OB/GYN          Other      history of cancer                    Anesthesia Plan    ASA 3     general     intravenous induction     Anesthetic plan, risks, benefits, and alternatives have been provided, discussed and informed consent has been obtained with: patient.        CODE STATUS:

## 2022-12-15 NOTE — OP NOTE
Bronchoscopy Procedure Note    Procedure:  1. Bronchoscopy, Diagnostic  2. Bronchoscopy, Therapeutic  3. EBUS guided 10 R, 4R LN TBNA    Pre-Operative Diagnosis: Mediastinal/hilar lymphadenopathy    Post-Operative Diagnosis: Same    Anesthesia: Monitored Anesthesia Care (MAC)    Procedure Details: Patient/ family was consented for the procedure with all risks and benefits of the procedure explained in detail.  Patient/ family was given the opportunity to ask questions and all concerns were answered.  The bronchocope was inserted into the main airway via the LMA. A detailed anatomical survey was done of the airways from the trachea till  the visualized subsegmental bronchi and the findings are as reported below.     PPE recommended per [facility] infectious disease protocol was followed.     EBUS scope was used to perform a mediastinal LN evaluation. 10 R, 4R LN were identified to be enlarged and were sampled in that order. The samples were assessed using Rapid Onsite Evaluation (ZITA) which has confirmed lymphocytes. We will share information with the patient once we have a confirmed diagnosis from the pathologist.       Findings:  1) Normal larynx, trachea, bilateral mainstem, lobar and visualized segmental bronchi other than mentioned below  2) Diffuse mucoid secretions noted requiring therapeutic aspiration  3)  EBUS guided TBNA of the 10 R, 4R LN obtained as described above    Estimated Blood Loss: Minimal                Complications: None;   Patient tolerated the procedure well and handed over to the anesthesia team for recovery.           Disposition: Endoscopy Recovery room    Pt will be discharged home per endo protocol when cleared by the anesthesia service.      Patient tolerated the procedure well and I have updated the family at bedside in the recovery room.     Patient will get a call from my office staff once results of the testing are available in a few days. He was asked to call the office if  he has any new chest pain, shortness of breath or worsening cough or hemoptysis. Mild hemoptysis and fever overnight are expected and OTC Tylenol is recommended.     Norberto Link MD  12/15/2022  10:34 EST

## 2022-12-15 NOTE — H&P
Please see scanned H&P.  Patient does not have any new questions or concerns.  Okay to move forward with the procedure.  Patient's friend is here and we will update her postprocedure & she is okay with that.

## 2022-12-15 NOTE — ANESTHESIA PROCEDURE NOTES
Airway  Urgency: elective    Date/Time: 12/15/2022 9:59 AM  Airway not difficult    General Information and Staff    Patient location during procedure: OR  Anesthesiologist: Violetta Maxwell MD    Indications and Patient Condition  Indications for airway management: airway protection    Preoxygenated: yes  MILS maintained throughout  Mask difficulty assessment: 1 - vent by mask    Final Airway Details  Final airway type: supraglottic airway      Successful airway: classic  Size 4     Number of attempts at approach: 1  Assessment: lips, teeth, and gum same as pre-op and atraumatic intubation

## 2022-12-15 NOTE — ANESTHESIA POSTPROCEDURE EVALUATION
Patient: Kirsten Lima    Procedure Summary     Date: 12/15/22 Room / Location:  [facility]    Anesthesia Start: 0940 Anesthesia Stop: 1053    Procedure: BRONCHOSCOPY WITH ENDOBRONCHIAL ULTRASOUND WITH FNA (Bronchus) Diagnosis:     Surgeons: Norberto Link MD Provider: Violetta Maxwell MD    Anesthesia Type: general ASA Status: 3          Anesthesia Type: general    Vitals  Vitals Value Taken Time   /70 12/15/22 1050   Temp     Pulse 56 12/15/22 1050   Resp 16 12/15/22 1050   SpO2 96 % 12/15/22 1050           Post Anesthesia Care and Evaluation    Patient location during evaluation: bedside  Patient participation: complete - patient participated  Level of consciousness: awake  Pain management: adequate    Airway patency: patent  Anesthetic complications: No anesthetic complications    Cardiovascular status: acceptable  Respiratory status: acceptable  Hydration status: acceptable

## 2022-12-16 ENCOUNTER — HOSPITAL ENCOUNTER (OUTPATIENT)
Dept: CARDIOLOGY | Facility: HOSPITAL | Age: 62
Discharge: HOME | End: 2022-12-16
Admitting: INTERNAL MEDICINE

## 2022-12-16 LAB
BH CV LOWER ARTERIAL LEFT ABI RATIO: 1.18
BH CV LOWER ARTERIAL LEFT DORSALIS PEDIS SYS MAX: 146
BH CV LOWER ARTERIAL LEFT GREAT TOE SYS MAX: 137
BH CV LOWER ARTERIAL LEFT POST TIBIAL SYS MAX: 154
BH CV LOWER ARTERIAL LEFT TBI RATIO: 1.05
BH CV LOWER ARTERIAL RIGHT ABI RATIO: 1.29
BH CV LOWER ARTERIAL RIGHT DORSALIS PEDIS SYS MAX: 164
BH CV LOWER ARTERIAL RIGHT GREAT TOE SYS MAX: 116
BH CV LOWER ARTERIAL RIGHT POST TIBIAL SYS MAX: 168
BH CV LOWER ARTERIAL RIGHT TBI RATIO: 0.89
CYTO UR: NORMAL
LAB AP CASE REPORT: NORMAL
LAB AP FLOW CYTOMETRY SUMMARY: NORMAL
LAB AP NON-GYN SPECIMEN ADEQUACY: NORMAL
LAB AP SPECIAL STAINS: NORMAL
MAXIMAL PREDICTED HEART RATE: 158 BPM
PATH REPORT.FINAL DX SPEC: NORMAL
PATH REPORT.GROSS SPEC: NORMAL
STRESS TARGET HR: 134 BPM
UPPER ARTERIAL RIGHT ARM BRACHIAL SYS MAX: 130 MMHG

## 2022-12-16 PROCEDURE — 93922 UPR/L XTREMITY ART 2 LEVELS: CPT

## 2023-01-05 ENCOUNTER — APPOINTMENT (OUTPATIENT)
Dept: OCCUPATIONAL THERAPY | Facility: HOSPITAL | Age: 63
End: 2023-01-05
Payer: COMMERCIAL

## 2023-01-09 ENCOUNTER — HOSPITAL ENCOUNTER (OUTPATIENT)
Dept: OCCUPATIONAL THERAPY | Facility: HOSPITAL | Age: 63
Setting detail: THERAPIES SERIES
Discharge: HOME | End: 2023-01-09
Payer: COMMERCIAL

## 2023-01-09 DIAGNOSIS — C50.812 MALIGNANT NEOPLASM OF OVERLAPPING SITES OF LEFT BREAST IN FEMALE, ESTROGEN RECEPTOR POSITIVE: ICD-10-CM

## 2023-01-09 DIAGNOSIS — Z91.89 AT RISK FOR LYMPHEDEMA: ICD-10-CM

## 2023-01-09 DIAGNOSIS — N63.0 BREAST SWELLING: Primary | ICD-10-CM

## 2023-01-09 DIAGNOSIS — Z17.0 MALIGNANT NEOPLASM OF OVERLAPPING SITES OF LEFT BREAST IN FEMALE, ESTROGEN RECEPTOR POSITIVE: ICD-10-CM

## 2023-01-09 PROCEDURE — 97535 SELF CARE MNGMENT TRAINING: CPT

## 2023-01-09 NOTE — THERAPY PROGRESS REPORT/RE-CERT
Outpatient Occupational Therapy Lymphedema Progress Note  [facility]     Patient Name: Kirsten Lima  : 1960  MRN: [id number]  Today's Date: 2023      Visit Date: 2023    Patient Active Problem List   Diagnosis   • Abnormal thyroid stimulating hormone (TSH) level   • Atopic rhinitis   • Hypertension   • Insomnia   • Overactive bladder   • Knee pain   • Cobalamin deficiency   • Vitamin D deficiency   • Hyperlipidemia   • Other chronic pain   • Lumbar facet arthropathy   • Spinal stenosis of lumbar region   • Intraductal papilloma of left breast   • Lobular carcinoma in situ (LCIS) of left breast   • Increased risk of breast cancer   • Tobacco use disorder   • Elevated glucose   • Mass of nipple   • Colon cancer screening   • Malignant neoplasm of overlapping sites of left breast in female, estrogen receptor positive (HCC)   • Lumbar radiculopathy   • Complicated grief   • Persistent depressive disorder with atypical features, currently mild        Past Medical History:   Diagnosis Date   • Acute cystitis    • Allergic rhinitis    • Breast cancer (HCC)    • Breast cyst    • Colon polyp May 6, 2022    7   • Cyst of left nipple    • Depression    • Diverticulosis May 6, 2022   • Dry skin    • Dysuria    • Hemorrhoid    • Hidradenitis    • History of bronchitis    • Hyperlipidemia    • Hypertension    • Incontinence in female     wears pads   • Insomnia    • Low back pain    • Nonspecific abnormal electrocardiogram (ECG) (EKG)    • Obesity    • Overactive bladder    • Pregnancy     G-2, P-0   • Sleep apnea    • UTI (urinary tract infection) 2021   • Vitamin B12 deficiency    • Vitamin D insufficiency    • Wound, breast     LEFT BREAST; PT INSTRUCTED TO NOTIFY DR GARCIA PRIOR TO SURGERY        Past Surgical History:   Procedure Laterality Date   • AXILLARY HIDRADENITIS EXCISION Bilateral    • BREAST BIOPSY Left 2021    Procedure: [facility] and left breast ultrasound-guided excisional biopsy (hydromark clip);  Surgeon: Debora Zambrano MD;  Location: [facility] MAIN OR;  Service: General;  Laterality: Left;   • BREAST BIOPSY Left 02/01/2022    Procedure: Left breast needle-localized excisional biopsy, Left nipple mass excision;  Surgeon: Debora Zambrano MD;  Location: [facility] MAIN OR;  Service: General;  Laterality: Left;   • BRONCHOSCOPY N/A 12/15/2022    Procedure: BRONCHOSCOPY WITH ENDOBRONCHIAL ULTRASOUND WITH FNA;  Surgeon: Norberto Link MD;  Location: [facility] ENDOSCOPY;  Service: Pulmonary;  Laterality: N/A;  LYMPHADENOPATHY   • CARDIAC CATHETERIZATION     • COLONOSCOPY     • COLONOSCOPY N/A 05/06/2022    Procedure: COLONOSCOPY;  Surgeon: Mirtha Davis MD;  [place] [facility] [place];  Service: Gastroenterology;  Laterality: N/A;  diverticulosis, polyps   • HYSTERECTOMY  2006    LAVH   • SUBTOTAL HYSTERECTOMY  2006         Visit Dx:     ICD-10-CM ICD-9-CM   1. Breast swelling  N63.0 611.72   2. Malignant neoplasm of overlapping sites of left breast in female, estrogen receptor positive (HCC)  C50.812 174.8    Z17.0 V86.0   3. At risk for lymphedema  Z91.89 V49.89            Lymphedema     Row Name 01/09/23 1800             Subjective Pain    Able to rate subjective pain? yes  -RE      Pre-Treatment Pain Level 0  -RE      Post-Treatment Pain Level 0  -RE         Subjective Comments    Subjective Comments Pt is concerned about coverage/bill for bioimpedance so would like to defer bio until she knows what her responsibility would be.  -RE         L-Dex Bioimpedence Screening    L-Dex UE Comment deferred  per pt  -RE            User Key  (r) = Recorded By, (t) = Taken By, (c) = Cosigned By    Initials Name Provider Type    RE Kelsy Mccain, FABIAN Occupational Therapist                        Therapy Education  Education Details: Encouraged pt to get a bra fitting. Recommended scar massageto firm area of breast. Discussed possible  cost of bioimpedance and pt would like to defer that for now. We will assist her in finding out if she would have to pay out of pocket.  Given: Symptoms/condition management, Edema management  Program: Reinforced  How Provided: Verbal, Written  Provided to: Patient  Level of Understanding: Teach back education performed, Verbalized  33078 - OT Self Care/Mgmt Minutes: 25         OT Goals     Row Name 01/09/23 1800          OT Short Term Goals    STG 1 Patient will demonstrate proper awareness of “What is Lymphedema?” and “Healthy Habits” for improved prevention, management, care of symptoms and ease of transition to self-care of condition.  -RE     STG 1 Progress Met  -RE     STG 2 Patient independent and compliant with self-massage techniques  for improved lymphatic drainage, decreased edema/symptoms, decreased risk of infection and improved transition to self-care of condition.  -RE     STG 2 Progress Met  -RE        Long Term Goals    LTG Date to Achieve 04/09/23  -RE     LTG 1 Patient independent and compliant with self-care techniques for self-management of condition including use of her compression bra and swell spot as indicated.  -RE     LTG 1 Progress Ongoing  -RE     LTG 1 Progress Comments gave information on mastectomy boutiques  -RE     LTG 2 Patient to report decreased breast swelling per her perception.  -RE     LTG 2 Progress Met  -RE     LTG 3 Patient to demonstrate decrease of breast edema to 1+.  -RE     LTG 3 Progress Met  -RE     LTG 4 Patient will participate in bioimpedance scans every 3 months as a method of early detection of lymphedema to allow for early intervention.  -RE     LTG 4 Progress Ongoing  -RE     LTG 5 Patient's bioimpedance score to remain below 6.5 for decreased risk of stage II lymphedema.  -RE     LTG 5 Progress Ongoing  -RE     LTG 6 Pt will demonstrate understanding of use of compression sleeve for edema prevention, exercise and air travel.  -RE     LTG 6 Progress Ongoing   -RE        Time Calculation    OT Goal Re-Cert Due Date 04/09/23  -RE           User Key  (r) = Recorded By, (t) = Taken By, (c) = Cosigned By    Initials Name Provider Type    Kelsy Cedeno OTR Occupational Therapist                 OT Assessment/Plan     Row Name 01/09/23 1814          OT Assessment    Impairments Edema;Impaired lymphatic circulation  -RE     Assessment Comments Little to no breast edema noted but scar tissue is firm. She plans to get a bra fitting soon. No UE edema noted.  -RE     Please refer to paper survey for additional self-reported information No  -RE     OT Diagnosis breast edema and at risk for arm lymphedema  -RE     OT Rehab Potential Good  -RE     Patient/caregiver participated in establishment of treatment plan and goals Yes  -RE     Patient would benefit from skilled therapy intervention Yes  -RE        OT Plan    OT Frequency Other (comment)  -RE     Predicted Duration of Therapy Intervention (OT) bioimpedance every 3 months  -RE     Planned CPT's? OT SELF CARE/MGMT/TRAIN 15 MIN: 83500;OT BIS XTRACELL FLUID ANALYSIS: 99163  -RE     Planned Therapy Interventions (Optional Details) patient/family education;other (see comments)  bioimpedance  -RE     OT Plan Comments follow up for bioimpedance as able based on pt cost  -RE           User Key  (r) = Recorded By, (t) = Taken By, (c) = Cosigned By    Initials Name Provider Type    Kelsy Cedeno OTR Occupational Therapist                          Time Calculation:   OT Start Time: 1405  OT Stop Time: 1430  OT Time Calculation (min): 25 min  Total Timed Code Minutes- OT: 25 minute(s)  Timed Charges  40482 - OT Self Care/Mgmt Minutes: 25  Total Minutes  Timed Charges Total Minutes: 25   Total Minutes: 25     Therapy Charges for Today     Code Description Service Date Service Provider Modifiers Qty    80998671744 HC OT SELF CARE/MGMT/TRAIN EA 15 MIN 1/9/2023 Kelsy Mccain OTR GO 2                    FABIAN Patel  1/9/2023

## 2023-01-11 ENCOUNTER — OFFICE VISIT (OUTPATIENT)
Dept: ONCOLOGY | Facility: CLINIC | Age: 63
End: 2023-01-11
Payer: COMMERCIAL

## 2023-01-11 VITALS
WEIGHT: 251.7 LBS | SYSTOLIC BLOOD PRESSURE: 131 MMHG | DIASTOLIC BLOOD PRESSURE: 80 MMHG | RESPIRATION RATE: 18 BRPM | HEIGHT: 66 IN | TEMPERATURE: 96.8 F | BODY MASS INDEX: 40.45 KG/M2 | HEART RATE: 56 BPM | OXYGEN SATURATION: 98 %

## 2023-01-11 DIAGNOSIS — C50.812 MALIGNANT NEOPLASM OF OVERLAPPING SITES OF LEFT BREAST IN FEMALE, ESTROGEN RECEPTOR POSITIVE: ICD-10-CM

## 2023-01-11 DIAGNOSIS — D05.02 LOBULAR CARCINOMA IN SITU (LCIS) OF LEFT BREAST: ICD-10-CM

## 2023-01-11 DIAGNOSIS — Z79.811 USE OF AROMATASE INHIBITORS: ICD-10-CM

## 2023-01-11 DIAGNOSIS — R91.8 PULMONARY NODULES: ICD-10-CM

## 2023-01-11 DIAGNOSIS — Z17.0 MALIGNANT NEOPLASM OF OVERLAPPING SITES OF LEFT BREAST IN FEMALE, ESTROGEN RECEPTOR POSITIVE: ICD-10-CM

## 2023-01-11 DIAGNOSIS — E04.1 THYROID NODULE: Primary | ICD-10-CM

## 2023-01-11 DIAGNOSIS — J35.1 ENLARGED TONSILS: ICD-10-CM

## 2023-01-11 PROCEDURE — 99214 OFFICE O/P EST MOD 30 MIN: CPT | Performed by: INTERNAL MEDICINE

## 2023-01-11 RX ORDER — FLUTICASONE PROPIONATE 50 MCG
2 SPRAY, SUSPENSION (ML) NASAL DAILY
COMMUNITY
Start: 2023-01-03

## 2023-01-11 NOTE — PROGRESS NOTES
Subjective   Kirsten Lima is a 62 y.o. female. Referred by  for LCIS     History of Present Illness   Ms. Lima is a 61-year-old -American lady who presents with a screen detected abnormality of the left breast.    6/2/2020-screening mammogram  Scattered areas of fibroglandular density.  There are small asymmetry seen in both breasts.  No significant change from prior exams.  Stable punctate and spherical lucent centered calcifications with diffuse distribution in both breasts.  There is a new focal asymmetry measuring 12 mm in the central region of the left breast.    6/24/2020-left diagnostic mammogram  Focal asymmetry in the left breast does not persist.  Ultrasound-no sonographic correlate.  3 oval intraductal masses with partially defined margins measuring 5 mm to 9 mm in the anterior one third subareolar region of the left breast.  Patient reported history of intermittent clear nipple discharge for about 10 years.  These were considered suspicious and ultrasound-guided biopsy was recommended.    6/29/2020-left breast ultrasound-guided biopsy x2  1.left subareolar mass measuring 5 x 5 x 6 mm-benign sclerosing intraductal papilloma with calcifications.  2.left subareolar mass-benign sclerosing intraductal papilloma with usual ductal hyperplasia and microcalcifications.    She was evaluated by Dr. Zambrano on 8/7/2020 and recommended to undergo excisional biopsy of both intraductal papillomas.    2/22/2021-left breast needle localized excisional biopsy-pathology was consistent with lobular carcinoma in situ.  Multiple sclerosed intraductal papillomas with focal calcifications which were completely excised.  Background breast parenchyma showed usual ductal hyperplasia and calcifications with sclerosing adenosis.    She has been referred to medical oncology to discuss risk reduction given findings of lobular carcinoma in situ.    Patient reports chronic nipple discharge from the left.  She also  had significant issues with the breast skin due to hidradenitis and recurrent skin abscesses.  She had a past history of benign breast biopsy , unable to recall the date.  Denies any family history of breast or ovarian cancer.    6/11/2021-bilateral screening mammogram-benign.    12/10/2021-bilateral breast MRI  Right breast-no suspicious findings.    Left breast-  Postsurgical changes in the anterior left breast.  At 5:00, 3.4 cm posterior to the nipple there is a 1.8 cm linear branching non-mass enhancement which is suspicious.  Abnormal enhancement in the left nipple with approximately 1.2 x 1.7 x 1 cm mass involving the nipple itself and extending slightly deeper into the subareolar breast.  This is suspicious.    No extramammary findings    Recommendations  1.suspicious 1.7 cm enhancing left nipple mass, surgical excision recommended as this is not amenable to imaging guided biopsy.  2.suspicious 1.8 cm linear branching non-mass enhancement at 5:00 in the left breast, MR guided biopsy recommended.    12/22/2021-left breast MRI guided biopsy of the 5:00.  A.extensive lobular carcinoma in situ  B.no invasive carcinoma identified by routine or immunostaining  C.associated intraductal papilloma with microcalcifications, ductal cyst wall and fibrocystic change.    Patient was prescribed tamoxifen for risk reduction which she initially did not take and subsequently this was changed to anastrozole at her visit in September which she only started taking in December 2021.    1/12/2022-she was evaluated by Dr. Zambrano and scheduled for left breast needle localized excisional biopsy and left nipple mass excision.    2/1/2022-left breast needle localized excisional biopsy left nipple mass excision  1.left breast lumpectomy-invasive lobular carcinoma, lobular carcinoma in situ and deep typical lobular hyperplasia.  A.invasive lobular carcinoma  Measures 4 mm  Grade 1  The focus is located amongst the area of lobular  carcinoma in situ  Extensive lobular carcinoma in situ and atypical lobular hyperplasia  Fibroadenoma with calcification, sclerosing adenosis, apocrine metaplasia, clustered cysts in area consistent with radial scar and fat necrosis    2.left nipple excision  Simple cyst  Apocrine metaplasia  Intraductal papilloma  Atypical lobular hyperplasia    The lobular carcinoma is ER +80% strong, OH 3% moderate, HER-2 negative, Ki-67 2%    Axillary lymph nodes not evaluated    Case was discussed in multidisciplinary conference and decided to omit axillary staging and proceed with radiation and continue Arimidex.    11/8/2022-low-dose CT of the chest with multiple pulmonary nodules bilateral measuring up to 7 mm.  There is also mediastinal lymph node which measures 1.7 cm and right paratracheal lymph node which measures 1 cm.    11/18/2022-PET/CT  1.multiple bilateral pulmonary nodules which are below PET resolution.  Hypermetabolic mediastinal hilar and right axillary lymphadenopathy.  Right axilla lymph node is 1.2 cm with an SUV of 5.1.  Precavernous node 1.5 cm with an SUV of 5.7.  Asymmetric hypermetabolic thickening of the right palatine tonsil.  Direct visualization recommended.  Focal intense uptake in the rectum and anus is nonspecific.  Hypodense right thyroid lesion demonstrates mild uptake.  Asymmetric soft tissue thickening involving the left breast.    12/15/2022-bronchoscopy and biopsy of the nodes was performed.  Pathology was noted to be benign.  She had a follow-up with pulmonary and she was recommended to have a repeat CT in 3 months.    12/8/2022-thyroid ultrasound performed with multiple bilateral thyroid nodules and none of them meeting criteria for recommendation for biopsy or FNA.  One of them was previously biopsied and benign.    1/3/2023-she had an appointment with Dr. Jac Pop for the asymmetric soft tissue thickening involving the right tonsil.  No significant pathology noted.    Interval  history  Ms. Lima presents to the clinic today for follow-up.  She reports no new complaints.  She continues on anastrozole and tolerating that well.  Still smoking about 6 cigarettes per day.    The following portions of the patient's history were reviewed and updated as appropriate: allergies, current medications, past family history, past medical history, past social history, past surgical history and problem list.    Past Medical History:   Diagnosis Date   • Acute cystitis    • Allergic rhinitis    • Breast cancer (HCC)    • Breast cyst    • Colon polyp May 6, 2022    7   • Cyst of left nipple    • Depression    • Diverticulosis May 6, 2022   • Dry skin    • Dysuria    • Hemorrhoid    • Hidradenitis    • History of bronchitis    • Hyperlipidemia    • Hypertension    • Incontinence in female     wears pads   • Insomnia    • Low back pain 2020   • Nonspecific abnormal electrocardiogram (ECG) (EKG)    • Obesity    • Overactive bladder    • Pregnancy     G-2, P-0   • Sleep apnea    • UTI (urinary tract infection) 02/2021   • Vitamin B12 deficiency    • Vitamin D insufficiency    • Wound, breast     LEFT BREAST; PT INSTRUCTED TO NOTIFY DR ZMABRANO PRIOR TO SURGERY        Past Surgical History:   Procedure Laterality Date   • AXILLARY HIDRADENITIS EXCISION Bilateral    • BREAST BIOPSY Left 02/22/2021    Procedure: Left breast needle-localized excisional biopsy (tophat clip) and left breast ultrasound-guided excisional biopsy (hydromark clip);  Surgeon: Debora Zambrano MD;  Location: [facility];  Service: General;  Laterality: Left;   • BREAST BIOPSY Left 02/01/2022    Procedure: Left breast needle-localized excisional biopsy, Left nipple mass excision;  Surgeon: Debora Zambrano MD;  Location: [facility];  Service: General;  Laterality: Left;   • BRONCHOSCOPY N/A 12/15/2022    Procedure: BRONCHOSCOPY WITH ENDOBRONCHIAL ULTRASOUND WITH FNA;  Surgeon: Norberto Link MD;  Location: [facility]  ENDOSCOPY;  Service: Pulmonary;  Laterality: N/A;  LYMPHADENOPATHY   • CARDIAC CATHETERIZATION     • COLONOSCOPY     • COLONOSCOPY N/A 2022    Procedure: COLONOSCOPY;  Surgeon: Mirtha Davis MD;  Location: [facility] MAIN OR;  Service: Gastroenterology;  Laterality: N/A;  diverticulosis, polyps   • HYSTERECTOMY  2006    LAVH   • SUBTOTAL HYSTERECTOMY          Family History   Problem Relation Age of Onset   • Hypertension Mother    • COPD Father             • Heart failure Father    • Hypertension Father    • Hyperlipidemia Father    • Vision loss Father    • Pancreatic cancer Brother         1 brother  from pancreatic cancer   • Alcohol abuse Brother             • Other Sister    • Alcohol abuse Sister             • Liver disease Sister    • Alcohol abuse Brother             • Cancer Brother         Liver   • Malig Hyperthermia Neg Hx         Social History     Socioeconomic History   • Marital status: Single   • Number of children: 0   Tobacco Use   • Smoking status: Every Day     Packs/day: 0.50     Years: 42.00     Pack years: 21.00     Types: Cigarettes     Start date: 1979   • Smokeless tobacco: Never   Vaping Use   • Vaping Use: Never used   Substance and Sexual Activity   • Alcohol use: Yes     Comment: 1-2 drinks a month   • Drug use: Never     Comment: marijuana in her twenties   • Sexual activity: Not Currently     Partners: Male     Birth control/protection: Abstinence        OB History    No obstetric history on file.      Age at menarche-13  Age at menopause- she is status post hysterectomy.  Still has both ovaries  She does not have any children  Total period of oral contraceptive pill use 8 years      No Known Allergies         Review of Systems   Constitutional: Negative.    HENT: Negative.    Eyes: Negative.    Respiratory: Negative.    Cardiovascular: Negative.    Gastrointestinal: Negative.    Genitourinary: Positive for amenorrhea.  "  Allergic/Immunologic: Negative.    Neurological: Negative.    Hematological: Negative.    Psychiatric/Behavioral: Positive for sleep disturbance.     Review of systems as mentioned in the HPI     Objective   Blood pressure 131/80, pulse 56, temperature 96.8 °F (36 °C), temperature source Temporal, resp. rate 18, height 167.6 cm (65.98\"), weight 114 kg (251 lb 11.2 oz), SpO2 98 %, not currently breastfeeding.   Physical Exam  Vitals reviewed.   Constitutional:       Appearance: Normal appearance. She is obese.   HENT:      Head: Normocephalic and atraumatic.      Right Ear: External ear normal.      Left Ear: External ear normal.      Nose: Nose normal. No congestion or rhinorrhea.      Mouth/Throat:      Mouth: Mucous membranes are moist.      Pharynx: Oropharynx is clear. No oropharyngeal exudate or posterior oropharyngeal erythema.   Eyes:      General:         Right eye: No discharge.         Left eye: No discharge.      Conjunctiva/sclera: Conjunctivae normal.      Pupils: Pupils are equal, round, and reactive to light.   Cardiovascular:      Rate and Rhythm: Normal rate.   Pulmonary:      Effort: Pulmonary effort is normal.      Breath sounds: Normal breath sounds.   Abdominal:      General: Abdomen is flat. Bowel sounds are normal.      Palpations: Abdomen is soft.   Musculoskeletal:         General: No swelling, tenderness or deformity. Normal range of motion.      Cervical back: Normal range of motion.   Skin:     General: Skin is warm.      Coloration: Skin is not jaundiced or pale.   Neurological:      General: No focal deficit present.      Mental Status: She is alert and oriented to person, place, and time.      Cranial Nerves: No cranial nerve deficit.      Sensory: No sensory deficit.   Psychiatric:         Mood and Affect: Mood normal.         Behavior: Behavior normal.         Thought Content: Thought content normal.         Judgment: Judgment normal.       Breast Exam: Right breast appears normal " inspection no palpable abnormalities of the right breast.  Left breast on inspection there is a periareolar incision which is well-healed and also another periareolar incision which is well-healed.  The left breast is hyperpigmented, thickened secondary to radiation changes.  There is tenderness to palpation in the left breast.    I have reexamined the patient and the results are consistent with the previously documented exam. Irena Dong MD       Admission on 12/15/2022, Discharged on 12/15/2022   Component Date Value Ref Range Status   • Case Report 12/15/2022    Final                    Value:Medical Cytology Report                           Case: [id number]                                 Authorizing Provider:  Norberto Link,   Collected:           12/15/2022 10:05 AM                                 MD                                                                           Ordering Location:     [facility]  Received:            12/15/2022 11:19 AM                                 END[facility]                                                                  Pathologist:           Wendy Castillo MD                                                    Specimens:   1) - Mediastinum, #10R FNA                                                                          2) - Mediastinum, #4R FNA                                                                 • Final Diagnosis 12/15/2022    Final                    Value:This result contains rich text formatting which cannot be displayed here.   • Specimen Adequacy 12/15/2022 Other, see comment   Final   • Gross Description 12/15/2022    Final                    Value:This result contains rich text formatting which cannot be displayed here.   • Microscopic Description 12/15/2022    Final                    Value:This result contains rich text formatting which cannot be displayed here.   • Special Stains 12/15/2022    Final                     Value:This result contains rich text formatting which cannot be displayed here.   • Flow Cytometry Summary 12/15/2022    Final                    Value:This result contains rich text formatting which cannot be displayed here.        No radiology results for the last 30 days.     MRI of the breast 12/9/2022-images independently reviewed and interpreted by me in detail summarized in the HPI.    Assessment & Plan       62-year-old lady with a previous hysterectomy with uncertain menopausal status presents for management of lobular carcinoma in situ.     *Lobular carcinoma in situ  · 2/22/2021-left breast needle localized excisional biopsy shows lobular carcinoma in situ with multiple intraductal papillomas.  · She was offered tamoxifen 5 mg daily for risk reduction however did not start taking that.  · She was seen in September 2021 and treatment was changed to anastrozole as she was concerned about the risk of blood clots with tamoxifen.  · She finally started taking anastrozole in December 2021.  · Had an abnormal MRI in December 2021 requiring a left breast biopsy and left breast excision.  · Diagnosed with invasive lobular carcinoma  · Continues on anastrozole  · MRI 12/9/2022 reviewed and benign.  · No evidence of recurrent disease    *Invasive lobular carcinoma of the left breast  · Invasive lobular carcinoma small focus diagnosed with an extensive areas of lobular carcinoma in situ  · pT1 a Nx M0  · Status post excision of the left breast mass  · Completed adjuvant radiation to the left breast in April 2022  · Continues on anastrozole  · PET/CT shows right axillary lymphadenopathy  · MRI 12/9/2022 without any evidence of axillary lymphadenopathy or new abnormalities in either breast.  · No clear evidence of recurrent disease.  · 12/15/2022-bronchoscopy and biopsy with benign lymph nodes.  · Repeat CT in 3 months    *Multiple pulmonary nodules and mediastinal and hilar lymphadenopathy  · CT of the chest on  11/8/2022 shows multiple bilateral pulmonary nodules along with mediastinal hilar lymphadenopathy which is greater than 1 cm.  · PET/CT 11/18/2022 shows increased uptake in the mediastinal and the hilar lymph nodes.  · Bronchoscopy and biopsy 12/15/2022-pathology benign  · Repeat CT chest in 3 months    *Asymmetric uptake of the palatine tonsil  · ENT appointment with Dr. Jac Pop on 1/3/2023  · No concern for malignancy.  · Continue follow-up with ENT    *Thyroid abnormalities on the PET/CT  · Thyroid ultrasound 12/8/2022 without any concerning findings     *Lifestyle changes  · She has not been exercising or modified her diet  · She has been going to physical therapy for back pain  · BMI 40.6    *Obesity  · BMI 40.6  · She has met with a dietitian before  · Still unable to implement dietary changes successfully.     *Tobacco cessation  · Referral made to Prerna Lewis to help with smoking cessation  · Encouraged her to use the nicotine patches  · Decreased to 5 cigarettes/day  · Trying to quit completely.     *Surveillance  · Bilateral breast MRI December 9, 2022 benign  · Screening mammogram due August 2023  · Screening lung CT  11/8/2022 with pulmonary nodules and mediastinal lymphadenopathy     *Hypertension  · Blood pressure stable  · Continue current medications       *Hyperlipidemia  · Continue Crestor  · Unchanged     *Bladder incontinence  · Continue oxybutynin  · Continue follow-up with Dr. Cantu her gynecologist     *Follow-up-3 months with repeat CT of the chest prior to follow-up

## 2023-01-12 ENCOUNTER — TELEPHONE (OUTPATIENT)
Dept: SURGERY | Facility: CLINIC | Age: 63
End: 2023-01-12
Payer: COMMERCIAL

## 2023-01-12 NOTE — TELEPHONE ENCOUNTER
Spoke with pt about normal MRI results. Pt to keep follow up appt on 2/6/2023 @ 1pm with YAN Schrader.  Pt confirmed.    NL

## 2023-01-23 ENCOUNTER — APPOINTMENT (OUTPATIENT)
Dept: MRI IMAGING | Facility: HOSPITAL | Age: 63
End: 2023-01-23

## 2023-02-03 NOTE — PROGRESS NOTES
[facility]     Referring Provider: No ref. provider found     Chief complaint: Routine follow up breast cancer    HPI:   8/7/20:  Ms. Kirsten Lima is a 61 yo woman, seen at the request of Dr. Tad Lowe, for a new diagnosis of left breast intraductal papillomas. These were initially detected as an imaging abnormality on routine screening. Her work-up is detailed in the breast history section below. Despite these being incidentally found, the patient reports that she has had intermittent, clear, left nipple discharge for years. 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 She denies any associated breast lumps or pain. She does report chronic issues with her breast skin due to hidradenitis and recurrent skin abscesses, however she has not had any acute problems lately. She has a past history of a benign left breast stereotactic biopsy in 2012. She denies any family history of breast or ovarian cancer.      1/7/21:  At her last visit, I recommended excision of both of the left breast papillomas due the associated nipple discharge. She wanted to think about it and ultimately decided to hold off on surgery. She underwent follow-up ultrasound prior to her appointment, which showed stable left breast intraductal masses (see report details below). She has not tried to elicit any nipple discharge since the last visit and has not noticed any spontaneously.     3/10/21:  She underwent left breast excisional biopsy x 2 on 2/22/21, which showed LCIS. See surgery & pathology details in breast history section below. She has been recovering well and has no complaints.      6/28/21, Visit with YAN Dill :  She returns today for follow up with no breast complaints   In 3/2021 she met with Dr Dong and discussed starting tamoxifen, referral to nutritionist and smoking cessation were made.   Due to risk for blood clots she has reservations about taking tamoxifen. She will follow up with Dr Dong in 9/21 to discuss again.   Screening mammogram with tomosynthesis was completed 6/11/2021 at [facility] (see full report below)    1/12/22:  She saw Regina Gilbert in June with a normal screening mammogram. She saw Dr. Dong who initially prescribed low-dose tamoxifen, however the patient never started taking it because she was concerned about blood clots. Dr. Dong then prescribed anastrozole in September, however the patient just started taking this 3 weeks ago.  She underwent her first screening MRI in December, which showed an area of non-mass enhancement in the left lower outer breast and a left nipple mass. She subsequently underwent an MR biopsy of the non-mass enhancement and this showed extensive LCIS (see imaging and pathology report details below).    2/16/22:  She underwent left breast excisional biopsy and left nipple mass excision on 2/1/22. The excisional biopsy showed an incidental focus of low-grade invasive lobular carcinoma measuring 4 mm with negative margins (see report details below). I have already discussed her case in tumor board and the consensus was that axillary staging is not necessary. She has been recovering well from surgery.    8/24/22, Interval History:  She returns today for scheduled follow-up. She completed radiation on 4/5/22 and tolerated this well. She remains on anastrozole and is still tolerating this well. She underwent MMG prior to her appointment which was benign.  She called the office about a month ago complaining of some left breast pain and nodular areas under her left nipple. She began wearing a sports bra day and night and the pain has resolved. She also noticed that the lower part of her left breast looked less swollen after wearing the bra.     2/6/2023 Interval History  Patient returns the office today for routine follow-up.  She last saw   Iker on 2023 and no changes were made to treatment plan.  Currently on anastrozole and tolerating well.    Oncology/Hematology History Overview Note   19, Screening MMG with [facility] ([facility]):  Scattered areas of fibroglandular density.  1. There are small asymmetries seen in both breasts. The parenchyma includes stable nodular asymmetries. No suspicious masses, suspicious microcalcifications or areas of architectural distortion are identified. There has been no significant change from the prior exams.  2. There are stable punctate and spherical lucent-centered calcifications with diffuse/scattered distribution seen in both breast.  BI-RADS 2: Benign.     20, Screening MMG with [facility] ([facility]):  Scattered areas of fibroglandular density.  1. There are small asymmetries seen in both breasts. There has been no significant change from the prior exams.  2. There are stable punctate and spherical lucent-centered calcifications with diffuse distribution seen in both breasts.  3. There is a new small focal asymmetry measuring 12 mm seen in the central region of the left breast. This is best visualized on tomosynthesis MLO view slice # 78. This is best seen on the MLO View.  BI-RADS 0: Incomplete.     20, Left Diagnostic MMG with [facility] & Left Breast US ([facility]):  MM. On the present examination, focal asymmetry in the left breast, central does not persist. The asymmetry noted on the recent screening mammogram resolves into stroma on additional views.  US:  1. There is no sonographic correlate. There is no evidence of any solid mass or abnormal cystic elements.  2. There are three oval intraductal masses with partially defined margins measuring 5 mm to 9 mm seen in the anterior one-third subareolar region of the left breast. The patient reports history of intermittent clear left nipple discharge for the past 10 years. These three structures are adjacent and would be included within the same biopsy  site.  3. There is an oval solid mass with partially defined margins measuring 6 x 5 x 5 mm seen in the anterior one-third subareolar region of the left breast. This is located 2 to 3 cm away from the above described masses.   BI-RADS 4: Suspicious      6/29/20, Left Breast, US-Guided Biopsy x 2 ([facility])  1. Left Subareolar mass (5 x 5 x 6 cm), Ultrasound guided biopsy:   Benign sclerosing intraductal papilloma with calcifications.  -Tophat clip. Concordant.  2. Left Subareolar mass, ultrasound guided biopsy:   Benign sclerosing intraductal papilloma with usual duct hyperplasia and microcalcifications.   -Hydromark clip. Concordant.     12/28/20, Left Breast US ([facility]):  There are two oval masses and biopsy clips with circumscribed margins seen in the sub-areolar region of the left breast. There are no significant changes from the prior exam(s). This finding represents biopsy proven benign breast disease. These measure less than 1 cm.  BI-RADS  2: Benign.     02/22/21, Left breast needle-localized excisional biopsy and left breast ultrasound-guided excisional biopsy:  1. Left Breast, Needle-Localized Excisional Biopsy (13 grams):               A. LOBULAR CARCINOMA IN SITU (LCIS).               B. Multiple sclerosed intraductal papillomas with focal calcifications (completely excised).               C. Clips and associated biopsy site changes are present and adjacent to papillomas.               D. Background breast parenchyma with clusters of apocrine cysts, usual ductal hyperplasia, and       calcifications associated with sclerosing adenosis.    6/11/2021, Screening MMG with [facility] ([facility]):  Scattered areas of fibroglandular density. There is a new postsurgical scar seen subareolar region of the left breast.  There are no suspicious masses, calcifications, or areas of architectural distortion. In the right breast, no suspicious masses, significant calcifications or other abnormalities are seen.  BI-RADS 2:  Benign.     Malignant neoplasm of overlapping sites of left breast in female, estrogen receptor positive (HCC)   12/9/2021 Initial Diagnosis    Malignant neoplasm of overlapping sites of left breast in female, estrogen receptor positive (HCC)     12/10/2021 Imaging    Bilateral Breast MRI ( [facility]):  RIGHT BREAST:    No suspicious enhancing mass or area of non-mass enhancement is identified. The visualized axilla is within normal limits.    LEFT BREAST:    There are post surgical changes in the anterior left breast. At 5:00 in the anterior left breast, 3.4 cm posterior to the nipple,  there is a 1.8 cm AP dimension somewhat linear branching nonmass enhancement, which is suspicious.   There is abnormal asymmetric enhancement within the left nipple with an approximately 1.2 cm AP dimension, 1.7 cm transverse dimension, 1.0 cm craniocaudal dimension mass involving the nipple itself and extending slightly deeper into the subareolar breast, which is associated with rapid initial and washout delayed phase enhancement on kinetic analysis. This is suspicious.  No suspicious enhancement is identified in the left chest wall. The visualized axilla is within normal limits.    EXTRAMAMMARY FINDINGS:   There are no abnormally enlarged internal mammary chain lymph nodes on either side.     BI-RADS 4: Suspicious.     12/22/2021 Biopsy    Left Breast, MR-Guided Biopsy ( [facility]):    1. Left Breast at 5 o'clock (not for calcifications) MRI-Guided Core Biopsy:                A.  Extensive lobular carcinoma in situ (LCIS):                            1.  LCIS measures up to 7 mm in single greatest dimension focally involving 7 of 8                      representative cores.                2.  Low grade nuclear features without necrosis.  B.  No invasive carcinoma identified by routine and/or immunostaining.  C.  Associated intraductal papilloma with microcalcification, ductal cyst wall and fibrocystic change noted.      2/1/2022 Surgery     Left breast needle-localized excisional biopsy and left nipple mass excision:    1. Breast, Left, Lumpectomy: Invasive lobular carcinoma, lobular carcinoma in situ and atypical lobular hyperplasia.               A. Invasive lobular carcinoma.                            1. The invasive lobular carcinoma measures 4 mm on the slide.                            2. The invasive lobular carcinoma is Addison grade I (tubular grade 3, nuclear grade 1, mitotic grade 1).                            3. The foci is located amongst an area of lobular carcinoma in situ and near the biopsy cavity.                            4. Biopsy site and clip identified (barbell shaped clip).                            5. The invasive lobular carcinoma comes within 1.3 mm of the medial margin, 7.5 mm of the posterior margin, 8.8 mm of the anterior margin, 12.5 mm of the lateral margin and 15 mm of the superior and inferior margins.                            6. Microcalcifications are not associated with the invasive tumor.               B. Extensive lobular carcinoma in situ and atypical lobular hyperplasia.  C. Fibroadenoma with calcification, sclerosing adenosis, apocrine metaplasia, clustered cysts, area consistent with radial scar and fat necrosis.     2. Breast, Left, Nipple, Excision:  Breast tissue with               A. Simple cysts.               B. Calcifications in non-neoplastic tissue.               C. Apocrine metaplasia.               D. Intraductal papilloma with sclerosis.               E. Atypical lobular hyperplasia.    ER+ (80%, strong)  OR+ (3%, moderate)  Her2 negative (IHC 1+)  Ki-67 2%     3/9/2022 - 4/5/2022 Radiation    Radiation OncologyTreatment Course:  Kirsten Lima received 4990 cGy in 20 fractions to LEFT breast.     4/6/2022 -  Hormonal Therapy    Anastrozole (Arimidex)     8/15/2022 Imaging    Screening MMG with [facility] ([facility]):  Scattered areas of fibroglandular density.  There is a stable post-surgical  scar with associated skin lesion and trabecular thickening seen in the central region of the left breast. Post-surgical scar is in an area of prior lumpectomy. Findings are consistent with post-surgical and post-radiation therapy changes.  In the right breast, no suspicious masses, significant calcifications or other abnormalities are seen.  BI-RADS 2: Benign.     12/9/2022 Imaging    Breast MRI at [facility]  FINDINGS:Scattered fibroglandular tissue seen is throughout both  breasts. Mild background parenchymal enhancement of both breasts is  noted. Postsurgical change in the left breast is noted. Otherwise, no  areas of abnormal morphology are seen in either breast. No areas of  abnormal enhancement are seen in either breast. I see no evidence for  abnormal skin, nipple or chest wall enhancement of either breast and  there is no evidence for axillary or internal mammary chain adenopathy.     IMPRESSION:  There are no findings suspicious for malignancy in either  breast. Routine follow-up imaging is recommended.     BI-RADS category 2         Review of Systems:  See interval history.       Medications:    Current Outpatient Medications:   •  amLODIPine (NORVASC) 10 MG tablet, TAKE 1 TABLET DAILY, Disp: 90 tablet, Rfl: 1  •  anastrozole (ARIMIDEX) 1 MG tablet, TAKE 1 TABLET BY MOUTH EVERY DAY, Disp: 90 tablet, Rfl: 1  •  cetirizine (ZyrTEC) 10 MG tablet, Take 1 tablet by mouth daily., Disp: , Rfl:   •  Cholecalciferol (VITAMIN D) 2000 units capsule, Take 2,000 Units by mouth Daily., Disp: , Rfl:   •  citalopram (CeleXA) 10 MG tablet, TAKE 1 TABLET BY MOUTH EVERY DAY, Disp: 90 tablet, Rfl: 1  •  clotrimazole-betamethasone (LOTRISONE) 1-0.05 % lotion, Apply  topically to the appropriate area as directed 2 (Two) Times a Day. (Patient taking differently: Apply 1 application topically to the appropriate area as directed 2 (Two) Times a Day As Needed.), Disp: 30 mL, Rfl: 1  •  Efinaconazole 10 % solution,  Apply 1 drop topically Daily. (Patient taking differently: Apply 1 drop topically Daily As Needed.), Disp: 8 mL, Rfl: 3  •  fluticasone (FLONASE) 50 MCG/ACT nasal spray, 2 sprays by Each Nare route Daily., Disp: , Rfl:   •  lisinopril-hydrochlorothiazide (PRINZIDE,ZESTORETIC) 20-12.5 MG per tablet, Take 2 tablets by mouth Daily., Disp: 180 tablet, Rfl: 3  •  metoprolol succinate XL (TOPROL-XL) 200 MG 24 hr tablet, TAKE 1 TABLET DAILY, Disp: 90 tablet, Rfl: 1  •  nystatin-triamcinolone (MYCOLOG II) 943243-1.1 UNIT/GM-% cream, SMALL AMT CREAM APPLY A THIN ALYER TO EXTERNAL VAGINAL AREA FOR ITCHING TWICE A DAY, Disp: , Rfl:   •  rosuvastatin (CRESTOR) 5 MG tablet, Take 1 tablet by mouth Daily., Disp: 90 tablet, Rfl: 3  •  traZODone (DESYREL) 50 MG tablet, TAKE 1 TABLET EVERY NIGHT, Disp: 90 tablet, Rfl: 3  •  Vibegron (Gemtesa) 75 MG tablet, Take 1 tablet by mouth Daily., Disp: 90 tablet, Rfl: 3      Allergies:  No Known Allergies      Family History   Problem Relation Age of Onset   • Hypertension Mother    • COPD Father             • Heart failure Father    • Hypertension Father    • Hyperlipidemia Father    • Vision loss Father    • Pancreatic cancer Brother         1 brother  from pancreatic cancer   • Alcohol abuse Brother             • Other Sister    • Alcohol abuse Sister             • Liver disease Sister    • Alcohol abuse Brother             • Cancer Brother         Liver   • Malig Hyperthermia Neg Hx        PHYSICAL EXAMINATION:   Vitals:    23 1246   BP: 148/80   Pulse: 72   SpO2: 96%     ECOG 0 - Asymptomatic  General: NAD, well appearing, obese  Psych: a&o x 3, normal mood and affect  Eyes: EOMI, no scleral icterus  ENMT: neck supple without masses or thyromegaly, mucus membranes moist  MSK: normal gait, normal ROM in bilateral shoulders  Lymph nodes: Bilateral axillary scar; no cervical, supraclavicular or axillary lymphadenopathy  Breast: very large size,  pendulous  Right: No visible abnormalities on inspection while seated, with arms raised or hands on hips. No masses or nipple abnormalities. 12:00 large area of darker skin that pt states has been there for months.   Left: On inspection, there is hyperpigmentation and the left breast is smaller and uplifted vs the right. Well-healed medial periareolar and lower outer periareolar scars. There are some expected postsurgical changes in the periareolar region, but no discrete masses. No nipple abnormalities. There is mild edema of the inferior breast.      I have independently reviewed her imaging and here are my findings:   Expected left breast postsurgical and postradiation changes. No suspicious findings in either breast on mammogram.      Assessment:  62 y.o. F with a new diagnosis of left breast cancer: Low grade, invasive lobular carcinoma, ER/NM positive, HER-2 negative. This was diagnosed after excisional biopsy for LCIS on 2/1/22, pT1aNx. Case was discussed at multidisciplinary tumor board and it was decided to omit axillary staging. She completed radiation on 4/5/22. She is currently on anastrozole.    -She has a past history of left breast LCIS, which was diagnosed after left breast excisional biopsy x2 on 2/22/21 for intraductal papillomas associated with pathologic nipple discharge.     -Because of her increased lifetime risk of breast cancer (47.1%, TCv8, calculated 3/10/21) and history of LCIS, she was in high risk screening and on anastrozole since 1/2022. Since she was already in high risk screening, we will plan to continue this in the future.    -She has mild left breast lymphedema.    Plan:  -Lymphedema clinic PRN  -Continue follow-up with Dr. Dong.  -mammo in aug followed by exam - [facility] [facility]   -She was instructed to call sooner with any questions, concerns or changes on BSE.  -follow up with derm for skin rash on right breast   Terry Liao  APRN      CC:  No ref. provider found  MD Emma Thurston MD

## 2023-02-06 ENCOUNTER — OFFICE VISIT (OUTPATIENT)
Dept: SURGERY | Facility: CLINIC | Age: 63
End: 2023-02-06
Payer: COMMERCIAL

## 2023-02-06 VITALS
OXYGEN SATURATION: 96 % | DIASTOLIC BLOOD PRESSURE: 80 MMHG | BODY MASS INDEX: 40.98 KG/M2 | HEIGHT: 66 IN | HEART RATE: 72 BPM | WEIGHT: 255 LBS | SYSTOLIC BLOOD PRESSURE: 148 MMHG

## 2023-02-06 DIAGNOSIS — C50.812 MALIGNANT NEOPLASM OF OVERLAPPING SITES OF LEFT BREAST IN FEMALE, ESTROGEN RECEPTOR POSITIVE: Primary | ICD-10-CM

## 2023-02-06 DIAGNOSIS — Z17.0 MALIGNANT NEOPLASM OF OVERLAPPING SITES OF LEFT BREAST IN FEMALE, ESTROGEN RECEPTOR POSITIVE: Primary | ICD-10-CM

## 2023-02-06 DIAGNOSIS — Z91.89 INCREASED RISK OF BREAST CANCER: ICD-10-CM

## 2023-02-06 DIAGNOSIS — D05.02 LOBULAR CARCINOMA IN SITU (LCIS) OF LEFT BREAST: ICD-10-CM

## 2023-02-06 PROCEDURE — 99214 OFFICE O/P EST MOD 30 MIN: CPT | Performed by: NURSE PRACTITIONER

## 2023-02-21 ENCOUNTER — HOSPITAL ENCOUNTER (OUTPATIENT)
Dept: CT IMAGING | Facility: HOSPITAL | Age: 63
Discharge: HOME | End: 2023-02-21
Admitting: INTERNAL MEDICINE
Payer: COMMERCIAL

## 2023-02-21 DIAGNOSIS — J35.1 ENLARGED TONSILS: ICD-10-CM

## 2023-02-21 DIAGNOSIS — C50.812 MALIGNANT NEOPLASM OF OVERLAPPING SITES OF LEFT BREAST IN FEMALE, ESTROGEN RECEPTOR POSITIVE: ICD-10-CM

## 2023-02-21 DIAGNOSIS — E04.1 THYROID NODULE: ICD-10-CM

## 2023-02-21 DIAGNOSIS — R91.8 PULMONARY NODULES: ICD-10-CM

## 2023-02-21 DIAGNOSIS — D05.02 LOBULAR CARCINOMA IN SITU (LCIS) OF LEFT BREAST: ICD-10-CM

## 2023-02-21 DIAGNOSIS — Z79.811 USE OF AROMATASE INHIBITORS: ICD-10-CM

## 2023-02-21 DIAGNOSIS — Z17.0 MALIGNANT NEOPLASM OF OVERLAPPING SITES OF LEFT BREAST IN FEMALE, ESTROGEN RECEPTOR POSITIVE: ICD-10-CM

## 2023-02-21 LAB — CREAT BLDA-MCNC: 0.8 MG/DL (ref 0.6–1.3)

## 2023-02-21 PROCEDURE — 25010000002 IOPAMIDOL 61 % SOLUTION: Performed by: INTERNAL MEDICINE

## 2023-02-21 PROCEDURE — 71260 CT THORAX DX C+: CPT

## 2023-02-21 PROCEDURE — 82565 ASSAY OF CREATININE: CPT

## 2023-02-21 RX ADMIN — IOPAMIDOL 100 ML: 612 INJECTION, SOLUTION INTRAVENOUS at 15:18

## 2023-02-23 ENCOUNTER — TELEPHONE (OUTPATIENT)
Dept: ONCOLOGY | Facility: CLINIC | Age: 63
End: 2023-02-23
Payer: COMMERCIAL

## 2023-02-23 NOTE — TELEPHONE ENCOUNTER
----- Message from Irena Dong MD sent at 2/23/2023 10:51 AM EST -----  Pls inform her that CT chest stable to improved.

## 2023-03-09 DIAGNOSIS — I10 ESSENTIAL HYPERTENSION: ICD-10-CM

## 2023-03-09 RX ORDER — LISINOPRIL AND HYDROCHLOROTHIAZIDE 20; 12.5 MG/1; MG/1
TABLET ORAL
Qty: 180 TABLET | Refills: 1 | Status: SHIPPED | OUTPATIENT
Start: 2023-03-09

## 2023-03-09 RX ORDER — ROSUVASTATIN CALCIUM 5 MG/1
TABLET, COATED ORAL
Qty: 90 TABLET | Refills: 1 | Status: SHIPPED | OUTPATIENT
Start: 2023-03-09

## 2023-04-03 ENCOUNTER — TELEPHONE (OUTPATIENT)
Dept: ONCOLOGY | Facility: CLINIC | Age: 63
End: 2023-04-03

## 2023-04-03 NOTE — TELEPHONE ENCOUNTER
Caller: Kirsten Lima    Relationship to patient: Self    Best call back number: [phone]    Type of visit: VITALS AND FOLLOW UP    Requested date: WEEK OF 04/17 OR 04/24    If rescheduling, when is the original appointment: 04/14    Additional notes: PLEASE CALL TO R/S.

## 2023-04-07 ENCOUNTER — TELEPHONE (OUTPATIENT)
Dept: INTERNAL MEDICINE | Facility: CLINIC | Age: 63
End: 2023-04-07

## 2023-04-07 ENCOUNTER — TELEMEDICINE (OUTPATIENT)
Dept: INTERNAL MEDICINE | Facility: CLINIC | Age: 63
End: 2023-04-07
Payer: COMMERCIAL

## 2023-04-07 DIAGNOSIS — U07.1 COVID-19 VIRUS INFECTION: Primary | ICD-10-CM

## 2023-04-07 DIAGNOSIS — J06.9 VIRAL URI WITH COUGH: ICD-10-CM

## 2023-04-07 DIAGNOSIS — R06.2 WHEEZING: ICD-10-CM

## 2023-04-07 RX ORDER — ALBUTEROL SULFATE 90 UG/1
2 AEROSOL, METERED RESPIRATORY (INHALATION) EVERY 4 HOURS PRN
Qty: 8 G | Refills: 0 | Status: SHIPPED | OUTPATIENT
Start: 2023-04-07

## 2023-04-07 RX ORDER — BENZONATATE 200 MG/1
200 CAPSULE ORAL 3 TIMES DAILY PRN
Qty: 21 CAPSULE | Refills: 0 | Status: SHIPPED | OUTPATIENT
Start: 2023-04-07

## 2023-04-07 NOTE — TELEPHONE ENCOUNTER
Caller: Kirsten Lima    Relationship to patient: Self    Best call back number: [phone]    Date of exposure:    Date of positive COVID19 test: 4/05/23    Date if possible COVID19 exposure:     COVID19 symptoms: FEVER, CHILLS, SEVERE COUGHING, HEAD/CHEST CONGESTION    Date of initial quarantine: 4/05/23    Additional information or concerns: PATIENT WANTS TO KNOW IF DR GARCIA CAN CALL IN THE [facility] FOR COVID AND SOMETHING FOR HER SINUSES.      What is the patients preferred pharmacy: [facility]/pharm[facility] #[id number] - [place], [place] - [address] [address] - [phone]  - [phone]

## 2023-04-07 NOTE — PROGRESS NOTES
Subjective   Kirsten Lima is a 62 y.o. female.     Chief Complaint   Patient presents with   • Covid-19 Home Monitoring Video Visit     Chills, headache, fever, nasal drainage, cough x3 days.  Tested for Covid on Wednesday and got positive results. Has only taken Claritin and Zyrtec for relief.         History of Present Illness   You have chosen to receive care through a telehealth visit.  Do you consent to use a video/audio connection for your medical care today? Yes    She is a here today for a telehealth visit using [employer].  She is currently in [place] for a .  She is positive for COVID. Symptoms started on Wednesday with fever, chills, headache, nasal drainage and cough. Her cough is dry with coughing fits. She notes intermittent wheezing, worse when laying down. She has had a few episodes of nausea and vomiting. Last episode last night.  Nausea and vomiting has improved today.  She notes sinus pressure and rhinorrhea with clear drainage.    Tmax 101.2 the first day of illness, improved with tylenol.   She has tried Zyrtec, Claritin, tylenol, cough suppressant with minimal relief.  She denies any chest pain, shortness of breath.    The following portions of the patient's history were reviewed and updated as appropriate: allergies, current medications, past family history, past medical history, past social history, past surgical history and problem list.    Review of Systems   Constitutional: Positive for chills, fatigue and fever.   HENT: Positive for congestion, postnasal drip, rhinorrhea and sinus pressure. Negative for ear pain and sore throat.    Respiratory: Positive for cough and wheezing. Negative for chest tightness and shortness of breath.    Cardiovascular: Negative for chest pain, palpitations and leg swelling.   Gastrointestinal: Positive for nausea and vomiting. Negative for diarrhea.   Neurological: Positive for headache.       Objective   Physical Exam  Constitutional:       General:  She is awake.      Appearance: She is ill-appearing.   Pulmonary:      Effort: Pulmonary effort is normal.   Neurological:      Mental Status: She is alert and oriented to person, place, and time.   Psychiatric:         Attention and Perception: Attention and perception normal.         Mood and Affect: Mood and affect normal.         Speech: Speech normal.         Behavior: Behavior normal. Behavior is cooperative.         Thought Content: Thought content normal.         Cognition and Memory: Cognition and memory normal.         Judgment: Judgment normal.         There were no vitals filed for this visit.       Assessment & Plan   Diagnoses and all orders for this visit:    1. COVID-19 virus infection (Primary)  -     benzonatate (TESSALON) 200 MG capsule; Take 1 capsule by mouth 3 (Three) Times a Day As Needed for Cough.  Dispense: 21 capsule; Refill: 0  -     albuterol sulfate  (90 Base) MCG/ACT inhaler; Inhale 2 puffs Every 4 (Four) Hours As Needed for Wheezing or Shortness of Air.  Dispense: 8 g; Refill: 0    2. Viral URI with cough  -     benzonatate (TESSALON) 200 MG capsule; Take 1 capsule by mouth 3 (Three) Times a Day As Needed for Cough.  Dispense: 21 capsule; Refill: 0  -     albuterol sulfate  (90 Base) MCG/ACT inhaler; Inhale 2 puffs Every 4 (Four) Hours As Needed for Wheezing or Shortness of Air.  Dispense: 8 g; Refill: 0    3. Wheezing  -     albuterol sulfate  (90 Base) MCG/ACT inhaler; Inhale 2 puffs Every 4 (Four) Hours As Needed for Wheezing or Shortness of Air.  Dispense: 8 g; Refill: 0      1.  COVID-19 virus infection with cough and wheezing- discussed treatment options with patient including supportive care versus antiviral therapy.  Patient elects for supportive care.  Prescription sent for Tessalon 200 mg 3 times daily as needed for cough.  We will also send albuterol inhaler to use 2 puffs every 4-6 hours as needed for any wheezing or shortness of breath.  Recommend  continuing nasal steroid spray, plain Mucinex, vitamin C, vitamin D.  Encouraged her to hydrate well with fluids, bland diet as tolerated.  Quarantine at home for 5 days from symptom onset and wear mask for 10 days from symptom onset when in public.  Notify for worsening symptoms.  To ER with severe symptoms or acute respiratory distress.    Doximity visit lasting 10 minutes

## 2023-04-08 DIAGNOSIS — I10 ESSENTIAL HYPERTENSION: ICD-10-CM

## 2023-04-10 RX ORDER — METOPROLOL SUCCINATE 200 MG/1
TABLET, EXTENDED RELEASE ORAL
Qty: 90 TABLET | Refills: 1 | Status: SHIPPED | OUTPATIENT
Start: 2023-04-10

## 2023-04-10 RX ORDER — AMLODIPINE BESYLATE 10 MG/1
TABLET ORAL
Qty: 90 TABLET | Refills: 1 | Status: SHIPPED | OUTPATIENT
Start: 2023-04-10

## 2023-05-01 RX ORDER — ANASTROZOLE 1 MG/1
TABLET ORAL
Qty: 90 TABLET | Refills: 3 | Status: SHIPPED | OUTPATIENT
Start: 2023-05-01

## 2023-05-01 RX ORDER — CITALOPRAM 10 MG/1
TABLET ORAL
Qty: 90 TABLET | Refills: 1 | Status: SHIPPED | OUTPATIENT
Start: 2023-05-01

## 2023-05-04 DIAGNOSIS — U07.1 COVID-19 VIRUS INFECTION: ICD-10-CM

## 2023-05-04 DIAGNOSIS — R06.2 WHEEZING: ICD-10-CM

## 2023-05-04 DIAGNOSIS — J06.9 VIRAL URI WITH COUGH: ICD-10-CM

## 2023-05-04 RX ORDER — ALBUTEROL SULFATE 90 UG/1
AEROSOL, METERED RESPIRATORY (INHALATION)
Qty: 18 G | Refills: 0 | Status: SHIPPED | OUTPATIENT
Start: 2023-05-04

## 2023-05-08 DIAGNOSIS — R73.09 ELEVATED GLUCOSE: ICD-10-CM

## 2023-05-08 DIAGNOSIS — Z00.00 HEALTH CARE MAINTENANCE: Primary | ICD-10-CM

## 2023-05-09 ENCOUNTER — OFFICE VISIT (OUTPATIENT)
Dept: ONCOLOGY | Facility: CLINIC | Age: 63
End: 2023-05-09
Payer: COMMERCIAL

## 2023-05-09 VITALS
TEMPERATURE: 97.7 F | BODY MASS INDEX: 41.33 KG/M2 | SYSTOLIC BLOOD PRESSURE: 132 MMHG | DIASTOLIC BLOOD PRESSURE: 84 MMHG | OXYGEN SATURATION: 96 % | HEIGHT: 66 IN | WEIGHT: 257.2 LBS | RESPIRATION RATE: 16 BRPM | HEART RATE: 50 BPM

## 2023-05-09 DIAGNOSIS — Z12.2 ENCOUNTER FOR SCREENING FOR LUNG CANCER: Primary | ICD-10-CM

## 2023-05-09 NOTE — PROGRESS NOTES
Subjective   Kirsten Lima is a 62 y.o. female. Referred by  for LCIS     History of Present Illness   Ms. Lima is a 61-year-old -American lady who presents with a screen detected abnormality of the left breast.    6/2/2020-screening mammogram  Scattered areas of fibroglandular density.  There are small asymmetry seen in both breasts.  No significant change from prior exams.  Stable punctate and spherical lucent centered calcifications with diffuse distribution in both breasts.  There is a new focal asymmetry measuring 12 mm in the central region of the left breast.    6/24/2020-left diagnostic mammogram  Focal asymmetry in the left breast does not persist.  Ultrasound-no sonographic correlate.  3 oval intraductal masses with partially defined margins measuring 5 mm to 9 mm in the anterior one third subareolar region of the left breast.  Patient reported history of intermittent clear nipple discharge for about 10 years.  These were considered suspicious and ultrasound-guided biopsy was recommended.    6/29/2020-left breast ultrasound-guided biopsy x2  1.left subareolar mass measuring 5 x 5 x 6 mm-benign sclerosing intraductal papilloma with calcifications.  2.left subareolar mass-benign sclerosing intraductal papilloma with usual ductal hyperplasia and microcalcifications.    She was evaluated by Dr. Zambrano on 8/7/2020 and recommended to undergo excisional biopsy of both intraductal papillomas.    2/22/2021-left breast needle localized excisional biopsy-pathology was consistent with lobular carcinoma in situ.  Multiple sclerosed intraductal papillomas with focal calcifications which were completely excised.  Background breast parenchyma showed usual ductal hyperplasia and calcifications with sclerosing adenosis.    She has been referred to medical oncology to discuss risk reduction given findings of lobular carcinoma in situ.    Patient reports chronic nipple discharge from the left.  She also  had significant issues with the breast skin due to hidradenitis and recurrent skin abscesses.  She had a past history of benign breast biopsy , unable to recall the date.  Denies any family history of breast or ovarian cancer.    6/11/2021-bilateral screening mammogram-benign.    12/10/2021-bilateral breast MRI  Right breast-no suspicious findings.    Left breast-  Postsurgical changes in the anterior left breast.  At 5:00, 3.4 cm posterior to the nipple there is a 1.8 cm linear branching non-mass enhancement which is suspicious.  Abnormal enhancement in the left nipple with approximately 1.2 x 1.7 x 1 cm mass involving the nipple itself and extending slightly deeper into the subareolar breast.  This is suspicious.    No extramammary findings    Recommendations  1.suspicious 1.7 cm enhancing left nipple mass, surgical excision recommended as this is not amenable to imaging guided biopsy.  2.suspicious 1.8 cm linear branching non-mass enhancement at 5:00 in the left breast, MR guided biopsy recommended.    12/22/2021-left breast MRI guided biopsy of the 5:00.  A.extensive lobular carcinoma in situ  B.no invasive carcinoma identified by routine or immunostaining  C.associated intraductal papilloma with microcalcifications, ductal cyst wall and fibrocystic change.    Patient was prescribed tamoxifen for risk reduction which she initially did not take and subsequently this was changed to anastrozole at her visit in September which she only started taking in December 2021.    1/12/2022-she was evaluated by Dr. Zambrano and scheduled for left breast needle localized excisional biopsy and left nipple mass excision.    2/1/2022-left breast needle localized excisional biopsy left nipple mass excision  1.left breast lumpectomy-invasive lobular carcinoma, lobular carcinoma in situ and deep typical lobular hyperplasia.  A.invasive lobular carcinoma  Measures 4 mm  Grade 1  The focus is located amongst the area of lobular  carcinoma in situ  Extensive lobular carcinoma in situ and atypical lobular hyperplasia  Fibroadenoma with calcification, sclerosing adenosis, apocrine metaplasia, clustered cysts in area consistent with radial scar and fat necrosis    2.left nipple excision  Simple cyst  Apocrine metaplasia  Intraductal papilloma  Atypical lobular hyperplasia    The lobular carcinoma is ER +80% strong, ME 3% moderate, HER-2 negative, Ki-67 2%    Axillary lymph nodes not evaluated    Case was discussed in multidisciplinary conference and decided to omit axillary staging and proceed with radiation and continue Arimidex.    11/8/2022-low-dose CT of the chest with multiple pulmonary nodules bilateral measuring up to 7 mm.  There is also mediastinal lymph node which measures 1.7 cm and right paratracheal lymph node which measures 1 cm.    11/18/2022-PET/CT  1.multiple bilateral pulmonary nodules which are below PET resolution.  Hypermetabolic mediastinal hilar and right axillary lymphadenopathy.  Right axilla lymph node is 1.2 cm with an SUV of 5.1.  Precavernous node 1.5 cm with an SUV of 5.7.  Asymmetric hypermetabolic thickening of the right palatine tonsil.  Direct visualization recommended.  Focal intense uptake in the rectum and anus is nonspecific.  Hypodense right thyroid lesion demonstrates mild uptake.  Asymmetric soft tissue thickening involving the left breast.    12/15/2022-bronchoscopy and biopsy of the nodes was performed.  Pathology was noted to be benign.  She had a follow-up with pulmonary and she was recommended to have a repeat CT in 3 months.    12/8/2022-thyroid ultrasound performed with multiple bilateral thyroid nodules and none of them meeting criteria for recommendation for biopsy or FNA.  One of them was previously biopsied and benign.    1/3/2023-she had an appointment with Dr. Jac Pop for the asymmetric soft tissue thickening involving the right tonsil.  No significant pathology noted.    Interval  history  Ms. Lima presents to the clinic today for follow-up.  She reports no new complaints at this time.  She has been compliant with anastrozole and tolerating that well.  She does feel like maybe she is having some depression and plans to discuss with her primary care physician.  Denies any suicidal ideations.    The following portions of the patient's history were reviewed and updated as appropriate: allergies, current medications, past family history, past medical history, past social history, past surgical history and problem list.    Past Medical History:   Diagnosis Date   • Acute cystitis    • Allergic rhinitis    • Breast cancer    • Breast cyst    • Colon polyp May 6, 2022    7   • Cyst of left nipple    • Depression    • Diverticulosis May 6, 2022   • Dry skin    • Dysuria    • Hemorrhoid    • Hidradenitis    • History of bronchitis    • Hyperlipidemia    • Hypertension    • Incontinence in female     wears pads   • Insomnia    • Low back pain 2020   • Nonspecific abnormal electrocardiogram (ECG) (EKG)    • Obesity    • Overactive bladder    • Pregnancy     G-2, P-0   • Sleep apnea    • UTI (urinary tract infection) 02/2021   • Vitamin B12 deficiency    • Vitamin D insufficiency    • Wound, breast     LEFT BREAST; PT INSTRUCTED TO NOTIFY DR ZAMBRANO PRIOR TO SURGERY        Past Surgical History:   Procedure Laterality Date   • AXILLARY HIDRADENITIS EXCISION Bilateral    • BREAST BIOPSY Left 02/22/2021    Procedure: Left breast needle-localized excisional biopsy (tophat clip) and left breast ultrasound-guided excisional biopsy (hydromark clip);  Surgeon: Debora Zambrano MD;  Location: [facility];  Service: General;  Laterality: Left;   • BREAST BIOPSY Left 02/01/2022    Procedure: Left breast needle-localized excisional biopsy, Left nipple mass excision;  Surgeon: Debora Zambrano MD;  Location: [facility] OR;  Service: General;  Laterality: Left;   • BRONCHOSCOPY N/A 12/15/2022     Procedure: BRONCHOSCOPY WITH ENDOBRONCHIAL ULTRASOUND WITH FNA;  Surgeon: Norberto Link MD;  Location: [facility] ENDOSCOPY;  Service: Pulmonary;  Laterality: N/A;  LYMPHADENOPATHY   • CARDIAC CATHETERIZATION     • COLONOSCOPY     • COLONOSCOPY N/A 2022    Procedure: COLONOSCOPY;  Surgeon: Mirtha Davis MD;  [place] [facility];  Service: Gastroenterology;  Laterality: N/A;  diverticulosis, polyps   • HYSTERECTOMY  2006    LAVH   • SUBTOTAL HYSTERECTOMY          Family History   Problem Relation Age of Onset   • Hypertension Mother    • COPD Father             • Heart failure Father    • Hypertension Father    • Hyperlipidemia Father    • Vision loss Father    • Pancreatic cancer Brother         1 brother  from pancreatic cancer   • Alcohol abuse Brother             • Other Sister    • Alcohol abuse Sister             • Liver disease Sister    • Alcohol abuse Brother             • Cancer Brother         Liver   • Malig Hyperthermia Neg Hx         Social History     Socioeconomic History   • Marital status: Single   • Number of children: 0   Tobacco Use   • Smoking status: Every Day     Packs/day: 0.50     Years: 42.00     Pack years: 21.00     Types: Cigarettes     Start date: 1979   • Smokeless tobacco: Never   Vaping Use   • Vaping Use: Never used   Substance and Sexual Activity   • Alcohol use: Yes     Comment: 1-2 drinks a month   • Drug use: Never     Comment: marijuana in her twenties   • Sexual activity: Not Currently     Partners: Male     Birth control/protection: Abstinence        OB History    No obstetric history on file.      Age at menarche-13  Age at menopause-2006 she is status post hysterectomy.  Still has both ovaries  She does not have any children  Total period of oral contraceptive pill use 8 years      No Known Allergies         Review of Systems   Constitutional: Negative.    HENT: Negative.    Eyes: Negative.   "  Respiratory: Negative.    Cardiovascular: Negative.    Gastrointestinal: Negative.    Genitourinary: Positive for amenorrhea.   Allergic/Immunologic: Negative.    Neurological: Negative.    Hematological: Negative.    Psychiatric/Behavioral: Positive for sleep disturbance.     Review of systems as mentioned in the HPI     Objective   Blood pressure 132/84, pulse 50, temperature 97.7 °F (36.5 °C), temperature source Temporal, resp. rate 16, height 167.7 cm (66.02\"), weight 117 kg (257 lb 3.2 oz), SpO2 96 %, not currently breastfeeding.   Physical Exam  Vitals reviewed.   Constitutional:       Appearance: Normal appearance. She is obese.   HENT:      Head: Normocephalic and atraumatic.      Right Ear: External ear normal.      Left Ear: External ear normal.      Nose: Nose normal. No congestion or rhinorrhea.      Mouth/Throat:      Mouth: Mucous membranes are moist.      Pharynx: Oropharynx is clear. No oropharyngeal exudate or posterior oropharyngeal erythema.   Eyes:      General:         Right eye: No discharge.         Left eye: No discharge.      Conjunctiva/sclera: Conjunctivae normal.      Pupils: Pupils are equal, round, and reactive to light.   Cardiovascular:      Rate and Rhythm: Normal rate.   Pulmonary:      Effort: Pulmonary effort is normal.      Breath sounds: Normal breath sounds.   Abdominal:      General: Abdomen is flat. Bowel sounds are normal.      Palpations: Abdomen is soft.   Musculoskeletal:         General: No swelling, tenderness or deformity. Normal range of motion.      Cervical back: Normal range of motion.   Skin:     General: Skin is warm.      Coloration: Skin is not jaundiced or pale.   Neurological:      General: No focal deficit present.      Mental Status: She is alert and oriented to person, place, and time.      Cranial Nerves: No cranial nerve deficit.      Sensory: No sensory deficit.   Psychiatric:         Mood and Affect: Mood normal.         Behavior: Behavior normal.    "      Thought Content: Thought content normal.         Judgment: Judgment normal.       Breast Exam: Right breast appears normal inspection no palpable abnormalities of the right breast.  Left breast on inspection there is a periareolar incision which is well-healed and also another periareolar incision which is well-healed.  The left breast is hyperpigmented, thickened secondary to radiation changes.  There is tenderness to palpation in the left breast.    I have reexamined the patient and the results are consistent with the previously documented exam. Irena Dong MD       No visits with results within 30 Day(s) from this visit.   Latest known visit with results is:   Hospital Outpatient Visit on 02/21/2023   Component Date Value Ref Range Status   • Creatinine 02/21/2023 0.80  0.60 - 1.30 mg/dL Final    Serial Number: [id number]:  [id number]        No radiology results for the last 30 days.     MRI of the breast 12/9/2022-images independently reviewed and interpreted by me in detail summarized in the HPI.    Assessment & Plan       62-year-old lady with a previous hysterectomy with uncertain menopausal status presents for management of lobular carcinoma in situ.     *Lobular carcinoma in situ  · 2/22/2021-left breast needle localized excisional biopsy shows lobular carcinoma in situ with multiple intraductal papillomas.  · She was offered tamoxifen 5 mg daily for risk reduction however did not start taking that.  · She was seen in September 2021 and treatment was changed to anastrozole as she was concerned about the risk of blood clots with tamoxifen.  · She finally started taking anastrozole in December 2021.  · Had an abnormal MRI in December 2021 requiring a left breast biopsy and left breast excision.  · Diagnosed with invasive lobular carcinoma  · Continues on anastrozole  · MRI 12/9/2022 reviewed and benign.  · Screening mammogram due August 2023.  This is to be ordered by her gynecologist.    *Invasive  lobular carcinoma of the left breast  · Invasive lobular carcinoma small focus diagnosed with an extensive areas of lobular carcinoma in situ  · pT1 a Nx M0  · Status post excision of the left breast mass  · Completed adjuvant radiation to the left breast in April 2022  · Continues on anastrozole  · PET/CT shows right axillary lymphadenopathy  · MRI 12/9/2022 without any evidence of axillary lymphadenopathy or new abnormalities in either breast.  · No clear evidence of recurrent disease.  · 12/15/2022-bronchoscopy and biopsy with benign lymph nodes.  · Repeat CT chest from February 2023 reviewed and the pulmonary nodules have resolved.  The lymphadenopathy is stable.  Likely benign.    *Multiple pulmonary nodules and mediastinal and hilar lymphadenopathy  · CT of the chest on 11/8/2022 shows multiple bilateral pulmonary nodules along with mediastinal hilar lymphadenopathy which is greater than 1 cm.  · PET/CT 11/18/2022 shows increased uptake in the mediastinal and the hilar lymph nodes.  · Bronchoscopy and biopsy 12/15/2022-pathology benign  · CT chest from February 2023 reviewed and pulmonary nodules have resolved and the lymphadenopathy is stable.  · Likely benign.  · Continue follow-up with pulmonology    *Asymmetric uptake of the palatine tonsil  · ENT appointment with Dr. Jac Pop on 1/3/2023  · No concern for malignancy.  · Continue follow-up with ENT    *Thyroid abnormalities on the PET/CT  · Thyroid ultrasound 12/8/2022 without any concerning findings     *Lifestyle changes  · She has not been exercising or modified her diet  · She has been going to physical therapy for back pain  · BMI 41.5  · We discussed regular exercises.    *Obesity  · BMI 41.5  · She has met with a dietitian before  · Still unable to implement dietary changes successfully.     *Tobacco cessation  · Referral made to Prerna Lewis to help with smoking cessation  · Encouraged her to use the nicotine patches  · Decreased to 5  cigarettes/day  · Trying to quit completely.     *Surveillance  · Bilateral breast MRI December 9, 2022 benign  · Screening mammogram due August 2023  · Screening lung CT  11/8/2022 with pulmonary nodules and mediastinal lymphadenopathy  · Screening lung CT due February 2024     *Hypertension  · Blood pressure stable, blood pressure 132/84  · Continue current medications       *Hyperlipidemia  · Continue Crestor  · Unchanged     *Bladder incontinence  · Continue oxybutynin  · Continue follow-up with Dr. Cantu her gynecologist     *Follow-up-6 months

## 2023-05-10 LAB
ALBUMIN SERPL-MCNC: 4 G/DL (ref 3.8–4.8)
ALBUMIN/GLOB SERPL: 1.3 {RATIO} (ref 1.2–2.2)
ALP SERPL-CCNC: 97 IU/L (ref 44–121)
ALT SERPL-CCNC: 8 IU/L (ref 0–32)
AST SERPL-CCNC: 11 IU/L (ref 0–40)
BASOPHILS # BLD AUTO: 0 X10E3/UL (ref 0–0.2)
BASOPHILS NFR BLD AUTO: 1 %
BILIRUB SERPL-MCNC: 0.3 MG/DL (ref 0–1.2)
BUN SERPL-MCNC: 10 MG/DL (ref 8–27)
BUN/CREAT SERPL: 12 (ref 12–28)
CALCIUM SERPL-MCNC: 9.8 MG/DL (ref 8.7–10.3)
CHLORIDE SERPL-SCNC: 102 MMOL/L (ref 96–106)
CHOLEST SERPL-MCNC: 152 MG/DL (ref 100–199)
CO2 SERPL-SCNC: 28 MMOL/L (ref 20–29)
CREAT SERPL-MCNC: 0.84 MG/DL (ref 0.57–1)
EGFRCR SERPLBLD CKD-EPI 2021: 79 ML/MIN/1.73
EOSINOPHIL # BLD AUTO: 0.1 X10E3/UL (ref 0–0.4)
EOSINOPHIL NFR BLD AUTO: 1 %
ERYTHROCYTE [DISTWIDTH] IN BLOOD BY AUTOMATED COUNT: 14.5 % (ref 11.7–15.4)
GLOBULIN SER CALC-MCNC: 3 G/DL (ref 1.5–4.5)
GLUCOSE SERPL-MCNC: 99 MG/DL (ref 70–99)
HBA1C MFR BLD: 6 % (ref 4.8–5.6)
HCT VFR BLD AUTO: 45.3 % (ref 34–46.6)
HDLC SERPL-MCNC: 53 MG/DL
HGB BLD-MCNC: 14.7 G/DL (ref 11.1–15.9)
IMM GRANULOCYTES # BLD AUTO: 0 X10E3/UL (ref 0–0.1)
IMM GRANULOCYTES NFR BLD AUTO: 1 %
LDLC SERPL CALC-MCNC: 79 MG/DL (ref 0–99)
LDLC/HDLC SERPL: 1.5 RATIO (ref 0–3.2)
LYMPHOCYTES # BLD AUTO: 1.7 X10E3/UL (ref 0.7–3.1)
LYMPHOCYTES NFR BLD AUTO: 26 %
MCH RBC QN AUTO: 27.2 PG (ref 26.6–33)
MCHC RBC AUTO-ENTMCNC: 32.5 G/DL (ref 31.5–35.7)
MCV RBC AUTO: 84 FL (ref 79–97)
MONOCYTES # BLD AUTO: 0.5 X10E3/UL (ref 0.1–0.9)
MONOCYTES NFR BLD AUTO: 8 %
NEUTROPHILS # BLD AUTO: 4.1 X10E3/UL (ref 1.4–7)
NEUTROPHILS NFR BLD AUTO: 63 %
PLATELET # BLD AUTO: 217 X10E3/UL (ref 150–450)
POTASSIUM SERPL-SCNC: 3.9 MMOL/L (ref 3.5–5.2)
PROT SERPL-MCNC: 7 G/DL (ref 6–8.5)
RBC # BLD AUTO: 5.41 X10E6/UL (ref 3.77–5.28)
SODIUM SERPL-SCNC: 142 MMOL/L (ref 134–144)
T4 FREE SERPL-MCNC: 1.47 NG/DL (ref 0.82–1.77)
TRIGL SERPL-MCNC: 111 MG/DL (ref 0–149)
TSH SERPL DL<=0.005 MIU/L-ACNC: 0.42 UIU/ML (ref 0.45–4.5)
VLDLC SERPL CALC-MCNC: 20 MG/DL (ref 5–40)
WBC # BLD AUTO: 6.4 X10E3/UL (ref 3.4–10.8)

## 2023-05-16 ENCOUNTER — HOSPITAL ENCOUNTER (OUTPATIENT)
Dept: GENERAL RADIOLOGY | Facility: HOSPITAL | Age: 63
Discharge: HOME | End: 2023-05-16
Admitting: INTERNAL MEDICINE
Payer: COMMERCIAL

## 2023-05-16 ENCOUNTER — OFFICE VISIT (OUTPATIENT)
Dept: INTERNAL MEDICINE | Facility: CLINIC | Age: 63
End: 2023-05-16
Payer: COMMERCIAL

## 2023-05-16 VITALS
WEIGHT: 258.3 LBS | HEART RATE: 58 BPM | OXYGEN SATURATION: 98 % | HEIGHT: 66 IN | SYSTOLIC BLOOD PRESSURE: 138 MMHG | BODY MASS INDEX: 41.51 KG/M2 | DIASTOLIC BLOOD PRESSURE: 72 MMHG | TEMPERATURE: 97.7 F

## 2023-05-16 DIAGNOSIS — R05.1 ACUTE COUGH: ICD-10-CM

## 2023-05-16 DIAGNOSIS — E78.00 PURE HYPERCHOLESTEROLEMIA: ICD-10-CM

## 2023-05-16 DIAGNOSIS — N32.81 OVERACTIVE BLADDER: ICD-10-CM

## 2023-05-16 DIAGNOSIS — I10 PRIMARY HYPERTENSION: Primary | ICD-10-CM

## 2023-05-16 PROCEDURE — 71046 X-RAY EXAM CHEST 2 VIEWS: CPT

## 2023-05-16 PROCEDURE — 99214 OFFICE O/P EST MOD 30 MIN: CPT | Performed by: INTERNAL MEDICINE

## 2023-05-16 RX ORDER — FLUTICASONE PROPIONATE 50 MCG
2 SPRAY, SUSPENSION (ML) NASAL DAILY
Qty: 16 G | Refills: 1 | Status: SHIPPED | OUTPATIENT
Start: 2023-05-16

## 2023-05-16 NOTE — PROGRESS NOTES
Subjective   Kirsten Lima is a 62 y.o. female here to follow up on HTN, HPL, OAB with labs.  Pt had Covid in April but still have congestion on chest that's is not clearing up.    History of Present Illness   Pt has been taking cholesterol meds as prescribed.  No difficulties with myalgias. \  Pt has been taking BP meds as prescribed without any problems.  No HA  No episodes of orthostasis  She had covid in April  She has cont to have congestion in the chest  She has been on celexa for depressin she says she is having smome more leg and back pain and thinks that makes her a little more depressed    The following portions of the patient's history were reviewed and updated as appropriate: allergies, current medications, past medical history, past social history and problem list.  No tob no etoh  Review of Systems    Objective   Physical Exam  Vitals reviewed.   Constitutional:       Appearance: She is well-developed.   HENT:      Head: Normocephalic and atraumatic.      Right Ear: External ear normal.      Left Ear: External ear normal.   Eyes:      Conjunctiva/sclera: Conjunctivae normal.      Pupils: Pupils are equal, round, and reactive to light.   Neck:      Thyroid: No thyromegaly.      Trachea: No tracheal deviation.   Cardiovascular:      Rate and Rhythm: Normal rate and regular rhythm.      Heart sounds: Normal heart sounds.   Pulmonary:      Effort: Pulmonary effort is normal.      Breath sounds: Normal breath sounds.   Abdominal:      General: Bowel sounds are normal. There is no distension.      Palpations: Abdomen is soft.      Tenderness: There is no abdominal tenderness.   Musculoskeletal:         General: No deformity. Normal range of motion.      Cervical back: Normal range of motion.   Skin:     General: Skin is warm and dry.   Neurological:      Mental Status: She is alert and oriented to person, place, and time.   Psychiatric:         Behavior: Behavior normal.         Thought Content: Thought  content normal.         Judgment: Judgment normal.         Vitals:    05/16/23 1127   BP: 138/72   Pulse: 58   Temp: 97.7 °F (36.5 °C)   SpO2: 98%     Orders Only on 05/08/2023   Component Date Value Ref Range Status   • Glucose 05/09/2023 99  70 - 99 mg/dL Final   • BUN 05/09/2023 10  8 - 27 mg/dL Final   • Creatinine 05/09/2023 0.84  0.57 - 1.00 mg/dL Final   • EGFR Result 05/09/2023 79  >59 mL/min/1.73 Final   • BUN/Creatinine Ratio 05/09/2023 12  12 - 28 Final   • Sodium 05/09/2023 142  134 - 144 mmol/L Final   • Potassium 05/09/2023 3.9  3.5 - 5.2 mmol/L Final   • Chloride 05/09/2023 102  96 - 106 mmol/L Final   • Total CO2 05/09/2023 28  20 - 29 mmol/L Final   • Calcium 05/09/2023 9.8  8.7 - 10.3 mg/dL Final   • Total Protein 05/09/2023 7.0  6.0 - 8.5 g/dL Final   • Albumin 05/09/2023 4.0  3.8 - 4.8 g/dL Final   • Globulin 05/09/2023 3.0  1.5 - 4.5 g/dL Final   • A/G Ratio 05/09/2023 1.3  1.2 - 2.2 Final   • Total Bilirubin 05/09/2023 0.3  0.0 - 1.2 mg/dL Final   • Alkaline Phosphatase 05/09/2023 97  44 - 121 IU/L Final   • AST (SGOT) 05/09/2023 11  0 - 40 IU/L Final   • ALT (SGPT) 05/09/2023 8  0 - 32 IU/L Final   • Total Cholesterol 05/09/2023 152  100 - 199 mg/dL Final   • Triglycerides 05/09/2023 111  0 - 149 mg/dL Final   • HDL Cholesterol 05/09/2023 53  >39 mg/dL Final   • VLDL Cholesterol Avel 05/09/2023 20  5 - 40 mg/dL Final   • LDL Chol Calc (NIH) 05/09/2023 79  0 - 99 mg/dL Final   • LDL/HDL RATIO 05/09/2023 1.5  0.0 - 3.2 ratio Final    Comment:                                     LDL/HDL Ratio                                              Men  Women                                1/2 Avg.Risk  1.0    1.5                                    Avg.Risk  3.6    3.2                                 2X Avg.Risk  6.2    5.0                                 3X Avg.Risk  8.0    6.1     • TSH 05/09/2023 0.421 (L)  0.450 - 4.500 uIU/mL Final   • WBC 05/09/2023 6.4  3.4 - 10.8 x10E3/uL Final   • RBC 05/09/2023 5.41  (H)  3.77 - 5.28 x10E6/uL Final   • Hemoglobin 05/09/2023 14.7  11.1 - 15.9 g/dL Final   • Hematocrit 05/09/2023 45.3  34.0 - 46.6 % Final   • MCV 05/09/2023 84  79 - 97 fL Final   • MCH 05/09/2023 27.2  26.6 - 33.0 pg Final   • MCHC 05/09/2023 32.5  31.5 - 35.7 g/dL Final   • RDW 05/09/2023 14.5  11.7 - 15.4 % Final   • Platelets 05/09/2023 217  150 - 450 x10E3/uL Final   • Neutrophil Rel % 05/09/2023 63  Not Estab. % Final   • Lymphocyte Rel % 05/09/2023 26  Not Estab. % Final   • Monocyte Rel % 05/09/2023 8  Not Estab. % Final   • Eosinophil Rel % 05/09/2023 1  Not Estab. % Final   • Basophil Rel % 05/09/2023 1  Not Estab. % Final   • Neutrophils Absolute 05/09/2023 4.1  1.4 - 7.0 x10E3/uL Final   • Lymphocytes Absolute 05/09/2023 1.7  0.7 - 3.1 x10E3/uL Final   • Monocytes Absolute 05/09/2023 0.5  0.1 - 0.9 x10E3/uL Final   • Eosinophils Absolute 05/09/2023 0.1  0.0 - 0.4 x10E3/uL Final   • Basophils Absolute 05/09/2023 0.0  0.0 - 0.2 x10E3/uL Final   • Immature Granulocyte Rel % 05/09/2023 1  Not Estab. % Final   • Immature Grans Absolute 05/09/2023 0.0  0.0 - 0.1 x10E3/uL Final   • Hemoglobin A1C 05/09/2023 6.0 (H)  4.8 - 5.6 % Final    Comment:          Prediabetes: 5.7 - 6.4           Diabetes: >6.4           Glycemic control for adults with diabetes: <7.0     • Free T4 05/09/2023 1.47  0.82 - 1.77 ng/dL Final     Current Outpatient Medications   Medication Instructions   • albuterol sulfate  (90 Base) MCG/ACT inhaler INHALE 2 PUFFS EVERY 4 HOURS AS NEEDED FOR WHEEZING OR SHORTNESS OF AIR   • amLODIPine (NORVASC) 10 MG tablet TAKE 1 TABLET DAILY   • anastrozole (ARIMIDEX) 1 MG tablet TAKE 1 TABLET BY MOUTH EVERY DAY   • cetirizine (ZyrTEC) 10 MG tablet 1 tablet, Oral, Daily   • citalopram (CeleXA) 10 MG tablet TAKE 1 TABLET BY MOUTH EVERY DAY   • clotrimazole-betamethasone (LOTRISONE) 1-0.05 % lotion Topical, 2 Times Daily   • Efinaconazole 10 % solution 1 drop, Apply externally, Daily   • fluticasone  (FLONASE) 50 MCG/ACT nasal spray 2 sprays, Each Nare, Daily   • Gemtesa 75 mg, Oral, Daily   • lisinopril-hydrochlorothiazide (PRINZIDE,ZESTORETIC) 20-12.5 MG per tablet TAKE 2 TABLETS DAILY   • metoprolol succinate XL (TOPROL-XL) 200 MG 24 hr tablet TAKE 1 TABLET DAILY   • nystatin-triamcinolone (MYCOLOG II) 428850-4.1 UNIT/GM-% cream SMALL AMT CREAM APPLY A THIN ALYER TO EXTERNAL VAGINAL AREA FOR ITCHING TWICE A DAY   • rosuvastatin (CRESTOR) 5 MG tablet TAKE 1 TABLET DAILY   • traZODone (DESYREL) 50 MG tablet TAKE 1 TABLET EVERY NIGHT   • Vitamin D 2,000 Units, Oral, Daily         Body mass index is 41.67 kg/m².         Assessment & Plan   Diagnoses and all orders for this visit:    1. Primary hypertension (Primary)    2. Pure hypercholesterolemia    3. Acute cough  -     XR Chest PA & Lateral    Other orders  -     fluticasone (FLONASE) 50 MCG/ACT nasal spray; 2 sprays by Each Nare route Daily.  Dispense: 16 g; Refill: 1    1.  AR-  Treat with NSS  2.  Cough- seen by pulmonary.  We will go ahead and get a chest x-ray today.  Pulmonary told her she just needs to cough all the junk out of her chest.  I advised her to make sure she is staying well-hydrated and use a humidifier.  Also warm teas  3.  HTN-  Ok with current meds  4.  HPL-  Ok crestor  5.  OAB-  She thinks the gemtesa helps   6.  LBP- with bilat leg pain-  Needs to fu with pain management and see about injecytiosn       Answers for HPI/ROS submitted by the patient on 5/15/2023  What is the primary reason for your visit?: High Blood Pressure

## 2023-08-12 DIAGNOSIS — I10 ESSENTIAL HYPERTENSION: ICD-10-CM

## 2023-08-14 ENCOUNTER — TELEPHONE (OUTPATIENT)
Dept: ONCOLOGY | Facility: CLINIC | Age: 63
End: 2023-08-14
Payer: COMMERCIAL

## 2023-08-14 RX ORDER — ROSUVASTATIN CALCIUM 5 MG/1
TABLET, COATED ORAL
Qty: 90 TABLET | Refills: 1 | Status: SHIPPED | OUTPATIENT
Start: 2023-08-14

## 2023-08-14 RX ORDER — LISINOPRIL AND HYDROCHLOROTHIAZIDE 20; 12.5 MG/1; MG/1
TABLET ORAL
Qty: 180 TABLET | Refills: 1 | Status: SHIPPED | OUTPATIENT
Start: 2023-08-14

## 2023-08-14 NOTE — TELEPHONE ENCOUNTER
Caller: Kirsten Lima    Relationship: Self    Best call back number: [phone]    What is the best time to reach you: ASAP    Who are you requesting to speak with (clinical staff, provider,  specific staff member): SCHEDULING    What was the call regarding: PT CALLING AND STATES SHE IS SUPPOSED TO HAVE A MRI BUT SHE CAN'T REMEMBER WHO WANTED THIS ORDERED, SHE THINKS IT WAS DR LARSON BUT NOT FOR SURE, PLEASE CALL TO ADVISE     Is it okay if the provider responds through MyChart: YES

## 2023-08-14 NOTE — TELEPHONE ENCOUNTER
Called patient to let her know that [facility] would schedule her mammogram. Patient thought that we were ordering a breast MRI and I let her know that we have a chest CT ordered through us. Patient v/u.

## 2023-09-07 ENCOUNTER — APPOINTMENT (OUTPATIENT)
Dept: WOMENS IMAGING | Facility: HOSPITAL | Age: 63
End: 2023-09-07
Payer: COMMERCIAL

## 2023-09-07 ENCOUNTER — TELEPHONE (OUTPATIENT)
Dept: SURGERY | Facility: CLINIC | Age: 63
End: 2023-09-07
Payer: COMMERCIAL

## 2023-09-07 DIAGNOSIS — R92.8 ABNORMAL FINDINGS ON DIAGNOSTIC IMAGING OF BREAST: Primary | ICD-10-CM

## 2023-09-07 PROCEDURE — 77061 BREAST TOMOSYNTHESIS UNI: CPT | Performed by: RADIOLOGY

## 2023-09-07 PROCEDURE — G0279 TOMOSYNTHESIS, MAMMO: HCPCS | Performed by: RADIOLOGY

## 2023-09-07 PROCEDURE — 77065 DX MAMMO INCL CAD UNI: CPT | Performed by: RADIOLOGY

## 2023-09-07 NOTE — TELEPHONE ENCOUNTER
Spoke to mrs mcpherson and let her know that we got her imaging back and they want more imaging on there right breast for a rght dx mammo   Pt stated she is at [facility] right now getting the imaging done   Changed pt imaging to Monday just in case we dont get the results back in time for tomorrow

## 2023-09-08 ENCOUNTER — PREP FOR SURGERY (OUTPATIENT)
Dept: OTHER | Facility: HOSPITAL | Age: 63
End: 2023-09-08
Payer: COMMERCIAL

## 2023-09-08 ENCOUNTER — TELEPHONE (OUTPATIENT)
Dept: SURGERY | Facility: CLINIC | Age: 63
End: 2023-09-08
Payer: COMMERCIAL

## 2023-09-08 ENCOUNTER — TELEPHONE (OUTPATIENT)
Dept: SURGERY | Facility: CLINIC | Age: 63
End: 2023-09-08

## 2023-09-08 DIAGNOSIS — R92.8 ABNORMAL FINDING ON BREAST IMAGING: Primary | ICD-10-CM

## 2023-09-08 NOTE — TELEPHONE ENCOUNTER
Spoke to pt and let her know I scheduled her for her right stereo bx at [facility] on 09/19 @8AM   I also let her know I scheduled her for her follow up appt here with dulce longoria after the bx on 09/26 @ 920   Pt stated understanding

## 2023-09-08 NOTE — TELEPHONE ENCOUNTER
Called and gave patient her recent mammogram results which returned as BI-RADS 4.  Please set her up for a right stereotactic breast biopsy and a follow-up visit with me after the biopsy.  Order has been placed.

## 2023-09-08 NOTE — TELEPHONE ENCOUNTER
Spoke to pt and let her know I scheduled her for her right stereo bx at [facility] on 09/19 @8AM   I also let her know I scheduled her for her follow up appt here with dulce longoria after the bx on 09/26 @ 920   Pt stated understanding

## 2023-09-18 ENCOUNTER — TELEPHONE (OUTPATIENT)
Dept: INTERNAL MEDICINE | Facility: CLINIC | Age: 63
End: 2023-09-18
Payer: COMMERCIAL

## 2023-09-18 RX ORDER — VIBEGRON 75 MG/1
75 TABLET, FILM COATED ORAL DAILY
Qty: 28 TABLET | Refills: 0 | COMMUNITY
Start: 2023-09-18

## 2023-09-18 NOTE — TELEPHONE ENCOUNTER
Caller: Kirsten Lima     Relationship:Self     Callback number: Self    Is it ok to leave a message: [] Yes [x] No PATIENT DOES NOT HAVE VOICEMAIL, BUT TEXTS WILL WORK AS WELL    Requested medication for samples: Vibegron (Gemtesa) 75 MG tablet     How much medication does the patient currently have left: PATIENT TOOK LAST DOSE 9/18/2023    Who will be picking up the samples: SELF    Do you need information about patient financial assistance for this medication: [] Yes [x] No

## 2023-09-18 NOTE — TELEPHONE ENCOUNTER
Called and spoke with patient, she requested Gemtesa 75 mg samples. Samples placed up front, patient aware.

## 2023-09-19 ENCOUNTER — APPOINTMENT (OUTPATIENT)
Dept: WOMENS IMAGING | Facility: HOSPITAL | Age: 63
End: 2023-09-19
Payer: COMMERCIAL

## 2023-09-19 ENCOUNTER — LAB REQUISITION (OUTPATIENT)
Dept: LAB | Facility: HOSPITAL | Age: 63
End: 2023-09-19
Payer: COMMERCIAL

## 2023-09-19 DIAGNOSIS — N63.0 LUMP OR MASS IN BREAST: ICD-10-CM

## 2023-09-19 PROCEDURE — A4648 IMPLANTABLE TISSUE MARKER: HCPCS | Performed by: RADIOLOGY

## 2023-09-19 PROCEDURE — C1819 TISSUE LOCALIZATION-EXCISION: HCPCS | Performed by: RADIOLOGY

## 2023-09-19 PROCEDURE — 19081 BX BREAST 1ST LESION STRTCTC: CPT | Performed by: RADIOLOGY

## 2023-09-19 PROCEDURE — 88305 TISSUE EXAM BY PATHOLOGIST: CPT | Performed by: OBSTETRICS & GYNECOLOGY

## 2023-09-20 ENCOUNTER — TRANSCRIBE ORDERS (OUTPATIENT)
Dept: LAB | Facility: HOSPITAL | Age: 63
End: 2023-09-20
Payer: COMMERCIAL

## 2023-09-20 ENCOUNTER — ANCILLARY PROCEDURE (OUTPATIENT)
Dept: LAB | Facility: HOSPITAL | Age: 63
End: 2023-09-20
Payer: COMMERCIAL

## 2023-09-20 DIAGNOSIS — R92.1 BREAST CALCIFICATION, RIGHT: Primary | ICD-10-CM

## 2023-09-20 DIAGNOSIS — R92.1 BREAST CALCIFICATION, RIGHT: ICD-10-CM

## 2023-09-20 PROCEDURE — 76098 X-RAY EXAM SURGICAL SPECIMEN: CPT

## 2023-09-21 LAB
DX PRELIMINARY: NORMAL
LAB AP CASE REPORT: NORMAL
LAB AP CLINICAL INFORMATION: NORMAL
LAB AP DIAGNOSIS COMMENT: NORMAL
PATH REPORT.FINAL DX SPEC: NORMAL
PATH REPORT.GROSS SPEC: NORMAL

## 2023-09-25 NOTE — PROGRESS NOTES
[facility]     Referring Provider: Tad Lowe MD     Chief complaint: Routine follow up breast cancer    HPI:   8/7/20:  Ms. Kirsten Lima is a 61 yo woman, seen at the request of Dr. Tad Lowe, for a new diagnosis of left breast intraductal papillomas. These were initially detected as an imaging abnormality on routine screening. Her work-up is detailed in the breast history section below. Despite these being incidentally found, the patient reports that she has had intermittent, clear, left nipple discharge for years. 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 She denies any associated breast lumps or pain. She does report chronic issues with her breast skin due to hidradenitis and recurrent skin abscesses, however she has not had any acute problems lately. She has a past history of a benign left breast stereotactic biopsy in 2012. She denies any family history of breast or ovarian cancer.      1/7/21:  At her last visit, I recommended excision of both of the left breast papillomas due the associated nipple discharge. She wanted to think about it and ultimately decided to hold off on surgery. She underwent follow-up ultrasound prior to her appointment, which showed stable left breast intraductal masses (see report details below). She has not tried to elicit any nipple discharge since the last visit and has not noticed any spontaneously.     3/10/21:  She underwent left breast excisional biopsy x 2 on 2/22/21, which showed LCIS. See surgery & pathology details in breast history section below. She has been recovering well and has no complaints.      6/28/21, Visit with YAN Dill :  She returns today for follow up with no breast complaints   In 3/2021 she met with Dr Dong and discussed starting tamoxifen, referral to nutritionist and smoking cessation were made.  Due to  risk for blood clots she has reservations about taking tamoxifen. She will follow up with Dr Dong in 9/21 to discuss again.   Screening mammogram with tomosynthesis was completed 6/11/2021 at [facility], BiRAds 2 (see full report below)    1/12/22:  She saw Regina Gilbert in June with a normal screening mammogram. She saw Dr. Dong who initially prescribed low-dose tamoxifen, however the patient never started taking it because she was concerned about blood clots. Dr. Dong then prescribed anastrozole in September, however the patient just started taking this 3 weeks ago.  She underwent her first screening MRI in December, which showed an area of non-mass enhancement in the left lower outer breast and a left nipple mass. She subsequently underwent an MR biopsy of the non-mass enhancement and this showed extensive LCIS (see imaging and pathology report details below).    2/16/22:  She underwent left breast excisional biopsy and left nipple mass excision on 2/1/22. The excisional biopsy showed an incidental focus of low-grade invasive lobular carcinoma measuring 4 mm with negative margins (see report details below). I have already discussed her case in tumor board and the consensus was that axillary staging is not necessary. She has been recovering well from surgery.    8/24/22, Interval History:  She returns today for scheduled follow-up. She completed radiation on 4/5/22 and tolerated this well. She remains on anastrozole and is still tolerating this well. She underwent MMG prior to her appointment which was benign.  She called the office about a month ago complaining of some left breast pain and nodular areas under her left nipple. She began wearing a sports bra day and night and the pain has resolved. She also noticed that the lower part of her left breast looked less swollen after wearing the bra.     2/6/2023 Interval History  Patient returns the office today for routine follow-up.  She last saw Dr. Dong  on 2023 and no changes were made to treatment plan.  Currently on anastrozole and tolerating well.    2023 interval history  Presenting to the office today for routine follow-up.  She last saw her oncologist in May without any changes to the treatment plan.  She had a screening mammogram on 2023 that resulted as BI-RADS 0.  She then had a right diagnostic mammogram which showed calcifications and suggested a biopsy.  She underwent a stereotactic breast biopsy that returned as benign.    Oncology/Hematology History Overview Note   19, Screening MMG with [facility] ([facility]):  Scattered areas of fibroglandular density.  1. There are small asymmetries seen in both breasts. The parenchyma includes stable nodular asymmetries. No suspicious masses, suspicious microcalcifications or areas of architectural distortion are identified. There has been no significant change from the prior exams.  2. There are stable punctate and spherical lucent-centered calcifications with diffuse/scattered distribution seen in both breast.  BI-RADS 2: Benign.     20, Screening MMG with [facility] ([facility]):  Scattered areas of fibroglandular density.  1. There are small asymmetries seen in both breasts. There has been no significant change from the prior exams.  2. There are stable punctate and spherical lucent-centered calcifications with diffuse distribution seen in both breasts.  3. There is a new small focal asymmetry measuring 12 mm seen in the central region of the left breast. This is best visualized on tomosynthesis MLO view slice # 78. This is best seen on the MLO View.  BI-RADS 0: Incomplete.     20, Left Diagnostic MMG with [facility] & Left Breast US ([facility]):  MM. On the present examination, focal asymmetry in the left breast, central does not persist. The asymmetry noted on the recent screening mammogram resolves into stroma on additional views.  US:  1. There is no sonographic correlate. There is no evidence  of any solid mass or abnormal cystic elements.  2. There are three oval intraductal masses with partially defined margins measuring 5 mm to 9 mm seen in the anterior one-third subareolar region of the left breast. The patient reports history of intermittent clear left nipple discharge for the past 10 years. These three structures are adjacent and would be included within the same biopsy site.  3. There is an oval solid mass with partially defined margins measuring 6 x 5 x 5 mm seen in the anterior one-third subareolar region of the left breast. This is located 2 to 3 cm away from the above described masses.   BI-RADS 4: Suspicious      6/29/20, Left Breast, US-Guided Biopsy x 2 ([facility])  1. Left Subareolar mass (5 x 5 x 6 cm), Ultrasound guided biopsy:   Benign sclerosing intraductal papilloma with calcifications.  -Tophat clip. Concordant.  2. Left Subareolar mass, ultrasound guided biopsy:   Benign sclerosing intraductal papilloma with usual duct hyperplasia and microcalcifications.   -Hydromark clip. Concordant.     12/28/20, Left Breast US ([facility]):  There are two oval masses and biopsy clips with circumscribed margins seen in the sub-areolar region of the left breast. There are no significant changes from the prior exam(s). This finding represents biopsy proven benign breast disease. These measure less than 1 cm.  BI-RADS  2: Benign.     02/22/21, Left breast needle-localized excisional biopsy and left breast ultrasound-guided excisional biopsy:  1. Left Breast, Needle-Localized Excisional Biopsy (13 grams):               A. LOBULAR CARCINOMA IN SITU (LCIS).               B. Multiple sclerosed intraductal papillomas with focal calcifications (completely excised).               C. Clips and associated biopsy site changes are present and adjacent to papillomas.               D. Background breast parenchyma with clusters of apocrine cysts, usual ductal hyperplasia, and       calcifications associated with sclerosing  adenosis.    6/11/2021, Screening MMG with [facility] ([facility]):  Scattered areas of fibroglandular density. There is a new postsurgical scar seen subareolar region of the left breast.  There are no suspicious masses, calcifications, or areas of architectural distortion. In the right breast, no suspicious masses, significant calcifications or other abnormalities are seen.  BI-RADS 2: Benign.     Malignant neoplasm of overlapping sites of left breast in female, estrogen receptor positive   12/9/2021 Initial Diagnosis    Malignant neoplasm of overlapping sites of left breast in female, estrogen receptor positive (HCC)     12/10/2021 Imaging    [facility]  RIGHT BREAST:    No suspicious enhancing mass or area of non-mass enhancement is identified. The visualized axilla is within normal limits.    LEFT BREAST:    There are post surgical changes in the anterior left breast. At 5:00 in the anterior left breast, 3.4 cm posterior to the nipple,  there is a 1.8 cm AP dimension somewhat linear branching nonmass enhancement, which is suspicious.   There is abnormal asymmetric enhancement within the left nipple with an approximately 1.2 cm AP dimension, 1.7 cm transverse dimension, 1.0 cm craniocaudal dimension mass involving the nipple itself and extending slightly deeper into the subareolar breast, which is associated with rapid initial and washout delayed phase enhancement on kinetic analysis. This is suspicious.  No suspicious enhancement is identified in the left chest wall. The visualized axilla is within normal limits.    EXTRAMAMMARY FINDINGS:   There are no abnormally enlarged internal mammary chain lymph nodes on either side.     BI-RADS 4: Suspicious.     12/22/2021 Biopsy    Left Breast, MR-Guided Biopsy ( [facility]):    1. Left Breast at 5 o'clock (not for calcifications) MRI-Guided Core Biopsy:                A.  Extensive lobular carcinoma in situ (LCIS):                            1.  LCIS measures  up to 7 mm in single greatest dimension focally involving 7 of 8                      representative cores.                2.  Low grade nuclear features without necrosis.  B.  No invasive carcinoma identified by routine and/or immunostaining.  C.  Associated intraductal papilloma with microcalcification, ductal cyst wall and fibrocystic change noted.      2/1/2022 Surgery    Left breast needle-localized excisional biopsy and left nipple mass excision:    1. Breast, Left, Lumpectomy: Invasive lobular carcinoma, lobular carcinoma in situ and atypical lobular hyperplasia.               A. Invasive lobular carcinoma.                            1. The invasive lobular carcinoma measures 4 mm on the slide.                            2. The invasive lobular carcinoma is Addison grade I (tubular grade 3, nuclear grade 1, mitotic grade 1).                            3. The foci is located amongst an area of lobular carcinoma in situ and near the biopsy cavity.                            4. Biopsy site and clip identified (barbell shaped clip).                            5. The invasive lobular carcinoma comes within 1.3 mm of the medial margin, 7.5 mm of the posterior margin, 8.8 mm of the anterior margin, 12.5 mm of the lateral margin and 15 mm of the superior and inferior margins.                            6. Microcalcifications are not associated with the invasive tumor.               B. Extensive lobular carcinoma in situ and atypical lobular hyperplasia.  C. Fibroadenoma with calcification, sclerosing adenosis, apocrine metaplasia, clustered cysts, area consistent with radial scar and fat necrosis.     2. Breast, Left, Nipple, Excision:  Breast tissue with               A. Simple cysts.               B. Calcifications in non-neoplastic tissue.               C. Apocrine metaplasia.               D. Intraductal papilloma with sclerosis.               E. Atypical lobular hyperplasia.    ER+ (80%, strong)  PA+ (3%,  moderate)  Her2 negative (IHC 1+)  Ki-67 2%     3/9/2022 - 4/5/2022 Radiation    Radiation OncologyTreatment Course:  Kirsten Lima received 4990 cGy in 20 fractions to LEFT breast.     4/6/2022 -  Hormonal Therapy    Anastrozole (Arimidex)     8/15/2022 Imaging    Screening MMG with [facility] ([facility]):  Scattered areas of fibroglandular density.  There is a stable post-surgical scar with associated skin lesion and trabecular thickening seen in the central region of the left breast. Post-surgical scar is in an area of prior lumpectomy. Findings are consistent with post-surgical and post-radiation therapy changes.  In the right breast, no suspicious masses, significant calcifications or other abnormalities are seen.  BI-RADS 2: Benign.     12/9/2022 Imaging    Breast MRI at [facility]  FINDINGS:Scattered fibroglandular tissue seen is throughout both  breasts. Mild background parenchymal enhancement of both breasts is  noted. Postsurgical change in the left breast is noted. Otherwise, no  areas of abnormal morphology are seen in either breast. No areas of  abnormal enhancement are seen in either breast. I see no evidence for  abnormal skin, nipple or chest wall enhancement of either breast and  there is no evidence for axillary or internal mammary chain adenopathy.     IMPRESSION:  There are no findings suspicious for malignancy in either  breast. Routine follow-up imaging is recommended.     BI-RADS category 2     9/1/2023 Imaging    Bilateral screening mammogram at [facility]  There are scattered areas of fibroglandular density.    Finding 1:  There is a stable post-surgical scar seen in the left breast.  No  suspicious masses, suspicious microcalcifications or architectural distortions  are identified.  There has been no significant change from the prior exam(s).    Finding 2:  There are calcifications seen in the sub-areolar region of the right  breast.    IMPRESSION:  Finding 1:  Stable post-surgical  scar in the left breast is benign-negative.    Finding 2:  Calcifications in the right breast require additional evaluation.  Magnification mammography is recommended.    BI-RADS Category 0: Incomplete: Needs Additional Imaging Evaluation     9/7/2023 Imaging    Right diagnostic mammogram at [facility]  There are scattered areas of fibroglandular density.    There are round and punctate calcifications measuring 8 mm seen in the sub-areolar region of the right breast  located 5 centimeters from the nipple.  Otherwise, no suspicious masses, suspicious microcalcifications or  architectural distortions are identified.  There has been no significant change from the prior exam(s).    IMPRESSION:  Calcifications in the right breast are suspicious. Stereotactic biopsy is recommended.    The patient was mailed a notification letter.    BI-RADS Category 4B: Suspicious Abnormality     9/19/2023 Biopsy    Right stereotactic biopsy at [facility]  Technically successful stereotactic right breast biopsy with marker clip placement and positive radiograph.   Pathology results to follow.     A two view mammogram shows the clip in the expected location.   Pathology returned as cyst and fibroadenomatoid hyperplasia.   Pathology is benign and concordant.  A follow up mammogram in 6 months is recommended.          Review of Systems:  See interval history.       Medications:    Current Outpatient Medications:     albuterol sulfate  (90 Base) MCG/ACT inhaler, INHALE 2 PUFFS EVERY 4 HOURS AS NEEDED FOR WHEEZING OR SHORTNESS OF AIR, Disp: 18 g, Rfl: 0    amLODIPine (NORVASC) 10 MG tablet, TAKE 1 TABLET DAILY, Disp: 90 tablet, Rfl: 1    anastrozole (ARIMIDEX) 1 MG tablet, TAKE 1 TABLET BY MOUTH EVERY DAY, Disp: 90 tablet, Rfl: 3    cetirizine (ZyrTEC) 10 MG tablet, Take 1 tablet by mouth Daily., Disp: , Rfl:     Cholecalciferol (VITAMIN D) 2000 units capsule, Take 1 capsule by mouth Daily., Disp: , Rfl:     citalopram (CeleXA) 10 MG tablet, TAKE 1  TABLET BY MOUTH EVERY DAY, Disp: 90 tablet, Rfl: 1    clotrimazole-betamethasone (LOTRISONE) 1-0.05 % lotion, Apply  topically to the appropriate area as directed 2 (Two) Times a Day. (Patient taking differently: Apply 1 application  topically to the appropriate area as directed 2 (Two) Times a Day As Needed.), Disp: 30 mL, Rfl: 1    Efinaconazole 10 % solution, Apply 1 drop topically Daily. (Patient taking differently: Apply 1 drop topically Daily As Needed.), Disp: 8 mL, Rfl: 3    fluticasone (FLONASE) 50 MCG/ACT nasal spray, 2 sprays by Each Nare route Daily., Disp: 16 g, Rfl: 1    lisinopril-hydrochlorothiazide (PRINZIDE,ZESTORETIC) 20-12.5 MG per tablet, TAKE 2 TABLETS DAILY, Disp: 180 tablet, Rfl: 1    metoprolol succinate XL (TOPROL-XL) 200 MG 24 hr tablet, TAKE 1 TABLET DAILY, Disp: 90 tablet, Rfl: 1    nystatin-triamcinolone (MYCOLOG II) 865417-9.1 UNIT/GM-% cream, SMALL AMT CREAM APPLY A THIN ALYER TO EXTERNAL VAGINAL AREA FOR ITCHING TWICE A DAY, Disp: , Rfl:     rosuvastatin (CRESTOR) 5 MG tablet, TAKE 1 TABLET DAILY, Disp: 90 tablet, Rfl: 1    sulfamethoxazole-trimethoprim (BACTRIM DS,SEPTRA DS) 800-160 MG per tablet, Take 1 tablet by mouth 2 (Two) Times a Day., Disp: 14 tablet, Rfl: 0    traZODone (DESYREL) 50 MG tablet, TAKE 1 TABLET EVERY NIGHT, Disp: 90 tablet, Rfl: 3    Vibegron (Gemtesa) 75 MG tablet, Take 1 tablet by mouth Daily., Disp: 28 tablet, Rfl: 0      Allergies:  No Known Allergies      Family History   Problem Relation Age of Onset    Hypertension Mother     COPD Father              Heart failure Father     Hypertension Father     Hyperlipidemia Father     Vision loss Father     Pancreatic cancer Brother         1 brother  from pancreatic cancer    Alcohol abuse Brother              Other Sister     Alcohol abuse Sister              Liver disease Sister     Alcohol abuse Brother              Cancer Brother         Liver    Malig Hyperthermia Neg Hx         PHYSICAL EXAMINATION:   There were no vitals filed for this visit.    ECOG 0 - Asymptomatic  General: NAD, well appearing, obese  Psych: a&o x 3, normal mood and affect  Eyes: EOMI, no scleral icterus  ENMT: neck supple without masses or thyromegaly, mucus membranes moist  MSK: normal gait, normal ROM in bilateral shoulders  Lymph nodes: Bilateral axillary scar; no cervical, supraclavicular or axillary lymphadenopathy  Breast: very large size, pendulous  Right: No visible abnormalities on inspection while seated, with arms raised or hands on hips. No masses or nipple abnormalities. 12:00 large area of darker skin that pt states has been there for months.   Left: On inspection, there is hyperpigmentation and the left breast is smaller and uplifted vs the right. Well-healed medial periareolar and lower outer periareolar scars. There are some expected postsurgical changes in the periareolar region, but no discrete masses. No nipple abnormalities. There is mild edema of the inferior breast.          Assessment:  63 y.o. F with a new diagnosis of left breast cancer: Low grade, invasive lobular carcinoma, ER/MS positive, HER-2 negative. This was diagnosed after excisional biopsy for LCIS on 2/1/22, pT1aNx. Case was discussed at multidisciplinary tumor board and it was decided to omit axillary staging. She completed radiation on 4/5/22. She is currently on anastrozole.    -She has a past history of left breast LCIS, which was diagnosed after left breast excisional biopsy x2 on 2/22/21 for intraductal papillomas associated with pathologic nipple discharge.     -Because of her increased lifetime risk of breast cancer (47.1%, TCv8, calculated 3/10/21) and history of LCIS, she was in high risk screening and on anastrozole since 1/2022. Since she was already in high risk screening, we will plan to continue this in the future.    -She has mild left breast lymphedema.    Plan:  -Lymphedema clinic PRN  -Continue follow-up  with Dr. Dong.  -MRI in Dec, call with results  - dx right mammo and exam in March   -She was instructed to call sooner with any questions, concerns or changes on BSE.    YAN Schrader      CC:  MD Verenice Donaldson MD Ann Grider, MD

## 2023-09-26 ENCOUNTER — OFFICE VISIT (OUTPATIENT)
Dept: SURGERY | Facility: CLINIC | Age: 63
End: 2023-09-26
Payer: COMMERCIAL

## 2023-09-26 VITALS
OXYGEN SATURATION: 98 % | HEIGHT: 66 IN | WEIGHT: 250 LBS | SYSTOLIC BLOOD PRESSURE: 142 MMHG | HEART RATE: 60 BPM | DIASTOLIC BLOOD PRESSURE: 78 MMHG | BODY MASS INDEX: 40.18 KG/M2

## 2023-09-26 DIAGNOSIS — R92.8 ABNORMAL FINDINGS ON DIAGNOSTIC IMAGING OF BREAST: ICD-10-CM

## 2023-09-26 DIAGNOSIS — Z91.89 INCREASED RISK OF BREAST CANCER: Primary | ICD-10-CM

## 2023-09-26 DIAGNOSIS — D24.2 INTRADUCTAL PAPILLOMA OF LEFT BREAST: ICD-10-CM

## 2023-09-26 DIAGNOSIS — C50.812 MALIGNANT NEOPLASM OF OVERLAPPING SITES OF LEFT BREAST IN FEMALE, ESTROGEN RECEPTOR POSITIVE: ICD-10-CM

## 2023-09-26 DIAGNOSIS — D05.02 LOBULAR CARCINOMA IN SITU (LCIS) OF LEFT BREAST: ICD-10-CM

## 2023-09-26 DIAGNOSIS — Z17.0 MALIGNANT NEOPLASM OF OVERLAPPING SITES OF LEFT BREAST IN FEMALE, ESTROGEN RECEPTOR POSITIVE: ICD-10-CM

## 2023-09-26 PROCEDURE — 99213 OFFICE O/P EST LOW 20 MIN: CPT | Performed by: NURSE PRACTITIONER

## 2023-09-29 DIAGNOSIS — I10 ESSENTIAL HYPERTENSION: ICD-10-CM

## 2023-09-29 RX ORDER — AMLODIPINE BESYLATE 10 MG/1
TABLET ORAL
Qty: 90 TABLET | Refills: 1 | Status: SHIPPED | OUTPATIENT
Start: 2023-09-29

## 2023-09-29 RX ORDER — METOPROLOL SUCCINATE 200 MG/1
TABLET, EXTENDED RELEASE ORAL
Qty: 90 TABLET | Refills: 1 | Status: SHIPPED | OUTPATIENT
Start: 2023-09-29

## 2023-10-16 RX ORDER — CITALOPRAM HYDROBROMIDE 10 MG/1
TABLET ORAL
Qty: 90 TABLET | Refills: 1 | Status: SHIPPED | OUTPATIENT
Start: 2023-10-16

## 2023-10-24 ENCOUNTER — OFFICE VISIT (OUTPATIENT)
Dept: ONCOLOGY | Facility: CLINIC | Age: 63
End: 2023-10-24
Payer: COMMERCIAL

## 2023-10-24 VITALS
BODY MASS INDEX: 42.02 KG/M2 | DIASTOLIC BLOOD PRESSURE: 92 MMHG | OXYGEN SATURATION: 98 % | HEIGHT: 66 IN | WEIGHT: 261.5 LBS | SYSTOLIC BLOOD PRESSURE: 145 MMHG | TEMPERATURE: 98 F | RESPIRATION RATE: 16 BRPM | HEART RATE: 56 BPM

## 2023-10-24 DIAGNOSIS — Z79.811 USE OF AROMATASE INHIBITORS: Primary | ICD-10-CM

## 2023-10-24 DIAGNOSIS — D05.02 LOBULAR CARCINOMA IN SITU (LCIS) OF LEFT BREAST: ICD-10-CM

## 2023-10-24 DIAGNOSIS — Z17.0 MALIGNANT NEOPLASM OF OVERLAPPING SITES OF LEFT BREAST IN FEMALE, ESTROGEN RECEPTOR POSITIVE: ICD-10-CM

## 2023-10-24 DIAGNOSIS — Z91.89 INCREASED RISK OF BREAST CANCER: ICD-10-CM

## 2023-10-24 DIAGNOSIS — C50.812 MALIGNANT NEOPLASM OF OVERLAPPING SITES OF LEFT BREAST IN FEMALE, ESTROGEN RECEPTOR POSITIVE: ICD-10-CM

## 2023-10-24 DIAGNOSIS — Z12.2 ENCOUNTER FOR SCREENING FOR LUNG CANCER: ICD-10-CM

## 2023-10-24 RX ORDER — LEVOCETIRIZINE DIHYDROCHLORIDE 5 MG/1
5 TABLET, FILM COATED ORAL EVERY EVENING
COMMUNITY

## 2023-10-24 RX ORDER — VITAMIN B COMPLEX
100 TABLET ORAL
COMMUNITY

## 2023-10-24 NOTE — PROGRESS NOTES
Subjective   Kirsten Lima is a 63 y.o. female. Referred by  for LCIS     History of Present Illness   Ms. Lima is a 61-year-old -American lady who presents with a screen detected abnormality of the left breast.    6/2/2020-screening mammogram  Scattered areas of fibroglandular density.  There are small asymmetry seen in both breasts.  No significant change from prior exams.  Stable punctate and spherical lucent centered calcifications with diffuse distribution in both breasts.  There is a new focal asymmetry measuring 12 mm in the central region of the left breast.    6/24/2020-left diagnostic mammogram  Focal asymmetry in the left breast does not persist.  Ultrasound-no sonographic correlate.  3 oval intraductal masses with partially defined margins measuring 5 mm to 9 mm in the anterior one third subareolar region of the left breast.  Patient reported history of intermittent clear nipple discharge for about 10 years.  These were considered suspicious and ultrasound-guided biopsy was recommended.    6/29/2020-left breast ultrasound-guided biopsy x2  1.left subareolar mass measuring 5 x 5 x 6 mm-benign sclerosing intraductal papilloma with calcifications.  2.left subareolar mass-benign sclerosing intraductal papilloma with usual ductal hyperplasia and microcalcifications.    She was evaluated by Dr. Zambrano on 8/7/2020 and recommended to undergo excisional biopsy of both intraductal papillomas.    2/22/2021-left breast needle localized excisional biopsy-pathology was consistent with lobular carcinoma in situ.  Multiple sclerosed intraductal papillomas with focal calcifications which were completely excised.  Background breast parenchyma showed usual ductal hyperplasia and calcifications with sclerosing adenosis.    She has been referred to medical oncology to discuss risk reduction given findings of lobular carcinoma in situ.    Patient reports chronic nipple discharge from the left.  She also  had significant issues with the breast skin due to hidradenitis and recurrent skin abscesses.  She had a past history of benign breast biopsy , unable to recall the date.  Denies any family history of breast or ovarian cancer.    6/11/2021-bilateral screening mammogram-benign.    12/10/2021-bilateral breast MRI  Right breast-no suspicious findings.    Left breast-  Postsurgical changes in the anterior left breast.  At 5:00, 3.4 cm posterior to the nipple there is a 1.8 cm linear branching non-mass enhancement which is suspicious.  Abnormal enhancement in the left nipple with approximately 1.2 x 1.7 x 1 cm mass involving the nipple itself and extending slightly deeper into the subareolar breast.  This is suspicious.    No extramammary findings    Recommendations  1.suspicious 1.7 cm enhancing left nipple mass, surgical excision recommended as this is not amenable to imaging guided biopsy.  2.suspicious 1.8 cm linear branching non-mass enhancement at 5:00 in the left breast, MR guided biopsy recommended.    12/22/2021-left breast MRI guided biopsy of the 5:00.  A.extensive lobular carcinoma in situ  B.no invasive carcinoma identified by routine or immunostaining  C.associated intraductal papilloma with microcalcifications, ductal cyst wall and fibrocystic change.    Patient was prescribed tamoxifen for risk reduction which she initially did not take and subsequently this was changed to anastrozole at her visit in September which she only started taking in December 2021.    1/12/2022-she was evaluated by Dr. Zambraon and scheduled for left breast needle localized excisional biopsy and left nipple mass excision.    2/1/2022-left breast needle localized excisional biopsy left nipple mass excision  1.left breast lumpectomy-invasive lobular carcinoma, lobular carcinoma in situ and deep typical lobular hyperplasia.  A.invasive lobular carcinoma  Measures 4 mm  Grade 1  The focus is located amongst the area of lobular  carcinoma in situ  Extensive lobular carcinoma in situ and atypical lobular hyperplasia  Fibroadenoma with calcification, sclerosing adenosis, apocrine metaplasia, clustered cysts in area consistent with radial scar and fat necrosis    2.left nipple excision  Simple cyst  Apocrine metaplasia  Intraductal papilloma  Atypical lobular hyperplasia    The lobular carcinoma is ER +80% strong, WI 3% moderate, HER-2 negative, Ki-67 2%    Axillary lymph nodes not evaluated    Case was discussed in multidisciplinary conference and decided to omit axillary staging and proceed with radiation and continue Arimidex.    11/8/2022-low-dose CT of the chest with multiple pulmonary nodules bilateral measuring up to 7 mm.  There is also mediastinal lymph node which measures 1.7 cm and right paratracheal lymph node which measures 1 cm.    11/18/2022-PET/CT  1.multiple bilateral pulmonary nodules which are below PET resolution.  Hypermetabolic mediastinal hilar and right axillary lymphadenopathy.  Right axilla lymph node is 1.2 cm with an SUV of 5.1.  Precavernous node 1.5 cm with an SUV of 5.7.  Asymmetric hypermetabolic thickening of the right palatine tonsil.  Direct visualization recommended.  Focal intense uptake in the rectum and anus is nonspecific.  Hypodense right thyroid lesion demonstrates mild uptake.  Asymmetric soft tissue thickening involving the left breast.    12/15/2022-bronchoscopy and biopsy of the nodes was performed.  Pathology was noted to be benign.  She had a follow-up with pulmonary and she was recommended to have a repeat CT in 3 months.    12/8/2022-thyroid ultrasound performed with multiple bilateral thyroid nodules and none of them meeting criteria for recommendation for biopsy or FNA.  One of them was previously biopsied and benign.    1/3/2023-she had an appointment with Dr. Jac Pop for the asymmetric soft tissue thickening involving the right tonsil.  No significant pathology noted.    Interval  "history:   Ms. Lima presents to the clinic today for follow-up.  She reports no new concerns at this time.  She denies any shortness of breath, abdominal pain, or new bone pain.  She denies any breast wall lesions or palpable masses.  She does still occasionally have some intermittent pain around her lumpectomy site in the left breast that does feel like a \"tinge\" or \"pulling sensation.\"  She does feel that the frequency of this discomfort has decreased.    Breast Mammogram from September 8, 2023 did note calcifications in the right breast that were suspicious and stereotactic biopsy was recommended.  Stereotactic biopsy from 9/21/2023 noted a cluster of apocrine cyst with polarizable calcium oxalate crystals and fibroadenomatoid change with rare calcifications.  Breast MRI in December 2023.  Remains compliant with her daily anastrozole and is tolerating this well.  She does have some intermittent hot flashes but does not note that these are significant.  Last DEXA scan was completed in September 2021.  No other concerns noted at this time.      The following portions of the patient's history were reviewed and updated as appropriate: allergies, current medications, past family history, past medical history, past social history, past surgical history and problem list.    Past Medical History:   Diagnosis Date    Acute cystitis     Allergic rhinitis     Breast cancer     Breast cyst     Colon polyp May 6, 2022    7    Cyst of left nipple     Depression     Diverticulosis May 6, 2022    Dry skin     Dysuria     Hemorrhoid     Hidradenitis     History of bronchitis     Hyperlipidemia     Hypertension     Incontinence in female     wears pads    Insomnia     Low back pain 2020    Nonspecific abnormal electrocardiogram (ECG) (EKG)     Obesity     Overactive bladder     Pregnancy     G-2, P-0    Sleep apnea     UTI (urinary tract infection) 02/2021    Vitamin B12 deficiency     Vitamin D insufficiency     Wound, breast  "    LEFT BREAST; PT INSTRUCTED TO NOTIFY DR ZAMBRANO PRIOR TO SURGERY        Past Surgical History:   Procedure Laterality Date    AXILLARY HIDRADENITIS EXCISION Bilateral     BREAST BIOPSY Left 2021    Procedure: Left breast needle-localized excisional biopsy (tophat clip) and left breast ultrasound-guided excisional biopsy (hydromark clip);  Surgeon: Debora Zambrano MD;  Location: [facility] OR;  Service: General;  Laterality: Left;    BREAST BIOPSY Left 2022    Procedure: Left breast needle-localized excisional biopsy, Left nipple mass excision;  Surgeon: Debora Zambrano MD;  Location: [facility] OR;  Service: General;  Laterality: Left;    BRONCHOSCOPY N/A 12/15/2022    Procedure: BRONCHOSCOPY WITH ENDOBRONCHIAL ULTRASOUND WITH FNA;  Surgeon: Norberto Link MD;  Location: [facility] ENDOSCOPY;  Service: Pulmonary;  Laterality: N/A;  LYMPHADENOPATHY    CARDIAC CATHETERIZATION      COLONOSCOPY      COLONOSCOPY N/A 2022    Procedure: COLONOSCOPY;  Surgeon: Mirtha Davis MD;  Location: [facility] OR;  Service: Gastroenterology;  Laterality: N/A;  diverticulosis, polyps    HYSTERECTOMY  2006    LAVH    SUBTOTAL HYSTERECTOMY  2006        Family History   Problem Relation Age of Onset    Hypertension Mother     COPD Father              Heart failure Father     Hypertension Father     Hyperlipidemia Father     Vision loss Father     Pancreatic cancer Brother         1 brother  from pancreatic cancer    Alcohol abuse Brother              Other Sister     Alcohol abuse Sister              Liver disease Sister     Alcohol abuse Brother          2018    Cancer Brother         Liver    Malig Hyperthermia Neg Hx         Social History     Socioeconomic History    Marital status: Single    Number of children: 0   Tobacco Use    Smoking status: Every Day     Packs/day: 0.50     Years: 42.00     Additional pack years: 0.00     Total pack years: 21.00      "Types: Cigarettes     Start date: 1/1/1979     Passive exposure: Current    Smokeless tobacco: Never   Vaping Use    Vaping Use: Never used   Substance and Sexual Activity    Alcohol use: Yes     Alcohol/week: 0.0 - 1.0 standard drinks of alcohol     Comment: 1-2 drinks a month    Drug use: Never     Comment: marijuana in her twenties    Sexual activity: Not Currently     Partners: Male     Birth control/protection: Abstinence        OB History    No obstetric history on file.      Age at menarche-13  Age at menopause-2006 she is status post hysterectomy.  Still has both ovaries  She does not have any children  Total period of oral contraceptive pill use 8 years      No Known Allergies         Review of Systems   Constitutional: Negative.    HENT: Negative.     Eyes: Negative.    Respiratory: Negative.     Cardiovascular: Negative.    Gastrointestinal: Negative.    Genitourinary:  Positive for amenorrhea.   Allergic/Immunologic: Negative.    Neurological: Negative.    Hematological: Negative.    Psychiatric/Behavioral:  Positive for sleep disturbance.      Review of systems as mentioned in the HPI     Objective   Blood pressure 145/92, pulse 56, temperature 98 °F (36.7 °C), temperature source Temporal, resp. rate 16, height 167.6 cm (65.98\"), weight 119 kg (261 lb 8 oz), SpO2 98%, not currently breastfeeding.   Physical Exam  Vitals reviewed.   Constitutional:       Appearance: Normal appearance. She is obese.   HENT:      Head: Normocephalic and atraumatic.      Right Ear: External ear normal.      Left Ear: External ear normal.      Nose: Nose normal. No congestion or rhinorrhea.      Mouth/Throat:      Mouth: Mucous membranes are moist.      Pharynx: Oropharynx is clear. No oropharyngeal exudate or posterior oropharyngeal erythema.   Eyes:      General:         Right eye: No discharge.         Left eye: No discharge.      Conjunctiva/sclera: Conjunctivae normal.      Pupils: Pupils are equal, round, and reactive " to light.   Cardiovascular:      Rate and Rhythm: Normal rate.   Pulmonary:      Effort: Pulmonary effort is normal.      Breath sounds: Normal breath sounds.   Abdominal:      General: Abdomen is flat. Bowel sounds are normal.      Palpations: Abdomen is soft.   Musculoskeletal:         General: No swelling, tenderness or deformity. Normal range of motion.      Cervical back: Normal range of motion.   Skin:     General: Skin is warm.      Coloration: Skin is not jaundiced or pale.   Neurological:      General: No focal deficit present.      Mental Status: She is alert and oriented to person, place, and time.      Cranial Nerves: No cranial nerve deficit.      Sensory: No sensory deficit.   Psychiatric:         Mood and Affect: Mood normal.         Behavior: Behavior normal.         Thought Content: Thought content normal.         Judgment: Judgment normal.       Breast Exam: Right breast appears normal inspection no palpable abnormalities of the right breast.  Left breast on inspection there is a periareolar incision which is well-healed and also another periareolar incision which is well-healed.  The left breast is hyperpigmented, thickened secondary to radiation changes.  There is tenderness to palpation in the left breast.    I have reexamined the patient and the results are consistent with the previously documented exam. Isma Aguilar, YAN       No visits with results within 30 Day(s) from this visit.   Latest known visit with results is:   Lab Requisition on 09/19/2023   Component Date Value Ref Range Status    Case Report 09/19/2023    Final                    Value:Surgical Pathology Report                         Case: [id number]                                  Authorizing Provider:  Emma Cantu MD         Collected:           09/19/2023 08:57 AM          Ordering Location:     [facility]  Received:            09/19/2023 10:59 AM                                 LABORATORY                                                                    Pathologist:           Isabel Grijalva MD                                                          Specimen:    Breast, Right, right breast stereo bx sub-areolar, for calcs, removed at 857,                       formalin at 917                                                                            Clinical Information 09/19/2023    Final                    Value:This result contains rich text formatting which cannot be displayed here.    Final Diagnosis 09/19/2023    Final                    Value:This result contains rich text formatting which cannot be displayed here.    Preliminary Diagnosis 09/19/2023    Final                    Value:This result contains rich text formatting which cannot be displayed here.    Comment 09/19/2023    Final                    Value:This result contains rich text formatting which cannot be displayed here.    Gross Description 09/19/2023    Final                    Value:This result contains rich text formatting which cannot be displayed here.        No radiology results for the last 30 days.     MRI of the breast 12/9/2022-images independently reviewed and interpreted by me in detail summarized in the HPI.    Breast Mammogram from September 8, 2023 did note calcifications in the right breast that were suspicious and stereotactic biopsy was recommended.  Stereotactic biopsy from 9/21/2023 noted a cluster of apocrine cyst with polarizable calcium oxalate crystals and fibroadenomatoid change with rare calcifications.    Assessment & Plan       62-year-old lady with a previous hysterectomy with uncertain menopausal status presents for management of lobular carcinoma in situ.     *Lobular carcinoma in situ  2/22/2021-left breast needle localized excisional biopsy shows lobular carcinoma in situ with multiple intraductal papillomas.  She was offered tamoxifen 5 mg daily for risk reduction however did not start taking that.  She  was seen in September 2021 and treatment was changed to anastrozole as she was concerned about the risk of blood clots with tamoxifen.  She finally started taking anastrozole in December 2021.  Had an abnormal MRI in December 2021 requiring a left breast biopsy and left breast excision.  Diagnosed with invasive lobular carcinoma  Continues on anastrozole  MRI 12/9/2022 reviewed and benign.  Screening mammogram due August 2023.  This is to be ordered by her gynecologist.  10/24/2023:Breast Mammogram from September 8, 2023 did note calcifications in the right breast that were suspicious and stereotactic biopsy was recommended.  Stereotactic biopsy from 9/21/2023 noted a cluster of apocrine cyst with polarizable calcium oxalate crystals and fibroadenomatoid change with rare calcifications.  Breast MRI scheduled for December 2023.  Patient continues on anastrozole with good tolerance.  Her last DEXA scan was completed in September 2021.  We will order her DEXA scan today as she continues on aromatase inhibitor.  No evidence of recurrence noted on exam today.    *Invasive lobular carcinoma of the left breast  Invasive lobular carcinoma small focus diagnosed with an extensive areas of lobular carcinoma in situ  pT1 a Nx M0  Status post excision of the left breast mass  Completed adjuvant radiation to the left breast in April 2022  Continues on anastrozole  PET/CT shows right axillary lymphadenopathy  MRI 12/9/2022 without any evidence of axillary lymphadenopathy or new abnormalities in either breast.  No clear evidence of recurrent disease.  12/15/2022-bronchoscopy and biopsy with benign lymph nodes.  Repeat CT chest from February 2023 reviewed and the pulmonary nodules have resolved.  The lymphadenopathy is stable.  Likely benign.  10/24/2023: Patient has a follow-up chest CT scheduled in February 2024 for continued surveillance.    *Multiple pulmonary nodules and mediastinal and hilar lymphadenopathy  CT of the chest on  11/8/2022 shows multiple bilateral pulmonary nodules along with mediastinal hilar lymphadenopathy which is greater than 1 cm.  PET/CT 11/18/2022 shows increased uptake in the mediastinal and the hilar lymph nodes.  Bronchoscopy and biopsy 12/15/2022-pathology benign  CT chest from February 2023 reviewed and pulmonary nodules have resolved and the lymphadenopathy is stable.  Likely benign.  Continue follow-up with pulmonology    *Asymmetric uptake of the palatine tonsil  ENT appointment with Dr. Jac Pop on 1/3/2023  No concern for malignancy.  Continue follow-up with ENT    *Thyroid abnormalities on the PET/CT  Thyroid ultrasound 12/8/2022 without any concerning findings     *Lifestyle changes  She has not been exercising or modified her diet  She has been going to physical therapy for back pain  BMI 41.5  We discussed regular exercises.    *Obesity  BMI 41.5  She has met with a dietitian before  Still unable to implement dietary changes successfully.     *Tobacco cessation  Referral made to Prerna Lewis to help with smoking cessation  Encouraged her to use the nicotine patches  Decreased to 5 cigarettes/day  Trying to quit completely.     *Surveillance  Bilateral breast MRI December 9, 2022 benign  Screening mammogram due August 2023  Screening lung CT  11/8/2022 with pulmonary nodules and mediastinal lymphadenopathy  Screening lung CT due February 2024     *Hypertension  Blood pressure stable, blood pressure 132/84  Continue current medications    *Hyperlipidemia  Continue Crestor  Unchanged     *Bladder incontinence  Continue oxybutynin  Continue follow-up with Dr. Cantu her gynecologist     Plan:  Continue daily anastrozole  Mammogram from 9/1/2023 reviewed  Stereotactic biopsy of the breast reviewed  Breast MRI scheduled for December 14, 2023.  Follow-up chest CT scheduled for February 2024  To be scheduled for DEXA scan.  Return for follow-up in 6 months as scheduled with Dr. Dong.    Patient  remains on high risk medication and requires close monitoring for toxicity.

## 2023-11-07 ENCOUNTER — TELEPHONE (OUTPATIENT)
Dept: INTERNAL MEDICINE | Facility: CLINIC | Age: 63
End: 2023-11-07
Payer: COMMERCIAL

## 2023-11-07 DIAGNOSIS — I10 ESSENTIAL HYPERTENSION: ICD-10-CM

## 2023-11-07 DIAGNOSIS — Z00.00 HEALTH CARE MAINTENANCE: Primary | ICD-10-CM

## 2023-11-07 DIAGNOSIS — R73.09 ELEVATED GLUCOSE: ICD-10-CM

## 2023-11-07 DIAGNOSIS — E78.00 PURE HYPERCHOLESTEROLEMIA: ICD-10-CM

## 2023-11-07 NOTE — TELEPHONE ENCOUNTER
----- Message from Meir Hale sent at 11/7/2023  7:31 AM EST -----  Regarding: FW: Appointment canceled  Contact: [phone]  She is going to [facility], will need lab orders  ----- Message -----  From: Kirsten Lima  Sent: 11/7/2023   6:21 AM EST  To: [facility]  Pool  Subject: Appointment canceled                             Appointment canceled for Kirsten HEBERT Lima ([id number])  Visit Type: [facility]  Date        Time      Length    Provider                  Department  11/9/2023   10:00 AM  10 mins.  [facility]     [facility]    Reason for Cancellation: Other    Patient Comments: Please send the blood work order to [facility] at [address] [facility]. [phone].

## 2023-11-12 LAB
ALBUMIN SERPL-MCNC: 4.1 G/DL (ref 3.9–4.9)
ALBUMIN/GLOB SERPL: 1.4 {RATIO} (ref 1.2–2.2)
ALP SERPL-CCNC: 99 IU/L (ref 44–121)
ALT SERPL-CCNC: 12 IU/L (ref 0–32)
AST SERPL-CCNC: 13 IU/L (ref 0–40)
BASOPHILS # BLD AUTO: 0 X10E3/UL (ref 0–0.2)
BASOPHILS NFR BLD AUTO: 1 %
BILIRUB SERPL-MCNC: 0.4 MG/DL (ref 0–1.2)
BUN SERPL-MCNC: 8 MG/DL (ref 8–27)
BUN/CREAT SERPL: 9 (ref 12–28)
CALCIUM SERPL-MCNC: 9.8 MG/DL (ref 8.7–10.3)
CHLORIDE SERPL-SCNC: 98 MMOL/L (ref 96–106)
CHOLEST SERPL-MCNC: 165 MG/DL (ref 100–199)
CO2 SERPL-SCNC: 28 MMOL/L (ref 20–29)
CREAT SERPL-MCNC: 0.9 MG/DL (ref 0.57–1)
EGFRCR SERPLBLD CKD-EPI 2021: 72 ML/MIN/1.73
EOSINOPHIL # BLD AUTO: 0.1 X10E3/UL (ref 0–0.4)
EOSINOPHIL NFR BLD AUTO: 1 %
ERYTHROCYTE [DISTWIDTH] IN BLOOD BY AUTOMATED COUNT: 14.7 % (ref 11.7–15.4)
GLOBULIN SER CALC-MCNC: 2.9 G/DL (ref 1.5–4.5)
GLUCOSE SERPL-MCNC: 99 MG/DL (ref 70–99)
HBA1C MFR BLD: 6.1 % (ref 4.8–5.6)
HCT VFR BLD AUTO: 48.1 % (ref 34–46.6)
HDLC SERPL-MCNC: 59 MG/DL
HGB BLD-MCNC: 15.8 G/DL (ref 11.1–15.9)
IMM GRANULOCYTES # BLD AUTO: 0.1 X10E3/UL (ref 0–0.1)
IMM GRANULOCYTES NFR BLD AUTO: 1 %
LDLC SERPL CALC-MCNC: 83 MG/DL (ref 0–99)
LDLC/HDLC SERPL: 1.4 RATIO (ref 0–3.2)
LYMPHOCYTES # BLD AUTO: 1.4 X10E3/UL (ref 0.7–3.1)
LYMPHOCYTES NFR BLD AUTO: 19 %
MCH RBC QN AUTO: 27.1 PG (ref 26.6–33)
MCHC RBC AUTO-ENTMCNC: 32.8 G/DL (ref 31.5–35.7)
MCV RBC AUTO: 83 FL (ref 79–97)
MONOCYTES # BLD AUTO: 0.6 X10E3/UL (ref 0.1–0.9)
MONOCYTES NFR BLD AUTO: 8 %
NEUTROPHILS # BLD AUTO: 5.2 X10E3/UL (ref 1.4–7)
NEUTROPHILS NFR BLD AUTO: 70 %
PLATELET # BLD AUTO: 252 X10E3/UL (ref 150–450)
POTASSIUM SERPL-SCNC: 3.9 MMOL/L (ref 3.5–5.2)
PROT SERPL-MCNC: 7 G/DL (ref 6–8.5)
RBC # BLD AUTO: 5.82 X10E6/UL (ref 3.77–5.28)
SODIUM SERPL-SCNC: 139 MMOL/L (ref 134–144)
TRIGL SERPL-MCNC: 130 MG/DL (ref 0–149)
TSH SERPL DL<=0.005 MIU/L-ACNC: 0.59 UIU/ML (ref 0.45–4.5)
VLDLC SERPL CALC-MCNC: 23 MG/DL (ref 5–40)
WBC # BLD AUTO: 7.3 X10E3/UL (ref 3.4–10.8)

## 2023-11-16 ENCOUNTER — OFFICE VISIT (OUTPATIENT)
Dept: INTERNAL MEDICINE | Facility: CLINIC | Age: 63
End: 2023-11-16
Payer: COMMERCIAL

## 2023-11-16 VITALS
SYSTOLIC BLOOD PRESSURE: 136 MMHG | BODY MASS INDEX: 41.96 KG/M2 | TEMPERATURE: 99.6 F | DIASTOLIC BLOOD PRESSURE: 88 MMHG | HEIGHT: 66 IN | OXYGEN SATURATION: 98 % | HEART RATE: 59 BPM | WEIGHT: 261.1 LBS

## 2023-11-16 DIAGNOSIS — N32.81 OVERACTIVE BLADDER: ICD-10-CM

## 2023-11-16 DIAGNOSIS — Z23 NEED FOR INFLUENZA VACCINATION: Primary | ICD-10-CM

## 2023-11-16 DIAGNOSIS — E78.00 PURE HYPERCHOLESTEROLEMIA: ICD-10-CM

## 2023-11-16 DIAGNOSIS — I10 PRIMARY HYPERTENSION: ICD-10-CM

## 2023-11-16 RX ORDER — CITALOPRAM 20 MG/1
20 TABLET ORAL DAILY
Start: 2023-11-16

## 2023-11-16 NOTE — PROGRESS NOTES
Subjective   Kirsten Lima is a 63 y.o. female and is here for a comprehensive physical exam. The patient reports problems - oab .  Better with gemtesa on the OAb but it is expensive  SHe has been using a CPAP and is not tolerating it well  Pt has been taking BP meds as prescribed without any problems.  No HA  No episodes of orthostasis  Pt has been taking cholesterol meds as prescribed.  No difficulties with myalgias.       Pt is UTD with annual gyn exam and mammo     Do you take any herbs or supplements that were not prescribed by a doctor?       Social History: no tob no etoh  Social History     Socioeconomic History    Marital status: Single    Number of children: 0   Tobacco Use    Smoking status: Every Day     Packs/day: 0.50     Years: 42.00     Additional pack years: 0.00     Total pack years: 21.00     Types: Cigarettes     Start date: 1979     Passive exposure: Current    Smokeless tobacco: Never   Vaping Use    Vaping Use: Never used   Substance and Sexual Activity    Alcohol use: Yes     Alcohol/week: 0.0 - 1.0 standard drinks of alcohol     Comment: 1-2 drinks a month    Drug use: Never     Comment: marijuana in her twenties    Sexual activity: Not Currently     Partners: Male     Birth control/protection: Abstinence       Family History:   Family History   Problem Relation Age of Onset    Hypertension Mother     COPD Father              Heart failure Father     Hypertension Father     Hyperlipidemia Father     Vision loss Father     Pancreatic cancer Brother         1 brother  from pancreatic cancer    Alcohol abuse Brother              Other Sister     Alcohol abuse Sister              Liver disease Sister     Alcohol abuse Brother              Cancer Brother         Liver    Malig Hyperthermia Neg Hx        Past Medical History:   Past Medical History:   Diagnosis Date    Acute cystitis     Allergic rhinitis     Breast cancer     Breast cyst     Colon  "polyp May 6, 2022    7    Cyst of left nipple     Depression     Diverticulosis May 6, 2022    Dry skin     Dysuria     Hemorrhoid     Hidradenitis     History of bronchitis     Hyperlipidemia     Hypertension     Incontinence in female     wears pads    Insomnia     Low back pain 2020    Nonspecific abnormal electrocardiogram (ECG) (EKG)     Obesity     Overactive bladder     Pregnancy     G-2, P-0    Sleep apnea     UTI (urinary tract infection) 02/2021    Vitamin B12 deficiency     Vitamin D insufficiency     Wound, breast     LEFT BREAST; PT INSTRUCTED TO NOTIFY DR GARCIA PRIOR TO SURGERY           Review of Systems    Pertinent items are noted in HPI.    Objective   /88 (BP Location: Right arm, Patient Position: Sitting)   Pulse 59   Temp 99.6 °F (37.6 °C) (Oral)   Ht 167.6 cm (65.98\")   Wt 118 kg (261 lb 1.6 oz)   SpO2 98%   BMI 42.17 kg/m²     General Appearance:    Alert, cooperative, no distress, appears stated age   Head:    Normocephalic, without obvious abnormality, atraumatic   Eyes:    PERRL, conjunctiva/corneas clear, EOM's intact, fundi     benign, both eyes   Ears:    Normal TM's and external ear canals, both ears   Nose:   Nares normal, septum midline, mucosa normal, no drainage    or sinus tenderness   Throat:   Lips, mucosa, and tongue normal; teeth and gums normal   Neck:   Supple, symmetrical, trachea midline, no adenopathy;     thyroid:  no enlargement/tenderness/nodules; no carotid    bruit or JVD   Back:     Symmetric, no curvature, ROM normal, no CVA tenderness   Lungs:     Clear to auscultation bilaterally, respirations unlabored   Chest Wall:    No tenderness or deformity    Heart:    Regular rate and rhythm, S1 and S2 normal, no murmur, rub   or gallop       Abdomen:     Soft, non-tender, bowel sounds active all four quadrants,     no masses, no organomegaly           Extremities:   Extremities normal, atraumatic, no cyanosis or edema   Pulses:   2+ and symmetric all " extremities   Skin:   Skin color, texture, turgor normal, no rashes or lesions   Lymph nodes:   Cervical, supraclavicular, and axillary nodes normal   Neurologic:   CNII-XII intact, normal strength, sensation and reflexes     throughout       Vitals:    11/16/23 1103   BP: 136/88   Pulse: 59   Temp: 99.6 °F (37.6 °C)   SpO2: 98%     Body mass index is 42.17 kg/m².      Medications:   Current Outpatient Medications:     amLODIPine (NORVASC) 10 MG tablet, TAKE 1 TABLET DAILY, Disp: 90 tablet, Rfl: 1    anastrozole (ARIMIDEX) 1 MG tablet, TAKE 1 TABLET BY MOUTH EVERY DAY, Disp: 90 tablet, Rfl: 3    Cholecalciferol (VITAMIN D) 2000 units capsule, Take 1 capsule by mouth Daily., Disp: , Rfl:     citalopram (CeleXA) 10 MG tablet, TAKE 1 TABLET BY MOUTH EVERY DAY, Disp: 90 tablet, Rfl: 1    clotrimazole-betamethasone (LOTRISONE) 1-0.05 % lotion, Apply  topically to the appropriate area as directed 2 (Two) Times a Day. (Patient taking differently: Apply 1 application  topically to the appropriate area as directed 2 (Two) Times a Day As Needed.), Disp: 30 mL, Rfl: 1    Coenzyme Q10 (CoQ10) 100 MG capsule, Take 1 capsule by mouth., Disp: , Rfl:     Efinaconazole 10 % solution, Apply 1 drop topically Daily. (Patient taking differently: Apply 1 drop topically Daily As Needed.), Disp: 8 mL, Rfl: 3    fluticasone (FLONASE) 50 MCG/ACT nasal spray, 2 sprays by Each Nare route Daily., Disp: 16 g, Rfl: 1    levocetirizine (Xyzal Allergy 24HR) 5 MG tablet, Take 1 tablet by mouth Every Evening., Disp: , Rfl:     lisinopril-hydrochlorothiazide (PRINZIDE,ZESTORETIC) 20-12.5 MG per tablet, TAKE 2 TABLETS DAILY, Disp: 180 tablet, Rfl: 1    metoprolol succinate XL (TOPROL-XL) 200 MG 24 hr tablet, TAKE 1 TABLET DAILY, Disp: 90 tablet, Rfl: 1    nystatin-triamcinolone (MYCOLOG II) 248914-7.1 UNIT/GM-% cream, SMALL AMT CREAM APPLY A THIN ALYER TO EXTERNAL VAGINAL AREA FOR ITCHING TWICE A DAY, Disp: , Rfl:     rosuvastatin (CRESTOR) 5 MG  tablet, TAKE 1 TABLET DAILY, Disp: 90 tablet, Rfl: 1    traZODone (DESYREL) 50 MG tablet, TAKE 1 TABLET EVERY NIGHT, Disp: 90 tablet, Rfl: 3    Vibegron (Gemtesa) 75 MG tablet, Take 1 tablet by mouth Daily., Disp: 28 tablet, Rfl: 0    albuterol sulfate  (90 Base) MCG/ACT inhaler, INHALE 2 PUFFS EVERY 4 HOURS AS NEEDED FOR WHEEZING OR SHORTNESS OF AIR (Patient not taking: Reported on 11/16/2023), Disp: 18 g, Rfl: 0             Assessment & Plan   Healthy female exam.      1. Healthcare Maintenance:  2. Patient Counseling:  --Nutrition: Stressed importance of moderation in sodium/caffeine intake, saturated fat and cholesterol, caloric balance, sufficient intake of fresh fruits, vegetables, fiber, calcium and vit D  --Exercise: she does not exercise reg  I have rec she increase this  --Substance Abuse: no tob no etoh  --Dental health: she does go to the dentist reg  --Immunizations reviewed.  --Discussed benefits of screening colonoscopy.  3.  Depression/anxiety- better but thinks she might do better better with a higher dose  increase to 20mg  4. DM-she is going to work more on diet  5.  OAB-  see if the coupon works for gemtesa if not we may try oxybutynin immediate release at night               Answers submitted by the patient for this visit:  Primary Reason for Visit (Submitted on 11/9/2023)  What is the primary reason for your visit?: Physical

## 2023-12-11 RX ORDER — TRAZODONE HYDROCHLORIDE 50 MG/1
TABLET ORAL
Qty: 90 TABLET | Refills: 3 | Status: SHIPPED | OUTPATIENT
Start: 2023-12-11

## 2023-12-14 ENCOUNTER — HOSPITAL ENCOUNTER (OUTPATIENT)
Dept: MRI IMAGING | Facility: HOSPITAL | Age: 63
Discharge: HOME | End: 2023-12-14
Payer: COMMERCIAL

## 2023-12-14 ENCOUNTER — HOSPITAL ENCOUNTER (OUTPATIENT)
Dept: BONE DENSITY | Facility: HOSPITAL | Age: 63
Discharge: HOME | End: 2023-12-14
Payer: COMMERCIAL

## 2023-12-14 DIAGNOSIS — Z79.811 USE OF AROMATASE INHIBITORS: ICD-10-CM

## 2023-12-14 DIAGNOSIS — Z91.89 INCREASED RISK OF BREAST CANCER: ICD-10-CM

## 2023-12-14 PROCEDURE — 77049 MRI BREAST C-+ W/CAD BI: CPT

## 2023-12-14 PROCEDURE — 0 GADOBENATE DIMEGLUMINE 529 MG/ML SOLUTION: Performed by: NURSE PRACTITIONER

## 2023-12-14 PROCEDURE — 77080 DXA BONE DENSITY AXIAL: CPT

## 2023-12-14 PROCEDURE — A9577 INJ MULTIHANCE: HCPCS | Performed by: NURSE PRACTITIONER

## 2023-12-14 PROCEDURE — 82565 ASSAY OF CREATININE: CPT

## 2023-12-14 RX ADMIN — GADOBENATE DIMEGLUMINE 20 ML: 529 INJECTION, SOLUTION INTRAVENOUS at 15:08

## 2023-12-15 ENCOUNTER — TELEPHONE (OUTPATIENT)
Dept: SURGERY | Facility: CLINIC | Age: 63
End: 2023-12-15
Payer: COMMERCIAL

## 2023-12-15 NOTE — TELEPHONE ENCOUNTER
Spoke to pt and let her know that MRI was normal and we will see her at her set appointment in April    Pt stated understanding   Pt also stated that test was extremely hard to go through she left in tears I told her maybe next time we can prescribe her something

## 2023-12-15 NOTE — TELEPHONE ENCOUNTER
----- Message from YAN Schrader sent at 12/15/2023  8:09 AM EST -----  Please let her know her MRI is normal and I will see her in April   ----- Message -----  From: [facility] [place] [facility]  Sent: 12/14/2023   4:52 PM EST  To: YAN Schrader

## 2023-12-16 LAB — CREAT BLDA-MCNC: 0.9 MG/DL (ref 0.6–1.3)

## 2023-12-18 ENCOUNTER — TELEPHONE (OUTPATIENT)
Dept: ONCOLOGY | Facility: CLINIC | Age: 63
End: 2023-12-18
Payer: COMMERCIAL

## 2023-12-18 NOTE — TELEPHONE ENCOUNTER
----- Message from Irena Dong MD sent at 12/17/2023  8:52 PM EST -----  Pls inform pt that DEXA normal     ----- Message -----  From: Isma Aguilar APRN  Sent: 12/15/2023   4:34 PM EST  To: Irena Dong MD      ----- Message -----  From: [facility] [email or web address] [place]  Sent: 12/15/2023   3:56 PM EST  To: YAN Amin

## 2024-01-08 RX ORDER — TRAZODONE HYDROCHLORIDE 50 MG/1
50 TABLET ORAL
Qty: 90 TABLET | Refills: 3 | OUTPATIENT
Start: 2024-01-08

## 2024-02-22 ENCOUNTER — HOSPITAL ENCOUNTER (OUTPATIENT)
Dept: CT IMAGING | Facility: HOSPITAL | Age: 64
Discharge: HOME | End: 2024-02-22
Admitting: INTERNAL MEDICINE
Payer: COMMERCIAL

## 2024-02-22 DIAGNOSIS — Z12.2 ENCOUNTER FOR SCREENING FOR LUNG CANCER: ICD-10-CM

## 2024-02-22 PROCEDURE — 71271 CT THORAX LUNG CANCER SCR C-: CPT

## 2024-02-25 DIAGNOSIS — I10 ESSENTIAL HYPERTENSION: ICD-10-CM

## 2024-02-26 RX ORDER — ROSUVASTATIN CALCIUM 5 MG/1
TABLET, COATED ORAL
Qty: 90 TABLET | Refills: 1 | Status: SHIPPED | OUTPATIENT
Start: 2024-02-26

## 2024-02-26 RX ORDER — LISINOPRIL AND HYDROCHLOROTHIAZIDE 20; 12.5 MG/1; MG/1
TABLET ORAL
Qty: 180 TABLET | Refills: 1 | Status: SHIPPED | OUTPATIENT
Start: 2024-02-26

## 2024-03-05 RX ORDER — VIBEGRON 75 MG/1
1 TABLET, FILM COATED ORAL DAILY
Qty: 90 TABLET | Refills: 0 | Status: SHIPPED | OUTPATIENT
Start: 2024-03-05 | End: 2024-03-06 | Stop reason: SDUPTHER

## 2024-03-06 RX ORDER — VIBEGRON 75 MG/1
1 TABLET, FILM COATED ORAL DAILY
Qty: 42 TABLET | Refills: 0 | COMMUNITY
Start: 2024-03-06

## 2024-03-25 ENCOUNTER — OFFICE VISIT (OUTPATIENT)
Dept: INTERNAL MEDICINE | Facility: CLINIC | Age: 64
End: 2024-03-25
Payer: COMMERCIAL

## 2024-03-25 ENCOUNTER — HOSPITAL ENCOUNTER (OUTPATIENT)
Dept: GENERAL RADIOLOGY | Facility: HOSPITAL | Age: 64
Discharge: HOME | End: 2024-03-25
Admitting: NURSE PRACTITIONER
Payer: COMMERCIAL

## 2024-03-25 VITALS
DIASTOLIC BLOOD PRESSURE: 72 MMHG | SYSTOLIC BLOOD PRESSURE: 118 MMHG | TEMPERATURE: 98.8 F | WEIGHT: 265.2 LBS | HEART RATE: 58 BPM | OXYGEN SATURATION: 97 % | BODY MASS INDEX: 42.62 KG/M2 | HEIGHT: 66 IN

## 2024-03-25 DIAGNOSIS — R06.02 SOB (SHORTNESS OF BREATH): ICD-10-CM

## 2024-03-25 DIAGNOSIS — F17.200 TOBACCO USE DISORDER: ICD-10-CM

## 2024-03-25 DIAGNOSIS — J20.9 ACUTE BRONCHITIS, UNSPECIFIED ORGANISM: Primary | ICD-10-CM

## 2024-03-25 LAB
EXPIRATION DATE: NORMAL
FLUAV AG UPPER RESP QL IA.RAPID: NOT DETECTED
FLUBV AG UPPER RESP QL IA.RAPID: NOT DETECTED
INTERNAL CONTROL: NORMAL
Lab: NORMAL
SARS-COV-2 AG UPPER RESP QL IA.RAPID: NOT DETECTED

## 2024-03-25 PROCEDURE — 99213 OFFICE O/P EST LOW 20 MIN: CPT | Performed by: NURSE PRACTITIONER

## 2024-03-25 PROCEDURE — 71046 X-RAY EXAM CHEST 2 VIEWS: CPT

## 2024-03-25 RX ORDER — DOXYCYCLINE HYCLATE 100 MG/1
100 CAPSULE ORAL 2 TIMES DAILY
Qty: 14 CAPSULE | Refills: 0 | Status: SHIPPED | OUTPATIENT
Start: 2024-03-25 | End: 2024-04-01

## 2024-03-25 RX ORDER — ALBUTEROL SULFATE 90 UG/1
2 AEROSOL, METERED RESPIRATORY (INHALATION) EVERY 4 HOURS PRN
Qty: 18 G | Refills: 0 | Status: SHIPPED | OUTPATIENT
Start: 2024-03-25 | End: 2024-03-26

## 2024-03-25 RX ORDER — HYDROCODONE POLISTIREX AND CHLORPHENIRAMINE POLISTIREX 10; 8 MG/5ML; MG/5ML
5 SUSPENSION, EXTENDED RELEASE ORAL EVERY 12 HOURS PRN
Qty: 100 ML | Refills: 0 | Status: SHIPPED | OUTPATIENT
Start: 2024-03-25

## 2024-03-25 NOTE — PROGRESS NOTES
Subjective   Kirsten Lima is a 63 y.o. female.     Chief Complaint   Patient presents with    Fever     Pt c/o SOB, fever, cough, HA, neck stiffness X 5 days.    Cough    URI    Shortness of Breath               History of Present Illness   She is here today with complaints of fever, body aches, headache, shortness of breath and cough. Symptoms started approximately 5 days ago with onset of fever and chills. She then developed a cough occasionally productive, but she is not able to cough anything up. Tmax 101. She notes intermittent SOB and wheezing.  Cough is worse when laying down at night, affecting her her sleep. She notes intermittent postnasal drainage.   She notes similar symptoms 3-4 weeks ago with fever, chills and cough with Tmax of 102.  She has been using Dayquil with mild improvement.  She is taking Xyzal daily.  She denies any chest pain or sinus pressure.  She has a long history of tobacco use.  She notes that she has decreased tobacco use while sick over the past week.     The following portions of the patient's history were reviewed and updated as appropriate: allergies, current medications, past family history, past medical history, past social history, past surgical history, and problem list.    Review of Systems   Constitutional:  Positive for chills, fatigue and fever.   HENT:  Positive for congestion and postnasal drip. Negative for ear pain, rhinorrhea, sinus pressure and sore throat.    Respiratory:  Positive for cough, shortness of breath and wheezing. Negative for chest tightness.    Cardiovascular:  Negative for chest pain, palpitations and leg swelling.   Musculoskeletal:  Positive for myalgias.   Neurological:  Negative for headache.       Objective   Physical Exam  Constitutional:       General: She is awake.      Appearance: She is well-developed. She is ill-appearing.   HENT:      Head: Normocephalic and atraumatic.      Right Ear: Hearing, ear canal and external ear normal.  Tympanic membrane is bulging.      Left Ear: Hearing, tympanic membrane, ear canal and external ear normal.      Nose: Congestion present.      Right Turbinates: Not enlarged.      Left Turbinates: Not enlarged.      Right Sinus: No maxillary sinus tenderness or frontal sinus tenderness.      Left Sinus: No maxillary sinus tenderness or frontal sinus tenderness.      Mouth/Throat:      Lips: Pink.      Mouth: Mucous membranes are moist. No injury or oral lesions.      Dentition: Normal dentition.      Tongue: No lesions. Tongue does not deviate from midline.      Palate: No mass and lesions.      Pharynx: Uvula midline. No pharyngeal swelling, oropharyngeal exudate, posterior oropharyngeal erythema or uvula swelling.      Comments: Posterior pharynx with clear drainage.  Neck:      Thyroid: No thyroid mass, thyromegaly or thyroid tenderness.      Vascular: No carotid bruit.      Trachea: Trachea normal.   Cardiovascular:      Rate and Rhythm: Normal rate and regular rhythm.      Chest Wall: PMI is not displaced.      Pulses:           Radial pulses are 2+ on the right side and 2+ on the left side.        Dorsalis pedis pulses are 2+ on the right side and 2+ on the left side.        Posterior tibial pulses are 2+ on the right side and 2+ on the left side.      Heart sounds: S1 normal and S2 normal.   Pulmonary:      Effort: Pulmonary effort is normal.      Breath sounds: Examination of the right-upper field reveals wheezing. Examination of the left-upper field reveals wheezing. Wheezing present. No decreased breath sounds, rhonchi or rales.      Comments: Intermittent bronchitic cough with deep breathing.  Musculoskeletal:      Right lower leg: No edema.      Left lower leg: No edema.   Lymphadenopathy:      Head:      Right side of head: No submental, submandibular, tonsillar or occipital adenopathy.      Left side of head: No submental, submandibular, tonsillar or occipital adenopathy.      Cervical: No cervical  adenopathy.   Skin:     General: Skin is warm and dry.      Capillary Refill: Capillary refill takes less than 2 seconds.      Nails: There is no clubbing.   Neurological:      Mental Status: She is alert and oriented to person, place, and time.   Psychiatric:         Attention and Perception: Attention normal.         Mood and Affect: Mood and affect normal.         Speech: Speech normal.         Behavior: Behavior normal. Behavior is cooperative.         Thought Content: Thought content normal.         Cognition and Memory: Cognition normal.         Vitals:    03/25/24 1348   BP: 118/72   Pulse: 58   Temp: 98.8 °F (37.1 °C)   SpO2: 97%      Body mass index is 42.83 kg/m².    Assessment & Plan   Problems Addressed this Visit       Tobacco use disorder    Relevant Medications    albuterol sulfate  (90 Base) MCG/ACT inhaler    Other Relevant Orders    XR Chest PA & Lateral     Other Visit Diagnoses       Acute bronchitis, unspecified organism    -  Primary    Relevant Medications    albuterol sulfate  (90 Base) MCG/ACT inhaler    Hydrocod Pietro-Chlorphe Pietro ER (TUSSIONEX PENNKINETIC) 10-8 MG/5ML ER suspension    Other Relevant Orders    XR Chest PA & Lateral    SOB (shortness of breath)        Relevant Medications    albuterol sulfate  (90 Base) MCG/ACT inhaler    Other Relevant Orders    XR Chest PA & Lateral          Diagnoses         Codes Comments    Acute bronchitis, unspecified organism    -  Primary ICD-10-CM: J20.9  ICD-9-CM: 466.0     SOB (shortness of breath)     ICD-10-CM: R06.02  ICD-9-CM: 786.05     Tobacco use disorder     ICD-10-CM: F17.200  ICD-9-CM: 305.1           1.  Acute bronchitis-with her increased shortness of breath we will further evaluate for pneumonia with chest x-ray today.  With her long history of tobacco use recommend restarting albuterol 2 puffs every 4 hours as needed for any shortness of breath or wheezing.  Start doxycycline 100 mg twice a day for 7 days.  Take  antibiotic with food, separate from vitamins and supplements make sure to hydrate well with fluids.  Recommend a daily probiotic separate from the antibiotic.  Will also send in a prescription for Tussionex to use nightly as needed for cough as she has previously tried Tessalon without improvement.  If chest x-ray is positive for pneumonia will also start Z-Jeffrey with the doxycycline. Discussed with her if frequent respiratory symptoms persist recommend she be further evaluated by pulmonology with pulmonary function testing.  2.  Tobacco use disorder-encouraged her to quit smoking.  She is working on this.  She is previously tried and failed Wellbutrin.  Recommend starting nicotine replacement therapy following up with PCP to determine if she is a candidate for Chantix.

## 2024-03-26 RX ORDER — LEVALBUTEROL TARTRATE 45 UG/1
1-2 AEROSOL, METERED ORAL EVERY 4 HOURS PRN
Qty: 15 G | Refills: 1 | Status: SHIPPED | OUTPATIENT
Start: 2024-03-26

## 2024-03-26 NOTE — TELEPHONE ENCOUNTER
Pt's insurance no longer covered albuterol [place], alternative suggested levalbuterol HFA. Dr Valdez agreed. RX sent to pharmacy. Pt aware.

## 2024-03-27 ENCOUNTER — TELEPHONE (OUTPATIENT)
Dept: INTERNAL MEDICINE | Facility: CLINIC | Age: 64
End: 2024-03-27
Payer: COMMERCIAL

## 2024-03-27 DIAGNOSIS — R06.02 SOB (SHORTNESS OF BREATH): Primary | ICD-10-CM

## 2024-03-28 ENCOUNTER — APPOINTMENT (OUTPATIENT)
Dept: WOMENS IMAGING | Facility: HOSPITAL | Age: 64
End: 2024-03-28
Payer: COMMERCIAL

## 2024-03-28 PROCEDURE — G0279 TOMOSYNTHESIS, MAMMO: HCPCS | Performed by: RADIOLOGY

## 2024-03-28 PROCEDURE — 77061 BREAST TOMOSYNTHESIS UNI: CPT | Performed by: RADIOLOGY

## 2024-03-28 PROCEDURE — 77065 DX MAMMO INCL CAD UNI: CPT | Performed by: RADIOLOGY

## 2024-04-02 DIAGNOSIS — I10 ESSENTIAL HYPERTENSION: ICD-10-CM

## 2024-04-02 RX ORDER — METOPROLOL SUCCINATE 200 MG/1
TABLET, EXTENDED RELEASE ORAL
Qty: 90 TABLET | Refills: 1 | Status: SHIPPED | OUTPATIENT
Start: 2024-04-02

## 2024-04-02 RX ORDER — AMLODIPINE BESYLATE 10 MG/1
TABLET ORAL
Qty: 90 TABLET | Refills: 1 | Status: SHIPPED | OUTPATIENT
Start: 2024-04-02

## 2024-04-09 RX ORDER — CITALOPRAM HYDROBROMIDE 10 MG/1
TABLET ORAL
Qty: 90 TABLET | Refills: 1 | OUTPATIENT
Start: 2024-04-09

## 2024-04-09 NOTE — PROGRESS NOTES
[facility]     Referring Provider: Tad Lowe MD     Chief complaint: Routine follow up breast cancer    HPI:   8/7/20:  Ms. Kirsten Lima is a 61 yo woman, seen at the request of Dr. Tad Lowe, for a new diagnosis of left breast intraductal papillomas. These were initially detected as an imaging abnormality on routine screening. Her work-up is detailed in the breast history section below. Despite these being incidentally found, the patient reports that she has had intermittent, clear, left nipple discharge for years. 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 She denies any associated breast lumps or pain. She does report chronic issues with her breast skin due to hidradenitis and recurrent skin abscesses, however she has not had any acute problems lately. She has a past history of a benign left breast stereotactic biopsy in 2012. She denies any family history of breast or ovarian cancer.      1/7/21:  At her last visit, I recommended excision of both of the left breast papillomas due the associated nipple discharge. She wanted to think about it and ultimately decided to hold off on surgery. She underwent follow-up ultrasound prior to her appointment, which showed stable left breast intraductal masses (see report details below). She has not tried to elicit any nipple discharge since the last visit and has not noticed any spontaneously.     3/10/21:  She underwent left breast excisional biopsy x 2 on 2/22/21, which showed LCIS. See surgery & pathology details in breast history section below. She has been recovering well and has no complaints.      6/28/21, Visit with YAN Dill :  She returns today for follow up with no breast complaints   In 3/2021 she met with Dr Dong and discussed starting tamoxifen, referral to nutritionist and smoking cessation were made.  Due to  risk for blood clots she has reservations about taking tamoxifen. She will follow up with Dr Dong in 9/21 to discuss again.   Screening mammogram with tomosynthesis was completed 6/11/2021 at [facility], [facility] (see full report below)    1/12/22:  She saw Regina Gilbert in June with a normal screening mammogram. She saw Dr. Dong who initially prescribed low-dose tamoxifen, however the patient never started taking it because she was concerned about blood clots. Dr. Dong then prescribed anastrozole in September, however the patient just started taking this 3 weeks ago.  She underwent her first screening MRI in December, which showed an area of non-mass enhancement in the left lower outer breast and a left nipple mass. She subsequently underwent an MR biopsy of the non-mass enhancement and this showed extensive LCIS (see imaging and pathology report details below).    2/16/22:  She underwent left breast excisional biopsy and left nipple mass excision on 2/1/22. The excisional biopsy showed an incidental focus of low-grade invasive lobular carcinoma measuring 4 mm with negative margins (see report details below). I have already discussed her case in tumor board and the consensus was that axillary staging is not necessary. She has been recovering well from surgery.    8/24/22, Interval History:  She returns today for scheduled follow-up. She completed radiation on 4/5/22 and tolerated this well. She remains on anastrozole and is still tolerating this well. She underwent MMG prior to her appointment which was benign.  She called the office about a month ago complaining of some left breast pain and nodular areas under her left nipple. She began wearing a sports bra day and night and the pain has resolved. She also noticed that the lower part of her left breast looked less swollen after wearing the bra.     2/6/2023 Interval History  Patient returns the office today for routine follow-up.  She last saw Dr. Dong  on 1/11/2023 and no changes were made to treatment plan.  Currently on anastrozole and tolerating well.    9/26/2023 interval history  Presenting to the office today for routine follow-up.  She last saw her oncologist in May without any changes to the treatment plan.  She had a screening mammogram on 9/1/2023 that resulted as BI-RADS 0.  She then had a right diagnostic mammogram which showed calcifications and suggested a biopsy.  She underwent a stereotactic breast biopsy that returned as benign.    4/11/2024 interval history  Patient presenting to the office today for routine follow-up.  On 12/14/2023 she had a bilateral breast MRI that resulted as BI-RADS 2.  On 3/28/2024 she had a right diagnostic mammogram that resulted as BI-RADS 2.  She is due for her bilateral screening mammogram in September.  Last saw her oncologist in May 2023 with no changes made to the treatment plan.  She is due to follow-up with them in May 2024    Oncology/Hematology History Overview Note   5/28/19, Screening MMG with [facility] ([facility]):  Scattered areas of fibroglandular density.  1. There are small asymmetries seen in both breasts. The parenchyma includes stable nodular asymmetries. No suspicious masses, suspicious microcalcifications or areas of architectural distortion are identified. There has been no significant change from the prior exams.  2. There are stable punctate and spherical lucent-centered calcifications with diffuse/scattered distribution seen in both breast.  BI-RADS 2: Benign.     6/2/20, Screening MMG with Hol[facility] ([facility]):  Scattered areas of fibroglandular density.  1. There are small asymmetries seen in both breasts. There has been no significant change from the prior exams.  2. There are stable punctate and spherical lucent-centered calcifications with diffuse distribution seen in both breasts.  3. There is a new small focal asymmetry measuring 12 mm seen in the central region of the left breast. This is  best visualized on tomosynthesis MLO view slice # 78. This is best seen on the MLO View.  BI-RADS 0: Incomplete.     20, Left Diagnostic MMG with [facility] & Left Breast US ([facility]):  MM. On the present examination, focal asymmetry in the left breast, central does not persist. The asymmetry noted on the recent screening mammogram resolves into stroma on additional views.  US:  1. There is no sonographic correlate. There is no evidence of any solid mass or abnormal cystic elements.  2. There are three oval intraductal masses with partially defined margins measuring 5 mm to 9 mm seen in the anterior one-third subareolar region of the left breast. The patient reports history of intermittent clear left nipple discharge for the past 10 years. These three structures are adjacent and would be included within the same biopsy site.  3. There is an oval solid mass with partially defined margins measuring 6 x 5 x 5 mm seen in the anterior one-third subareolar region of the left breast. This is located 2 to 3 cm away from the above described masses.   BI-RADS 4: Suspicious      20, Left Breast, US-Guided Biopsy x 2 ([facility])  1. Left Subareolar mass (5 x 5 x 6 cm), Ultrasound guided biopsy:   Benign sclerosing intraductal papilloma with calcifications.  -Tophat clip. Concordant.  2. Left Subareolar mass, ultrasound guided biopsy:   Benign sclerosing intraductal papilloma with usual duct hyperplasia and microcalcifications.   -Hydromark clip. Concordant.     20, Left Breast US ([facility]):  There are two oval masses and biopsy clips with circumscribed margins seen in the sub-areolar region of the left breast. There are no significant changes from the prior exam(s). This finding represents biopsy proven benign breast disease. These measure less than 1 cm.  BI-RADS  2: Benign.     21, Left breast needle-localized excisional biopsy and left breast ultrasound-guided excisional biopsy:  1. Left Breast, Needle-Localized  Excisional Biopsy (13 grams):               A. LOBULAR CARCINOMA IN SITU (LCIS).               B. Multiple sclerosed intraductal papillomas with focal calcifications (completely excised).               C. Clips and associated biopsy site changes are present and adjacent to papillomas.               D. Background breast parenchyma with clusters of apocrine cysts, usual ductal hyperplasia, and       calcifications associated with sclerosing adenosis.    6/11/2021, Screening MMG with [facility] ([facility]):  Scattered areas of fibroglandular density. There is a new postsurgical scar seen subareolar region of the left breast.  There are no suspicious masses, calcifications, or areas of architectural distortion. In the right breast, no suspicious masses, significant calcifications or other abnormalities are seen.  BI-RADS 2: Benign.     Malignant neoplasm of overlapping sites of left breast in female, estrogen receptor positive   12/9/2021 Initial Diagnosis    Malignant neoplasm of overlapping sites of left breast in female, estrogen receptor positive (HCC)     12/10/2021 Imaging    Bilateral Breast MRI ([facility]):  RIGHT BREAST:    No suspicious enhancing mass or area of non-mass enhancement is identified. The visualized axilla is within normal limits.    LEFT BREAST:    There are post surgical changes in the anterior left breast. At 5:00 in the anterior left breast, 3.4 cm posterior to the nipple,  there is a 1.8 cm AP dimension somewhat linear branching nonmass enhancement, which is suspicious.   There is abnormal asymmetric enhancement within the left nipple with an approximately 1.2 cm AP dimension, 1.7 cm transverse dimension, 1.0 cm craniocaudal dimension mass involving the nipple itself and extending slightly deeper into the subareolar breast, which is associated with rapid initial and washout delayed phase enhancement on kinetic analysis. This is suspicious.  No suspicious enhancement is identified in the left chest  wall. The visualized axilla is within normal limits.    EXTRAMAMMARY FINDINGS:   There are no abnormally enlarged internal mammary chain lymph nodes on either side.     BI-RADS 4: Suspicious.     12/22/2021 Biopsy    Left Breast, MR-Guided Biopsy ([facility]):    1. Left Breast at 5 o'clock (not for calcifications) MRI-Guided Core Biopsy:                A.  Extensive lobular carcinoma in situ (LCIS):                            1.  LCIS measures up to 7 mm in single greatest dimension focally involving 7 of 8                      representative cores.                2.  Low grade nuclear features without necrosis.  B.  No invasive carcinoma identified by routine and/or immunostaining.  C.  Associated intraductal papilloma with microcalcification, ductal cyst wall and fibrocystic change noted.      2/1/2022 Surgery    Left breast needle-localized excisional biopsy and left nipple mass excision:    1. Breast, Left, Lumpectomy: Invasive lobular carcinoma, lobular carcinoma in situ and atypical lobular hyperplasia.               A. Invasive lobular carcinoma.                            1. The invasive lobular carcinoma measures 4 mm on the slide.                            2. The invasive lobular carcinoma is Addison grade I (tubular grade 3, nuclear grade 1, mitotic grade 1).                            3. The foci is located amongst an area of lobular carcinoma in situ and near the biopsy cavity.                            4. Biopsy site and clip identified (barbell shaped clip).                            5. The invasive lobular carcinoma comes within 1.3 mm of the medial margin, 7.5 mm of the posterior margin, 8.8 mm of the anterior margin, 12.5 mm of the lateral margin and 15 mm of the superior and inferior margins.                            6. Microcalcifications are not associated with the invasive tumor.               B. Extensive lobular carcinoma in situ and atypical lobular hyperplasia.  C. Fibroadenoma with  calcification, sclerosing adenosis, apocrine metaplasia, clustered cysts, area consistent with radial scar and fat necrosis.     2. Breast, Left, Nipple, Excision:  Breast tissue with               A. Simple cysts.               B. Calcifications in non-neoplastic tissue.               C. Apocrine metaplasia.               D. Intraductal papilloma with sclerosis.               E. Atypical lobular hyperplasia.    ER+ (80%, strong)  CT+ (3%, moderate)  Her2 negative (IHC 1+)  Ki-67 2%     3/9/2022 - 4/5/2022 Radiation    Radiation OncologyTreatment Course:  Kirsten Lima received 4990 cGy in 20 fractions to LEFT breast.     4/6/2022 -  Hormonal Therapy    Anastrozole (Arimidex)     8/15/2022 Imaging    Screening MMG with Hol[facility] ([facility]):  Scattered areas of fibroglandular density.  There is a stable post-surgical scar with associated skin lesion and trabecular thickening seen in the central region of the left breast. Post-surgical scar is in an area of prior lumpectomy. Findings are consistent with post-surgical and post-radiation therapy changes.  In the right breast, no suspicious masses, significant calcifications or other abnormalities are seen.  BI-RADS 2: Benign.     12/9/2022 Imaging    Breast MRI at [facility]  FINDINGS:Scattered fibroglandular tissue seen is throughout both  breasts. Mild background parenchymal enhancement of both breasts is  noted. Postsurgical change in the left breast is noted. Otherwise, no  areas of abnormal morphology are seen in either breast. No areas of  abnormal enhancement are seen in either breast. I see no evidence for  abnormal skin, nipple or chest wall enhancement of either breast and  there is no evidence for axillary or internal mammary chain adenopathy.     IMPRESSION:  There are no findings suspicious for malignancy in either  breast. Routine follow-up imaging is recommended.     BI-RADS category 2     9/1/2023 Imaging    Bilateral screening mammogram at  [facility]  There are scattered areas of fibroglandular density.    Finding 1:  There is a stable post-surgical scar seen in the left breast.  No  suspicious masses, suspicious microcalcifications or architectural distortions  are identified.  There has been no significant change from the prior exam(s).    Finding 2:  There are calcifications seen in the sub-areolar region of the right  breast.    IMPRESSION:  Finding 1:  Stable post-surgical scar in the left breast is benign-negative.    Finding 2:  Calcifications in the right breast require additional evaluation.  Magnification mammography is recommended.    BI-RADS Category 0: Incomplete: Needs Additional Imaging Evaluation     9/7/2023 Imaging    Right diagnostic mammogram at [facility]  There are scattered areas of fibroglandular density.    There are round and punctate calcifications measuring 8 mm seen in the sub-areolar region of the right breast  located 5 centimeters from the nipple.  Otherwise, no suspicious masses, suspicious microcalcifications or  architectural distortions are identified.  There has been no significant change from the prior exam(s).    IMPRESSION:  Calcifications in the right breast are suspicious. Stereotactic biopsy is recommended.    The patient was mailed a notification letter.    BI-RADS Category 4B: Suspicious Abnormality     9/19/2023 Biopsy    Right stereotactic biopsy at [facility]  Technically successful stereotactic right breast biopsy with marker clip placement and positive radiograph.   Pathology results to follow.     A two view mammogram shows the clip in the expected location.   Pathology returned as cyst and fibroadenomatoid hyperplasia.   Pathology is benign and concordant.  A follow up mammogram in 6 months is recommended.      12/14/2023 Imaging    Bilateral breast MRI [facility]  FINDINGS: There is scattered fibroglandular tissue. There is mild  background parenchymal enhancement.     RIGHT BREAST:    There are new post biopsy changes in the  anterior right breast from a  recent benign biopsy. No suspicious enhancing mass or area of non-mass  enhancement is identified.  The visualized axilla is within normal limits.     LEFT BREAST:    Benign-appearing postsurgical changes are identified in the left breast.  No suspicious enhancing mass or area of non-mass enhancement is  identified.  The visualized axilla is within normal limits.     EXTRAMAMMARY FINDINGS:  There are no pathologically enlarged internal mammary chain lymph nodes  on either side.          IMPRESSION AND RECOMMENDATION:     No MR evidence of malignancy in either breast. Recommend annual  screening mammogram in September 2023.     BI-RADS Category 2: Benign     3/28/2024 Imaging    Right diagnostic mammogram at [facility]  The following views were obtained: Right craniocaudal; right craniocaudal magnification; right mediolateral; right  mediolateral magnification; and right mediolateral oblique.  This examination was reviewed with the aid of R2  computer aided detection.    Follow-up examination was performed for the calcifications in the right breast, sub-areolar seen on 09/07/2023.  There is a decreased number of calcifications.  This finding represents biopsy proven benign breast disease.  Note  is made of a biopsy clip as evidence of a previous surgical procedure.    IMPRESSION:  Calcifications in the right breast are benign-negative.    Screening mammogram in 1 year is recommended.    The patient was mailed a notification letter.    BI-RADS Category 2: Benign Finding(s)         Review of Systems:  See interval history.       Medications:    Current Outpatient Medications:     amLODIPine (NORVASC) 10 MG tablet, TAKE 1 TABLET DAILY, Disp: 90 tablet, Rfl: 1    anastrozole (ARIMIDEX) 1 MG tablet, TAKE 1 TABLET BY MOUTH EVERY DAY, Disp: 90 tablet, Rfl: 3    Cholecalciferol (VITAMIN D) 2000 units capsule, Take 1 capsule by mouth Daily., Disp: , Rfl:     clotrimazole-betamethasone (LOTRISONE) 1-0.05  % lotion, Apply  topically to the appropriate area as directed 2 (Two) Times a Day. (Patient not taking: Reported on 3/25/2024), Disp: 30 mL, Rfl: 1    Coenzyme Q10 (CoQ10) 100 MG capsule, Take 1 capsule by mouth. (Patient not taking: Reported on 3/25/2024), Disp: , Rfl:     Efinaconazole 10 % solution, Apply 1 drop topically Daily. (Patient taking differently: Apply 1 drop topically Daily As Needed.), Disp: 8 mL, Rfl: 3    fluticasone (FLONASE) 50 MCG/ACT nasal spray, 2 sprays by Each Nare route Daily. (Patient not taking: Reported on 3/25/2024), Disp: 16 g, Rfl: 1    Hydrocod Pietro-Chlorphe Pietro ER (TUSSIONEX PENNKINETIC) 10-8 MG/5ML ER suspension, Take 5 mL by mouth Every 12 (Twelve) Hours As Needed for Cough., Disp: 100 mL, Rfl: 0    levalbuterol (XOPENEX HFA) 45 MCG/ACT inhaler, Inhale 1-2 puffs Every 4 (Four) Hours As Needed for Wheezing., Disp: 15 g, Rfl: 1    levocetirizine (Xyzal Allergy 24HR) 5 MG tablet, Take 1 tablet by mouth Every Evening., Disp: , Rfl:     lisinopril-hydrochlorothiazide (PRINZIDE,ZESTORETIC) 20-12.5 MG per tablet, TAKE 2 TABLETS DAILY, Disp: 180 tablet, Rfl: 1    metoprolol succinate XL (TOPROL-XL) 200 MG 24 hr tablet, TAKE 1 TABLET DAILY, Disp: 90 tablet, Rfl: 1    rosuvastatin (CRESTOR) 5 MG tablet, TAKE 1 TABLET DAILY, Disp: 90 tablet, Rfl: 1    traZODone (DESYREL) 50 MG tablet, TAKE 1 TABLET EVERY NIGHT, Disp: 90 tablet, Rfl: 3    Vibegron (Gemtesa) 75 MG tablet, Take 1 tablet by mouth Daily., Disp: 42 tablet, Rfl: 0      Allergies:  No Known Allergies      Family History   Problem Relation Age of Onset    Hypertension Mother     COPD Father              Heart failure Father     Hypertension Father     Hyperlipidemia Father     Vision loss Father     Pancreatic cancer Brother         1 brother  from pancreatic cancer    Alcohol abuse Brother              Other Sister     Alcohol abuse Sister              Liver disease Sister     Alcohol abuse Brother           2018    Cancer Brother         Liver    Malig Hyperthermia Neg Hx        PHYSICAL EXAMINATION:   There were no vitals filed for this visit.    ECOG 0 - Asymptomatic  General: NAD, well appearing, obese  Psych: a&o x 3, normal mood and affect  Eyes: EOMI, no scleral icterus  ENMT: neck supple without masses or thyromegaly, mucus membranes moist  MSK: normal gait, normal ROM in bilateral shoulders  Lymph nodes: Bilateral axillary scar; no cervical, supraclavicular or axillary lymphadenopathy  Breast: very large size, pendulous  Right: No visible abnormalities on inspection while seated, with arms raised or hands on hips. No masses or nipple abnormalities. 12:00 large area of darker skin that pt states has been there for months.   Left: On inspection, there is hyperpigmentation and the left breast is smaller and uplifted vs the right. Well-healed medial periareolar and lower outer periareolar scars. There are some expected postsurgical changes in the periareolar region, but no discrete masses. No nipple abnormalities. There is mild edema of the inferior breast.          Assessment:  63 y.o. F with a new diagnosis of left breast cancer: Low grade, invasive lobular carcinoma, ER/NC positive, HER-2 negative. This was diagnosed after excisional biopsy for LCIS on 22, pT1aNx. Case was discussed at multidisciplinary tumor board and it was decided to omit axillary staging. She completed radiation on 22. She is currently on anastrozole.    -She has a past history of left breast LCIS, which was diagnosed after left breast excisional biopsy x2 on 21 for intraductal papillomas associated with pathologic nipple discharge.     -Because of her increased lifetime risk of breast cancer (47.1%, TCv8, calculated 3/10/21) and history of LCIS, she was in high risk screening and on anastrozole since 2022. Since she was already in high risk screening, we will plan to continue this in the future.    -She has mild  left breast lymphedema.    Plan:  -Lymphedema clinic PRN  -Continue follow-up with Dr. Dong.  - mammo in sept followed by exam  -She was instructed to call sooner with any questions, concerns or changes on BSE.    YAN Schrader      CC:  MD Verenice Donaldson MD Ann Grider, MD

## 2024-04-11 ENCOUNTER — OFFICE VISIT (OUTPATIENT)
Dept: SURGERY | Facility: CLINIC | Age: 64
End: 2024-04-11
Payer: COMMERCIAL

## 2024-04-11 VITALS
BODY MASS INDEX: 43.55 KG/M2 | DIASTOLIC BLOOD PRESSURE: 84 MMHG | WEIGHT: 271 LBS | SYSTOLIC BLOOD PRESSURE: 134 MMHG | OXYGEN SATURATION: 93 % | HEIGHT: 66 IN | HEART RATE: 58 BPM

## 2024-04-11 DIAGNOSIS — Z17.0 MALIGNANT NEOPLASM OF OVERLAPPING SITES OF LEFT BREAST IN FEMALE, ESTROGEN RECEPTOR POSITIVE: Primary | ICD-10-CM

## 2024-04-11 DIAGNOSIS — Z12.31 ENCOUNTER FOR SCREENING MAMMOGRAM FOR MALIGNANT NEOPLASM OF BREAST: ICD-10-CM

## 2024-04-11 DIAGNOSIS — D24.2 INTRADUCTAL PAPILLOMA OF LEFT BREAST: ICD-10-CM

## 2024-04-11 DIAGNOSIS — D05.02 LOBULAR CARCINOMA IN SITU (LCIS) OF LEFT BREAST: ICD-10-CM

## 2024-04-11 DIAGNOSIS — C50.812 MALIGNANT NEOPLASM OF OVERLAPPING SITES OF LEFT BREAST IN FEMALE, ESTROGEN RECEPTOR POSITIVE: Primary | ICD-10-CM

## 2024-04-11 PROCEDURE — 99213 OFFICE O/P EST LOW 20 MIN: CPT | Performed by: NURSE PRACTITIONER

## 2024-04-18 ENCOUNTER — OFFICE VISIT (OUTPATIENT)
Dept: CARDIOLOGY | Facility: CLINIC | Age: 64
End: 2024-04-18
Payer: COMMERCIAL

## 2024-04-18 VITALS
HEIGHT: 66 IN | SYSTOLIC BLOOD PRESSURE: 144 MMHG | DIASTOLIC BLOOD PRESSURE: 88 MMHG | HEART RATE: 62 BPM | BODY MASS INDEX: 43.23 KG/M2 | WEIGHT: 269 LBS

## 2024-04-18 DIAGNOSIS — I10 PRIMARY HYPERTENSION: ICD-10-CM

## 2024-04-18 DIAGNOSIS — R06.09 DYSPNEA ON EXERTION: Primary | ICD-10-CM

## 2024-04-18 DIAGNOSIS — E66.01 CLASS 3 SEVERE OBESITY DUE TO EXCESS CALORIES WITHOUT SERIOUS COMORBIDITY WITH BODY MASS INDEX (BMI) OF 40.0 TO 44.9 IN ADULT: ICD-10-CM

## 2024-04-18 DIAGNOSIS — E78.00 PURE HYPERCHOLESTEROLEMIA: ICD-10-CM

## 2024-04-18 DIAGNOSIS — Z17.0 MALIGNANT NEOPLASM OF OVERLAPPING SITES OF LEFT BREAST IN FEMALE, ESTROGEN RECEPTOR POSITIVE: ICD-10-CM

## 2024-04-18 DIAGNOSIS — R94.31 ABNORMAL ECG: ICD-10-CM

## 2024-04-18 DIAGNOSIS — I10 ESSENTIAL HYPERTENSION: ICD-10-CM

## 2024-04-18 DIAGNOSIS — C50.812 MALIGNANT NEOPLASM OF OVERLAPPING SITES OF LEFT BREAST IN FEMALE, ESTROGEN RECEPTOR POSITIVE: ICD-10-CM

## 2024-04-18 PROBLEM — E66.813 CLASS 3 SEVERE OBESITY DUE TO EXCESS CALORIES WITHOUT SERIOUS COMORBIDITY WITH BODY MASS INDEX (BMI) OF 40.0 TO 44.9 IN ADULT: Status: ACTIVE | Noted: 2024-04-18

## 2024-04-18 PROCEDURE — 93000 ELECTROCARDIOGRAM COMPLETE: CPT | Performed by: INTERNAL MEDICINE

## 2024-04-18 PROCEDURE — 99204 OFFICE O/P NEW MOD 45 MIN: CPT | Performed by: INTERNAL MEDICINE

## 2024-04-18 RX ORDER — SPIRONOLACTONE 25 MG/1
25 TABLET ORAL EVERY MORNING
Qty: 90 TABLET | Refills: 3 | Status: SHIPPED | OUTPATIENT
Start: 2024-04-18

## 2024-04-18 RX ORDER — LISINOPRIL AND HYDROCHLOROTHIAZIDE 20; 12.5 MG/1; MG/1
2 TABLET ORAL EVERY MORNING
Qty: 180 TABLET | Refills: 1 | Status: SHIPPED | OUTPATIENT
Start: 2024-04-18

## 2024-04-18 RX ORDER — AMLODIPINE BESYLATE 5 MG/1
5 TABLET ORAL EVERY MORNING
Qty: 90 TABLET | Refills: 3 | Status: SHIPPED | OUTPATIENT
Start: 2024-04-18

## 2024-04-18 RX ORDER — ROSUVASTATIN CALCIUM 10 MG/1
10 TABLET, COATED ORAL NIGHTLY
Qty: 90 TABLET | Refills: 3 | Status: SHIPPED | OUTPATIENT
Start: 2024-04-18

## 2024-04-18 RX ORDER — METOPROLOL SUCCINATE 200 MG/1
200 TABLET, EXTENDED RELEASE ORAL NIGHTLY
Qty: 90 TABLET | Refills: 1 | Status: SHIPPED | OUTPATIENT
Start: 2024-04-18

## 2024-04-18 NOTE — PROGRESS NOTES
Date of Office Visit: 2024  Encounter Provider: Eliza Timmons MD  Place of Service: [facility] CARDIOLOGY  Patient Name: Kirsten Lima  :1960      Patient ID:  Kirsten Liam is a 63 y.o. female is here for dyspnea.         History of Present Illness    She has a history of hypertension, left breast cancer, VALERIE untreated, hyperlipidemia, obesity.  She is here for exertional dyspnea.    Her dad had COPD and hypertension.  Her mom had hypertension.  She has a sister and a stroke.  She has 2 brothers who had cancer.  She is single, retired from [employer], smokes half a pack of cigarettes a day, drinks 2 caffeinated beverages per day and has occasional alcohol.    Labs done 2023 show normal CMP, hemoglobin A1c 6.1%, normal TSH, total cholesterol 165, HDL 59, LDL 83, triglycerides 130, VLDL 23, normal CBC.  Ankle-brachial indices done 2022 were normal.  CT chest low-dose cancer screening done 2024 showed benign findings on the lung exam with airway disease and emphysema, mild bronchiectasis noted.  I did review the images showing calcification of the proximal to mid LAD, calcification of the proximal circumflex, spotty calcification of the proximal RCA, calcification of the aortic valve annulus.    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  She was seen in 2021 and treatment was changed to anastrozole because she was concerned about risks of blood clots with tamoxifen.  She started anastrozole 2021.  She had an abnormal MRI 2021 which required left breast biopsy and left breast tumor excision-she was diagnosed with invasive lobular carcinoma.  She completed 20 cycles of adjuvant radiation 2022 and remains on anastrozole.  She  follows with Dr. Dong.    She has noticed progressive exertional dyspnea.  She does have lower extremity edema.  She has gained about 30 pounds and she is not exercising.  She has had 2 recent episodes of severe bronchitis and feels like her breathing got worse after that.  She sees Dr. Rogel with pulmonary.  Her sleep apnea is untreated because she could not tolerate the CPAP masks.  She is willing to try a dental device.  She has no orthopnea or PND.  She does not feel her heart racing or skipping and she has no dizziness, syncope or falls.  She has no chest pain.    Past Medical History:   Diagnosis Date    Acute cystitis     Allergic rhinitis     Breast cancer     Breast cyst     Colon polyp May 6, 2022    7    Cyst of left nipple     Depression     Diverticulosis May 6, 2022    Dry skin     Dysuria     Hemorrhoid     Hidradenitis     History of bronchitis     Hyperlipidemia     Hypertension     Incontinence in female     wears pads    Insomnia     Low back pain 2020    Nonspecific abnormal electrocardiogram (ECG) (EKG)     Obesity     Overactive bladder     Pregnancy     G-2, P-0    Sleep apnea     UTI (urinary tract infection) 02/2021    Vitamin B12 deficiency     Vitamin D insufficiency     Wound, breast     LEFT BREAST; PT INSTRUCTED TO NOTIFY DR ZAMBRANO PRIOR TO SURGERY         Past Surgical History:   Procedure Laterality Date    AXILLARY HIDRADENITIS EXCISION Bilateral     BREAST BIOPSY Left 02/22/2021    Procedure: Left breast needle-localized excisional biopsy (tophat clip) and left breast ultrasound-guided excisional biopsy (hydromark clip);  Surgeon: Debora Zambrano MD;  Location: [facility];  Service: General;  Laterality: Left;    BREAST BIOPSY Left 02/01/2022    Procedure: Left breast needle-localized excisional biopsy, Left nipple mass excision;  Surgeon: Debora Zambrano MD;  Location: [facility];  Service: General;  Laterality: Left;    BRONCHOSCOPY N/A 12/15/2022     Procedure: BRONCHOSCOPY WITH ENDOBRONCHIAL ULTRASOUND WITH FNA;  Surgeon: Norberto Link MD;  Location: [facility] ENDOSCOPY;  Service: Pulmonary;  Laterality: N/A;  LYMPHADENOPATHY    CARDIAC CATHETERIZATION      COLONOSCOPY      COLONOSCOPY N/A 05/06/2022    Procedure: COLONOSCOPY;  Surgeon: Mirtha Davis MD;  Location: [facility] MAIN OR;  Service: Gastroenterology;  Laterality: N/A;  diverticulosis, polyps    HYSTERECTOMY  2006    [facility]    SUBTOTAL HYSTERECTOMY  2006       Current Outpatient Medications on File Prior to Visit   Medication Sig Dispense Refill    anastrozole (ARIMIDEX) 1 MG tablet TAKE 1 TABLET BY MOUTH EVERY DAY 90 tablet 3    Cholecalciferol (VITAMIN D) 2000 units capsule Take 1 capsule by mouth Daily.      clotrimazole-betamethasone (LOTRISONE) 1-0.05 % lotion Apply  topically to the appropriate area as directed 2 (Two) Times a Day. 30 mL 1    levocetirizine (Xyzal Allergy 24HR) 5 MG tablet Take 1 tablet by mouth Every Evening.      Vibegron (Gemtesa) 75 MG tablet Take 1 tablet by mouth Daily. 42 tablet 0    [DISCONTINUED] amLODIPine (NORVASC) 10 MG tablet TAKE 1 TABLET DAILY 90 tablet 1    [DISCONTINUED] lisinopril-hydrochlorothiazide (PRINZIDE,ZESTORETIC) 20-12.5 MG per tablet TAKE 2 TABLETS DAILY 180 tablet 1    [DISCONTINUED] metoprolol succinate XL (TOPROL-XL) 200 MG 24 hr tablet TAKE 1 TABLET DAILY 90 tablet 1    [DISCONTINUED] rosuvastatin (CRESTOR) 5 MG tablet TAKE 1 TABLET DAILY 90 tablet 1    fluticasone (FLONASE) 50 MCG/ACT nasal spray 2 sprays by Each Nare route Daily. (Patient not taking: Reported on 3/25/2024) 16 g 1    levalbuterol (XOPENEX HFA) 45 MCG/ACT inhaler Inhale 1-2 puffs Every 4 (Four) Hours As Needed for Wheezing. (Patient not taking: Reported on 4/18/2024) 15 g 1    [DISCONTINUED] Coenzyme Q10 (CoQ10) 100 MG capsule Take 1 capsule by mouth. (Patient not taking: Reported on 3/25/2024)      [DISCONTINUED] Efinaconazole 10 % solution Apply 1 drop topically Daily.  "(Patient not taking: Reported on 4/11/2024) 8 mL 3    [DISCONTINUED] Hydrocod Pietro-Chlorphe Pietro ER (TUSSIONEX PENNKINETIC) 10-8 MG/5ML ER suspension Take 5 mL by mouth Every 12 (Twelve) Hours As Needed for Cough. (Patient not taking: Reported on 4/18/2024) 100 mL 0    [DISCONTINUED] traZODone (DESYREL) 50 MG tablet TAKE 1 TABLET EVERY NIGHT (Patient not taking: Reported on 4/18/2024) 90 tablet 3     No current facility-administered medications on file prior to visit.       Social History     Socioeconomic History    Marital status: Single    Number of children: 0   Tobacco Use    Smoking status: Every Day     Current packs/day: 0.25     Average packs/day: 0.3 packs/day for 64.7 years (21.0 ttl pk-yrs)     Types: Cigarettes     Start date: 1/1/1979     Passive exposure: Current    Smokeless tobacco: Never    Tobacco comments:     Caffeine: 2 drinks daily and occ rich   Vaping Use    Vaping status: Never Used   Substance and Sexual Activity    Alcohol use: Yes     Alcohol/week: 0.0 - 1.0 standard drinks of alcohol     Comment: 1-2 drinks a month    Drug use: Never     Comment: marijuana in her twenties    Sexual activity: Not Currently     Partners: Male     Birth control/protection: Abstinence             Procedures    ECG 12 Lead    Date/Time: 4/18/2024 9:16 AM  Performed by: Eliza Timmons MD    Authorized by: Eliza Timmons MD  Comparison: compared with previous ECG   Comparison to previous ECG: T wave inversions  Rhythm: sinus rhythm  T inversion: I, aVL, V2, V3, V4, V5 and V6    Clinical impression: abnormal EKG              Objective:      Vitals:    04/18/24 0844   BP: 144/88   Pulse: 62   Weight: 122 kg (269 lb)   Height: 167.6 cm (66\")     Body mass index is 43.42 kg/m².    Vitals reviewed.   Constitutional:       General: Not in acute distress.     Appearance: Obese. Not diaphoretic.   Neck:      Vascular: No carotid bruit or JVD.   Pulmonary:      Effort: Pulmonary effort is normal.      " Breath sounds: Normal breath sounds.   Cardiovascular:      Normal rate. Regular rhythm.      Murmurs: There is no murmur.      No gallop.  No rub.   Pulses:     Intact distal pulses.      Carotid: 2+ bilaterally.     Radial: 2+ bilaterally.     Dorsalis pedis: 2+ bilaterally.     Posterior tibial: 2+ bilaterally.  Edema:     Peripheral edema present.     Pretibial: bilateral 1+ edema of the pretibial area.     Ankle: bilateral 1+ edema of the ankle.  Neurological:      Cranial Nerves: No cranial nerve deficit.         Lab Review:       Assessment:      Diagnosis Plan   1. Dyspnea on exertion  D-dimer, Quantitative    proBNP    High Sensitivity Troponin T    Uric Acid    Adult Transthoracic Echo Complete W/ Cont if Necessary Per Protocol    Stress Test With Myocardial Perfusion One Day      2. Pure hypercholesterolemia  D-dimer, Quantitative    proBNP    High Sensitivity Troponin T    Uric Acid    Adult Transthoracic Echo Complete W/ Cont if Necessary Per Protocol    Stress Test With Myocardial Perfusion One Day      3. Primary hypertension  D-dimer, Quantitative    proBNP    High Sensitivity Troponin T    Uric Acid    Adult Transthoracic Echo Complete W/ Cont if Necessary Per Protocol    Stress Test With Myocardial Perfusion One Day      4. Abnormal ECG  Stress Test With Myocardial Perfusion One Day      5. Class 3 severe obesity due to excess calories without serious comorbidity with body mass index (BMI) of 40.0 to 44.9 in adult        6. Essential hypertension  amLODIPine (NORVASC) 5 MG tablet    lisinopril-hydrochlorothiazide (PRINZIDE,ZESTORETIC) 20-12.5 MG per tablet    metoprolol succinate XL (TOPROL-XL) 200 MG 24 hr tablet      7. Malignant neoplasm of overlapping sites of left breast in female, estrogen receptor positive          Dyspnea on exertion.  Concerned that this is cardiac in etiology.  Abnormal ECG  Hypertension, goal <120/80.   Hyperlipidemia, on rosuvastatin  Invasive lobular carcinoma of the  "left breast, treated with lumpectomy, adjuvant radiation and anastrozole.  Follows with Dr. Dong  Obesity and sedentary lifestyle  Coronary artery calcification noted on low-dose CT chest 2/2024  Tobacco use, needs to stop smoking.  Edema, possibly due to amlodipine.      Plan:       See APRN in 6 weeks, decrease amlodipine to 5 mg in the morning for lower extremity edema, start spironolactone 25 mg in the morning for hypertension, change metoprolol to nightly, remain on lisinopril HCTZ in the morning, change rosuvastatin to nightly and increase to 10 mg.  Advised water aerobics at [facility].  Set up echocardiogram and stress nuclear perfusion study to rule out ischemia and cardiomyopathy.  See pulmonary to get treatment for VALERIE.      STOP-Bang Score  Have you been diagnosed with Sleep Apnea?: yes  Snoring?: no  Tired?: yes  Observed?: yes  Pressure?: yes  Stop Score: 3  Body Mass Index more than 35 kg/m2?: yes  Age older than 50 year old?: yes  Neck large? \">17\"/43cm-M, >16\"/41cm-F: yes  Gender=Male?: no  Total Stop-Bang Score: 6    "

## 2024-04-18 NOTE — LETTER
2024       No Recipients    Patient: Kirsten Lima   YOB: 1960   Date of Visit: 2024     Dear Verenice Valdez MD:       Thank you for referring Kirsten Lima to me for evaluation. Below are the relevant portions of my assessment and plan of care.    If you have questions, please do not hesitate to call me. I look forward to following Kirsten along with you.         Sincerely,        Eliza Timmons MD        CC:   No Recipients    Eliza Timmons MD  [phone]  Sign when Signing Visit  Date of Office Visit: 2024  Encounter Provider: Eliza Timmons MD  Place of Service: [facility] CARDIOLOGY  Patient Name: Kirsten Lima  :1960      Patient ID:  Kirsten Lima is a 63 y.o. female is here for dyspnea.         History of Present Illness    She has a history of hypertension, left breast cancer, VALERIE untreated, hyperlipidemia, obesity.  She is here for exertional dyspnea.    Her dad had COPD and hypertension.  Her mom had hypertension.  She has a sister and a stroke.  She has 2 brothers who had cancer.  She is single, retired from [employer], smokes half a pack of cigarettes a day, drinks 2 caffeinated beverages per day and has occasional alcohol.    Labs done 2023 show normal CMP, hemoglobin A1c 6.1%, normal TSH, total cholesterol 165, HDL 59, LDL 83, triglycerides 130, VLDL 23, normal CBC.  Ankle-brachial indices done 2022 were normal.  CT chest low-dose cancer screening done 2024 showed benign findings on the lung exam with airway disease and emphysema, mild bronchiectasis noted.  I did review the images showing calcification of the proximal to mid LAD, calcification of the proximal circumflex, spotty calcification of the proximal RCA, calcification of the aortic valve annulus.    She has a history of intraductal papillomas and had left needle biopsy showing lobular carcinoma in situ with multiple  intraductal papillomas, was offered tamoxifen 5 mg daily for risk reduction but did not start this.  She was seen in September 2021 and treatment was changed to anastrozole because she was concerned about risks of blood clots with tamoxifen.  She started anastrozole December 2021.  She had an abnormal MRI December 2021 which required left breast biopsy and left breast tumor excision-she was diagnosed with invasive lobular carcinoma.  She completed 20 cycles of adjuvant radiation April 2022 and remains on anastrozole.  She follows with Dr. Dong.    She has noticed progressive exertional dyspnea.  She does have lower extremity edema.  She has gained about 30 pounds and she is not exercising.  She has had 2 recent episodes of severe bronchitis and feels like her breathing got worse after that.  She sees Dr. Rogel with pulmonary.  Her sleep apnea is untreated because she could not tolerate the CPAP masks.  She is willing to try a dental device.  She has no orthopnea or PND.  She does not feel her heart racing or skipping and she has no dizziness, syncope or falls.  She has no chest pain.    Past Medical History:   Diagnosis Date   • Acute cystitis    • Allergic rhinitis    • Breast cancer    • Breast cyst    • Colon polyp May 6, 2022    7   • Cyst of left nipple    • Depression    • Diverticulosis May 6, 2022   • Dry skin    • Dysuria    • Hemorrhoid    • Hidradenitis    • History of bronchitis    • Hyperlipidemia    • Hypertension    • Incontinence in female     wears pads   • Insomnia    • Low back pain 2020   • Nonspecific abnormal electrocardiogram (ECG) (EKG)    • Obesity    • Overactive bladder    • Pregnancy     G-2, P-0   • Sleep apnea    • UTI (urinary tract infection) 02/2021   • Vitamin B12 deficiency    • Vitamin D insufficiency    • Wound, breast     LEFT BREAST; PT INSTRUCTED TO NOTIFY DR GARCIA PRIOR TO SURGERY         Past Surgical History:   Procedure Laterality Date   • AXILLARY HIDRADENITIS  EXCISION Bilateral    • BREAST BIOPSY Left 02/22/2021    Procedure: Left breast needle-localized excisional biopsy (tophat clip) and left breast ultrasound-guided excisional biopsy (hydromark clip);  Surgeon: Debora Zambrano MD;  Location: [facility] MAIN OR;  Service: General;  Laterality: Left;   • BREAST BIOPSY Left 02/01/2022    Procedure: Left breast needle-localized excisional biopsy, Left nipple mass excision;  Surgeon: Debora Zambrano MD;  Location: [facility] MAIN OR;  Service: General;  Laterality: Left;   • BRONCHOSCOPY N/A 12/15/2022    Procedure: BRONCHOSCOPY WITH ENDOBRONCHIAL ULTRASOUND WITH FNA;  Surgeon: Norberto Link MD;  Location: [facility] ENDOSCOPY;  Service: Pulmonary;  Laterality: N/A;  LYMPHADENOPATHY   • CARDIAC CATHETERIZATION     • COLONOSCOPY     • COLONOSCOPY N/A 05/06/2022    Procedure: COLONOSCOPY;  Surgeon: Mirtha Davis MD;  Location: [facility];  Service: Gastroenterology;  Laterality: N/A;  diverticulosis, polyps   • HYSTERECTOMY  2006    [facility]   • SUBTOTAL HYSTERECTOMY  2006       Current Outpatient Medications on File Prior to Visit   Medication Sig Dispense Refill   • anastrozole (ARIMIDEX) 1 MG tablet TAKE 1 TABLET BY MOUTH EVERY DAY 90 tablet 3   • Cholecalciferol (VITAMIN D) 2000 units capsule Take 1 capsule by mouth Daily.     • clotrimazole-betamethasone (LOTRISONE) 1-0.05 % lotion Apply  topically to the appropriate area as directed 2 (Two) Times a Day. 30 mL 1   • levocetirizine (Xyzal Allergy 24HR) 5 MG tablet Take 1 tablet by mouth Every Evening.     • Vibegron (Gemtesa) 75 MG tablet Take 1 tablet by mouth Daily. 42 tablet 0   • [DISCONTINUED] amLODIPine (NORVASC) 10 MG tablet TAKE 1 TABLET DAILY 90 tablet 1   • [DISCONTINUED] lisinopril-hydrochlorothiazide (PRINZIDE,ZESTORETIC) 20-12.5 MG per tablet TAKE 2 TABLETS DAILY 180 tablet 1   • [DISCONTINUED] metoprolol succinate XL (TOPROL-XL) 200 MG 24 hr tablet TAKE 1 TABLET DAILY 90 tablet 1   •  [DISCONTINUED] rosuvastatin (CRESTOR) 5 MG tablet TAKE 1 TABLET DAILY 90 tablet 1   • fluticasone (FLONASE) 50 MCG/ACT nasal spray 2 sprays by Each Nare route Daily. (Patient not taking: Reported on 3/25/2024) 16 g 1   • levalbuterol (XOPENEX HFA) 45 MCG/ACT inhaler Inhale 1-2 puffs Every 4 (Four) Hours As Needed for Wheezing. (Patient not taking: Reported on 4/18/2024) 15 g 1   • [DISCONTINUED] Coenzyme Q10 (CoQ10) 100 MG capsule Take 1 capsule by mouth. (Patient not taking: Reported on 3/25/2024)     • [DISCONTINUED] Efinaconazole 10 % solution Apply 1 drop topically Daily. (Patient not taking: Reported on 4/11/2024) 8 mL 3   • [DISCONTINUED] Hydrocod Pietro-Chlorphe Pietro ER (TUSSIONEX PENNKINETIC) 10-8 MG/5ML ER suspension Take 5 mL by mouth Every 12 (Twelve) Hours As Needed for Cough. (Patient not taking: Reported on 4/18/2024) 100 mL 0   • [DISCONTINUED] traZODone (DESYREL) 50 MG tablet TAKE 1 TABLET EVERY NIGHT (Patient not taking: Reported on 4/18/2024) 90 tablet 3     No current facility-administered medications on file prior to visit.       Social History     Socioeconomic History   • Marital status: Single   • Number of children: 0   Tobacco Use   • Smoking status: Every Day     Current packs/day: 0.25     Average packs/day: 0.3 packs/day for 64.7 years (21.0 ttl pk-yrs)     Types: Cigarettes     Start date: 1/1/1979     Passive exposure: Current   • Smokeless tobacco: Never   • Tobacco comments:     Caffeine: 2 drinks daily and occ rich   Vaping Use   • Vaping status: Never Used   Substance and Sexual Activity   • Alcohol use: Yes     Alcohol/week: 0.0 - 1.0 standard drinks of alcohol     Comment: 1-2 drinks a month   • Drug use: Never     Comment: marijuana in her twenties   • Sexual activity: Not Currently     Partners: Male     Birth control/protection: Abstinence             Procedures    ECG 12 Lead    Date/Time: 4/18/2024 9:16 AM  Performed by: Eliza Timmons MD    Authorized by: Iain  "Eliza AMBROSIO MD  Comparison: compared with previous ECG   Comparison to previous ECG: T wave inversions  Rhythm: sinus rhythm  T inversion: I, aVL, V2, V3, V4, V5 and V6    Clinical impression: abnormal EKG              Objective:      Vitals:    04/18/24 0844   BP: 144/88   Pulse: 62   Weight: 122 kg (269 lb)   Height: 167.6 cm (66\")     Body mass index is 43.42 kg/m².    Vitals reviewed.   Constitutional:       General: Not in acute distress.     Appearance: Not diaphoretic.   Neck:      Vascular: No carotid bruit or JVD.   Pulmonary:      Effort: Pulmonary effort is normal.      Breath sounds: Normal breath sounds.   Cardiovascular:      Normal rate. Regular rhythm.      Murmurs: There is no murmur.      No gallop.  No rub.   Pulses:     Intact distal pulses.      Carotid: 2+ bilaterally.     Radial: 2+ bilaterally.     Dorsalis pedis: 2+ bilaterally.     Posterior tibial: 2+ bilaterally.  Edema:     Peripheral edema absent.   Neurological:      Cranial Nerves: No cranial nerve deficit.         Lab Review:       Assessment:      Diagnosis Plan   1. Dyspnea on exertion  D-dimer, Quantitative    proBNP    High Sensitivity Troponin T    Uric Acid    Adult Transthoracic Echo Complete W/ Cont if Necessary Per Protocol    Stress Test With Myocardial Perfusion One Day      2. Pure hypercholesterolemia  D-dimer, Quantitative    proBNP    High Sensitivity Troponin T    Uric Acid    Adult Transthoracic Echo Complete W/ Cont if Necessary Per Protocol    Stress Test With Myocardial Perfusion One Day      3. Primary hypertension  D-dimer, Quantitative    proBNP    High Sensitivity Troponin T    Uric Acid    Adult Transthoracic Echo Complete W/ Cont if Necessary Per Protocol    Stress Test With Myocardial Perfusion One Day      4. Abnormal ECG  Stress Test With Myocardial Perfusion One Day      5. Class 3 severe obesity due to excess calories without serious comorbidity with body mass index (BMI) of 40.0 to 44.9 in adult      " "  6. Essential hypertension  amLODIPine (NORVASC) 5 MG tablet    lisinopril-hydrochlorothiazide (PRINZIDE,ZESTORETIC) 20-12.5 MG per tablet    metoprolol succinate XL (TOPROL-XL) 200 MG 24 hr tablet      7. Malignant neoplasm of overlapping sites of left breast in female, estrogen receptor positive          Dyspnea on exertion.  Concerned that this is cardiac in etiology.  Abnormal ECG  Hypertension, goal <120/80.   Hyperlipidemia, on rosuvastatin  Invasive lobular carcinoma of the left breast, treated with lumpectomy, adjuvant radiation and anastrozole.  Follows with Dr. Dong  Obesity and sedentary lifestyle  Coronary artery calcification noted on low-dose CT chest 2/2024  Tobacco use, needs to stop smoking.     Plan:       See APRN in 6 weeks, decrease amlodipine to 5 mg in the morning for lower extremity edema, start spironolactone 25 mg in the morning for hypertension, change metoprolol to nightly, remain on lisinopril HCTZ in the morning, change rosuvastatin to nightly and increase to 10 mg.  Advised [facility] aerobics at [facility].  Set up echocardiogram and stress nuclear perfusion study to rule out ischemia and cardiomyopathy.  See pulmonary to get treatment for VALERIE.      STOP-Bang Score  Have you been diagnosed with Sleep Apnea?: yes  Snoring?: no  Tired?: yes  Observed?: yes  Pressure?: yes  Stop Score: 3  Body Mass Index more than 35 kg/m2?: yes  Age older than 50 year old?: yes  Neck large? \">17\"/43cm-M, >16\"/41cm-F: yes  Gender=Male?: no  Total Stop-Bang Score: 6    "

## 2024-04-19 ENCOUNTER — TELEPHONE (OUTPATIENT)
Dept: CARDIOLOGY | Facility: CLINIC | Age: 64
End: 2024-04-19
Payer: COMMERCIAL

## 2024-04-19 ENCOUNTER — HOSPITAL ENCOUNTER (OUTPATIENT)
Dept: CT IMAGING | Facility: HOSPITAL | Age: 64
Discharge: HOME | End: 2024-04-19
Admitting: INTERNAL MEDICINE
Payer: COMMERCIAL

## 2024-04-19 DIAGNOSIS — R06.09 DYSPNEA ON EXERTION: ICD-10-CM

## 2024-04-19 DIAGNOSIS — R06.09 DYSPNEA ON EXERTION: Primary | ICD-10-CM

## 2024-04-19 LAB
CREAT BLDA-MCNC: 1 MG/DL (ref 0.6–1.3)
D DIMER PPP FEU-MCNC: 0.72 MG/L FEU (ref 0–0.49)
NT-PROBNP SERPL-MCNC: 312 PG/ML (ref 0–287)
TROPONIN T SERPL HS-MCNC: 7 NG/L (ref 0–14)
URATE SERPL-MCNC: 5.8 MG/DL (ref 3–7.2)

## 2024-04-19 PROCEDURE — 71275 CT ANGIOGRAPHY CHEST: CPT

## 2024-04-19 PROCEDURE — 82565 ASSAY OF CREATININE: CPT

## 2024-04-19 PROCEDURE — 25510000001 IOPAMIDOL PER 1 ML: Performed by: INTERNAL MEDICINE

## 2024-04-19 RX ADMIN — IOPAMIDOL 95 ML: 755 INJECTION, SOLUTION INTRAVENOUS at 16:40

## 2024-04-19 NOTE — TELEPHONE ENCOUNTER
Notified pt of results and orders. She verbalized understanding. Notified Hospital Scheduling who will work on scheduling her CT scan.    Thank you,    Madeleine Burt, RN  Triage [facility] [place]  04/19/24 14:57 EDT

## 2024-04-19 NOTE — NURSING NOTE
D/w Dr. Silva that CTA was negative.  Ok to discharge. IV site d/c'ed, site secured with 2x2 gauze and coban wrap.  Patient ambulated out front independently in no distress, in stable condition.

## 2024-04-22 ENCOUNTER — TELEPHONE (OUTPATIENT)
Dept: CARDIOLOGY | Facility: CLINIC | Age: 64
End: 2024-04-22
Payer: COMMERCIAL

## 2024-04-22 NOTE — TELEPHONE ENCOUNTER
I spoke with Kirsten Lima and updated pt on results/recommendations from provider.  They verbalized understanding and have no further questions at this time.    Thank you,    Ale CHARLES, RN  [place] [facility]  04/22/24 09:33 EDT

## 2024-04-22 NOTE — TELEPHONE ENCOUNTER
CTA chest shows no pulmonary embolism.  Coronary calcification noted-this is the start of coronary artery disease.  Left ventricle appears mildly thickened consistent with hypertension.  Advised no smoking, no medication changes at this time.  Please call

## 2024-04-29 ENCOUNTER — TELEPHONE (OUTPATIENT)
Dept: CARDIOLOGY | Facility: CLINIC | Age: 64
End: 2024-04-29
Payer: COMMERCIAL

## 2024-04-30 ENCOUNTER — HOSPITAL ENCOUNTER (OUTPATIENT)
Dept: CARDIOLOGY | Facility: HOSPITAL | Age: 64
Discharge: HOME | End: 2024-04-30
Payer: COMMERCIAL

## 2024-04-30 ENCOUNTER — TELEPHONE (OUTPATIENT)
Dept: CARDIOLOGY | Facility: CLINIC | Age: 64
End: 2024-04-30
Payer: COMMERCIAL

## 2024-04-30 VITALS — BODY MASS INDEX: 43.93 KG/M2 | HEIGHT: 66 IN | WEIGHT: 273.37 LBS

## 2024-04-30 VITALS
BODY MASS INDEX: 43.23 KG/M2 | HEIGHT: 66 IN | WEIGHT: 269 LBS | DIASTOLIC BLOOD PRESSURE: 90 MMHG | SYSTOLIC BLOOD PRESSURE: 134 MMHG | HEART RATE: 66 BPM

## 2024-04-30 DIAGNOSIS — R94.31 ABNORMAL ECG: ICD-10-CM

## 2024-04-30 DIAGNOSIS — R06.09 DYSPNEA ON EXERTION: ICD-10-CM

## 2024-04-30 DIAGNOSIS — E78.00 PURE HYPERCHOLESTEROLEMIA: ICD-10-CM

## 2024-04-30 DIAGNOSIS — I10 PRIMARY HYPERTENSION: ICD-10-CM

## 2024-04-30 LAB
AORTIC ARCH: 3 CM
AORTIC DIMENSIONLESS INDEX: 0.7 (DI)
ASCENDING AORTA: 2.6 CM
BH CV ECHO LEFT VENTRICLE GLOBAL LONGITUDINAL STRAIN: -21.3 %
BH CV ECHO MEAS - ACS: 1.98 CM
BH CV ECHO MEAS - AO MAX PG: 7.9 MMHG
BH CV ECHO MEAS - AO MEAN PG: 4.7 MMHG
BH CV ECHO MEAS - AO ROOT DIAM: 3.2 CM
BH CV ECHO MEAS - AO V2 MAX: 140.3 CM/SEC
BH CV ECHO MEAS - AO V2 VTI: 31.6 CM
BH CV ECHO MEAS - AVA(I,D): 2.4 CM2
BH CV ECHO MEAS - EDV(CUBED): 94.8 ML
BH CV ECHO MEAS - EDV(MOD-SP2): 76 ML
BH CV ECHO MEAS - EDV(MOD-SP4): 98 ML
BH CV ECHO MEAS - EF(MOD-BP): 63.5 %
BH CV ECHO MEAS - EF(MOD-SP2): 64.5 %
BH CV ECHO MEAS - EF(MOD-SP4): 62.2 %
BH CV ECHO MEAS - ESV(CUBED): 29.9 ML
BH CV ECHO MEAS - ESV(MOD-SP2): 27 ML
BH CV ECHO MEAS - ESV(MOD-SP4): 37 ML
BH CV ECHO MEAS - FS: 31.9 %
BH CV ECHO MEAS - IVS/LVPW: 0.96 CM
BH CV ECHO MEAS - IVSD: 1.2 CM
BH CV ECHO MEAS - LAT PEAK E' VEL: 10.4 CM/SEC
BH CV ECHO MEAS - LV DIASTOLIC VOL/BSA (35-75): 43.2 CM2
BH CV ECHO MEAS - LV MASS(C)D: 209.8 GRAMS
BH CV ECHO MEAS - LV MAX PG: 5.4 MMHG
BH CV ECHO MEAS - LV MEAN PG: 2.7 MMHG
BH CV ECHO MEAS - LV SYSTOLIC VOL/BSA (12-30): 16.3 CM2
BH CV ECHO MEAS - LV V1 MAX: 116.1 CM/SEC
BH CV ECHO MEAS - LV V1 VTI: 23.7 CM
BH CV ECHO MEAS - LVIDD: 4.6 CM
BH CV ECHO MEAS - LVIDS: 3.1 CM
BH CV ECHO MEAS - LVOT AREA: 3.2 CM2
BH CV ECHO MEAS - LVOT DIAM: 2.02 CM
BH CV ECHO MEAS - LVPWD: 1.26 CM
BH CV ECHO MEAS - MED PEAK E' VEL: 6.2 CM/SEC
BH CV ECHO MEAS - MV A DUR: 0.16 SEC
BH CV ECHO MEAS - MV A MAX VEL: 87.8 CM/SEC
BH CV ECHO MEAS - MV DEC SLOPE: 466 CM/SEC2
BH CV ECHO MEAS - MV DEC TIME: 0.29 SEC
BH CV ECHO MEAS - MV E MAX VEL: 72.4 CM/SEC
BH CV ECHO MEAS - MV E/A: 0.82
BH CV ECHO MEAS - MV MAX PG: 3.6 MMHG
BH CV ECHO MEAS - MV MEAN PG: 1.48 MMHG
BH CV ECHO MEAS - MV P1/2T: 61.4 MSEC
BH CV ECHO MEAS - MV V2 VTI: 31.3 CM
BH CV ECHO MEAS - MVA(P1/2T): 3.6 CM2
BH CV ECHO MEAS - MVA(VTI): 2.42 CM2
BH CV ECHO MEAS - PA ACC TIME: 0.17 SEC
BH CV ECHO MEAS - PA V2 MAX: 104.5 CM/SEC
BH CV ECHO MEAS - PULM A REVS DUR: 0.13 SEC
BH CV ECHO MEAS - PULM A REVS VEL: 28.5 CM/SEC
BH CV ECHO MEAS - PULM DIAS VEL: 47.7 CM/SEC
BH CV ECHO MEAS - PULM S/D: 0.89
BH CV ECHO MEAS - PULM SYS VEL: 42.7 CM/SEC
BH CV ECHO MEAS - QP/QS: 1.03
BH CV ECHO MEAS - RAP SYSTOLE: 3 MMHG
BH CV ECHO MEAS - RV MAX PG: 1.79 MMHG
BH CV ECHO MEAS - RV V1 MAX: 66.8 CM/SEC
BH CV ECHO MEAS - RV V1 VTI: 15.9 CM
BH CV ECHO MEAS - RVOT DIAM: 2.5 CM
BH CV ECHO MEAS - RVSP: 10 MMHG
BH CV ECHO MEAS - SUP REN AO DIAM: 2.4 CM
BH CV ECHO MEAS - SV(LVOT): 75.8 ML
BH CV ECHO MEAS - SV(MOD-SP2): 49 ML
BH CV ECHO MEAS - SV(MOD-SP4): 61 ML
BH CV ECHO MEAS - SV(RVOT): 78.3 ML
BH CV ECHO MEAS - SVI(LVOT): 33.4 ML/M2
BH CV ECHO MEAS - SVI(MOD-SP2): 21.6 ML/M2
BH CV ECHO MEAS - SVI(MOD-SP4): 26.9 ML/M2
BH CV ECHO MEAS - TAPSE (>1.6): 2.6 CM
BH CV ECHO MEAS - TR MAX PG: 7 MMHG
BH CV ECHO MEAS - TR MAX VEL: 132.7 CM/SEC
BH CV ECHO MEASUREMENTS AVERAGE E/E' RATIO: 8.72
BH CV NUCLEAR PRIOR STUDY: 2
BH CV STRESS BP STAGE 1: NORMAL
BH CV STRESS COMMENTS STAGE 1: NORMAL
BH CV STRESS DOSE REGADENOSON STAGE 1: 0.4
BH CV STRESS DURATION MIN STAGE 1: 0
BH CV STRESS DURATION SEC STAGE 1: 10
BH CV STRESS HR STAGE 1: 103
BH CV STRESS NUCLEAR ISOTOPE DOSE: 37.5 MCI
BH CV STRESS PROTOCOL 1: NORMAL
BH CV STRESS RECOVERY BP: NORMAL MMHG
BH CV STRESS RECOVERY HR: 75 BPM
BH CV STRESS STAGE 1: 1
BH CV XLRA - RV BASE: 3.6 CM
BH CV XLRA - RV LENGTH: 6.5 CM
BH CV XLRA - RV MID: 2.8 CM
BH CV XLRA - TDI S': 14.5 CM/SEC
LEFT ATRIUM VOLUME INDEX: 35.5 ML/M2
LV EF NUC BP: 58 %
MAXIMAL PREDICTED HEART RATE: 157 BPM
PERCENT MAX PREDICTED HR: 65.61 %
SINUS: 2.9 CM
STJ: 2.16 CM
STRESS BASELINE BP: NORMAL MMHG
STRESS BASELINE HR: 69 BPM
STRESS PERCENT HR: 77 %
STRESS POST EXERCISE DUR SEC: 10 SEC
STRESS POST PEAK BP: NORMAL MMHG
STRESS POST PEAK HR: 103 BPM
STRESS TARGET HR: 133 BPM

## 2024-04-30 PROCEDURE — 93306 TTE W/DOPPLER COMPLETE: CPT | Performed by: INTERNAL MEDICINE

## 2024-04-30 PROCEDURE — 93306 TTE W/DOPPLER COMPLETE: CPT

## 2024-04-30 PROCEDURE — 0 TECHNETIUM TETROFOSMIN KIT: Performed by: INTERNAL MEDICINE

## 2024-04-30 PROCEDURE — 25010000002 REGADENOSON 0.4 MG/5ML SOLUTION: Performed by: INTERNAL MEDICINE

## 2024-04-30 PROCEDURE — 78451 HT MUSCLE IMAGE SPECT SING: CPT

## 2024-04-30 PROCEDURE — A9502 TC99M TETROFOSMIN: HCPCS | Performed by: INTERNAL MEDICINE

## 2024-04-30 PROCEDURE — 93356 MYOCRD STRAIN IMG SPCKL TRCK: CPT

## 2024-04-30 PROCEDURE — 93017 CV STRESS TEST TRACING ONLY: CPT

## 2024-04-30 PROCEDURE — 93356 MYOCRD STRAIN IMG SPCKL TRCK: CPT | Performed by: INTERNAL MEDICINE

## 2024-04-30 RX ORDER — REGADENOSON 0.08 MG/ML
0.4 INJECTION, SOLUTION INTRAVENOUS
Status: COMPLETED | OUTPATIENT
Start: 2024-04-30 | End: 2024-04-30

## 2024-04-30 RX ADMIN — REGADENOSON 0.4 MG: 0.08 INJECTION, SOLUTION INTRAVENOUS at 07:50

## 2024-04-30 RX ADMIN — TETROFOSMIN 1 DOSE: 1.38 INJECTION, POWDER, LYOPHILIZED, FOR SOLUTION INTRAVENOUS at 07:50

## 2024-04-30 NOTE — TELEPHONE ENCOUNTER
Results and recommendations called to pt.  Instructed to call with any further questions or concerns.  Verbalized understanding.    Corin Ramey RN  [place] Nurse[place] [facility] [place]  04/30/24 13:02 EDT

## 2024-04-30 NOTE — TELEPHONE ENCOUNTER
Results and recommendations called to pt.  Instructed to call with any further questions or concerns.  Verbalized understanding.    Corin Ramey RN  Triage Nurse, [facility] [place]  04/30/24 08:45 EDT

## 2024-04-30 NOTE — TELEPHONE ENCOUNTER
Please call, echocardiogram is normal except increased thickness to the left ventricular wall due to hypertension.  No medication changes at this time, keep appointment with YAN

## 2024-05-06 ENCOUNTER — TELEPHONE (OUTPATIENT)
Dept: INTERNAL MEDICINE | Facility: CLINIC | Age: 64
End: 2024-05-06
Payer: COMMERCIAL

## 2024-05-06 DIAGNOSIS — I10 ESSENTIAL HYPERTENSION: ICD-10-CM

## 2024-05-06 DIAGNOSIS — E78.00 PURE HYPERCHOLESTEROLEMIA: ICD-10-CM

## 2024-05-06 DIAGNOSIS — Z00.00 HEALTH CARE MAINTENANCE: Primary | ICD-10-CM

## 2024-05-06 DIAGNOSIS — R73.09 ELEVATED GLUCOSE: ICD-10-CM

## 2024-05-11 LAB
ALBUMIN SERPL-MCNC: 3.8 G/DL (ref 3.9–4.9)
ALBUMIN/GLOB SERPL: 1.2 {RATIO} (ref 1.2–2.2)
ALP SERPL-CCNC: 94 IU/L (ref 44–121)
ALT SERPL-CCNC: 10 IU/L (ref 0–32)
AST SERPL-CCNC: 10 IU/L (ref 0–40)
BASOPHILS # BLD AUTO: 0.1 X10E3/UL (ref 0–0.2)
BASOPHILS NFR BLD AUTO: 1 %
BILIRUB SERPL-MCNC: 0.4 MG/DL (ref 0–1.2)
BUN SERPL-MCNC: 14 MG/DL (ref 8–27)
BUN/CREAT SERPL: 15 (ref 12–28)
CALCIUM SERPL-MCNC: 9.7 MG/DL (ref 8.7–10.3)
CHLORIDE SERPL-SCNC: 101 MMOL/L (ref 96–106)
CHOLEST SERPL-MCNC: 140 MG/DL (ref 100–199)
CO2 SERPL-SCNC: 25 MMOL/L (ref 20–29)
CREAT SERPL-MCNC: 0.94 MG/DL (ref 0.57–1)
EGFRCR SERPLBLD CKD-EPI 2021: 68 ML/MIN/1.73
EOSINOPHIL # BLD AUTO: 0.1 X10E3/UL (ref 0–0.4)
EOSINOPHIL NFR BLD AUTO: 1 %
ERYTHROCYTE [DISTWIDTH] IN BLOOD BY AUTOMATED COUNT: 14.2 % (ref 11.7–15.4)
GLOBULIN SER CALC-MCNC: 3.2 G/DL (ref 1.5–4.5)
GLUCOSE SERPL-MCNC: 100 MG/DL (ref 70–99)
HBA1C MFR BLD: 6 % (ref 4.8–5.6)
HCT VFR BLD AUTO: 45.7 % (ref 34–46.6)
HDLC SERPL-MCNC: 51 MG/DL
HGB BLD-MCNC: 15.2 G/DL (ref 11.1–15.9)
IMM GRANULOCYTES # BLD AUTO: 0.1 X10E3/UL (ref 0–0.1)
IMM GRANULOCYTES NFR BLD AUTO: 1 %
LDLC SERPL CALC-MCNC: 69 MG/DL (ref 0–99)
LDLC/HDLC SERPL: 1.4 RATIO (ref 0–3.2)
LYMPHOCYTES # BLD AUTO: 1.8 X10E3/UL (ref 0.7–3.1)
LYMPHOCYTES NFR BLD AUTO: 23 %
MCH RBC QN AUTO: 27.9 PG (ref 26.6–33)
MCHC RBC AUTO-ENTMCNC: 33.3 G/DL (ref 31.5–35.7)
MCV RBC AUTO: 84 FL (ref 79–97)
MONOCYTES # BLD AUTO: 0.7 X10E3/UL (ref 0.1–0.9)
MONOCYTES NFR BLD AUTO: 9 %
NEUTROPHILS # BLD AUTO: 5.3 X10E3/UL (ref 1.4–7)
NEUTROPHILS NFR BLD AUTO: 65 %
PLATELET # BLD AUTO: 270 X10E3/UL (ref 150–450)
POTASSIUM SERPL-SCNC: 4.5 MMOL/L (ref 3.5–5.2)
PROT SERPL-MCNC: 7 G/DL (ref 6–8.5)
RBC # BLD AUTO: 5.44 X10E6/UL (ref 3.77–5.28)
SODIUM SERPL-SCNC: 138 MMOL/L (ref 134–144)
TRIGL SERPL-MCNC: 111 MG/DL (ref 0–149)
TSH SERPL DL<=0.005 MIU/L-ACNC: 0.46 UIU/ML (ref 0.45–4.5)
VLDLC SERPL CALC-MCNC: 20 MG/DL (ref 5–40)
WBC # BLD AUTO: 8 X10E3/UL (ref 3.4–10.8)

## 2024-05-16 ENCOUNTER — OFFICE VISIT (OUTPATIENT)
Dept: INTERNAL MEDICINE | Facility: CLINIC | Age: 64
End: 2024-05-16
Payer: COMMERCIAL

## 2024-05-16 VITALS
DIASTOLIC BLOOD PRESSURE: 84 MMHG | SYSTOLIC BLOOD PRESSURE: 136 MMHG | TEMPERATURE: 98.6 F | HEIGHT: 66 IN | HEART RATE: 60 BPM | WEIGHT: 271.1 LBS | OXYGEN SATURATION: 99 % | BODY MASS INDEX: 43.57 KG/M2

## 2024-05-16 DIAGNOSIS — E78.00 PURE HYPERCHOLESTEROLEMIA: ICD-10-CM

## 2024-05-16 DIAGNOSIS — I10 PRIMARY HYPERTENSION: Primary | ICD-10-CM

## 2024-05-16 DIAGNOSIS — E66.01 CLASS 3 SEVERE OBESITY DUE TO EXCESS CALORIES WITHOUT SERIOUS COMORBIDITY WITH BODY MASS INDEX (BMI) OF 40.0 TO 44.9 IN ADULT: ICD-10-CM

## 2024-05-16 DIAGNOSIS — F32.A DEPRESSION, UNSPECIFIED DEPRESSION TYPE: ICD-10-CM

## 2024-05-16 PROCEDURE — 99214 OFFICE O/P EST MOD 30 MIN: CPT | Performed by: INTERNAL MEDICINE

## 2024-05-16 RX ORDER — UBIDECARENONE 100 MG
100 CAPSULE ORAL DAILY
COMMUNITY

## 2024-05-16 RX ORDER — L.ACID,CASEI/B.ANIMAL/S.THERMO 16B CELL
1 CAPSULE ORAL DAILY
COMMUNITY

## 2024-05-16 RX ORDER — BUPROPION HYDROCHLORIDE 150 MG/1
150 TABLET ORAL DAILY
Qty: 30 TABLET | Refills: 2 | Status: SHIPPED | OUTPATIENT
Start: 2024-05-16

## 2024-05-16 NOTE — PROGRESS NOTES
Subjective   Kirsten Lima is a 63 y.o. female.   She has been having depression   History of Present Illness   She had been on celexa for depression and weaned off as it was not helping  She says she is eating better she feels ariel but not losing any weight    The following portions of the patient's history were reviewed and updated as appropriate: allergies, current medications, past family history, past medical history, past social history, past surgical history, and problem list.    Review of Systems    Objective   Physical Exam  Vitals reviewed.   Constitutional:       Appearance: She is well-developed.   HENT:      Head: Normocephalic and atraumatic.      Right Ear: External ear normal.      Left Ear: External ear normal.   Eyes:      Conjunctiva/sclera: Conjunctivae normal.      Pupils: Pupils are equal, round, and reactive to light.   Neck:      Thyroid: No thyromegaly.      Trachea: No tracheal deviation.   Cardiovascular:      Rate and Rhythm: Normal rate and regular rhythm.      Heart sounds: Normal heart sounds.   Pulmonary:      Effort: Pulmonary effort is normal.      Breath sounds: Normal breath sounds.   Abdominal:      General: Bowel sounds are normal. There is no distension.      Palpations: Abdomen is soft.      Tenderness: There is no abdominal tenderness.   Musculoskeletal:         General: No deformity. Normal range of motion.      Cervical back: Normal range of motion.   Skin:     General: Skin is warm and dry.   Neurological:      Mental Status: She is alert and oriented to person, place, and time.   Psychiatric:         Behavior: Behavior normal.         Thought Content: Thought content normal.         Judgment: Judgment normal.         Vitals:    05/16/24 1040   BP: 136/84   Pulse: 60   Temp: 98.6 °F (37 °C)   SpO2: 99%     Body mass index is 43.78 kg/m².         Assessment & Plan   Diagnoses and all orders for this visit:    1. Primary hypertension (Primary)    2. Pure  hypercholesterolemia    3. Class 3 severe obesity due to excess calories without serious comorbidity with body mass index (BMI) of 40.0 to 44.9 in adult    4. Depression, unspecified depression type    Other orders  -     buPROPion XL (Wellbutrin XL) 150 MG 24 hr tablet; Take 1 tablet by mouth Daily.  Dispense: 30 tablet; Refill: 2  -     Tirzepatide-Weight Management (ZEPBOUND) 2.5 MG/0.5ML solution auto-injector; Inject 0.5 mL under the skin into the appropriate area as directed 1 (One) Time Per Week.  Dispense: 2 mL; Refill: 0       Depression-  we can try welbutrin xl and try to get walking more daily    Weight gain- try zepbound also my fitness pal and reg exercise  HPL-  ok with crestor

## 2024-05-21 ENCOUNTER — TELEPHONE (OUTPATIENT)
Dept: ONCOLOGY | Facility: CLINIC | Age: 64
End: 2024-05-21

## 2024-05-21 NOTE — TELEPHONE ENCOUNTER
Caller: Kirsten Lima    Relationship to patient: Self    Best call back number: [phone]    Type of visit: VITALS AND FOLLOW UP    Requested date: PLEASE CALL TO R/S.     If rescheduling, when is the original appointment: 05/21

## 2024-06-05 ENCOUNTER — OFFICE VISIT (OUTPATIENT)
Dept: CARDIOLOGY | Facility: CLINIC | Age: 64
End: 2024-06-05
Payer: COMMERCIAL

## 2024-06-05 VITALS
HEART RATE: 65 BPM | DIASTOLIC BLOOD PRESSURE: 80 MMHG | BODY MASS INDEX: 43.04 KG/M2 | HEIGHT: 66 IN | SYSTOLIC BLOOD PRESSURE: 130 MMHG | OXYGEN SATURATION: 99 % | WEIGHT: 267.8 LBS

## 2024-06-05 DIAGNOSIS — I10 PRIMARY HYPERTENSION: Primary | ICD-10-CM

## 2024-06-05 PROCEDURE — 99214 OFFICE O/P EST MOD 30 MIN: CPT | Performed by: NURSE PRACTITIONER

## 2024-06-05 PROCEDURE — 93000 ELECTROCARDIOGRAM COMPLETE: CPT | Performed by: NURSE PRACTITIONER

## 2024-06-05 NOTE — PROGRESS NOTES
[facility] Follow Up Office Note     Encounter Date:24  Patient:Kirsten Lima  :1960  MRN:[id number]      Chief Complaint:   Chief Complaint   Patient presents with    Dyspnea on exertion     Patient is in the office today for her 6 week follow up appointment.          History of Presenting Illness:        Kirsten Lima is a 63 y.o. female who is here for follow-up.  She is a patient of Dr Timmons.    Patient has past medical history that is significant for hypertension, left breast cancer, VALERIE not on CPA due to mask intolerance P, hyperlipidemia, obesity, tobacco use    Patient establish care with Dr. Timmons in April of this year for complaint of exertional dyspnea.  Cancer screening CT chest 2024 with noted calcification of proximal to mid LAD, calcification of proximal circumflex, spotty calcification of proximal RCA, calcification of aortic valve annulus..    Patient reports she has been stable.  She feels dyspnea with walking further distances, no chest pain.  Her lower extremity edema has improved with medication changes, no PND orthopnea.  She saw her dentist about a mouthguard for sleep apnea, however proceeding with this was cost prohibitive.    Review of Systems:  Review of Systems   Cardiovascular:  Negative for chest pain, dyspnea on exertion, leg swelling, orthopnea and palpitations.   Respiratory:  Negative for shortness of breath.        Current Outpatient Medications on File Prior to Visit   Medication Sig Dispense Refill    amLODIPine (NORVASC) 5 MG tablet Take 1 tablet by mouth Every Morning. 90 tablet 3    anastrozole (ARIMIDEX) 1 MG tablet TAKE 1 TABLET BY MOUTH EVERY DAY 90 tablet 3    buPROPion XL (Wellbutrin XL) 150 MG 24 hr tablet Take 1 tablet by mouth Daily. 30 tablet 2    Cholecalciferol (VITAMIN D) 2000 units capsule Take 1 capsule by mouth Daily.      clotrimazole-betamethasone (LOTRISONE) 1-0.05 % lotion Apply  topically to the appropriate area as  directed 2 (Two) Times a Day. 30 mL 1    coenzyme Q10 100 MG capsule Take 1 capsule by mouth Daily.      fluticasone (FLONASE) 50 MCG/ACT nasal spray 2 sprays by Each Nare route Daily. 16 g 1    levalbuterol (XOPENEX HFA) 45 MCG/ACT inhaler Inhale 1-2 puffs Every 4 (Four) Hours As Needed for Wheezing. 15 g 1    levocetirizine (Xyzal Allergy 24HR) 5 MG tablet Take 1 tablet by mouth Every Evening.      lisinopril-hydrochlorothiazide (PRINZIDE,ZESTORETIC) 20-12.5 MG per tablet Take 2 tablets by mouth Every Morning. 180 tablet 1    metoprolol succinate XL (TOPROL-XL) 200 MG 24 hr tablet Take 1 tablet by mouth Every Night. 90 tablet 1    Probiotic Product (Risaquad-2) capsule capsule Take 1 capsule by mouth Daily.      rosuvastatin (CRESTOR) 10 MG tablet Take 1 tablet by mouth Every Night. 90 tablet 3    spironolactone (ALDACTONE) 25 MG tablet Take 1 tablet by mouth Every Morning. 90 tablet 3    Tirzepatide-Weight Management (ZEPBOUND) 2.5 MG/0.5ML solution auto-injector Inject 0.5 mL under the skin into the appropriate area as directed 1 (One) Time Per Week. 2 mL 0    Vibegron (Gemtesa) 75 MG tablet Take 1 tablet by mouth Daily. 42 tablet 0     No current facility-administered medications on file prior to visit.       No Known Allergies    Past Medical History:   Diagnosis Date    Acute cystitis     Allergic rhinitis     Breast cancer     Breast cyst     Colon polyp May 6, 2022    7    Cyst of left nipple     Depression     Diverticulosis May 6, 2022    Dry skin     Dysuria     Hemorrhoid     Hidradenitis     History of bronchitis     Hyperlipidemia     Hypertension     Incontinence in female     wears pads    Insomnia     Low back pain 2020    Nonspecific abnormal electrocardiogram (ECG) (EKG)     Obesity     Overactive bladder     Pregnancy     G-2, P-0    Sleep apnea     UTI (urinary tract infection) 02/2021    Vitamin B12 deficiency     Vitamin D insufficiency     Wound, breast     LEFT BREAST; PT INSTRUCTED TO  NOTIFY DR ZAMBRANO PRIOR TO SURGERY       Past Surgical History:   Procedure Laterality Date    AXILLARY HIDRADENITIS EXCISION Bilateral     BREAST BIOPSY Left 02/22/2021    Procedure: Left breast needle-localized excisional biopsy (tophat clip) and left breast ultrasound-guided excisional biopsy (hydromark clip);  Surgeon: Debora Zambrano MD;  Location: [facility];  Service: General;  Laterality: Left;    BREAST BIOPSY Left 02/01/2022    Procedure: Left breast needle-localized excisional biopsy, Left nipple mass excision;  Surgeon: Debora Zambrano MD;  Location: [facility] OR;  Service: General;  Laterality: Left;    BRONCHOSCOPY N/A 12/15/2022    Procedure: BRONCHOSCOPY WITH ENDOBRONCHIAL ULTRASOUND WITH FNA;  Surgeon: Norberto Link MD;  Location: [facility] ENDOSCOPY;  Service: Pulmonary;  Laterality: N/A;  LYMPHADENOPATHY    CARDIAC CATHETERIZATION      COLONOSCOPY      COLONOSCOPY N/A 05/06/2022    Procedure: COLONOSCOPY;  Surgeon: Mirtha Davis MD;  Location: [facility] OR;  Service: Gastroenterology;  Laterality: N/A;  diverticulosis, polyps    HYSTERECTOMY  2006    LAVH    SUBTOTAL HYSTERECTOMY  2006       Social History     Socioeconomic History    Marital status: Single    Number of children: 0   Tobacco Use    Smoking status: Every Day     Current packs/day: 0.25     Average packs/day: 0.3 packs/day for 64.8 years (21.1 ttl pk-yrs)     Types: Cigarettes     Start date: 1/1/1979     Passive exposure: Current    Smokeless tobacco: Never    Tobacco comments:     Caffeine: 2 drinks daily and occ rich   Vaping Use    Vaping status: Never Used   Substance and Sexual Activity    Alcohol use: Yes     Alcohol/week: 0.0 - 1.0 standard drinks of alcohol     Comment: 1-2 drinks a month    Drug use: Never     Comment: marijuana in her twenties    Sexual activity: Not Currently     Partners: Male     Birth control/protection: Abstinence       Family History   Problem Relation Age of Onset     "Hypertension Mother              COPD Father              Heart failure Father     Hypertension Father     Hyperlipidemia Father              Vision loss Father     Hypertension Sister     Other Sister     Alcohol abuse Sister              Liver disease Sister     Stroke Sister     Hypertension Brother     Pancreatic cancer Brother         1 brother  from pancreatic cancer    Alcohol abuse Brother              Hypertension Brother     Alcohol abuse Brother              Cancer Brother         Liver    Alcohol abuse Brother     Sudden death Brother     Malig Hyperthermia Neg Hx        The following portions of the patient's history were reviewed and updated as appropriate: allergies, current medications, past family history, past medical history, past social history, past surgical history and problem list.       Objective:       Vitals:    24 1148   BP: 130/80   BP Location: Left arm   Patient Position: Sitting   Pulse: 65   SpO2: 99%   Weight: 121 kg (267 lb 12.8 oz)   Height: 167.6 cm (65.98\")         Physical Exam:  Constitutional: Pleasant and conversant  HENT: Oropharynx clear and membrane moist  Eyes: Normal conjunctiva, no sclera icterus  Neck: Supple, no carotid bruit bilaterally  Cardiovascular: Regular rate and rhythm, No Murmur, No bilateral lower extremity edema  Pulmonary: Normal respiratory effort, normal lung sounds, no wheezing  Neurological: Alert and orient x 3  Skin: Warm, dry, no ecchymosis, no rash  Psych: Appropriate mood and affect. Normal judgment and insight         Lab Results   Component Value Date     05/10/2024     2023    K 4.5 05/10/2024    K 3.9 2023     05/10/2024    CL 98 2023    CO2 25 05/10/2024    CO2 28 2023    BUN 14 05/10/2024    BUN 8 2023    CREATININE 0.94 05/10/2024    CREATININE 1.00 2024    EGFRIFNONA 76 2021    EGFRIFNONA 75 2021    EGFRIFAFRI 84 " 01/27/2022    EGFRIFAFRI 88 11/19/2021    GLUCOSE 100 (H) 05/10/2024    GLUCOSE 99 11/11/2023    CALCIUM 9.7 05/10/2024    CALCIUM 9.8 11/11/2023    PROTENTOTREF 7.0 05/10/2024    PROTENTOTREF 7.0 11/11/2023    ALBUMIN 3.8 (L) 05/10/2024    ALBUMIN 4.1 11/11/2023    BILITOT 0.4 05/10/2024    BILITOT 0.4 11/11/2023    AST 10 05/10/2024    AST 13 11/11/2023    ALT 10 05/10/2024    ALT 12 11/11/2023     Lab Results   Component Value Date    WBC 8.0 05/10/2024    WBC 7.3 11/11/2023    HGB 15.2 05/10/2024    HGB 15.8 11/11/2023    HCT 45.7 05/10/2024    HCT 48.1 (H) 11/11/2023    MCV 84 05/10/2024    MCV 83 11/11/2023     05/10/2024     11/11/2023     Lab Results   Component Value Date    TRIG 111 05/10/2024    TRIG 130 11/11/2023    HDL 51 05/10/2024    HDL 59 11/11/2023    LDL 69 05/10/2024    LDL 83 11/11/2023     Lab Results   Component Value Date    PROBNP 312 (H) 04/18/2024     Lab Results   Component Value Date    CKTOTAL 97 08/31/2018    TROPONINT 7 04/18/2024     Lab Results   Component Value Date    TSH 0.459 05/10/2024    TSH 0.593 11/11/2023           ECG 12 Lead    Date/Time: 6/5/2024 12:09 PM  Performed by: Clara Duarte APRN    Authorized by: Clara Duarte APRN  Comparison: compared with previous ECG from 4/18/2024  Rhythm: sinus rhythm  BPM: 56  Other findings: non-specific ST-T wave changes             Assessment:          Diagnosis Plan   1. Primary hypertension  ECG 12 Lead             Plan:       Dyspnea on exertion -likely multifactorial including deconditioning, weight, baseline lung disease related to tobacco use.  Cardiac workup unrevealing including normal nuclear stress, Echocardiogram with normal EF and no significant valvular heart disease.    Coronary artery calcification -seen on CT chest 2/2024 as reviewed by Dr. Timmons.  Stress MPI normal 4/30/2024.  Continue statin  Hypertension -continue current regimen.  Swelling improved with stopping amlodipine and  switching to spironolactone.  Her most recent labs show normal potassium  Hyperlipidemia -rosuvastatin 10 mg nightly.  LDL 69 on labs 5/10/2024.  This was after only a couple weeks of increased therapy  Invasive lobular carcinoma of the left breast -treated with lumpectomy, adjuvant radiation and anastrozole.  Follows with Dr. Dong  Obesity and sedentary lifestyle -encourage lifestyle modifications.  Has been recommended milestone  Tobacco use -needs to stop smoking.  We discussed this today  VALERIE -intolerant of mask.  We discussed importance on cardiac health today.  Needs to follow-up with sleep medicine      Patient is seen today for follow-up.  She is stable from a cardiac perspective and I am not recommending any changes    Follow-up with Dr. Timmons in 6 months, sooner with problems    Orders Placed This Encounter   Procedures    ECG 12 Lead     This order was created via procedure documentation     Order Specific Question:   Release to patient     Answer:   Routine Release [[id number]]            YAN Stevens  [facility]  06/05/24  12:26 EDT

## 2024-06-12 ENCOUNTER — OFFICE VISIT (OUTPATIENT)
Dept: ONCOLOGY | Facility: CLINIC | Age: 64
End: 2024-06-12
Payer: COMMERCIAL

## 2024-06-12 VITALS
BODY MASS INDEX: 43.54 KG/M2 | RESPIRATION RATE: 18 BRPM | HEART RATE: 58 BPM | OXYGEN SATURATION: 96 % | SYSTOLIC BLOOD PRESSURE: 144 MMHG | TEMPERATURE: 98 F | HEIGHT: 66 IN | DIASTOLIC BLOOD PRESSURE: 87 MMHG | WEIGHT: 270.9 LBS

## 2024-06-12 DIAGNOSIS — F17.200 TOBACCO USE DISORDER: Primary | ICD-10-CM

## 2024-06-12 DIAGNOSIS — N64.4 BREAST PAIN, LEFT: Primary | ICD-10-CM

## 2024-06-12 NOTE — PROGRESS NOTES
Subjective   Kirsten Lima is a 63 y.o. female. Referred by  for LCIS     History of Present Illness   Ms. Lima is a 61-year-old -American lady who presents with a screen detected abnormality of the left breast.    6/2/2020-screening mammogram  Scattered areas of fibroglandular density.  There are small asymmetry seen in both breasts.  No significant change from prior exams.  Stable punctate and spherical lucent centered calcifications with diffuse distribution in both breasts.  There is a new focal asymmetry measuring 12 mm in the central region of the left breast.    6/24/2020-left diagnostic mammogram  Focal asymmetry in the left breast does not persist.  Ultrasound-no sonographic correlate.  3 oval intraductal masses with partially defined margins measuring 5 mm to 9 mm in the anterior one third subareolar region of the left breast.  Patient reported history of intermittent clear nipple discharge for about 10 years.  These were considered suspicious and ultrasound-guided biopsy was recommended.    6/29/2020-left breast ultrasound-guided biopsy x2  1.left subareolar mass measuring 5 x 5 x 6 mm-benign sclerosing intraductal papilloma with calcifications.  2.left subareolar mass-benign sclerosing intraductal papilloma with usual ductal hyperplasia and microcalcifications.    She was evaluated by Dr. Zambrano on 8/7/2020 and recommended to undergo excisional biopsy of both intraductal papillomas.    2/22/2021-left breast needle localized excisional biopsy-pathology was consistent with lobular carcinoma in situ.  Multiple sclerosed intraductal papillomas with focal calcifications which were completely excised.  Background breast parenchyma showed usual ductal hyperplasia and calcifications with sclerosing adenosis.    She has been referred to medical oncology to discuss risk reduction given findings of lobular carcinoma in situ.    Patient reports chronic nipple discharge from the left.  She also  had significant issues with the breast skin due to hidradenitis and recurrent skin abscesses.  She had a past history of benign breast biopsy , unable to recall the date.  Denies any family history of breast or ovarian cancer.    6/11/2021-bilateral screening mammogram-benign.    12/10/2021-bilateral breast MRI  Right breast-no suspicious findings.    Left breast-  Postsurgical changes in the anterior left breast.  At 5:00, 3.4 cm posterior to the nipple there is a 1.8 cm linear branching non-mass enhancement which is suspicious.  Abnormal enhancement in the left nipple with approximately 1.2 x 1.7 x 1 cm mass involving the nipple itself and extending slightly deeper into the subareolar breast.  This is suspicious.    No extramammary findings    Recommendations  1.suspicious 1.7 cm enhancing left nipple mass, surgical excision recommended as this is not amenable to imaging guided biopsy.  2.suspicious 1.8 cm linear branching non-mass enhancement at 5:00 in the left breast, MR guided biopsy recommended.    12/22/2021-left breast MRI guided biopsy of the 5:00.  A.extensive lobular carcinoma in situ  B.no invasive carcinoma identified by routine or immunostaining  C.associated intraductal papilloma with microcalcifications, ductal cyst wall and fibrocystic change.    Patient was prescribed tamoxifen for risk reduction which she initially did not take and subsequently this was changed to anastrozole at her visit in September which she only started taking in December 2021.    1/12/2022-she was evaluated by Dr. Zambrano and scheduled for left breast needle localized excisional biopsy and left nipple mass excision.    2/1/2022-left breast needle localized excisional biopsy left nipple mass excision  1.left breast lumpectomy-invasive lobular carcinoma, lobular carcinoma in situ and deep typical lobular hyperplasia.  A.invasive lobular carcinoma  Measures 4 mm  Grade 1  The focus is located amongst the area of lobular  carcinoma in situ  Extensive lobular carcinoma in situ and atypical lobular hyperplasia  Fibroadenoma with calcification, sclerosing adenosis, apocrine metaplasia, clustered cysts in area consistent with radial scar and fat necrosis    2.left nipple excision  Simple cyst  Apocrine metaplasia  Intraductal papilloma  Atypical lobular hyperplasia    The lobular carcinoma is ER +80% strong, AZ 3% moderate, HER-2 negative, Ki-67 2%    Axillary lymph nodes not evaluated    Case was discussed in multidisciplinary conference and decided to omit axillary staging and proceed with radiation and continue Arimidex.    11/8/2022-low-dose CT of the chest with multiple pulmonary nodules bilateral measuring up to 7 mm.  There is also mediastinal lymph node which measures 1.7 cm and right paratracheal lymph node which measures 1 cm.    11/18/2022-PET/CT  1.multiple bilateral pulmonary nodules which are below PET resolution.  Hypermetabolic mediastinal hilar and right axillary lymphadenopathy.  Right axilla lymph node is 1.2 cm with an SUV of 5.1.  Precavernous node 1.5 cm with an SUV of 5.7.  Asymmetric hypermetabolic thickening of the right palatine tonsil.  Direct visualization recommended.  Focal intense uptake in the rectum and anus is nonspecific.  Hypodense right thyroid lesion demonstrates mild uptake.  Asymmetric soft tissue thickening involving the left breast.    12/15/2022-bronchoscopy and biopsy of the nodes was performed.  Pathology was noted to be benign.  She had a follow-up with pulmonary and she was recommended to have a repeat CT in 3 months.    12/8/2022-thyroid ultrasound performed with multiple bilateral thyroid nodules and none of them meeting criteria for recommendation for biopsy or FNA.  One of them was previously biopsied and benign.    1/3/2023-she had an appointment with Dr. Jac Pop for the asymmetric soft tissue thickening involving the right tonsil.  No significant pathology noted.    Breast  Mammogram from September 8, 2023 did note calcifications in the right breast that were suspicious and stereotactic biopsy was recommended.  Stereotactic biopsy from 9/21/2023 noted a cluster of apocrine cyst with polarizable calcium oxalate crystals and fibroadenomatoid change with rare calcifications.    Interval history:   Ms. Lima presents to the clinic today for follow-up.    She remains compliant with anastrozole.  She is reporting worsening pain in the left breast over the past few months.  She had a screening MRI in December 2023 which was benign.  She also has chronic skin changes on both her breasts and a fair amount of hyperpigmentation of the left breast which was present from before.  She is unable to tell me if this thickening and hyperpigmentation has gotten worse but she does report worsening pain which is more constant now.  She complained of dyspnea on exertion when she met with Dr. Timmons in April 2024 resulting in CT chest PE protocol which was negative and also a stress test which was negative.  Denies any new bone pains, cough, abdominal pain nausea vomiting.    The following portions of the patient's history were reviewed and updated as appropriate: allergies, current medications, past family history, past medical history, past social history, past surgical history and problem list.    Past Medical History:   Diagnosis Date    Acute cystitis     Allergic rhinitis     Breast cancer     Breast cyst     Colon polyp May 6, 2022    7    Cyst of left nipple     Depression     Diverticulosis May 6, 2022    Dry skin     Dysuria     Hemorrhoid     Hidradenitis     History of bronchitis     Hyperlipidemia     Hypertension     Incontinence in female     wears pads    Insomnia     Low back pain 2020    Nonspecific abnormal electrocardiogram (ECG) (EKG)     Obesity     Overactive bladder     Pregnancy     G-2, P-0    Sleep apnea     UTI (urinary tract infection) 02/2021    Vitamin B12 deficiency      Vitamin D insufficiency     Wound, breast     LEFT BREAST; PT INSTRUCTED TO NOTIFY DR ZAMBRANO PRIOR TO SURGERY        Past Surgical History:   Procedure Laterality Date    AXILLARY HIDRADENITIS EXCISION Bilateral     BREAST BIOPSY Left 2021    Procedure: Left breast needle-localized excisional biopsy (tophat clip) and left breast ultrasound-guided excisional biopsy (hydromark clip);  Surgeon: Debora Zambrano MD;  Location: [facility] MAIN OR;  Service: General;  Laterality: Left;    BREAST BIOPSY Left 2022    Procedure: Left breast needle-localized excisional biopsy, Left nipple mass excision;  Surgeon: Debora Zambrano MD;  Location: [facility] MAIN OR;  Service: General;  Laterality: Left;    BRONCHOSCOPY N/A 12/15/2022    Procedure: BRONCHOSCOPY WITH ENDOBRONCHIAL ULTRASOUND WITH FNA;  Surgeon: Norberto Link MD;  Location: [facility] ENDOSCOPY;  Service: Pulmonary;  Laterality: N/A;  LYMPHADENOPATHY    CARDIAC CATHETERIZATION      COLONOSCOPY      COLONOSCOPY N/A 2022    Procedure: COLONOSCOPY;  Surgeon: Mirtha Davis MD;  [place] [facility] [place];  Service: Gastroenterology;  Laterality: N/A;  diverticulosis, polyps    HYSTERECTOMY  2006    LAVH    SUBTOTAL HYSTERECTOMY  2006        Family History   Problem Relation Age of Onset    Hypertension Mother              COPD Father              Heart failure Father     Hypertension Father     Hyperlipidemia Father              Vision loss Father     Hypertension Sister     Other Sister     Alcohol abuse Sister              Liver disease Sister     Stroke Sister     Hypertension Brother     Pancreatic cancer Brother         1 brother  from pancreatic cancer    Alcohol abuse Brother              Hypertension Brother     Alcohol abuse Brother              Cancer Brother         Liver    Alcohol abuse Brother     Sudden death Brother     Malig Hyperthermia Neg Hx         Social History  "    Socioeconomic History    Marital status: Single    Number of children: 0   Tobacco Use    Smoking status: Every Day     Current packs/day: 0.25     Average packs/day: 0.3 packs/day for 64.8 years (21.1 ttl pk-yrs)     Types: Cigarettes     Start date: 1/1/1979     Passive exposure: Current    Smokeless tobacco: Never    Tobacco comments:     Caffeine: 2 drinks daily and occ rich   Vaping Use    Vaping status: Never Used   Substance and Sexual Activity    Alcohol use: Yes     Alcohol/week: 0.0 - 1.0 standard drinks of alcohol     Comment: 1-2 drinks a month    Drug use: Never     Comment: marijuana in her twenties    Sexual activity: Not Currently     Partners: Male     Birth control/protection: Abstinence        OB History    No obstetric history on file.      Age at menarche-13  Age at menopause-2006 she is status post hysterectomy.  Still has both ovaries  She does not have any children  Total period of oral contraceptive pill use 8 years      No Known Allergies         Review of Systems   Constitutional: Negative.    HENT: Negative.     Eyes: Negative.    Respiratory: Negative.     Cardiovascular: Negative.    Gastrointestinal: Negative.    Genitourinary:  Positive for amenorrhea.   Allergic/Immunologic: Negative.    Neurological: Negative.    Hematological: Negative.    Psychiatric/Behavioral:  Positive for sleep disturbance.      Review of systems as mentioned HPI otherwise negative     Objective   Blood pressure 144/87, pulse 58, temperature 98 °F (36.7 °C), temperature source Oral, resp. rate 18, height 167.6 cm (65.98\"), weight 123 kg (270 lb 14.4 oz), SpO2 96%, not currently breastfeeding.   Physical Exam  Vitals reviewed.   Constitutional:       Appearance: Normal appearance. She is obese.   HENT:      Head: Normocephalic and atraumatic.      Right Ear: External ear normal.      Left Ear: External ear normal.      Nose: Nose normal. No congestion or rhinorrhea.      Mouth/Throat:      Mouth: Mucous " membranes are moist.      Pharynx: Oropharynx is clear. No oropharyngeal exudate or posterior oropharyngeal erythema.   Eyes:      General:         Right eye: No discharge.         Left eye: No discharge.      Conjunctiva/sclera: Conjunctivae normal.      Pupils: Pupils are equal, round, and reactive to light.   Cardiovascular:      Rate and Rhythm: Normal rate.   Pulmonary:      Effort: Pulmonary effort is normal.      Breath sounds: Normal breath sounds.   Abdominal:      General: Abdomen is flat. Bowel sounds are normal.      Palpations: Abdomen is soft.   Musculoskeletal:         General: No swelling, tenderness or deformity. Normal range of motion.      Cervical back: Normal range of motion.   Skin:     General: Skin is warm.      Coloration: Skin is not jaundiced or pale.   Neurological:      General: No focal deficit present.      Mental Status: She is alert and oriented to person, place, and time.      Cranial Nerves: No cranial nerve deficit.      Sensory: No sensory deficit.   Psychiatric:         Mood and Affect: Mood normal.         Behavior: Behavior normal.         Thought Content: Thought content normal.         Judgment: Judgment normal.       Breast Exam: Right breast appears normal inspection no palpable abnormalities of the right breast.  Left breast on inspection there is a periareolar incision which is well-healed and also another periareolar incision which is well-healed.  The left breast is hyperpigmented, thickened secondary to radiation changes.  There is tenderness to palpation in the left breast.  There is also some erythema in the upper inner quadrant of the left breast and extremely dry scaly skin of both breasts.    I have reexamined the patient and the results are consistent with the previously documented exam. Irena Dong MD        No visits with results within 30 Day(s) from this visit.   Latest known visit with results is:   Telephone on 05/06/2024   Component Date Value Ref  Range Status    Glucose 05/10/2024 100 (H)  70 - 99 mg/dL Final    BUN 05/10/2024 14  8 - 27 mg/dL Final    Creatinine 05/10/2024 0.94  0.57 - 1.00 mg/dL Final    EGFR Result 05/10/2024 68  >59 mL/min/1.73 Final    BUN/Creatinine Ratio 05/10/2024 15  12 - 28 Final    Sodium 05/10/2024 138  134 - 144 mmol/L Final    Potassium 05/10/2024 4.5  3.5 - 5.2 mmol/L Final    Chloride 05/10/2024 101  96 - 106 mmol/L Final    Total CO2 05/10/2024 25  20 - 29 mmol/L Final    Calcium 05/10/2024 9.7  8.7 - 10.3 mg/dL Final    Total Protein 05/10/2024 7.0  6.0 - 8.5 g/dL Final    Albumin 05/10/2024 3.8 (L)  3.9 - 4.9 g/dL Final    Globulin 05/10/2024 3.2  1.5 - 4.5 g/dL Final    A/G Ratio 05/10/2024 1.2  1.2 - 2.2 Final    Total Bilirubin 05/10/2024 0.4  0.0 - 1.2 mg/dL Final    Alkaline Phosphatase 05/10/2024 94  44 - 121 IU/L Final    AST (SGOT) 05/10/2024 10  0 - 40 IU/L Final    ALT (SGPT) 05/10/2024 10  0 - 32 IU/L Final    Total Cholesterol 05/10/2024 140  100 - 199 mg/dL Final    Triglycerides 05/10/2024 111  0 - 149 mg/dL Final    HDL Cholesterol 05/10/2024 51  >39 mg/dL Final    VLDL Cholesterol Avel 05/10/2024 20  5 - 40 mg/dL Final    LDL Chol Calc (NIH) 05/10/2024 69  0 - 99 mg/dL Final    LDL/HDL RATIO 05/10/2024 1.4  0.0 - 3.2 ratio Final    Comment:                                     LDL/HDL Ratio                                              Men  Women                                1/2 Avg.Risk  1.0    1.5                                    Avg.Risk  3.6    3.2                                 2X Avg.Risk  6.2    5.0                                 3X Avg.Risk  8.0    6.1      TSH 05/10/2024 0.459  0.450 - 4.500 uIU/mL Final    WBC 05/10/2024 8.0  3.4 - 10.8 x10E3/uL Final    RBC 05/10/2024 5.44 (H)  3.77 - 5.28 x10E6/uL Final    Hemoglobin 05/10/2024 15.2  11.1 - 15.9 g/dL Final    Hematocrit 05/10/2024 45.7  34.0 - 46.6 % Final    MCV 05/10/2024 84  79 - 97 fL Final    MCH 05/10/2024 27.9  26.6 - 33.0 pg Final     MCHC 05/10/2024 33.3  31.5 - 35.7 g/dL Final    RDW 05/10/2024 14.2  11.7 - 15.4 % Final    Platelets 05/10/2024 270  150 - 450 x10E3/uL Final    Neutrophil Rel % 05/10/2024 65  Not Estab. % Final    Lymphocyte Rel % 05/10/2024 23  Not Estab. % Final    Monocyte Rel % 05/10/2024 9  Not Estab. % Final    Eosinophil Rel % 05/10/2024 1  Not Estab. % Final    Basophil Rel % 05/10/2024 1  Not Estab. % Final    Neutrophils Absolute 05/10/2024 5.3  1.4 - 7.0 x10E3/uL Final    Lymphocytes Absolute 05/10/2024 1.8  0.7 - 3.1 x10E3/uL Final    Monocytes Absolute 05/10/2024 0.7  0.1 - 0.9 x10E3/uL Final    Eosinophils Absolute 05/10/2024 0.1  0.0 - 0.4 x10E3/uL Final    Basophils Absolute 05/10/2024 0.1  0.0 - 0.2 x10E3/uL Final    Immature Granulocyte Rel % 05/10/2024 1  Not Estab. % Final    Immature Grans Absolute 05/10/2024 0.1  0.0 - 0.1 x10E3/uL Final    Hemoglobin A1C 05/10/2024 6.0 (H)  4.8 - 5.6 % Final    Comment:          Prediabetes: 5.7 - 6.4           Diabetes: >6.4           Glycemic control for adults with diabetes: <7.0          No radiology results for the last 30 days.     MRI of the breast 12/9/2022-images independently reviewed and interpreted by me in detail summarized in the HPI.    Breast Mammogram from September 8, 2023 did note calcifications in the right breast that were suspicious and stereotactic biopsy was recommended.  Stereotactic biopsy from 9/21/2023 noted a cluster of apocrine cyst with polarizable calcium oxalate crystals and fibroadenomatoid change with rare calcifications.    Assessment & Plan       62-year-old lady with a previous hysterectomy with uncertain menopausal status presents for management of lobular carcinoma in situ.     *Lobular carcinoma in situ  2/22/2021-left breast needle localized excisional biopsy shows lobular carcinoma in situ with multiple intraductal papillomas.  She was offered tamoxifen 5 mg daily for risk reduction however did not start taking that.  She was seen  in September 2021 and treatment was changed to anastrozole as she was concerned about the risk of blood clots with tamoxifen.  She finally started taking anastrozole in December 2021.  Had an abnormal MRI in December 2021 requiring a left breast biopsy and left breast excision.  Diagnosed with invasive lobular carcinoma  Continues on anastrozole  MRI 12/9/2022 reviewed and benign.  Screening mammogram due August 2023.  This is to be ordered by her gynecologist.  Mammogram from September 8, 2023 did note calcifications in the right breast that were suspicious and stereotactic biopsy was recommended.  Stereotactic biopsy from 9/21/2023 noted a cluster of apocrine cyst with polarizable calcium oxalate crystals and fibroadenomatoid change with rare calcifications.   12/14/2024-bilateral breast [id number]-halalygse postsurgical changes in the left breast.  No suspicious enhancing masses or non-mass enhancement.  Normal axilla.  Right breast without to be suspicious abnormalities.  Continue annual mammogram and MRI    *Invasive lobular carcinoma of the left breast  Invasive lobular carcinoma small focus diagnosed with an extensive areas of lobular carcinoma in situ  pT1 a Nx M0  Status post excision of the left breast mass  Completed adjuvant radiation to the left breast in April 2022  Continues on anastrozole  PET/CT shows right axillary lymphadenopathy  MRI 12/9/2022 without any evidence of axillary lymphadenopathy or new abnormalities in either breast.  No clear evidence of recurrent disease.  12/15/2022-bronchoscopy and biopsy with benign lymph nodes.  She continues on anastrozole without any evidence of recurrent disease  CT of the chest from 2/22/2024 and 4/19/2024-images independently reviewed and interpreted by me and there is no evidence of metastatic disease.    *Left breast pain and skin changes  Most likely secondary to radiation.  However patient reports worsening pain over the past few months.  An image has  been taken and uploaded to the media  Also discussed the findings with Dr. Zambrano with breast surgery who plans to order diagnostic left breast mammogram and subsequently follow-up to assess the patient further.      *Multiple pulmonary nodules and mediastinal and hilar lymphadenopathy  CT of the chest on 11/8/2022 shows multiple bilateral pulmonary nodules along with mediastinal hilar lymphadenopathy which is greater than 1 cm.  PET/CT 11/18/2022 shows increased uptake in the mediastinal and the hilar lymph nodes.  Bronchoscopy and biopsy 12/15/2022-pathology benign  CT chest from February 2023 reviewed and pulmonary nodules have resolved and the lymphadenopathy is stable.  Screening lung CT February 2024 benign  She subsequently had another CT with contrast for ruling out pulmonary embolism on 4/19/2024 which was also benign.  Repeat CT chest in April 2025    *Asymmetric uptake of the palatine tonsil  ENT appointment with Dr. Jac Pop on 1/3/2023  No concern for malignancy.  Continue follow-up with ENT    *Thyroid abnormalities on the PET/CT  Thyroid ultrasound 12/8/2022 without any concerning findings     *Lifestyle changes  She has not been exercising or modified her diet  She has been going to physical therapy for back pain  BMI 43.5.  Patient is not compliant with regular exercise    *Obesity  BMI 43.7  She has met with a dietitian before  She is unable to implement dietary changes or regular exercises.     *Tobacco cessation  Referral made to Prerna Lewis to help with smoking cessation  Encouraged her to use the nicotine patches  Continues 5 cigarettes/day  Trying to quit completely but not successful     *Surveillance  Bilateral breast MRI December 9, 2022 benign  Screening mammogram due August 2023  Screening lung CT  11/8/2022 with pulmonary nodules and mediastinal lymphadenopathy  Screening lung CT February 2024 benign  MRI December 2024 benign     *Hypertension  Blood pressure stable, blood  pressure 144/87  Continue current medications    *Hyperlipidemia  Continue Crestor  Unchanged     *Bladder incontinence  Continue oxybutynin  Continue follow-up with Dr. Cantu her gynecologist     Plan:  Continue daily anastrozole  MRI of the breast in December 2023 reviewed  CT of the chest from February 2024 in April 2024 reviewed and benign  Plan for bilateral diagnostic mammogram and follow-up with Dr. Zambrano subsequently, Dr. Zambrano's office plans to schedule the same  APRN in 6 months and MD in 1 year  Screening lung CT in April 2025  Screening mammogram in September 2024    Patient remains on high risk medication and requires close monitoring for toxicity.  Reviewed the CT of the chest, screening MRI and screening mammogram images independently and interpreted them independently and discussed with Dr. Zambrano with breast surgery on the same day regarding the skin changes, thickening and breast pain as well as imaging.

## 2024-06-13 RX ORDER — VIBEGRON 75 MG/1
1 TABLET, FILM COATED ORAL DAILY
Qty: 90 TABLET | Refills: 1 | Status: SHIPPED | OUTPATIENT
Start: 2024-06-13

## 2024-07-02 NOTE — THERAPY TREATMENT NOTE
Outpatient Physical Therapy Ortho Treatment Note  [facility]     Patient Name: Kirsten Lima  : 1960  MRN: [id number]  Today's Date: 2022      Visit Date: 2022    Visit Dx:    ICD-10-CM ICD-9-CM   1. Lumbar radiculopathy  M54.16 724.4   2. Chronic bilateral low back pain with bilateral sciatica  M54.42 724.2    M54.41 724.3    G89.29 338.29   3. Lumbar stenosis with neurogenic claudication  M48.062 724.03       Patient Active Problem List   Diagnosis   • Abnormal thyroid stimulating hormone (TSH) level   • Atopic rhinitis   • Hypertension   • Insomnia   • Overactive bladder   • Knee pain   • Cobalamin deficiency   • Vitamin D deficiency   • Hyperlipidemia   • Other chronic pain   • Lumbar facet arthropathy   • Spinal stenosis of lumbar region   • Intraductal papilloma of left breast   • Lobular carcinoma in situ (LCIS) of left breast   • Increased risk of breast cancer   • Tobacco use disorder   • Elevated glucose   • Mass of nipple   • Colon cancer screening   • Malignant neoplasm of overlapping sites of left breast in female, estrogen receptor positive (HCC)   • Lumbar radiculopathy        Past Medical History:   Diagnosis Date   • Acute cystitis    • Allergic rhinitis    • Breast cancer (HCC)    • Breast cyst    • Colon polyp May 6, 2022    7   • Cyst of left nipple    • Depression    • Diverticulosis May 6, 2022   • Dry skin    • Dysuria    • Hemorrhoid    • Hidradenitis    • History of bronchitis    • Hyperlipidemia    • Hypertension    • Incontinence in female     wears pads   • Insomnia    • Nonspecific abnormal electrocardiogram (ECG) (EKG)    • Obesity    • Overactive bladder    • Pregnancy     G-2, P-0   • Sleep apnea    • UTI (urinary tract infection) 2021   • Vitamin B12 deficiency    • Vitamin D insufficiency    • Wound, breast     LEFT BREAST; PT INSTRUCTED TO NOTIFY DR GARCIA PRIOR TO SURGERY        Past Surgical History:   Procedure Laterality Date   • AXILLARY  HIDRADENITIS EXCISION Bilateral    • BREAST BIOPSY Left 02/22/2021    Procedure: Left breast needle-localized excisional biopsy (tophat clip) and left breast ultrasound-guided excisional biopsy (hydromark clip);  Surgeon: Debora Zambrano MD;  Location: [facility] OR;  Service: General;  Laterality: Left;   • BREAST BIOPSY Left 02/01/2022    Procedure: Left breast needle-localized excisional biopsy, Left nipple mass excision;  Surgeon: Debora Zambrano MD;  Location: [facility] OR;  Service: General;  Laterality: Left;   • CARDIAC CATHETERIZATION     • COLONOSCOPY     • COLONOSCOPY N/A 05/06/2022    Procedure: COLONOSCOPY;  Surgeon: Mirtha Davis MD;  Location: [facility] OR;  Service: Gastroenterology;  Laterality: N/A;  diverticulosis, polyps   • HYSTERECTOMY  2006    LAVH   • SUBTOTAL HYSTERECTOMY  2006                        PT Assessment/Plan     Row Name 07/19/22 1040          PT Assessment    Assessment Comments Ms. Lima returns for her first visit since evaluation, with no reports of increased soreness or pain.  Pt was able to perform SPPT very well, except she was holding her breath, and she was able to correct this with cuing.  Pt is doing well with stretches. Weakness noted in R LE vs L with SL clams.  -LP     Please refer to paper survey for additional self-reported information Yes  -LP     Rehab Potential Good  -LP     Patient/caregiver participated in establishment of treatment plan and goals Yes  -LP     Patient would benefit from skilled therapy intervention Yes  -LP            PT Plan    PT Frequency 2x/week  -LP     PT Plan Comments Continue to progress with core/hip strengthening and trunk AROM.  -LP           User Key  (r) = Recorded By, (t) = Taken By, (c) = Cosigned By    Initials Name Provider Type    LP Yenny Adrian PT Physical Therapist                   OP Exercises     Row Name 07/19/22 0800             Subjective Comments    Subjective Comments Went out yesterday  and didn't have too much pain  -LP              Subjective Pain    Able to rate subjective pain? yes  -LP      Pre-Treatment Pain Level 5  -LP              Total Minutes    58961 - PT Therapeutic Exercise Minutes 40  -LP              Exercise 1    Exercise Name 1 Nustep  -LP      Cueing 1 Verbal;Tactile;Demo  -LP      Time 1 5  -LP      Additional Comments L5 seat @ 9  -LP              Exercise 2    Exercise Name 2 PPT  -LP      Cueing 2 Verbal;Tactile;Demo  -LP      Sets 2 1  -LP      Reps 2 10  -LP              Exercise 3    Exercise Name 3 LTR  -LP      Cueing 3 Verbal;Tactile  -LP      Sets 3 1  -LP      Reps 3 10  -LP              Exercise 4    Exercise Name 4 HL hip abd with  T-A  -LP      Cueing 4 Verbal;Tactile;Demo  -LP      Sets 4 1  -LP      Reps 4 10  -LP      Time 4 3 s  -LP      Additional Comments RTB  -LP              Exercise 5    Exercise Name 5 HL hip add  -LP      Cueing 5 Verbal;Tactile;Demo  -LP      Sets 5 1  -LP      Reps 5 10  -LP      Additional Comments small ball  -LP              Exercise 6    Exercise Name 6 fig 4 stretch  -LP      Cueing 6 Verbal;Demo  -LP      Sets 6 1  -LP      Reps 6 3  -LP      Time 6 20s  -LP      Additional Comments B  -LP              Exercise 7    Exercise Name 7 HS stretch at EOB  -LP      Cueing 7 Verbal;Tactile;Demo  -LP      Sets 7 1  -LP      Reps 7 3  -LP      Time 7 20s  -LP      Additional Comments B  -LP              Exercise 8    Exercise Name 8 SB roll outs  -LP      Cueing 8 Verbal;Tactile;Demo  -LP      Sets 8 1  -LP      Reps 8 10  -LP              Exercise 9    Exercise Name 9 SL clams  -LP      Cueing 9 Verbal;Tactile;Demo  -LP      Sets 9 1  -LP      Reps 9 7  -LP      Additional Comments B-a little weaker on R  -LP            User Key  (r) = Recorded By, (t) = Taken By, (c) = Cosigned By    Initials Name Provider Type    LP Yenny Adrian, PT Physical Therapist                              PT OP Goals     Row Name 07/19/22 1000          PT  Short Term Goals    STG Date to Achieve 08/04/22  -LP     STG 1 The pt will demonstrate IND with initial HEP focused on improved lumbar mobility/stability and decreased pain.  -LP     STG 1 Progress Ongoing  -LP     STG 1 Progress Comments Pt did well with PPT from initial visit, just needs to keep breathing during ex.  -LP     STG 2 Patient will demonstrate symmetrical and pain-free spinal AROM to decrease pain and improve ability to exercise.  -LP     STG 2 Progress Ongoing  -LP            Long Term Goals    LTG Date to Achieve 09/03/22  -LP     LTG 1 The pt will demonstrate IND with finalized HEP focused on return to PLOF and IND condition management.  -LP     LTG 1 Progress New  -LP     LTG 2 Patient will score no more than 15% disability per the Oswestry to demonstrate decreased limitations on daily activities.  -LP     LTG 2 Progress New  -LP     LTG 3 Patient will report ability to stand and walk  for at least 30 min each before onset of symptoms to allow her to perform increased cooking and shopping before needing to sit as well as allow for improved ability to exercise.  -LP     LTG 3 Progress New  -LP           User Key  (r) = Recorded By, (t) = Taken By, (c) = Cosigned By    Initials Name Provider Type    LP Yenny Adrian, PT Physical Therapist                Therapy Education  Education Details: Pics of new exercises given  Given: HEP, Posture/body mechanics  Program: New, Reinforced  How Provided: Verbal, Demonstration, Written  Provided to: Patient  Level of Understanding: Teach back education performed              Time Calculation:   Start Time: 0845  Stop Time: 0930  Time Calculation (min): 45 min  Timed Charges  22099 - PT Therapeutic Exercise Minutes: 40  Total Minutes  Timed Charges Total Minutes: 40   Total Minutes: 40  Therapy Charges for Today     Code Description Service Date Service Provider Modifiers Qty    99630213449 HC PT THER PROC EA 15 MIN 7/19/2022 Yenny Adrian, PT GP 3                     Yenny Adrian, PT  7/19/2022      Render Post-Care Instructions In Note?: no

## 2024-07-09 ENCOUNTER — OFFICE VISIT (OUTPATIENT)
Dept: INTERNAL MEDICINE | Facility: CLINIC | Age: 64
End: 2024-07-09
Payer: COMMERCIAL

## 2024-07-09 VITALS
BODY MASS INDEX: 43.12 KG/M2 | OXYGEN SATURATION: 98 % | HEART RATE: 80 BPM | SYSTOLIC BLOOD PRESSURE: 138 MMHG | DIASTOLIC BLOOD PRESSURE: 80 MMHG | WEIGHT: 268.3 LBS | TEMPERATURE: 99.4 F | HEIGHT: 66 IN

## 2024-07-09 DIAGNOSIS — F32.A DEPRESSION, UNSPECIFIED DEPRESSION TYPE: Primary | ICD-10-CM

## 2024-07-09 PROCEDURE — 99214 OFFICE O/P EST MOD 30 MIN: CPT | Performed by: INTERNAL MEDICINE

## 2024-07-09 RX ORDER — CETIRIZINE HYDROCHLORIDE 10 MG/1
10 TABLET ORAL DAILY
COMMUNITY

## 2024-07-09 RX ORDER — BUPROPION HYDROCHLORIDE 300 MG/1
300 TABLET ORAL DAILY
Qty: 90 TABLET | Refills: 1 | Status: SHIPPED | OUTPATIENT
Start: 2024-07-09

## 2024-07-09 NOTE — PROGRESS NOTES
Subjective   Kirsten Lima is a 63 y.o. female.   She says the depression is getting worse    Depression  Her past medical history is significant for depression.      She does not feel like doing anything  she thinks she has always had depression but worse since her sister passed away last year  she was also dx with breast cancer a couple years ago and has been on arimidex    The following portions of the patient's history were reviewed and updated as appropriate: allergies, current medications, past family history, past medical history, past social history, past surgical history, and problem list.    Review of Systems    Objective   Physical Exam  Vitals reviewed.   Constitutional:       Appearance: She is well-developed.   HENT:      Head: Normocephalic and atraumatic.      Right Ear: External ear normal.      Left Ear: External ear normal.   Eyes:      Conjunctiva/sclera: Conjunctivae normal.      Pupils: Pupils are equal, round, and reactive to light.   Neck:      Thyroid: No thyromegaly.      Trachea: No tracheal deviation.   Cardiovascular:      Rate and Rhythm: Normal rate and regular rhythm.      Heart sounds: Normal heart sounds.   Pulmonary:      Effort: Pulmonary effort is normal.      Breath sounds: Normal breath sounds.   Abdominal:      General: Bowel sounds are normal. There is no distension.      Palpations: Abdomen is soft.      Tenderness: There is no abdominal tenderness.   Musculoskeletal:         General: No deformity. Normal range of motion.      Cervical back: Normal range of motion.   Skin:     General: Skin is warm and dry.   Neurological:      Mental Status: She is alert and oriented to person, place, and time.   Psychiatric:         Behavior: Behavior normal.         Thought Content: Thought content normal.         Judgment: Judgment normal.       Vitals:    07/09/24 0912   BP: 138/80   Pulse: 80   Temp: 99.4 °F (37.4 °C)   SpO2: 98%     Body mass index is 43.33 kg/m².         Assessment  & Plan   Diagnoses and all orders for this visit:    1. Depression, unspecified depression type (Primary)  -     Ambulatory Referral to Psychiatry    Other orders  -     buPROPion XL (Wellbutrin XL) 300 MG 24 hr tablet; Take 1 tablet by mouth Daily.  Dispense: 90 tablet; Refill: 1      1.  Depression: Patient is depression is primarily manifesting with no desire to do anything.  She thinks she has always been this way but when she was working she did not have a choice she had to go do things and now she just chooses to do nothing.  I definitely think increasing her exercise would help.  And it is entirely possible that the antiestrogen is making this worse.  We will go ahead and increase the Wellbutrin to 300 mg and have her see psychiatry for further evaluation  2.  Obesity: I do think her weight is contributing to a lot of her issues because she feels much more immobile because of her weight gain.  Again we discussed trying the tirzepatide which she already picked up from the pharmacy.  She is going to consider starting this.  If she has any side effects she can simply stop taking it

## 2024-07-15 RX ORDER — ANASTROZOLE 1 MG/1
TABLET ORAL
Qty: 90 TABLET | Refills: 3 | Status: SHIPPED | OUTPATIENT
Start: 2024-07-15

## 2024-07-23 ENCOUNTER — HOSPITAL ENCOUNTER (OUTPATIENT)
Dept: ULTRASOUND IMAGING | Facility: HOSPITAL | Age: 64
Discharge: HOME | End: 2024-07-23
Payer: COMMERCIAL

## 2024-07-23 ENCOUNTER — OFFICE VISIT (OUTPATIENT)
Age: 64
End: 2024-07-23
Payer: COMMERCIAL

## 2024-07-23 ENCOUNTER — HOSPITAL ENCOUNTER (OUTPATIENT)
Dept: MAMMOGRAPHY | Facility: HOSPITAL | Age: 64
Discharge: HOME | End: 2024-07-23
Payer: COMMERCIAL

## 2024-07-23 VITALS
BODY MASS INDEX: 43.12 KG/M2 | HEART RATE: 57 BPM | DIASTOLIC BLOOD PRESSURE: 88 MMHG | OXYGEN SATURATION: 96 % | HEIGHT: 66 IN | WEIGHT: 268.3 LBS | SYSTOLIC BLOOD PRESSURE: 144 MMHG

## 2024-07-23 DIAGNOSIS — N64.4 BREAST PAIN, LEFT: ICD-10-CM

## 2024-07-23 DIAGNOSIS — F33.1 MDD (MAJOR DEPRESSIVE DISORDER), RECURRENT EPISODE, MODERATE: Primary | ICD-10-CM

## 2024-07-23 PROCEDURE — 77065 DX MAMMO INCL CAD UNI: CPT

## 2024-07-23 PROCEDURE — 76642 ULTRASOUND BREAST LIMITED: CPT

## 2024-07-23 PROCEDURE — 90791 PSYCH DIAGNOSTIC EVALUATION: CPT

## 2024-07-23 PROCEDURE — G0279 TOMOSYNTHESIS, MAMMO: HCPCS

## 2024-07-23 NOTE — PROGRESS NOTES
"Chief Complaint: \"lack of motivation\"    Subjective      Kirsetn Lima presents to [facility] for a mental health evaluation.    HPI :     Pt is a 64 y.o. yo female who is being seen here at the clinic for a mental health evaluation.  Patient was for referred by her PCP for depression.  Patient reports a history of depression that started during childhood.  States that she cannot remember a time not feeling depressed.  Reports that her depression worsened when her mother passed away in 2018.  Patient felt that when her mother passed away she lost a part of her identity because she was her caregiver. Patient was started on Wellbutrin  mg daily in May of this year and the dose was increased to 300 mg daily about 2 weeks ago.  Patient states that she has noticed an increase in her motivation since increasing the Wellbutrin.  States she has been completing chores that she has not been able to complete in weeks.  States that prior to increasing the Wellbutrin small tasks seemed large.  Patient reports the following depressive symptoms today:depressed mood, diminished interest or pleasure in activities, increased appetite, poor sleep, fatigue or loss of energy, inappropriate guilt , and diminished ability to concentrate.  States that although her depressive symptoms are still present they have improved over the past couple weeks.  Additionally, patient reports following symptoms of anxiety:Excessive anxiety and worry, easily fatigued, difficulty concentrating , muscle tension, and sleep disturbance.  States that she worries about multiple different things but denies any trouble being able to control the worry.  Denies any panic attacks.  Reports to be sleeping about 5 hours each night.  States that she does have sleep apnea.  Reports that she is currently getting a  fitted so that she can wear that during a repeated sleep study to see if she requires a CPAP.  Reports " that she has been eating poorly.  Denies SI/HI/AVH.    Psychiatric Review of Systems:   Depression: depressed mood, diminished interest or pleasure in activities, increased appetite, poor sleep, fatigue or loss of energy, inappropriate guilt , and diminished ability to concentrate   Jazz: Denies symptoms of jazz.  Anxiety: Excessive anxiety and worry, easily fatigued, difficulty concentrating , muscle tension, and sleep disturbance   Psychosis: Denies symptoms of psychosis.   Panic Attacks: Denies any panic attacks.   Agoraphobia: Denies symptoms of agoraphobia.   OCD: Denies symptoms of OCD.   Eating Disorders: Denies symptoms of eating disorder.   PTSD: Denies symptoms of PTSD.  Specific Phobias: Denies any phobias.  Borderline Personality DO: Denies symptoms of borderline personality disorder.  Antisocial Personality DO: Denies symptoms of antisocial personality disorder.    Past Psychiatric History:   Diagnoses: MDD  Hospitalizations: Denies.    Counseling: Some therapy for a short time years ago.   Suicide attempts: Denies.   Self Harm: Denies.     Previous Psych Meds: Believes she was on Celexa at some point but cannot remember how it treated her symptoms.    Substance Use/Abuse:   Caffeine: 1 cup of coffee daily.   Alcohol: Rarely drinks alcohol, every couple of months.   Tobacco: 1/4 of a pack a day.   Illicit substances: Denies.   IVDU: Denies.   History of formal substance abuse treatment: Denies.     Social History:    Family structure: Lives alone.     Education: Some college.    Employment: Retired from [employer].    Supportive relationships: Best friend.    Anabaptist/[place]: Restorationism.     Abuse History: Denies.       Legal History:    Arrested for shoplifting at age 17.    History of violence: Denies.      History: Denies.     Past Medical/Developmental History:    Chronic Illnesses: Listed below.    Head trauma: Denies.     Past Medical History:   Diagnosis Date    Acute cystitis     Allergic  rhinitis     Breast cancer     Breast cyst     Colon polyp May 6, 2022    7    Cyst of left nipple     Depression     Diverticulosis May 6, 2022    Dry skin     Dysuria     Hemorrhoid     Hidradenitis     History of bronchitis     Hyperlipidemia     Hypertension     Incontinence in female     wears pads    Insomnia     Low back pain 2020    Nonspecific abnormal electrocardiogram (ECG) (EKG)     Obesity     Overactive bladder     Pregnancy     G-2, P-0    Sleep apnea     UTI (urinary tract infection) 02/2021    Vitamin B12 deficiency     Vitamin D insufficiency     Wound, breast     LEFT BREAST; PT INSTRUCTED TO NOTIFY DR GARCIA PRIOR TO SURGERY        Family Psychiatric History:    Psychiatric history: Denies   Substance use: Denies    Current Medications:   Current Outpatient Medications   Medication Sig Dispense Refill    amLODIPine (NORVASC) 5 MG tablet Take 1 tablet by mouth Every Morning. 90 tablet 3    anastrozole (ARIMIDEX) 1 MG tablet TAKE 1 TABLET BY MOUTH EVERY DAY 90 tablet 3    buPROPion XL (Wellbutrin XL) 300 MG 24 hr tablet Take 1 tablet by mouth Daily. 90 tablet 1    cetirizine (zyrTEC) 10 MG tablet Take 1 tablet by mouth Daily.      Cholecalciferol (VITAMIN D) 2000 units capsule Take 1 capsule by mouth Daily.      coenzyme Q10 100 MG capsule Take 1 capsule by mouth Daily.      fluticasone (FLONASE) 50 MCG/ACT nasal spray 2 sprays by Each Nare route Daily. 16 g 1    levalbuterol (XOPENEX HFA) 45 MCG/ACT inhaler Inhale 1-2 puffs Every 4 (Four) Hours As Needed for Wheezing. 15 g 1    levocetirizine (Xyzal Allergy 24HR) 5 MG tablet Take 1 tablet by mouth Every Evening.      lisinopril-hydrochlorothiazide (PRINZIDE,ZESTORETIC) 20-12.5 MG per tablet Take 2 tablets by mouth Every Morning. 180 tablet 1    metoprolol succinate XL (TOPROL-XL) 200 MG 24 hr tablet Take 1 tablet by mouth Every Night. 90 tablet 1    Probiotic Product (Risaquad-2) capsule capsule Take 1 capsule by mouth Daily.      rosuvastatin  (CRESTOR) 10 MG tablet Take 1 tablet by mouth Every Night. 90 tablet 3    spironolactone (ALDACTONE) 25 MG tablet Take 1 tablet by mouth Every Morning. 90 tablet 3    Vibegron (Gemtesa) 75 MG tablet TAKE 1 TABLET BY MOUTH EVERY DAY 90 tablet 1    clotrimazole-betamethasone (LOTRISONE) 1-0.05 % lotion Apply  topically to the appropriate area as directed 2 (Two) Times a Day. (Patient not taking: Reported on 7/23/2024) 30 mL 1    Tirzepatide-Weight Management (ZEPBOUND) 2.5 MG/0.5ML solution auto-injector Inject 0.5 mL under the skin into the appropriate area as directed 1 (One) Time Per Week. (Patient not taking: Reported on 7/9/2024) 2 mL 0     No current facility-administered medications for this visit.       Review of Systems   Constitutional:  Negative for appetite change.   HENT:  Negative for sore throat.    Eyes:  Negative for visual disturbance.   Respiratory:  Negative for chest tightness and shortness of breath.    Cardiovascular:  Negative for chest pain and palpitations.   Gastrointestinal:  Negative for abdominal pain, constipation, diarrhea and nausea.   Genitourinary:  Negative for difficulty urinating.   Neurological:  Negative for dizziness, tremors and memory problem.   Psychiatric/Behavioral:  Negative for hallucinations and suicidal ideas.         Mental Status Exam:   MENTAL STATUS EXAM   General Appearance:  Cleanly groomed and dressed  Eye Contact:  Good eye contact  Attitude:  Cooperative  Motor Activity:  Normal gait, posture  Muscle Strength:  Normal  Speech:  Normal rate, tone, volume  Language:  Spontaneous  Mood and affect:  Normal, pleasant  Hopelessness:  Denies  Loneliness: Denies  Thought Process:  Logical  Associations/ Thought Content:  No delusions  Hallucinations:  None  Suicidal Ideations:  Not present  Homicidal Ideation:  Not present  Sensorium:  Alert and clear  Orientation:  Person, place, time and situation  Attention Span/ Concentration:  Good  Fund of Knowledge:   "Appropriate for age and educational level  Intellectual Functioning:  Average range  Insight:  Good  Judgement:  Good  Reliability:  Good  Impulse Control:  Good       Objective   Vital Signs:   /88   Pulse 57   Ht 167.6 cm (66\")   Wt 122 kg (268 lb 4.8 oz)   SpO2 96%   BMI 43.30 kg/m²       Result Review :       TSH          11/11/2023    09:06 5/10/2024    08:25   TSH   TSH 0.593  0.459                Assessment and Plan      PHQ-9 Score:   PHQ-9 Total Score: 21    PHQ-9 Depression Screening  Little interest or pleasure in doing things? 3-->nearly every day   Feeling down, depressed, or hopeless? 3-->nearly every day   Trouble falling or staying asleep, or sleeping too much? 3-->nearly every day   Feeling tired or having little energy? 3-->nearly every day   Poor appetite or overeating? 3-->nearly every day   Feeling bad about yourself - or that you are a failure or have let yourself or your family down? 3-->nearly every day   Trouble concentrating on things, such as reading the newspaper or watching television? 3-->nearly every day   Moving or speaking so slowly that other people could have noticed? Or the opposite - being so fidgety or restless that you have been moving around a lot more than usual? 0-->not at all   Thoughts that you would be better off dead, or of hurting yourself in some way? 0-->not at all   PHQ-9 Total Score 21   If you checked off any problems, how difficult have these problems made it for you to do your work, take care of things at home, or get along with other people? extremely difficult      Depression Screening:  Patient screened positive for depression based on a PHQ-9 score of 16 on 7/29/2024. Follow-up recommendations include: Prescribed antidepressant medication treatment.    MIKHAIL-7      Over the last two weeks, how often have you been bothered by the following problems?  Feeling nervous, anxious or on edge: More than half the days  Not being able to stop or control " worrying: Not at all  Worrying too much about different things: Several days  Trouble Relaxing: Nearly every day  Being so restless that it is hard to sit still: Not at all  Becoming easily annoyed or irritable: Nearly every day  Feeling afraid as if something awful might happen: Not at all  MIKHAIL 7 Total Score: 9  If you checked any problems, how difficult have these problems made it for you to do your work, take care of things at home, or get along with other people: Very difficult               Tobacco Cessation:  Educated pt on risks of using tobacco products. Encouraged pt to decrease use.     Impression/Treatment Plan:  Patient is a 64-year-old female with a history of MDD.  Patient was started on Wellbutrin  mg daily in May of this year and the dose was increased to 300 mg daily about 2 weeks ago.  Patient's PHQ-9 is still elevated today.  Given that the medication was only increased 2 weeks ago will continue current dosage and see the patient back in 1 month.    Patient's anxiety is present but is very manageable currently. Encouraged patient to start therapy in clinic.    Short-term goals: Continue to develop rapport with patient.    Long-term goals: Symptom reduction with medication and therapy.    Weakness: Not currently in therapy.    Strengths: Willing to start therapy.    Diagnoses and all orders for this visit:    1. MDD (major depressive disorder), recurrent episode, moderate (Primary)      MEDS ORDERED DURING VISIT:  No orders of the defined types were placed in this encounter.      Follow Up   Return in about 4 weeks (around 8/20/2024) for Recheck.  Patient was given instructions and counseling regarding her condition or for health maintenance advice. Please see specific information pulled into the AVS if appropriate.     PATIENT EDUCATION:  - Discussed medication options and treatment plan of prescribed medication as well as the risks, benefits, and side effects   - Encouraged pt to continue  supportive psychotherapy efforts and medications as indicated.    Treatment and medication options discussed during today's visit. Patient acknowledged and verbally consented to continue with current treatment plan and was educated on the importance of compliance with treatment and follow-up appointments. Patient is agreeable to call the office with any worsening of symptoms or onset of side effects. Patient is agreeable to call 911 or go to the nearest ER should he/she begin having SI/HI.    Howard reviewed and is appropriate.    YAN Benton, PMHNP-BC    Part of this note may be an electronic transcription/translation of spoken language to printed text using the Dragon Dictation System.

## 2024-07-25 ENCOUNTER — PATIENT ROUNDING (BHMG ONLY) (OUTPATIENT)
Age: 64
End: 2024-07-25
Payer: COMMERCIAL

## 2024-07-25 NOTE — PROGRESS NOTES
A My-Chart message has been sent to the patient [place] [facility] [place]

## 2024-07-29 ENCOUNTER — OFFICE VISIT (OUTPATIENT)
Age: 64
End: 2024-07-29
Payer: COMMERCIAL

## 2024-07-29 DIAGNOSIS — F33.1 MODERATE EPISODE OF RECURRENT MAJOR DEPRESSIVE DISORDER: Primary | ICD-10-CM

## 2024-07-29 DIAGNOSIS — F41.1 GENERALIZED ANXIETY DISORDER: ICD-10-CM

## 2024-07-29 PROCEDURE — 90791 PSYCH DIAGNOSTIC EVALUATION: CPT
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
## 2024-07-31 ENCOUNTER — OFFICE VISIT (OUTPATIENT)
Dept: SURGERY | Facility: CLINIC | Age: 64
End: 2024-07-31
Payer: COMMERCIAL

## 2024-07-31 VITALS
OXYGEN SATURATION: 99 % | SYSTOLIC BLOOD PRESSURE: 130 MMHG | BODY MASS INDEX: 43.07 KG/M2 | HEIGHT: 66 IN | DIASTOLIC BLOOD PRESSURE: 78 MMHG | WEIGHT: 268 LBS | HEART RATE: 59 BPM | RESPIRATION RATE: 17 BRPM

## 2024-07-31 DIAGNOSIS — N64.4 BREAST PAIN, LEFT: Primary | ICD-10-CM

## 2024-07-31 DIAGNOSIS — Z12.31 ENCOUNTER FOR SCREENING MAMMOGRAM FOR MALIGNANT NEOPLASM OF BREAST: ICD-10-CM

## 2024-07-31 NOTE — LETTER
July 31, 2024       No Recipients    Patient: Kirsten Lima   YOB: 1960   Date of Visit: 7/31/2024     Dear Tad Lowe MD:       Thank you for referring Kirsten Lima to me for evaluation. Below are the relevant portions of my assessment and plan of care.    If you have questions, please do not hesitate to call me. I look forward to following Kirsten along with you.         Sincerely,        Debora Zambrano MD        CC:   No Recipients    Debora Zambrano MD  07/31/24 1048  Sign when Signing Visit  [facility]     Referring Provider: Tad Lowe MD     Chief complaint: Routine follow up breast cancer    Subjective  HPI:   8/7/2020:  Ms. Kirsten Lima is a 59 yo woman, seen at the request of Dr. Tad Lowe, for a new diagnosis of left breast intraductal papillomas. These were initially detected as an imaging abnormality on routine screening. Her work-up is detailed in the breast history section below. Despite these being incidentally found, the patient reports that she has had intermittent, clear, left nipple discharge for years. 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 She denies any associated breast lumps or pain. She does report chronic issues with her breast skin due to hidradenitis and recurrent skin abscesses, however she has not had any acute problems lately. She has a past history of a benign left breast stereotactic biopsy in 2012. She denies any family history of breast or ovarian cancer.      1/7/2021:  At her last visit, I recommended excision of both of the left breast papillomas due the associated nipple discharge. She wanted to think about it and ultimately decided to hold off on surgery. She underwent follow-up ultrasound prior to her appointment, which showed stable left breast intraductal masses (see report details below). She has not  tried to elicit any nipple discharge since the last visit and has not noticed any spontaneously.     3/10/2021:  She underwent left breast excisional biopsy x 2 on 2/22/21, which showed LCIS. See surgery & pathology details in breast history section below. She has been recovering well and has no complaints.      6/28/2021, Visit with YAN Dill :  She returns today for follow up with no breast complaints   In 3/2021 she met with Dr Dong and discussed starting tamoxifen, referral to nutritionist and smoking cessation were made.  Due to risk for blood clots she has reservations about taking tamoxifen. She will follow up with Dr Dong in 9/21 to discuss again.   Screening mammogram with tomosynthesis was completed 6/11/2021 at [facility] (see full report below)    1/12/2022:  She saw Regina Gilbert in June with a normal screening mammogram. She saw Dr. Dong who initially prescribed low-dose tamoxifen, however the patient never started taking it because she was concerned about blood clots. Dr. Dong then prescribed anastrozole in September, however the patient just started taking this 3 weeks ago.  She underwent her first screening MRI in December, which showed an area of non-mass enhancement in the left lower outer breast and a left nipple mass. She subsequently underwent an MR biopsy of the non-mass enhancement and this showed extensive LCIS (see imaging and pathology report details below).    2/16/2022:  She underwent left breast excisional biopsy and left nipple mass excision on 2/1/22. The excisional biopsy showed an incidental focus of low-grade invasive lobular carcinoma measuring 4 mm with negative margins (see report details below). I have already discussed her case in tumor board and the consensus was that axillary staging is not necessary. She has been recovering well from surgery.    8/24/2022:  She returns today for scheduled follow-up. She completed radiation on 4/5/22 and tolerated  this well. She remains on anastrozole and is still tolerating this well. She underwent MMG prior to her appointment which was benign.  She called the office about a month ago complaining of some left breast pain and nodular areas under her left nipple. She began wearing a sports bra day and night and the pain has resolved. She also noticed that the lower part of her left breast looked less swollen after wearing the bra.     2/6/2023, Seen by YAN Schrader:  Patient returns the office today for routine follow-up.  She last saw Dr. Dong on 1/11/2023 and no changes were made to treatment plan.  Currently on anastrozole and tolerating well.     9/26/2023, Seen by YAN Schrader:  Presenting to the office today for routine follow-up.  She last saw her oncologist in May without any changes to the treatment plan.  She had a screening mammogram on 9/1/2023 that resulted as BI-RADS 0.  She then had a right diagnostic mammogram which showed calcifications and suggested a biopsy.  She underwent a stereotactic breast biopsy that returned as benign.     4/11/2024, Seen by YAN Schrader:  Patient presenting to the office today for routine follow-up.  On 12/14/2023 she had a bilateral breast MRI that resulted as BI-RADS 2.  On 3/28/2024 she had a right diagnostic mammogram that resulted as BI-RADS 2.  She is due for her bilateral screening mammogram in September.  Last saw her oncologist in May 2023 with no changes made to the treatment plan.  She is due to follow-up with them in May 2024.     7/31/2024, Interval history  She saw Dr. Dong in June complaining of increased left breast pain.  Since then the pain has actually resolved on its own.  She only drinks about 1 cup of caffeine per day, but she is still smoking.  Diagnostic mammo and ultrasound prior to this appointment were benign.       Oncology/Hematology History Overview Note   5/28/19, Screening MMG with Hol[facility] ([facility]):  Scattered areas of  fibroglandular density.  1. There are small asymmetries seen in both breasts. The parenchyma includes stable nodular asymmetries. No suspicious masses, suspicious microcalcifications or areas of architectural distortion are identified. There has been no significant change from the prior exams.  2. There are stable punctate and spherical lucent-centered calcifications with diffuse/scattered distribution seen in both breast.  BI-RADS 2: Benign.     20, Screening MMG with Hol[facility] ([facility]):  Scattered areas of fibroglandular density.  1. There are small asymmetries seen in both breasts. There has been no significant change from the prior exams.  2. There are stable punctate and spherical lucent-centered calcifications with diffuse distribution seen in both breasts.  3. There is a new small focal asymmetry measuring 12 mm seen in the central region of the left breast. This is best visualized on tomosynthesis MLO view slice # 78. This is best seen on the MLO View.  BI-RADS 0: Incomplete.     20, Left Diagnostic MMG with [facility]a & Left Breast US ([facility]):  MM. On the present examination, focal asymmetry in the left breast, central does not persist. The asymmetry noted on the recent screening mammogram resolves into stroma on additional views.  US:  1. There is no sonographic correlate. There is no evidence of any solid mass or abnormal cystic elements.  2. There are three oval intraductal masses with partially defined margins measuring 5 mm to 9 mm seen in the anterior one-third subareolar region of the left breast. The patient reports history of intermittent clear left nipple discharge for the past 10 years. These three structures are adjacent and would be included within the same biopsy site.  3. There is an oval solid mass with partially defined margins measuring 6 x 5 x 5 mm seen in the anterior one-third subareolar region of the left breast. This is located 2 to 3 cm away from the above described masses.    BI-RADS 4: Suspicious      6/29/20, Left Breast, US-Guided Biopsy x 2 ([facility])  1. Left Subareolar mass (5 x 5 x 6 cm), Ultrasound guided biopsy:   Benign sclerosing intraductal papilloma with calcifications.  -Tophat clip. Concordant.  2. Left Subareolar mass, ultrasound guided biopsy:   Benign sclerosing intraductal papilloma with usual duct hyperplasia and microcalcifications.   -Hydromark clip. Concordant.     12/28/20, Left Breast US ([facility]):  There are two oval masses and biopsy clips with circumscribed margins seen in the sub-areolar region of the left breast. There are no significant changes from the prior exam(s). This finding represents biopsy proven benign breast disease. These measure less than 1 cm.  BI-RADS  2: Benign.     02/22/21, Left breast needle-localized excisional biopsy and left breast ultrasound-guided excisional biopsy:  1. Left Breast, Needle-Localized Excisional Biopsy (13 grams):               A. LOBULAR CARCINOMA IN SITU (LCIS).               B. Multiple sclerosed intraductal papillomas with focal calcifications (completely excised).               C. Clips and associated biopsy site changes are present and adjacent to papillomas.               D. Background breast parenchyma with clusters of apocrine cysts, usual ductal hyperplasia, and       calcifications associated with sclerosing adenosis.    6/11/2021, Screening MMG with Hol[facility] ([facility]):  Scattered areas of fibroglandular density. There is a new postsurgical scar seen subareolar region of the left breast.  There are no suspicious masses, calcifications, or areas of architectural distortion. In the right breast, no suspicious masses, significant calcifications or other abnormalities are seen.  BI-RADS 2: Benign.     Malignant neoplasm of overlapping sites of left breast in female, estrogen receptor positive   12/9/2021 Initial Diagnosis    Malignant neoplasm of overlapping sites of left breast in female, estrogen receptor positive  ([facility])     12/10/2021 Imaging    [facility]  RIGHT BREAST:    No suspicious enhancing mass or area of non-mass enhancement is identified. The visualized axilla is within normal limits.    LEFT BREAST:    There are post surgical changes in the anterior left breast. At 5:00 in the anterior left breast, 3.4 cm posterior to the nipple,  there is a 1.8 cm AP dimension somewhat linear branching nonmass enhancement, which is suspicious.   There is abnormal asymmetric enhancement within the left nipple with an approximately 1.2 cm AP dimension, 1.7 cm transverse dimension, 1.0 cm craniocaudal dimension mass involving the nipple itself and extending slightly deeper into the subareolar breast, which is associated with rapid initial and washout delayed phase enhancement on kinetic analysis. This is suspicious.  No suspicious enhancement is identified in the left chest wall. The visualized axilla is within normal limits.    EXTRAMAMMARY FINDINGS:   There are no abnormally enlarged internal mammary chain lymph nodes on either side.     BI-RADS 4: Suspicious.     12/22/2021 Biopsy    Left Breast, MR-Guided Biopsy ( [facility]):    1. Left Breast at 5 o'clock (not for calcifications) MRI-Guided Core Biopsy:                A.  Extensive lobular carcinoma in situ (LCIS):                            1.  LCIS measures up to 7 mm in single greatest dimension focally involving 7 of 8                      representative cores.                2.  Low grade nuclear features without necrosis.  B.  No invasive carcinoma identified by routine and/or immunostaining.  C.  Associated intraductal papilloma with microcalcification, ductal cyst wall and fibrocystic change noted.      2/1/2022 Surgery    Left breast needle-localized excisional biopsy and left nipple mass excision:    1. Breast, Left, Lumpectomy: Invasive lobular carcinoma, lobular carcinoma in situ and atypical lobular hyperplasia.               A. Invasive lobular  carcinoma.                            1. The invasive lobular carcinoma measures 4 mm on the slide.                            2. The invasive lobular carcinoma is Addison grade I (tubular grade 3, nuclear grade 1, mitotic grade 1).                            3. The foci is located amongst an area of lobular carcinoma in situ and near the biopsy cavity.                            4. Biopsy site and clip identified (barbell shaped clip).                            5. The invasive lobular carcinoma comes within 1.3 mm of the medial margin, 7.5 mm of the posterior margin, 8.8 mm of the anterior margin, 12.5 mm of the lateral margin and 15 mm of the superior and inferior margins.                            6. Microcalcifications are not associated with the invasive tumor.               B. Extensive lobular carcinoma in situ and atypical lobular hyperplasia.  C. Fibroadenoma with calcification, sclerosing adenosis, apocrine metaplasia, clustered cysts, area consistent with radial scar and fat necrosis.     2. Breast, Left, Nipple, Excision:  Breast tissue with               A. Simple cysts.               B. Calcifications in non-neoplastic tissue.               C. Apocrine metaplasia.               D. Intraductal papilloma with sclerosis.               E. Atypical lobular hyperplasia.    ER+ (80%, strong)  SC+ (3%, moderate)  Her2 negative (IHC 1+)  Ki-67 2%     3/9/2022 - 4/5/2022 Radiation    Radiation OncologyTreatment Course:  Kirsten Lima received 4990 cGy in 20 fractions to LEFT breast.     4/6/2022 -  Hormonal Therapy    Anastrozole (Arimidex)     8/15/2022 Imaging    Screening MMG with Hol[facility] ([facility]):  Scattered areas of fibroglandular density.  There is a stable post-surgical scar with associated skin lesion and trabecular thickening seen in the central region of the left breast. Post-surgical scar is in an area of prior lumpectomy. Findings are consistent with post-surgical and post-radiation therapy  changes.  In the right breast, no suspicious masses, significant calcifications or other abnormalities are seen.  BI-RADS 2: Benign.     12/9/2022 Imaging    Breast MRI at [facility]  FINDINGS:Scattered fibroglandular tissue seen is throughout both  breasts. Mild background parenchymal enhancement of both breasts is  noted. Postsurgical change in the left breast is noted. Otherwise, no  areas of abnormal morphology are seen in either breast. No areas of  abnormal enhancement are seen in either breast. I see no evidence for  abnormal skin, nipple or chest wall enhancement of either breast and  there is no evidence for axillary or internal mammary chain adenopathy.     IMPRESSION:  There are no findings suspicious for malignancy in either  breast. Routine follow-up imaging is recommended.     BI-RADS category 2     9/1/2023 Imaging    Bilateral screening mammogram at [facility]  There are scattered areas of fibroglandular density.    Finding 1:  There is a stable post-surgical scar seen in the left breast.  No  suspicious masses, suspicious microcalcifications or architectural distortions  are identified.  There has been no significant change from the prior exam(s).    Finding 2:  There are calcifications seen in the sub-areolar region of the right  breast.    IMPRESSION:  Finding 1:  Stable post-surgical scar in the left breast is benign-negative.    Finding 2:  Calcifications in the right breast require additional evaluation.  Magnification mammography is recommended.    BI-RADS Category 0: Incomplete: Needs Additional Imaging Evaluation     9/7/2023 Imaging    Right diagnostic mammogram at [facility]  There are scattered areas of fibroglandular density.    There are round and punctate calcifications measuring 8 mm seen in the sub-areolar region of the right breast  located 5 centimeters from the nipple.  Otherwise, no suspicious masses, suspicious microcalcifications or  architectural distortions are identified.   There has been no significant change from the prior exam(s).    IMPRESSION:  Calcifications in the right breast are suspicious. Stereotactic biopsy is recommended.    The patient was mailed a notification letter.    BI-RADS Category 4B: Suspicious Abnormality     9/19/2023 Biopsy    Right stereotactic biopsy at [facility]  Technically successful stereotactic right breast biopsy with marker clip placement and positive radiograph.   Pathology results to follow.     A two view mammogram shows the clip in the expected location.   Pathology returned as cyst and fibroadenomatoid hyperplasia.   Pathology is benign and concordant.  A follow up mammogram in 6 months is recommended.      12/14/2023 Imaging    Bilateral breast MRI [facility]  FINDINGS: There is scattered fibroglandular tissue. There is mild  background parenchymal enhancement.     RIGHT BREAST:    There are new post biopsy changes in the anterior right breast from a  recent benign biopsy. No suspicious enhancing mass or area of non-mass  enhancement is identified.  The visualized axilla is within normal limits.     LEFT BREAST:    Benign-appearing postsurgical changes are identified in the left breast.  No suspicious enhancing mass or area of non-mass enhancement is  identified.  The visualized axilla is within normal limits.     EXTRAMAMMARY FINDINGS:  There are no pathologically enlarged internal mammary chain lymph nodes  on either side.          IMPRESSION AND RECOMMENDATION:     No MR evidence of malignancy in either breast. Recommend annual  screening mammogram in September 2023.     BI-RADS Category 2: Benign     3/28/2024 Imaging    Right diagnostic mammogram at [facility]  The following views were obtained: Right craniocaudal; right craniocaudal magnification; right mediolateral; right  mediolateral magnification; and right mediolateral oblique.  This examination was reviewed with the aid of R2  computer aided detection.    Follow-up examination was performed for the  calcifications in the right breast, sub-areolar seen on 09/07/2023.  There is a decreased number of calcifications.  This finding represents biopsy proven benign breast disease.  Note  is made of a biopsy clip as evidence of a previous surgical procedure.    IMPRESSION:  Calcifications in the right breast are benign-negative.    Screening mammogram in 1 year is recommended.    The patient was mailed a notification letter.    BI-RADS Category 2: Benign Finding(s)     7/23/2024 Imaging    Left Diagnostic MMG with [facility] & [facility]  MMG:  Benign-appearing postsurgical and treatment-related changes in the left breast, evolved over prior mammograms. There are benign-appearing calcifications. There are no suspicious masses, calcifications, or areas of architectural distortion.  US:  Targeted sonographic evaluation of the left breast was performed in the areas of concern indicated by the patient at 3-4 o'clock in the area of patient indicated pain and at 11:00, 8 cm from the nipple and 11:00 retroareolar in the areas of reported skin changes. Predominantly fatty tissue is identified. Skin thickness is upper normal to 0.3 cm at 11:00 retroareolar, likely treatment related. No suspicious cystic or solid mass is identified.  BI-RADS 2: Benign          Review of Systems:  See interval history.       Medications:    Current Outpatient Medications:   •  amLODIPine (NORVASC) 5 MG tablet, Take 1 tablet by mouth Every Morning., Disp: 90 tablet, Rfl: 3  •  anastrozole (ARIMIDEX) 1 MG tablet, TAKE 1 TABLET BY MOUTH EVERY DAY, Disp: 90 tablet, Rfl: 3  •  buPROPion XL (Wellbutrin XL) 300 MG 24 hr tablet, Take 1 tablet by mouth Daily., Disp: 90 tablet, Rfl: 1  •  cetirizine (zyrTEC) 10 MG tablet, Take 1 tablet by mouth Daily., Disp: , Rfl:   •  Cholecalciferol (VITAMIN D) 2000 units capsule, Take 1 capsule by mouth Daily., Disp: , Rfl:   •  clotrimazole-betamethasone (LOTRISONE) 1-0.05 % lotion, Apply  topically to the  appropriate area as directed 2 (Two) Times a Day. (Patient not taking: Reported on 2024), Disp: 30 mL, Rfl: 1  •  coenzyme Q10 100 MG capsule, Take 1 capsule by mouth Daily., Disp: , Rfl:   •  fluticasone (FLONASE) 50 MCG/ACT nasal spray, 2 sprays by Each Nare route Daily., Disp: 16 g, Rfl: 1  •  levalbuterol (XOPENEX HFA) 45 MCG/ACT inhaler, Inhale 1-2 puffs Every 4 (Four) Hours As Needed for Wheezing., Disp: 15 g, Rfl: 1  •  levocetirizine (Xyzal Allergy 24HR) 5 MG tablet, Take 1 tablet by mouth Every Evening., Disp: , Rfl:   •  lisinopril-hydrochlorothiazide (PRINZIDE,ZESTORETIC) 20-12.5 MG per tablet, Take 2 tablets by mouth Every Morning., Disp: 180 tablet, Rfl: 1  •  metoprolol succinate XL (TOPROL-XL) 200 MG 24 hr tablet, Take 1 tablet by mouth Every Night., Disp: 90 tablet, Rfl: 1  •  Probiotic Product (Risaquad-2) capsule capsule, Take 1 capsule by mouth Daily., Disp: , Rfl:   •  rosuvastatin (CRESTOR) 10 MG tablet, Take 1 tablet by mouth Every Night., Disp: 90 tablet, Rfl: 3  •  spironolactone (ALDACTONE) 25 MG tablet, Take 1 tablet by mouth Every Morning., Disp: 90 tablet, Rfl: 3  •  Tirzepatide-Weight Management (ZEPBOUND) 2.5 MG/0.5ML solution auto-injector, Inject 0.5 mL under the skin into the appropriate area as directed 1 (One) Time Per Week. (Patient not taking: Reported on 2024), Disp: 2 mL, Rfl: 0  •  Vibegron (Gemtesa) 75 MG tablet, TAKE 1 TABLET BY MOUTH EVERY DAY, Disp: 90 tablet, Rfl: 1      Allergies:  No Known Allergies      Family History   Problem Relation Age of Onset   • Hypertension Mother             • COPD Father             • Heart failure Father    • Hypertension Father    • Hyperlipidemia Father             • Vision loss Father    • Hypertension Sister    • Other Sister    • Alcohol abuse Sister             • Liver disease Sister    • Stroke Sister    • Hypertension Brother    • Pancreatic cancer Brother         1 brother  from  pancreatic cancer   • Alcohol abuse Brother             • Hypertension Brother    • Alcohol abuse Brother          2018   • Cancer Brother         Liver   • Alcohol abuse Brother    • Sudden death Brother    • Malig Hyperthermia Neg Hx      Objective  PHYSICAL EXAMINATION:   Vitals:    24 1019   BP: 130/78   Pulse: 59   Resp: 17   SpO2: 99%       ECOG 0 - Asymptomatic  General: NAD, well appearing, obese  Psych: a&o x 3, normal mood and affect  Eyes: EOMI, no scleral icterus  ENMT: neck supple without masses or thyromegaly, mucus membranes moist  MSK: normal gait, normal ROM in bilateral shoulders  Lymph nodes: Bilateral axillary scar; no cervical, supraclavicular or axillary lymphadenopathy  Breast: very large size, pendulous  Right: No visible abnormalities on inspection while seated, with arms raised or hands on hips. No masses, skin changes, or nipple abnormalities.  Left: On inspection, there is hyperpigmentation and the left breast is smaller and uplifted vs the right. Well-healed medial periareolar and lower outer periareolar scars.  Stable periareolar hyperpigmentation and mild edema of the inferior breast.  No masses or nipple abnormalities.      I have independently reviewed her imaging and here are my findings:   Expected left breast postsurgical and postradiation changes. No suspicious findings.    Assessment & Plan  Assessment:   1. 64 y.o. F with a new diagnosis of left breast cancer: Low grade, invasive lobular carcinoma, ER/NV positive, HER-2 negative. This was diagnosed after excisional biopsy for LCIS on 22, pT1aNx. Case was discussed at multidisciplinary tumor board and it was decided to omit axillary staging. She completed radiation on 22. She is currently on anastrozole.  2. She has a past history of left breast LCIS, which was diagnosed after left breast excisional biopsy x2 on 21 for intraductal papillomas associated with pathologic nipple discharge.   3. Because  of her increased lifetime risk of breast cancer (47.1%, TCv8, calculated 3/10/21) and history of LCIS, she was in high risk screening and on anastrozole since 1/2022. Since she was already in high risk screening, we will plan to continue this in the future.  4. She has mild left breast lymphedema and intermittent left breast pain.    Plan:  -Encouraged continued smoking cessation efforts, especially since this will help with breast pain.  Use sports bra day and night if pain recurs.  -Continue follow-up with Dr. Dong.  -Keep follow-up with NP in September after mammogram.  Plan on breast MRI in 3/2025.    Debora Zambrano MD      CC:  MD Verenice Donaldson MD Ann Grider, MD

## 2024-07-31 NOTE — PROGRESS NOTES
[facility]     Referring Provider: Tad Lowe MD     Chief complaint: Routine follow up breast cancer    Subjective   HPI:   8/7/2020:  Ms. Kirsten Lima is a 59 yo woman, seen at the request of Dr. Tad Lowe, for a new diagnosis of left breast intraductal papillomas. These were initially detected as an imaging abnormality on routine screening. Her work-up is detailed in the breast history section below. Despite these being incidentally found, the patient reports that she has had intermittent, clear, left nipple discharge for years. 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 She denies any associated breast lumps or pain. She does report chronic issues with her breast skin due to hidradenitis and recurrent skin abscesses, however she has not had any acute problems lately. She has a past history of a benign left breast stereotactic biopsy in 2012. She denies any family history of breast or ovarian cancer.      1/7/2021:  At her last visit, I recommended excision of both of the left breast papillomas due the associated nipple discharge. She wanted to think about it and ultimately decided to hold off on surgery. She underwent follow-up ultrasound prior to her appointment, which showed stable left breast intraductal masses (see report details below). She has not tried to elicit any nipple discharge since the last visit and has not noticed any spontaneously.     3/10/2021:  She underwent left breast excisional biopsy x 2 on 2/22/21, which showed LCIS. See surgery & pathology details in breast history section below. She has been recovering well and has no complaints.      6/28/2021, Visit with YAN Dill :  She returns today for follow up with no breast complaints   In 3/2021 she met with Dr Dong and discussed starting tamoxifen, referral to nutritionist and smoking  cessation were made.  Due to risk for blood clots she has reservations about taking tamoxifen. She will follow up with Dr Dong in 9/21 to discuss again.   Screening mammogram with tomosynthesis was completed 6/11/2021 at [facility], [facility] (see full report below)    1/12/2022:  She saw Regina Gilbert in June with a normal screening mammogram. She saw Dr. Dong who initially prescribed low-dose tamoxifen, however the patient never started taking it because she was concerned about blood clots. Dr. Dong then prescribed anastrozole in September, however the patient just started taking this 3 weeks ago.  She underwent her first screening MRI in December, which showed an area of non-mass enhancement in the left lower outer breast and a left nipple mass. She subsequently underwent an MR biopsy of the non-mass enhancement and this showed extensive LCIS (see imaging and pathology report details below).    2/16/2022:  She underwent left breast excisional biopsy and left nipple mass excision on 2/1/22. The excisional biopsy showed an incidental focus of low-grade invasive lobular carcinoma measuring 4 mm with negative margins (see report details below). I have already discussed her case in tumor board and the consensus was that axillary staging is not necessary. She has been recovering well from surgery.    8/24/2022:  She returns today for scheduled follow-up. She completed radiation on 4/5/22 and tolerated this well. She remains on anastrozole and is still tolerating this well. She underwent MMG prior to her appointment which was benign.  She called the office about a month ago complaining of some left breast pain and nodular areas under her left nipple. She began wearing a sports bra day and night and the pain has resolved. She also noticed that the lower part of her left breast looked less swollen after wearing the bra.     2/6/2023, Seen by YAN Schrader:  Patient returns the office today for routine  follow-up.  She last saw Dr. Dong on 1/11/2023 and no changes were made to treatment plan.  Currently on anastrozole and tolerating well.     9/26/2023, Seen by YAN Schrader:  Presenting to the office today for routine follow-up.  She last saw her oncologist in May without any changes to the treatment plan.  She had a screening mammogram on 9/1/2023 that resulted as BI-RADS 0.  She then had a right diagnostic mammogram which showed calcifications and suggested a biopsy.  She underwent a stereotactic breast biopsy that returned as benign.     4/11/2024, Seen by YAN Schrader:  Patient presenting to the office today for routine follow-up.  On 12/14/2023 she had a bilateral breast MRI that resulted as BI-RADS 2.  On 3/28/2024 she had a right diagnostic mammogram that resulted as BI-RADS 2.  She is due for her bilateral screening mammogram in September.  Last saw her oncologist in May 2023 with no changes made to the treatment plan.  She is due to follow-up with them in May 2024.     7/31/2024, Interval history  She saw Dr. Dong in June complaining of increased left breast pain.  Since then the pain has actually resolved on its own.  She only drinks about 1 cup of caffeine per day, but she is still smoking.  Diagnostic mammo and ultrasound prior to this appointment were benign.       Oncology/Hematology History Overview Note   5/28/19, Screening MMG with [facility] ([facility]):  Scattered areas of fibroglandular density.  1. There are small asymmetries seen in both breasts. The parenchyma includes stable nodular asymmetries. No suspicious masses, suspicious microcalcifications or areas of architectural distortion are identified. There has been no significant change from the prior exams.  2. There are stable punctate and spherical lucent-centered calcifications with diffuse/scattered distribution seen in both breast.  BI-RADS 2: Benign.     6/2/20, Screening MMG with [facility] ([facility]):  Scattered areas of  fibroglandular density.  1. There are small asymmetries seen in both breasts. There has been no significant change from the prior exams.  2. There are stable punctate and spherical lucent-centered calcifications with diffuse distribution seen in both breasts.  3. There is a new small focal asymmetry measuring 12 mm seen in the central region of the left breast. This is best visualized on tomosynthesis MLO view slice # 78. This is best seen on the MLO View.  BI-RADS 0: Incomplete.     20, Left Diagnostic MMG with [facility] & Left Breast US ([facility]):  MM. On the present examination, focal asymmetry in the left breast, central does not persist. The asymmetry noted on the recent screening mammogram resolves into stroma on additional views.  US:  1. There is no sonographic correlate. There is no evidence of any solid mass or abnormal cystic elements.  2. There are three oval intraductal masses with partially defined margins measuring 5 mm to 9 mm seen in the anterior one-third subareolar region of the left breast. The patient reports history of intermittent clear left nipple discharge for the past 10 years. These three structures are adjacent and would be included within the same biopsy site.  3. There is an oval solid mass with partially defined margins measuring 6 x 5 x 5 mm seen in the anterior one-third subareolar region of the left breast. This is located 2 to 3 cm away from the above described masses.   BI-RADS 4: Suspicious      20, Left Breast, US-Guided Biopsy x 2 ([facility])  1. Left Subareolar mass (5 x 5 x 6 cm), Ultrasound guided biopsy:   Benign sclerosing intraductal papilloma with calcifications.  -Tophat clip. Concordant.  2. Left Subareolar mass, ultrasound guided biopsy:   Benign sclerosing intraductal papilloma with usual duct hyperplasia and microcalcifications.   -Hydromark clip. Concordant.     20, Left Breast US ([facility]):  There are two oval masses and biopsy clips with circumscribed margins  seen in the sub-areolar region of the left breast. There are no significant changes from the prior exam(s). This finding represents biopsy proven benign breast disease. These measure less than 1 cm.  BI-RADS  2: Benign.     02/22/21, Left breast needle-localized excisional biopsy and left breast ultrasound-guided excisional biopsy:  1. Left Breast, Needle-Localized Excisional Biopsy (13 grams):               A. LOBULAR CARCINOMA IN SITU (LCIS).               B. Multiple sclerosed intraductal papillomas with focal calcifications (completely excised).               C. Clips and associated biopsy site changes are present and adjacent to papillomas.               D. Background breast parenchyma with clusters of apocrine cysts, usual ductal hyperplasia, and       calcifications associated with sclerosing adenosis.    6/11/2021, Screening MMG with [facility] ([facility]):  Scattered areas of fibroglandular density. There is a new postsurgical scar seen subareolar region of the left breast.  There are no suspicious masses, calcifications, or areas of architectural distortion. In the right breast, no suspicious masses, significant calcifications or other abnormalities are seen.  BI-RADS 2: Benign.     Malignant neoplasm of overlapping sites of left breast in female, estrogen receptor positive   12/9/2021 Initial Diagnosis    Malignant neoplasm of overlapping sites of left breast in female, estrogen receptor positive (HCC)     12/10/2021 Imaging    Bilateral Breast MRI ([facility]):  RIGHT BREAST:    No suspicious enhancing mass or area of non-mass enhancement is identified. The visualized axilla is within normal limits.    LEFT BREAST:    There are post surgical changes in the anterior left breast. At 5:00 in the anterior left breast, 3.4 cm posterior to the nipple,  there is a 1.8 cm AP dimension somewhat linear branching nonmass enhancement, which is suspicious.   There is abnormal asymmetric enhancement within the left nipple  with an approximately 1.2 cm AP dimension, 1.7 cm transverse dimension, 1.0 cm craniocaudal dimension mass involving the nipple itself and extending slightly deeper into the subareolar breast, which is associated with rapid initial and washout delayed phase enhancement on kinetic analysis. This is suspicious.  No suspicious enhancement is identified in the left chest wall. The visualized axilla is within normal limits.    EXTRAMAMMARY FINDINGS:   There are no abnormally enlarged internal mammary chain lymph nodes on either side.     BI-RADS 4: Suspicious.     12/22/2021 Biopsy    Left Breast, MR-Guided Biopsy ([facility]):    1. Left Breast at 5 o'clock (not for calcifications) MRI-Guided Core Biopsy:                A.  Extensive lobular carcinoma in situ (LCIS):                            1.  LCIS measures up to 7 mm in single greatest dimension focally involving 7 of 8                      representative cores.                2.  Low grade nuclear features without necrosis.  B.  No invasive carcinoma identified by routine and/or immunostaining.  C.  Associated intraductal papilloma with microcalcification, ductal cyst wall and fibrocystic change noted.      2/1/2022 Surgery    Left breast needle-localized excisional biopsy and left nipple mass excision:    1. Breast, Left, Lumpectomy: Invasive lobular carcinoma, lobular carcinoma in situ and atypical lobular hyperplasia.               A. Invasive lobular carcinoma.                            1. The invasive lobular carcinoma measures 4 mm on the slide.                            2. The invasive lobular carcinoma is [place] grade I (tubular grade 3, nuclear grade 1, mitotic grade 1).                            3. The foci is located amongst an area of lobular carcinoma in situ and near the biopsy cavity.                            4. Biopsy site and clip identified (barbell shaped clip).                            5. The invasive lobular carcinoma comes within 1.3  mm of the medial margin, 7.5 mm of the posterior margin, 8.8 mm of the anterior margin, 12.5 mm of the lateral margin and 15 mm of the superior and inferior margins.                            6. Microcalcifications are not associated with the invasive tumor.               B. Extensive lobular carcinoma in situ and atypical lobular hyperplasia.  C. Fibroadenoma with calcification, sclerosing adenosis, apocrine metaplasia, clustered cysts, area consistent with radial scar and fat necrosis.     2. Breast, Left, Nipple, Excision:  Breast tissue with               A. Simple cysts.               B. Calcifications in non-neoplastic tissue.               C. Apocrine metaplasia.               D. Intraductal papilloma with sclerosis.               E. Atypical lobular hyperplasia.    ER+ (80%, strong)  KY+ (3%, moderate)  Her2 negative (IHC 1+)  Ki-67 2%     3/9/2022 - 4/5/2022 Radiation    Radiation OncologyTreatment Course:  Kirsten Lima received 4990 cGy in 20 fractions to LEFT breast.     4/6/2022 -  Hormonal Therapy    Anastrozole (Arimidex)     8/15/2022 Imaging    Screening MMG with [facility] ([facility]):  Scattered areas of fibroglandular density.  There is a stable post-surgical scar with associated skin lesion and trabecular thickening seen in the central region of the left breast. Post-surgical scar is in an area of prior lumpectomy. Findings are consistent with post-surgical and post-radiation therapy changes.  In the right breast, no suspicious masses, significant calcifications or other abnormalities are seen.  BI-RADS 2: Benign.     12/9/2022 Imaging    Breast MRI at [facility]  FINDINGS:Scattered fibroglandular tissue seen is throughout both  breasts. Mild background parenchymal enhancement of both breasts is  noted. Postsurgical change in the left breast is noted. Otherwise, no  areas of abnormal morphology are seen in either breast. No areas of  abnormal enhancement are seen in either breast. I  see no evidence for  abnormal skin, nipple or chest wall enhancement of either breast and  there is no evidence for axillary or internal mammary chain adenopathy.     IMPRESSION:  There are no findings suspicious for malignancy in either  breast. Routine follow-up imaging is recommended.     BI-RADS category 2     9/1/2023 Imaging    Bilateral screening mammogram at [facility]  There are scattered areas of fibroglandular density.    Finding 1:  There is a stable post-surgical scar seen in the left breast.  No  suspicious masses, suspicious microcalcifications or architectural distortions  are identified.  There has been no significant change from the prior exam(s).    Finding 2:  There are calcifications seen in the sub-areolar region of the right  breast.    IMPRESSION:  Finding 1:  Stable post-surgical scar in the left breast is benign-negative.    Finding 2:  Calcifications in the right breast require additional evaluation.  Magnification mammography is recommended.    BI-RADS Category 0: Incomplete: Needs Additional Imaging Evaluation     9/7/2023 Imaging    Right diagnostic mammogram at [facility]  There are scattered areas of fibroglandular density.    There are round and punctate calcifications measuring 8 mm seen in the sub-areolar region of the right breast  located 5 centimeters from the nipple.  Otherwise, no suspicious masses, suspicious microcalcifications or  architectural distortions are identified.  There has been no significant change from the prior exam(s).    IMPRESSION:  Calcifications in the right breast are suspicious. Stereotactic biopsy is recommended.    The patient was mailed a notification letter.    BI-RADS Category 4B: Suspicious Abnormality     9/19/2023 Biopsy    Right stereotactic biopsy at [facility]  Technically successful stereotactic right breast biopsy with marker clip placement and positive radiograph.   Pathology results to follow.     A two view mammogram shows the clip in the expected  location.   Pathology returned as cyst and fibroadenomatoid hyperplasia.   Pathology is benign and concordant.  A follow up mammogram in 6 months is recommended.      12/14/2023 Imaging    Bilateral breast MRI [facility]  FINDINGS: There is scattered fibroglandular tissue. There is mild  background parenchymal enhancement.     RIGHT BREAST:    There are new post biopsy changes in the anterior right breast from a  recent benign biopsy. No suspicious enhancing mass or area of non-mass  enhancement is identified.  The visualized axilla is within normal limits.     LEFT BREAST:    Benign-appearing postsurgical changes are identified in the left breast.  No suspicious enhancing mass or area of non-mass enhancement is  identified.  The visualized axilla is within normal limits.     EXTRAMAMMARY FINDINGS:  There are no pathologically enlarged internal mammary chain lymph nodes  on either side.          IMPRESSION AND RECOMMENDATION:     No MR evidence of malignancy in either breast. Recommend annual  screening mammogram in September 2023.     BI-RADS Category 2: Benign     3/28/2024 Imaging    Right diagnostic mammogram at [facility]  The following views were obtained: Right craniocaudal; right craniocaudal magnification; right mediolateral; right  mediolateral magnification; and right mediolateral oblique.  This examination was reviewed with the aid of R2  computer aided detection.    Follow-up examination was performed for the calcifications in the right breast, sub-areolar seen on 09/07/2023.  There is a decreased number of calcifications.  This finding represents biopsy proven benign breast disease.  Note  is made of a biopsy clip as evidence of a previous surgical procedure.    IMPRESSION:  Calcifications in the right breast are benign-negative.    Screening mammogram in 1 year is recommended.    The patient was mailed a notification letter.    BI-RADS Category 2: Benign Finding(s)     7/23/2024 Imaging    Left Diagnostic MMG with  [facility] & Left Breast US ([facility]):  MMG:  Benign-appearing postsurgical and treatment-related changes in the left breast, evolved over prior mammograms. There are benign-appearing calcifications. There are no suspicious masses, calcifications, or areas of architectural distortion.  US:  Targeted sonographic evaluation of the left breast was performed in the areas of concern indicated by the patient at 3-4 o'clock in the area of patient indicated pain and at 11:00, 8 cm from the nipple and 11:00 retroareolar in the areas of reported skin changes. Predominantly fatty tissue is identified. Skin thickness is upper normal to 0.3 cm at 11:00 retroareolar, likely treatment related. No suspicious cystic or solid mass is identified.  BI-RADS 2: Benign          Review of Systems:  See interval history.       Medications:    Current Outpatient Medications:     amLODIPine (NORVASC) 5 MG tablet, Take 1 tablet by mouth Every Morning., Disp: 90 tablet, Rfl: 3    anastrozole (ARIMIDEX) 1 MG tablet, TAKE 1 TABLET BY MOUTH EVERY DAY, Disp: 90 tablet, Rfl: 3    buPROPion XL (Wellbutrin XL) 300 MG 24 hr tablet, Take 1 tablet by mouth Daily., Disp: 90 tablet, Rfl: 1    cetirizine (zyrTEC) 10 MG tablet, Take 1 tablet by mouth Daily., Disp: , Rfl:     Cholecalciferol (VITAMIN D) 2000 units capsule, Take 1 capsule by mouth Daily., Disp: , Rfl:     clotrimazole-betamethasone (LOTRISONE) 1-0.05 % lotion, Apply  topically to the appropriate area as directed 2 (Two) Times a Day. (Patient not taking: Reported on 7/23/2024), Disp: 30 mL, Rfl: 1    coenzyme Q10 100 MG capsule, Take 1 capsule by mouth Daily., Disp: , Rfl:     fluticasone (FLONASE) 50 MCG/ACT nasal spray, 2 sprays by Each Nare route Daily., Disp: 16 g, Rfl: 1    levalbuterol (XOPENEX HFA) 45 MCG/ACT inhaler, Inhale 1-2 puffs Every 4 (Four) Hours As Needed for Wheezing., Disp: 15 g, Rfl: 1    levocetirizine (Xyzal Allergy 24HR) 5 MG tablet, Take 1 tablet by mouth Every Evening., Disp:  , Rfl:     lisinopril-hydrochlorothiazide (PRINZIDE,ZESTORETIC) 20-12.5 MG per tablet, Take 2 tablets by mouth Every Morning., Disp: 180 tablet, Rfl: 1    metoprolol succinate XL (TOPROL-XL) 200 MG 24 hr tablet, Take 1 tablet by mouth Every Night., Disp: 90 tablet, Rfl: 1    Probiotic Product (Risaquad-2) capsule capsule, Take 1 capsule by mouth Daily., Disp: , Rfl:     rosuvastatin (CRESTOR) 10 MG tablet, Take 1 tablet by mouth Every Night., Disp: 90 tablet, Rfl: 3    spironolactone (ALDACTONE) 25 MG tablet, Take 1 tablet by mouth Every Morning., Disp: 90 tablet, Rfl: 3    Tirzepatide-Weight Management (ZEPBOUND) 2.5 MG/0.5ML solution auto-injector, Inject 0.5 mL under the skin into the appropriate area as directed 1 (One) Time Per Week. (Patient not taking: Reported on 2024), Disp: 2 mL, Rfl: 0    Vibegron (Gemtesa) 75 MG tablet, TAKE 1 TABLET BY MOUTH EVERY DAY, Disp: 90 tablet, Rfl: 1      Allergies:  No Known Allergies      Family History   Problem Relation Age of Onset    Hypertension Mother              COPD Father              Heart failure Father     Hypertension Father     Hyperlipidemia Father              Vision loss Father     Hypertension Sister     Other Sister     Alcohol abuse Sister              Liver disease Sister     Stroke Sister     Hypertension Brother     Pancreatic cancer Brother         1 brother  from pancreatic cancer    Alcohol abuse Brother              Hypertension Brother     Alcohol abuse Brother              Cancer Brother         Liver    Alcohol abuse Brother     Sudden death Brother     Malig Hyperthermia Neg Hx      Objective   PHYSICAL EXAMINATION:   Vitals:    24 1019   BP: 130/78   Pulse: 59   Resp: 17   SpO2: 99%       ECOG 0 - Asymptomatic  General: NAD, well appearing, obese  Psych: a&o x 3, normal mood and affect  Eyes: EOMI, no scleral icterus  ENMT: neck supple without masses or thyromegaly, mucus  membranes moist  MSK: normal gait, normal ROM in bilateral shoulders  Lymph nodes: Bilateral axillary scar; no cervical, supraclavicular or axillary lymphadenopathy  Breast: very large size, pendulous  Right: No visible abnormalities on inspection while seated, with arms raised or hands on hips. No masses, skin changes, or nipple abnormalities.  Left: On inspection, there is hyperpigmentation and the left breast is smaller and uplifted vs the right. Well-healed medial periareolar and lower outer periareolar scars.  Stable periareolar hyperpigmentation and mild edema of the inferior breast.  No masses or nipple abnormalities.      I have independently reviewed her imaging and here are my findings:   Expected left breast postsurgical and postradiation changes. No suspicious findings.    Assessment & Plan   Assessment:   1. 64 y.o. F with a new diagnosis of left breast cancer: Low grade, invasive lobular carcinoma, ER/FL positive, HER-2 negative. This was diagnosed after excisional biopsy for LCIS on 2/1/22, pT1aNx. Case was discussed at multidisciplinary tumor board and it was decided to omit axillary staging. She completed radiation on 4/5/22. She is currently on anastrozole.  2. She has a past history of left breast LCIS, which was diagnosed after left breast excisional biopsy x2 on 2/22/21 for intraductal papillomas associated with pathologic nipple discharge.   3. Because of her increased lifetime risk of breast cancer (47.1%, TCv8, calculated 3/10/21) and history of LCIS, she was in high risk screening and on anastrozole since 1/2022. Since she was already in high risk screening, we will plan to continue this in the future.  4. She has mild left breast lymphedema and intermittent left breast pain.    Plan:  -Encouraged continued smoking cessation efforts, especially since this will help with breast pain.  Use sports bra day and night if pain recurs.  -Continue follow-up with Dr. Dong.  -Keep follow-up with NP  in September after mammogram.  Plan on breast MRI in 3/2025.    Debora Zambrano MD      CC:  MD Verenice Donaldson MD Ann Grider, MD

## 2024-08-02 ENCOUNTER — PATIENT ROUNDING (BHMG ONLY) (OUTPATIENT)
Age: 64
End: 2024-08-02
Payer: COMMERCIAL

## 2024-08-02 NOTE — PROGRESS NOTES
A My-Chart message has been sent to the patient [place] [facility]

## 2024-08-06 DIAGNOSIS — I10 ESSENTIAL HYPERTENSION: ICD-10-CM

## 2024-08-06 RX ORDER — ROSUVASTATIN CALCIUM 5 MG/1
TABLET, COATED ORAL
Qty: 90 TABLET | Refills: 1 | OUTPATIENT
Start: 2024-08-06

## 2024-08-06 RX ORDER — LISINOPRIL AND HYDROCHLOROTHIAZIDE 20; 12.5 MG/1; MG/1
2 TABLET ORAL DAILY
Qty: 180 TABLET | Refills: 1 | OUTPATIENT
Start: 2024-08-06

## 2024-08-08 ENCOUNTER — OFFICE VISIT (OUTPATIENT)
Age: 64
End: 2024-08-08
Payer: COMMERCIAL

## 2024-08-08 DIAGNOSIS — F41.1 GENERALIZED ANXIETY DISORDER: ICD-10-CM

## 2024-08-08 DIAGNOSIS — F33.1 MODERATE EPISODE OF RECURRENT MAJOR DEPRESSIVE DISORDER: Primary | ICD-10-CM

## 2024-08-08 NOTE — PROGRESS NOTES
[facility]      Follow Up Adult Note     Date:2024   Client Name: Kirsten Lima  : 1960   MRN: [id number]   Time IN: 3:00 PM    Time OUT: 3:55 PM     Referring Provider: Verenice Valdez MD    Chief Complaint:      ICD-10-CM ICD-9-CM   1. Moderate episode of recurrent major depressive disorder  F33.1 296.32   2. Generalized anxiety disorder  F41.1 300.02        History of Present Illness:   Kirsten Lima is a 64 y.o. female who is being seen today for follow up individual psychotherapy counseling for depression and anxiety.        Objective     PHQ-9 Depression Screening  Little interest or pleasure in doing things? 3-->nearly every day   Feeling down, depressed, or hopeless? 2-->more than half the days   Trouble falling or staying asleep, or sleeping too much? 2-->more than half the days   Feeling tired or having little energy? 3-->nearly every day   Poor appetite or overeating?     Feeling bad about yourself - or that you are a failure or have let yourself or your family down? 3-->nearly every day   Trouble concentrating on things, such as reading the newspaper or watching television? 3-->nearly every day   Moving or speaking so slowly that other people could have noticed? Or the opposite - being so fidgety or restless that you have been moving around a lot more than usual? 0-->not at all   Thoughts that you would be better off dead, or of hurting yourself in some way? 0-->not at all   PHQ-9 Total Score 16   If you checked off any problems, how difficult have these problems made it for you to do your work, take care of things at home, or get along with other people? very difficult      MIKHAIL-7  Feeling nervous, anxious or on edge: More than half the days  Not being able to stop or control worrying: More than half the days  Worrying too much about different things: More than half the days  Trouble Relaxing: More than half the days  Being so restless that it is hard to sit still:  Not at all  Feeling afraid as if something awful might happen: Not at all  Becoming easily annoyed or irritable: Nearly every day  MIKHAIL 7 Total Score: 11  If you checked any problems, how difficult have these problems made it for you to do your work, take care of things at home, or get along with other people: Very difficult      Medications:     Current Outpatient Medications:     amLODIPine (NORVASC) 5 MG tablet, Take 1 tablet by mouth Every Morning., Disp: 90 tablet, Rfl: 3    anastrozole (ARIMIDEX) 1 MG tablet, TAKE 1 TABLET BY MOUTH EVERY DAY, Disp: 90 tablet, Rfl: 3    buPROPion XL (Wellbutrin XL) 300 MG 24 hr tablet, Take 1 tablet by mouth Daily., Disp: 90 tablet, Rfl: 1    cetirizine (zyrTEC) 10 MG tablet, Take 1 tablet by mouth Daily., Disp: , Rfl:     Cholecalciferol (VITAMIN D) 2000 units capsule, Take 1 capsule by mouth Daily., Disp: , Rfl:     clotrimazole-betamethasone (LOTRISONE) 1-0.05 % lotion, Apply  topically to the appropriate area as directed 2 (Two) Times a Day. (Patient not taking: Reported on 7/23/2024), Disp: 30 mL, Rfl: 1    coenzyme Q10 100 MG capsule, Take 1 capsule by mouth Daily., Disp: , Rfl:     fluticasone (FLONASE) 50 MCG/ACT nasal spray, 2 sprays by Each Nare route Daily., Disp: 16 g, Rfl: 1    levalbuterol (XOPENEX HFA) 45 MCG/ACT inhaler, Inhale 1-2 puffs Every 4 (Four) Hours As Needed for Wheezing., Disp: 15 g, Rfl: 1    levocetirizine (Xyzal Allergy 24HR) 5 MG tablet, Take 1 tablet by mouth Every Evening., Disp: , Rfl:     lisinopril-hydrochlorothiazide (PRINZIDE,ZESTORETIC) 20-12.5 MG per tablet, Take 2 tablets by mouth Every Morning., Disp: 180 tablet, Rfl: 1    metoprolol succinate XL (TOPROL-XL) 200 MG 24 hr tablet, Take 1 tablet by mouth Every Night., Disp: 90 tablet, Rfl: 1    Probiotic Product (Risaquad-2) capsule capsule, Take 1 capsule by mouth Daily., Disp: , Rfl:     rosuvastatin (CRESTOR) 10 MG tablet, Take 1 tablet by mouth Every Night., Disp: 90 tablet, Rfl: 3     "spironolactone (ALDACTONE) 25 MG tablet, Take 1 tablet by mouth Every Morning., Disp: 90 tablet, Rfl: 3    Tirzepatide-Weight Management (ZEPBOUND) 2.5 MG/0.5ML solution auto-injector, Inject 0.5 mL under the skin into the appropriate area as directed 1 (One) Time Per Week. (Patient not taking: Reported on 7/9/2024), Disp: 2 mL, Rfl: 0    Vibegron (Gemtesa) 75 MG tablet, TAKE 1 TABLET BY MOUTH EVERY DAY, Disp: 90 tablet, Rfl: 1    Allergies:   No Known Allergies      Subjective     Mental Status Exam:   MENTAL STATUS EXAM   General Appearance:  Cleanly groomed and dressed  Eye Contact:  Good eye contact  Attitude:  Cooperative  Motor Activity:  Normal gait, posture  Muscle Strength:  Normal  Speech:  Normal rate, tone, volume  Language:  Stereotypical and unspontaneous  Mood and affect:  Normal, pleasant  Thought Process:  Logical and goal-directed  Associations/ Thought Content:  No delusions  Hallucinations:  None  Suicidal Ideations:  Not present  Homicidal Ideation:  Not present  Sensorium:  Alert and clear  Orientation:  Person, place, situation and time  Immediate Recall, Recent, and Remote Memory:  Intact  Attention Span/ Concentration:  Good  Fund of Knowledge:  Appropriate for age and educational level  Intellectual Functioning:  Average range  Insight:  Good  Judgement:  Good  Reliability:  Good  Impulse Control:  Good       Client's Support Network Includes:   \"nobody\"    Functional Status: No impairment    Progress toward goal: Not at goal    Prognosis: Good with Ongoing Treatment     Assessment / Plan / Progress     Visit Diagnosis/Orders Placed This Visit:    ICD-10-CM ICD-9-CM   1. Moderate episode of recurrent major depressive disorder  F33.1 296.32   2. Generalized anxiety disorder  F41.1 300.02        SUICIDE RISK ASSESSMENT/CSSRS:  1. Does client have thoughts of suicide? Patient denies  2. Does client have intent for suicide? Patient denies  3. Does client have a current plan for suicide? Patient " denies   4. History of suicide attempts: Patient denies   5. Family history of suicide or attempts: Patient denies   6. History of violent behaviors towards others or property or thoughts of committing suicide: Patient denies   7. History of sexual aggression toward others: Patient denies   8. Access to firearms or weapons: Yes     PLAN:  Safety: No acute safety concerns  Risk Assessment: Risk of self-harm acutely is low. Risk of self-harm chronically is also low, but could be further elevated in the event of treatment noncompliance and/or AODA.    Presenting Problem: Client reported that she is feeling better than she did at the beginning of this week. Client reported that she was experiencing pain in her leg from arthritis and received a shot. Client reported that she has done a few more things including keeping the dishes washed and went to the grocery store. Client reported that she doesn't seem to be too agitated and is not debating things as much. Client discussed her Taoism and reported that she feels like an outsider. Client reported that they didn't support her when her mom . Client reported that she has thought about leaving the Taoism and joining another one. Client discussed her relationship with her best friend and felt disappointed that her best friend did not reach out to her when she communicated that she was going through things. 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     Treatment Plan/Goals & Progress: Continue supportive psychotherapy efforts and medications as indicated. Treatment options discussed during today's visit. Client ackowledged and verbally consented to continue with current treatment plan and was educated on the importance of compliance with treatment and follow-up appointments. Client seems reasonably able to adhere to treatment plan.      Assisted client in processing above session  content; acknowledged and normalized client’s thoughts, feelings, and concerns.  Rationalized client's thought process regarding depression and anxiety.      Allowed client to freely discuss issues without interruption or judgement with unconditional positive regard, active listening skills, and empathy. Clinician provided a safe, confidential environment to facilitate the development of a positive therapeutic relationship and encouraged open, honest communication. Assisted client in identifying risk factors which would indicate the need for higher level of care including thoughts to harm self or others and/or self-harming behavior and encouraged client to contact this office, call 911, or present to the nearest emergency room should any of these events occur. Discussed crisis intervention services and means to access. Client adamantly and convincingly denies current suicidal or homicidal ideation or perceptual disturbance. Assisted client in processing session content; acknowledged and normalized client’s thoughts, feelings, and concerns by utilizing a person-centered approach in efforts to build appropriate rapport and a positive therapeutic relationship with open and honest communication.       Follow Up:   Return in about 2 weeks (around 8/22/2024) for Next scheduled follow up.    Janny Cruz LCSW  [facility]

## 2024-08-09 NOTE — TREATMENT PLAN
Multi-Disciplinary Problems (from Behavioral Health Treatment Plan)      Active Problems       Problem: Assessment/EAP  Start Date: 08/09/24      Problem Details:           Goal Priority Start Date Expected End Date End Date    Patient will utilize appropriate treatment services. -- 08/09/24 02/07/25 --    Goal Details: Progress toward goal:  Not appropriate to rate progress toward goal since this is the initial treatment plan.            Goal Intervention Frequency Start Date End Date    Assist patient in developing a plan of action Q2 Weeks 08/09/24 --    Intervention Details: Duration of treatment until remission of symptoms.                  Problem: Anxiety  Start Date: 08/09/24      Problem Details: The patient self-scales this problem as a 5 with 10 being the worst.          Goal Priority Start Date Expected End Date End Date    Patient will develop and implement behavioral and cognitive strategies to reduce anxiety and irrational fears. -- 08/09/24 02/07/25 --    Goal Details: Progress toward goal:  Not appropriate to rate progress toward goal since this is the initial treatment plan.          Goal Intervention Frequency Start Date End Date    Help patient explore past emotional issues in relation to present anxiety. Q2 Weeks 08/09/24 --    Intervention Details: Duration of treatment until remission of symptoms.          Goal Intervention Frequency Start Date End Date    Help patient develop an awareness of their cognitive and physical responses to anxiety. Q2 Weeks 08/09/24 --    Intervention Details: Duration of treatment until remission of symptoms.                  Problem: Depression  Start Date: 08/09/24      Problem Details: The patient self-scales this problem as a 5 with 10 being the worst.          Goal Priority Start Date Expected End Date End Date    Patient will demonstrate the ability to initiate new constructive life skills outside of sessions on a consistent basis. -- 08/09/24 02/07/25 --     Goal Details: Progress toward goal:  Not appropriate to rate progress toward goal since this is the initial treatment plan.          Goal Intervention Frequency Start Date End Date    Assist patient in setting attainable activities of daily living goals. PRN 08/09/24 --      Goal Intervention Frequency Start Date End Date    Provide education about depression Q2 Weeks 08/09/24 --    Intervention Details: Duration of treatment until remission of symptoms.          Goal Intervention Frequency Start Date End Date    Assist patient in developing healthy coping strategies. Q2 Weeks 08/09/24 --    Intervention Details: Duration of treatment until remission of symptoms.                          Reviewed By       Janny Cruz LCSW 08/09/24 0843    Janny Cruz LCSW 08/09/24 0840                     I have discussed and reviewed this treatment plan with the patient.  It has been printed for signatures.

## 2024-08-20 ENCOUNTER — OFFICE VISIT (OUTPATIENT)
Age: 64
End: 2024-08-20
Payer: COMMERCIAL

## 2024-08-20 VITALS — DIASTOLIC BLOOD PRESSURE: 85 MMHG | HEART RATE: 57 BPM | OXYGEN SATURATION: 96 % | SYSTOLIC BLOOD PRESSURE: 159 MMHG

## 2024-08-20 DIAGNOSIS — F33.1 MDD (MAJOR DEPRESSIVE DISORDER), RECURRENT EPISODE, MODERATE: ICD-10-CM

## 2024-08-20 DIAGNOSIS — F41.1 GENERALIZED ANXIETY DISORDER: Primary | ICD-10-CM

## 2024-08-20 DIAGNOSIS — F33.1 MODERATE EPISODE OF RECURRENT MAJOR DEPRESSIVE DISORDER: Primary | ICD-10-CM

## 2024-08-20 DIAGNOSIS — F41.1 GENERALIZED ANXIETY DISORDER: ICD-10-CM

## 2024-08-20 PROCEDURE — 90837 PSYTX W PT 60 MINUTES: CPT

## 2024-08-20 PROCEDURE — 99213 OFFICE O/P EST LOW 20 MIN: CPT

## 2024-08-20 NOTE — PROGRESS NOTES
"     Office  Follow Up Visit      Patient Name: Kirsten Lima  : 1960   MRN: [id number]     Referring Provider: Verenice Valdez MD    Chief Complaint:  \"I have been doing better\".     ICD-10-CM ICD-9-CM   1. Generalized anxiety disorder  F41.1 300.02   2. MDD (major depressive disorder), recurrent episode, moderate  F33.1 296.32      History of Present Illness:   Kirsten Lima is a 64 y.o. female who is here today for follow up. Pt has a history of MDD and anxiety. Reports that her depression worsened when her mother passed away in 2018. Patient felt that when her mother passed away she lost a part of her identity because she was her caregiver. Patient was started on Wellbutrin  mg daily in May of this year and the dose was increased to 300 mg daily about 6 weeks ago.  Today patient reports to be doing well and reports that she does feel her Wellbutrin has helped with her symptoms.  States that she has more motivation now to get her chores done and no longer dreads doing things like going to the grocery store.  Reports to feel less irritable and feels that her mind is more clear.  Believes that the Wellbutrin has helped with her mood but does still feel depressed more than half the days of the week.  States that she does feel on edge at times.  Patient has a difficult time describing her anxiety.  Reports to have a difficult time relaxing and does worry about multiple different things when she actually has nothing to worry about at all.  Reports to be sleeping 4 hours each night due to having to get up and avoid multiple times.  Patient recently had her mouth guard fitted and is going to have a repeat sleep study done soon.  Reports that her appetite has increased.  States that since starting Wellbutrin she feels that she craves cigarettes more.  Denies SI/HI/AVH.     Subjective      Review of Systems:   Review of Systems   Constitutional:  Positive for appetite change.   Respiratory:  Negative for " chest tightness and shortness of breath.    Gastrointestinal:  Negative for abdominal pain, constipation, diarrhea, nausea and vomiting.   Genitourinary:  Negative for difficulty urinating.   Neurological:  Negative for headaches.   Psychiatric/Behavioral:  Negative for hallucinations and suicidal ideas.      PHQ-9 Depression Screening  Little interest or pleasure in doing things? 2-->more than half the days   Feeling down, depressed, or hopeless? 2-->more than half the days   Trouble falling or staying asleep, or sleeping too much? 2-->more than half the days   Feeling tired or having little energy? 2-->more than half the days   Poor appetite or overeating? 2-->more than half the days   Feeling bad about yourself - or that you are a failure or have let yourself or your family down? 2-->more than half the days   Trouble concentrating on things, such as reading the newspaper or watching television? 2-->more than half the days   Moving or speaking so slowly that other people could have noticed? Or the opposite - being so fidgety or restless that you have been moving around a lot more than usual? 0-->not at all   Thoughts that you would be better off dead, or of hurting yourself in some way? 0-->not at all   PHQ-9 Total Score 14   If you checked off any problems, how difficult have these problems made it for you to do your work, take care of things at home, or get along with other people? somewhat difficult     MIKHAIL-7      Over the last two weeks, how often have you been bothered by the following problems?  Feeling nervous, anxious or on edge: More than half the days  Not being able to stop or control worrying: More than half the days  Worrying too much about different things: Several days  Trouble Relaxing: More than half the days  Being so restless that it is hard to sit still: Not at all  Becoming easily annoyed or irritable: Several days  Feeling afraid as if something awful might happen: Not at all  MIKHAIL 7 Total  Score: 8  If you checked any problems, how difficult have these problems made it for you to do your work, take care of things at home, or get along with other people: Somewhat difficult    Patient History:   The following portions of the patient's history were reviewed and updated as appropriate: allergies, current medications, past family history, past medical history, past social history, past surgical history and problem list.     Social History     Socioeconomic History    Marital status: Single    Number of children: 0   Tobacco Use    Smoking status: Every Day     Current packs/day: 0.25     Average packs/day: 0.3 packs/day for 65.0 years (21.1 ttl pk-yrs)     Types: Cigarettes     Start date: 1/1/1979     Passive exposure: Current    Smokeless tobacco: Never    Tobacco comments:     Caffeine: 2 drinks daily and occ rich   Vaping Use    Vaping status: Never Used   Substance and Sexual Activity    Alcohol use: Yes     Alcohol/week: 0.0 - 1.0 standard drinks of alcohol     Comment: 1-2 drinks a month    Drug use: Never     Comment: marijuana in her twenties    Sexual activity: Not Currently     Partners: Male     Birth control/protection: Abstinence       Medications:     Current Outpatient Medications:     amLODIPine (NORVASC) 5 MG tablet, Take 1 tablet by mouth Every Morning., Disp: 90 tablet, Rfl: 3    anastrozole (ARIMIDEX) 1 MG tablet, TAKE 1 TABLET BY MOUTH EVERY DAY, Disp: 90 tablet, Rfl: 3    buPROPion XL (Wellbutrin XL) 300 MG 24 hr tablet, Take 1 tablet by mouth Daily., Disp: 90 tablet, Rfl: 1    cetirizine (zyrTEC) 10 MG tablet, Take 1 tablet by mouth Daily., Disp: , Rfl:     Cholecalciferol (VITAMIN D) 2000 units capsule, Take 1 capsule by mouth Daily., Disp: , Rfl:     clotrimazole-betamethasone (LOTRISONE) 1-0.05 % lotion, Apply  topically to the appropriate area as directed 2 (Two) Times a Day. (Patient not taking: Reported on 7/23/2024), Disp: 30 mL, Rfl: 1    coenzyme Q10 100 MG capsule, Take 1  capsule by mouth Daily., Disp: , Rfl:     fluticasone (FLONASE) 50 MCG/ACT nasal spray, 2 sprays by Each Nare route Daily., Disp: 16 g, Rfl: 1    levalbuterol (XOPENEX HFA) 45 MCG/ACT inhaler, Inhale 1-2 puffs Every 4 (Four) Hours As Needed for Wheezing., Disp: 15 g, Rfl: 1    levocetirizine (Xyzal Allergy 24HR) 5 MG tablet, Take 1 tablet by mouth Every Evening., Disp: , Rfl:     lisinopril-hydrochlorothiazide (PRINZIDE,ZESTORETIC) 20-12.5 MG per tablet, Take 2 tablets by mouth Every Morning., Disp: 180 tablet, Rfl: 1    metoprolol succinate XL (TOPROL-XL) 200 MG 24 hr tablet, Take 1 tablet by mouth Every Night., Disp: 90 tablet, Rfl: 1    Probiotic Product (Risaquad-2) capsule capsule, Take 1 capsule by mouth Daily., Disp: , Rfl:     rosuvastatin (CRESTOR) 10 MG tablet, Take 1 tablet by mouth Every Night., Disp: 90 tablet, Rfl: 3    spironolactone (ALDACTONE) 25 MG tablet, Take 1 tablet by mouth Every Morning., Disp: 90 tablet, Rfl: 3    Tirzepatide-Weight Management (ZEPBOUND) 2.5 MG/0.5ML solution auto-injector, Inject 0.5 mL under the skin into the appropriate area as directed 1 (One) Time Per Week. (Patient not taking: Reported on 7/9/2024), Disp: 2 mL, Rfl: 0    Vibegron (Gemtesa) 75 MG tablet, TAKE 1 TABLET BY MOUTH EVERY DAY, Disp: 90 tablet, Rfl: 1    Objective     Physical Exam:  Vital Signs:   Vitals:    08/20/24 1246   BP: 159/85   Pulse: 57   SpO2: 96%     There is no height or weight on file to calculate BMI.     Mental Status Exam:   MENTAL STATUS EXAM   General Appearance:  Cleanly groomed and dressed  Eye Contact:  Good eye contact  Attitude:  Cooperative  Motor Activity:  Normal gait, posture  Muscle Strength:  Normal  Speech:  Normal rate, tone, volume  Language:  Spontaneous  Mood and affect:  Normal, pleasant  Hopelessness:  Denies  Loneliness: Denies  Thought Process:  Logical  Associations/ Thought Content:  No delusions  Hallucinations:  None  Suicidal Ideations:  Not present  Homicidal  Ideation:  Not present  Sensorium:  Alert and clear  Orientation:  Person, situation, time and place  Attention Span/ Concentration:  Good  Fund of Knowledge:  Appropriate for age and educational level  Intellectual Functioning:  Average range  Insight:  Good  Judgement:  Good  Reliability:  Good  Impulse Control:  Good       @RESULASTCBCDIFFPANEL,TSH,LABLIPI,BJMXEOOM65,BRRDSFYI53,MG,FOLATE,PROLACTIN,CRPRESULT,CMP,[id number])@    Lab Results   Component Value Date    GLUCOSE 100 (H) 05/10/2024    BUN 14 05/10/2024    CREATININE 0.94 05/10/2024    EGFRRESULT 68 05/10/2024    EGFR 77.6 07/26/2022    BCR 15 05/10/2024    K 4.5 05/10/2024    CO2 25 05/10/2024    CALCIUM 9.7 05/10/2024    PROTENTOTREF 7.0 05/10/2024    ALBUMIN 3.8 (L) 05/10/2024    BILITOT 0.4 05/10/2024    AST 10 05/10/2024    ALT 10 05/10/2024       Lab Results   Component Value Date    WBC 8.0 05/10/2024    HGB 15.2 05/10/2024    HCT 45.7 05/10/2024    MCV 84 05/10/2024     05/10/2024       Lab Results   Component Value Date    CHLPL 140 05/10/2024    TRIG 111 05/10/2024    HDL 51 05/10/2024    LDL 69 05/10/2024         TSH          11/11/2023    09:06 5/10/2024    08:25   TSH   TSH 0.593  0.459           Assessment / Plan      Assessment:   Pt is a 65yo female with a history of anxiety and MDD. Patient was started on Wellbutrin  mg daily in May of this year and the dose was increased to 300 mg daily about 6 weeks ago.  Today patient reports to still feel symptoms of anxiety and depression but that they have improved over the past couple weeks.  Patient's PHQ-9 and MIKHAIL-7 scores have slightly decreased since last visit.  Notified patient that we may need to add an additional antidepressant such as Zoloft to help with patient's symptoms of anxiety or replace Wellbutrin with Zoloft.  Patient states that she would like to take some time to think about it and will let provider know if she would like to make a change in her medication regimen.   Encouraged patient to continue with therapy.  Will see patient in 2 months but notified patient if she would like to make a change in her medications that she can follow up with me sooner.  Patient's blood pressure was slightly elevated today but was normal during her last visit.  Patient is going to see her PCP to follow-up on the Wellbutrin in 2 weeks.     Diagnoses and all orders for this visit:    1. Generalized anxiety disorder (Primary)    2. MDD (major depressive disorder), recurrent episode, moderate       Plan:   Continue Wellbutrin XL 300mg daily.   Discussed medication options and treatment plan of prescribed medication as well as the risks, benefits, and side effects   Encouraged pt to continue supportive psychotherapy efforts and medications as indicated.    Short-term goals: Continue to develop rapport with patient.     Long-term goals: Symptom reduction with medication and therapy.     Weakness: Smoking.      Strengths: Pt has started therapy.     Continue supportive psychotherapy efforts and medications as indicated. Treatment and medication options discussed during today's visit. Patient ackowledged and verbally consented to continue with current treatment plan and was educated on the importance of compliance with treatment and follow-up appointments. Patient seems reasonably able to adhere to treatment plan.      Medication Considerations:  Discussed medication options and treatment plan of prescribed medication(s) as well as the risks, benefits, and potential side effects. Patient is agreeable to call the office with any worsening of symptoms or onset of side effects. Patient is agreeable to call 911 or go to the nearest ER should he/she begin having SI/HI.    Quality Measures:   Current every day smoker less than 3 minutes spent counseling Not agreeable to stopping    I advised Kirsten Lima of the risks of tobacco use.     Howard reviewed and is appropriate.    Follow Up:   Return in about 2  months (around 10/20/2024) for Recheck.    Copied text in this note has been reviewed and is accurate as of 8/20/2024.    Part of this note may be an electronic transcription/translation of spoken language to printed text using the Dragon Dictation System.    Patient was seen from 1205 - 1230 on 8/20/24.     YAN Benton, PMRYP-BC

## 2024-08-20 NOTE — PROGRESS NOTES
[facility]      Follow Up Adult Note     Date:2024   Client Name: Kirsten Lima  : 1960   MRN: [id number]   Time IN: 11:04 AM     Time OUT: 11:59 AM      Referring Provider: Verenice Valdez MD    Chief Complaint:      ICD-10-CM ICD-9-CM   1. Moderate episode of recurrent major depressive disorder  F33.1 296.32   2. Generalized anxiety disorder  F41.1 300.02        History of Present Illness:   Kirsten Lima is a 64 y.o. female who is being seen today for follow up individual psychotherapy counseling for depression and anxiety.        Objective     PHQ-9 Depression Screening  Little interest or pleasure in doing things? 2-->more than half the days   Feeling down, depressed, or hopeless? 2-->more than half the days   Trouble falling or staying asleep, or sleeping too much? 2-->more than half the days   Feeling tired or having little energy? 2-->more than half the days   Poor appetite or overeating? 2-->more than half the days   Feeling bad about yourself - or that you are a failure or have let yourself or your family down? 2-->more than half the days   Trouble concentrating on things, such as reading the newspaper or watching television? 2-->more than half the days   Moving or speaking so slowly that other people could have noticed? Or the opposite - being so fidgety or restless that you have been moving around a lot more than usual? 0-->not at all   Thoughts that you would be better off dead, or of hurting yourself in some way? 0-->not at all   PHQ-9 Total Score 14   If you checked off any problems, how difficult have these problems made it for you to do your work, take care of things at home, or get along with other people? somewhat difficult      MIKHAIL-7  Feeling nervous, anxious or on edge: More than half the days  Not being able to stop or control worrying: More than half the days  Worrying too much about different things: Several days  Trouble Relaxing: More than half the  days  Being so restless that it is hard to sit still: Not at all  Feeling afraid as if something awful might happen: Not at all  Becoming easily annoyed or irritable: Several days  MIKHAIL 7 Total Score: 8  If you checked any problems, how difficult have these problems made it for you to do your work, take care of things at home, or get along with other people: Somewhat difficult      Medications:     Current Outpatient Medications:     amLODIPine (NORVASC) 5 MG tablet, Take 1 tablet by mouth Every Morning., Disp: 90 tablet, Rfl: 3    anastrozole (ARIMIDEX) 1 MG tablet, TAKE 1 TABLET BY MOUTH EVERY DAY, Disp: 90 tablet, Rfl: 3    buPROPion XL (Wellbutrin XL) 300 MG 24 hr tablet, Take 1 tablet by mouth Daily., Disp: 90 tablet, Rfl: 1    cetirizine (zyrTEC) 10 MG tablet, Take 1 tablet by mouth Daily., Disp: , Rfl:     Cholecalciferol (VITAMIN D) 2000 units capsule, Take 1 capsule by mouth Daily., Disp: , Rfl:     clotrimazole-betamethasone (LOTRISONE) 1-0.05 % lotion, Apply  topically to the appropriate area as directed 2 (Two) Times a Day. (Patient not taking: Reported on 7/23/2024), Disp: 30 mL, Rfl: 1    coenzyme Q10 100 MG capsule, Take 1 capsule by mouth Daily., Disp: , Rfl:     fluticasone (FLONASE) 50 MCG/ACT nasal spray, 2 sprays by Each Nare route Daily., Disp: 16 g, Rfl: 1    levalbuterol (XOPENEX HFA) 45 MCG/ACT inhaler, Inhale 1-2 puffs Every 4 (Four) Hours As Needed for Wheezing., Disp: 15 g, Rfl: 1    levocetirizine (Xyzal Allergy 24HR) 5 MG tablet, Take 1 tablet by mouth Every Evening., Disp: , Rfl:     lisinopril-hydrochlorothiazide (PRINZIDE,ZESTORETIC) 20-12.5 MG per tablet, Take 2 tablets by mouth Every Morning., Disp: 180 tablet, Rfl: 1    metoprolol succinate XL (TOPROL-XL) 200 MG 24 hr tablet, Take 1 tablet by mouth Every Night., Disp: 90 tablet, Rfl: 1    Probiotic Product (Risaquad-2) capsule capsule, Take 1 capsule by mouth Daily., Disp: , Rfl:     rosuvastatin (CRESTOR) 10 MG tablet, Take 1  "tablet by mouth Every Night., Disp: 90 tablet, Rfl: 3    spironolactone (ALDACTONE) 25 MG tablet, Take 1 tablet by mouth Every Morning., Disp: 90 tablet, Rfl: 3    Tirzepatide-Weight Management (ZEPBOUND) 2.5 MG/0.5ML solution auto-injector, Inject 0.5 mL under the skin into the appropriate area as directed 1 (One) Time Per Week. (Patient not taking: Reported on 7/9/2024), Disp: 2 mL, Rfl: 0    Vibegron (Gemtesa) 75 MG tablet, TAKE 1 TABLET BY MOUTH EVERY DAY, Disp: 90 tablet, Rfl: 1    Allergies:   No Known Allergies      Subjective     Mental Status Exam:   MENTAL STATUS EXAM   General Appearance:  Cleanly groomed and dressed  Eye Contact:  Good eye contact  Attitude:  Cooperative  Motor Activity:  Normal gait, posture  Muscle Strength:  Normal  Speech:  Normal rate, tone, volume  Language:  Stereotypical and unspontaneous  Mood and affect:  Normal, pleasant  Thought Process:  Logical and goal-directed  Associations/ Thought Content:  No delusions  Hallucinations:  None  Suicidal Ideations:  Not present  Homicidal Ideation:  Not present  Sensorium:  Alert and clear  Orientation:  Person, place, time and situation  Immediate Recall, Recent, and Remote Memory:  Intact  Attention Span/ Concentration:  Good  Fund of Knowledge:  Appropriate for age and educational level  Intellectual Functioning:  Average range  Insight:  Good  Judgement:  Good  Reliability:  Good  Impulse Control:  Good       Client's Support Network Includes:   \"nobody\"    Functional Status: No impairment    Progress toward goal: Not at goal    Prognosis: Good with Ongoing Treatment     Assessment / Plan / Progress     Visit Diagnosis/Orders Placed This Visit:    ICD-10-CM ICD-9-CM   1. Moderate episode of recurrent major depressive disorder  F33.1 296.32   2. Generalized anxiety disorder  F41.1 300.02        SUICIDE RISK ASSESSMENT/CSSRS:  1. Does client have thoughts of suicide? Patient denies  2. Does client have intent for suicide? Patient " "denies  3. Does client have a current plan for suicide? Patient denies   4. History of suicide attempts: Patient denies   5. Family history of suicide or attempts: Patient denies   6. History of violent behaviors towards others or property or thoughts of committing suicide: Patient denies   7. History of sexual aggression toward others: Patient denies   8. Access to firearms or weapons: Yes     PLAN:  Safety: No acute safety concerns  Risk Assessment: Risk of self-harm acutely is low. Risk of self-harm chronically is also low, but could be further elevated in the event of treatment noncompliance and/or AODA.    Presenting Problem: Client reported that she has had \"not so many down days.\" Client reported that she was able to clean her kitchen, did laundry, and cleaned out her bedroom. Client reported that she continues to experience pain in her left knee and has a MRI scheduled for this week. Client reported that she has been awake since 3:30 AM. Client reported that she has trouble staying asleep. Client reported that she takes Tylenol PM which helps her fall asleep, however, she wakes up to go to the bathroom every 2 hours and is unable to fall back asleep. Client reflected on her relationship with an older woman at [facility] and reported that she didn't know that she was dying before she passed away. Client reported that she wishes that she knew that she was dying because she would not have told her she wasn't dying if she knew. Client reflected on her grief journey with her mom and reported that she would like to move past it. Client reported that she used to be fearless and would like to get back to that. Client reported that she liked making her parents proud and she wonders who she's doing it for now that both of her parents are . Client reported that she was dependable and had to be the strong one. Client reported that she still has yet to start Ozempic and is worried about the side effects. Client " reported that she would like to work on starting the Ozempic.     Treatment Plan/Goals & Progress: Clinician reviewed treatment plan with client and she was in agreement with the treatment plan. Continue supportive psychotherapy efforts and medications as indicated. Treatment options discussed during today's visit. Client ackowledged and verbally consented to continue with current treatment plan and was educated on the importance of compliance with treatment and follow-up appointments. Client seems reasonably able to adhere to treatment plan.      Assisted client in processing above session content; acknowledged and normalized client’s thoughts, feelings, and concerns.  Rationalized client's thought process regarding depression and anxiety.      Allowed client to freely discuss issues without interruption or judgement with unconditional positive regard, active listening skills, and empathy. Clinician provided a safe, confidential environment to facilitate the development of a positive therapeutic relationship and encouraged open, honest communication. Assisted client in identifying risk factors which would indicate the need for higher level of care including thoughts to harm self or others and/or self-harming behavior and encouraged client to contact this office, call 911, or present to the nearest emergency room should any of these events occur. Discussed crisis intervention services and means to access. Client adamantly and convincingly denies current suicidal or homicidal ideation or perceptual disturbance. Assisted client in processing session content; acknowledged and normalized client’s thoughts, feelings, and concerns by utilizing a person-centered approach in efforts to build appropriate rapport and a positive therapeutic relationship with open and honest communication.       Follow Up:   Return in about 2 weeks (around 9/3/2024) for Next scheduled follow up.    Janny Cruz LCSW  [facility]  [facility] [place]

## 2024-09-04 ENCOUNTER — OFFICE VISIT (OUTPATIENT)
Age: 64
End: 2024-09-04
Payer: COMMERCIAL

## 2024-09-04 DIAGNOSIS — F41.1 GENERALIZED ANXIETY DISORDER: Primary | ICD-10-CM

## 2024-09-04 DIAGNOSIS — F33.1 MODERATE EPISODE OF RECURRENT MAJOR DEPRESSIVE DISORDER: ICD-10-CM

## 2024-09-04 PROCEDURE — 90837 PSYTX W PT 60 MINUTES: CPT

## 2024-09-04 NOTE — PROGRESS NOTES
[facility]      Follow Up Adult Note     Date:2024   Client Name: Kirsten Lima  : 1960   MRN: [id number]   Time IN: 11:02 PM    Time OUT: 12:08 PM     Referring Provider: Verenice Valdez MD    Chief Complaint:      ICD-10-CM ICD-9-CM   1. Generalized anxiety disorder  F41.1 300.02   2. Moderate episode of recurrent major depressive disorder  F33.1 296.32        History of Present Illness:   Kirsten Lima is a 64 y.o. female who is being seen today for follow up individual psychotherapy counseling for depression and anxiety.        Objective     PHQ-9 Depression Screening  Little interest or pleasure in doing things? 1-->several days   Feeling down, depressed, or hopeless? 1-->several days   Trouble falling or staying asleep, or sleeping too much? 3-->nearly every day   Feeling tired or having little energy? 2-->more than half the days   Poor appetite or overeating? 2-->more than half the days   Feeling bad about yourself - or that you are a failure or have let yourself or your family down? 2-->more than half the days   Trouble concentrating on things, such as reading the newspaper or watching television? 2-->more than half the days   Moving or speaking so slowly that other people could have noticed? Or the opposite - being so fidgety or restless that you have been moving around a lot more than usual? 0-->not at all   Thoughts that you would be better off dead, or of hurting yourself in some way? 0-->not at all   PHQ-9 Total Score 13   If you checked off any problems, how difficult have these problems made it for you to do your work, take care of things at home, or get along with other people? somewhat difficult      MIKHAIL-7  Feeling nervous, anxious or on edge: More than half the days  Not being able to stop or control worrying: Several days  Worrying too much about different things: More than half the days  Trouble Relaxing: More than half the days  Being so restless that it is  hard to sit still: Not at all  Feeling afraid as if something awful might happen: Several days  Becoming easily annoyed or irritable: Several days  MIKHAIL 7 Total Score: 9  If you checked any problems, how difficult have these problems made it for you to do your work, take care of things at home, or get along with other people: Somewhat difficult      Medications:     Current Outpatient Medications:     amLODIPine (NORVASC) 5 MG tablet, Take 1 tablet by mouth Every Morning., Disp: 90 tablet, Rfl: 3    anastrozole (ARIMIDEX) 1 MG tablet, TAKE 1 TABLET BY MOUTH EVERY DAY, Disp: 90 tablet, Rfl: 3    buPROPion XL (Wellbutrin XL) 300 MG 24 hr tablet, Take 1 tablet by mouth Daily., Disp: 90 tablet, Rfl: 1    cetirizine (zyrTEC) 10 MG tablet, Take 1 tablet by mouth Daily., Disp: , Rfl:     Cholecalciferol (VITAMIN D) 2000 units capsule, Take 1 capsule by mouth Daily., Disp: , Rfl:     clotrimazole-betamethasone (LOTRISONE) 1-0.05 % lotion, Apply  topically to the appropriate area as directed 2 (Two) Times a Day. (Patient not taking: Reported on 7/23/2024), Disp: 30 mL, Rfl: 1    coenzyme Q10 100 MG capsule, Take 1 capsule by mouth Daily., Disp: , Rfl:     fluticasone (FLONASE) 50 MCG/ACT nasal spray, 2 sprays by Each Nare route Daily., Disp: 16 g, Rfl: 1    levalbuterol (XOPENEX HFA) 45 MCG/ACT inhaler, Inhale 1-2 puffs Every 4 (Four) Hours As Needed for Wheezing., Disp: 15 g, Rfl: 1    levocetirizine (Xyzal Allergy 24HR) 5 MG tablet, Take 1 tablet by mouth Every Evening., Disp: , Rfl:     lisinopril-hydrochlorothiazide (PRINZIDE,ZESTORETIC) 20-12.5 MG per tablet, Take 2 tablets by mouth Every Morning., Disp: 180 tablet, Rfl: 1    metoprolol succinate XL (TOPROL-XL) 200 MG 24 hr tablet, Take 1 tablet by mouth Every Night., Disp: 90 tablet, Rfl: 1    Probiotic Product (Risaquad-2) capsule capsule, Take 1 capsule by mouth Daily., Disp: , Rfl:     rosuvastatin (CRESTOR) 10 MG tablet, Take 1 tablet by mouth Every Night., Disp: 90  "tablet, Rfl: 3    spironolactone (ALDACTONE) 25 MG tablet, Take 1 tablet by mouth Every Morning., Disp: 90 tablet, Rfl: 3    Tirzepatide-Weight Management (ZEPBOUND) 2.5 MG/0.5ML solution auto-injector, Inject 0.5 mL under the skin into the appropriate area as directed 1 (One) Time Per Week. (Patient not taking: Reported on 7/9/2024), Disp: 2 mL, Rfl: 0    Vibegron (Gemtesa) 75 MG tablet, TAKE 1 TABLET BY MOUTH EVERY DAY, Disp: 90 tablet, Rfl: 1    Allergies:   No Known Allergies      Subjective     Mental Status Exam:   MENTAL STATUS EXAM   General Appearance:  Cleanly groomed and dressed  Eye Contact:  Good eye contact  Attitude:  Cooperative  Motor Activity:  Normal gait, posture  Muscle Strength:  Normal  Speech:  Normal rate, tone, volume  Language:  Stereotypical and unspontaneous  Mood and affect:  Normal, pleasant  Thought Process:  Logical and goal-directed  Associations/ Thought Content:  No delusions  Hallucinations:  None  Suicidal Ideations:  Not present  Homicidal Ideation:  Not present  Sensorium:  Alert and clear  Orientation:  Person, place, time and situation  Immediate Recall, Recent, and Remote Memory:  Intact  Attention Span/ Concentration:  Good  Fund of Knowledge:  Appropriate for age and educational level  Intellectual Functioning:  Average range  Insight:  Good  Judgement:  Good  Reliability:  Good  Impulse Control:  Good       Client's Support Network Includes:   \"nobody\"    Functional Status: No impairment    Progress toward goal: Not at goal    Prognosis: Good with Ongoing Treatment     Assessment / Plan / Progress     Visit Diagnosis/Orders Placed This Visit:    ICD-10-CM ICD-9-CM   1. Generalized anxiety disorder  F41.1 300.02   2. Moderate episode of recurrent major depressive disorder  F33.1 296.32        SUICIDE RISK ASSESSMENT/CSSRS:  1. Does client have thoughts of suicide? Patient denies  2. Does client have intent for suicide? Patient denies  3. Does client have a current plan " "for suicide? Patient denies   4. History of suicide attempts: Patient denies   5. Family history of suicide or attempts: Patient denies   6. History of violent behaviors towards others or property or thoughts of committing suicide: Patient denies   7. History of sexual aggression toward others: Patient denies   8. Access to firearms or weapons: Yes     PLAN:  Safety: No acute safety concerns  Risk Assessment: Risk of self-harm acutely is low. Risk of self-harm chronically is also low, but could be further elevated in the event of treatment noncompliance and/or AODA.    Presenting Problem: Client stated that she is feeling ok and things have been pretty good since she last saw clinician. Client reported that she is still keeping the kitchen clean and went to the grocery store yesterday. Client reported that she had the MRI for her knee and needs to have surgery to repair 2 tears in her meniscus. Client reported that she wasn't anticipating surgery and is feeling anxiety about her knee and the surgery. Client reported that she would like to get the surgery done as soon as possible. Client reported that she has been \"nervously eating\" and smoking a bit more due to the anxiety. Client discussed how she isn't in the present because she is still getting over the past. Client reported that she has no one to relate or connect to. Client reported that she wants to reach out to her old friend of 30 years, however, she is disappointed that her friend didn't reach out to her.     Treatment Plan/Goals & Progress: Continue supportive psychotherapy efforts and medications as indicated. Treatment options discussed during today's visit. Client ackowledged and verbally consented to continue with current treatment plan and was educated on the importance of compliance with treatment and follow-up appointments. Client seems reasonably able to adhere to treatment plan.      Assisted client in processing above session content; " acknowledged and normalized client’s thoughts, feelings, and concerns.  Rationalized client's thought process regarding depression and anxiety.      Allowed client to freely discuss issues without interruption or judgement with unconditional positive regard, active listening skills, and empathy. Clinician provided a safe, confidential environment to facilitate the development of a positive therapeutic relationship and encouraged open, honest communication. Assisted client in identifying risk factors which would indicate the need for higher level of care including thoughts to harm self or others and/or self-harming behavior and encouraged client to contact this office, call 911, or present to the nearest emergency room should any of these events occur. Discussed crisis intervention services and means to access. Client adamantly and convincingly denies current suicidal or homicidal ideation or perceptual disturbance. Assisted client in processing session content; acknowledged and normalized client’s thoughts, feelings, and concerns by utilizing a person-centered approach in efforts to build appropriate rapport and a positive therapeutic relationship with open and honest communication.       Follow Up:   Return in about 2 weeks (around 9/18/2024) for Next scheduled follow up.    Janny Cruz LCSW  [facility]

## 2024-09-09 ENCOUNTER — PRE-ADMISSION TESTING (OUTPATIENT)
Dept: PREADMISSION TESTING | Facility: HOSPITAL | Age: 64
End: 2024-09-09
Payer: COMMERCIAL

## 2024-09-09 ENCOUNTER — HOSPITAL ENCOUNTER (OUTPATIENT)
Dept: GENERAL RADIOLOGY | Facility: HOSPITAL | Age: 64
Discharge: HOME | End: 2024-09-09
Payer: COMMERCIAL

## 2024-09-09 VITALS
HEART RATE: 67 BPM | HEIGHT: 66 IN | WEIGHT: 268.1 LBS | BODY MASS INDEX: 43.09 KG/M2 | TEMPERATURE: 97.9 F | OXYGEN SATURATION: 95 % | DIASTOLIC BLOOD PRESSURE: 83 MMHG | RESPIRATION RATE: 16 BRPM | SYSTOLIC BLOOD PRESSURE: 150 MMHG

## 2024-09-09 LAB
ALBUMIN SERPL-MCNC: 3.8 G/DL (ref 3.5–5.2)
ALBUMIN/GLOB SERPL: 1.1 G/DL
ALP SERPL-CCNC: 105 U/L (ref 39–117)
ALT SERPL W P-5'-P-CCNC: 15 U/L (ref 1–33)
ANION GAP SERPL CALCULATED.3IONS-SCNC: 8.6 MMOL/L (ref 5–15)
AST SERPL-CCNC: 14 U/L (ref 1–32)
BASOPHILS # BLD AUTO: 0.06 10*3/MM3 (ref 0–0.2)
BASOPHILS NFR BLD AUTO: 0.7 % (ref 0–1.5)
BILIRUB SERPL-MCNC: 0.3 MG/DL (ref 0–1.2)
BUN SERPL-MCNC: 15 MG/DL (ref 8–23)
BUN/CREAT SERPL: 12.7 (ref 7–25)
CALCIUM SPEC-SCNC: 10.1 MG/DL (ref 8.6–10.5)
CHLORIDE SERPL-SCNC: 105 MMOL/L (ref 98–107)
CO2 SERPL-SCNC: 29.4 MMOL/L (ref 22–29)
CREAT SERPL-MCNC: 1.18 MG/DL (ref 0.57–1)
DEPRECATED RDW RBC AUTO: 45.1 FL (ref 37–54)
EGFRCR SERPLBLD CKD-EPI 2021: 51.7 ML/MIN/1.73
EOSINOPHIL # BLD AUTO: 0.11 10*3/MM3 (ref 0–0.4)
EOSINOPHIL NFR BLD AUTO: 1.2 % (ref 0.3–6.2)
ERYTHROCYTE [DISTWIDTH] IN BLOOD BY AUTOMATED COUNT: 14.9 % (ref 12.3–15.4)
GLOBULIN UR ELPH-MCNC: 3.6 GM/DL
GLUCOSE SERPL-MCNC: 90 MG/DL (ref 65–99)
HCT VFR BLD AUTO: 49.1 % (ref 34–46.6)
HGB BLD-MCNC: 16.3 G/DL (ref 12–15.9)
IMM GRANULOCYTES # BLD AUTO: 0.06 10*3/MM3 (ref 0–0.05)
IMM GRANULOCYTES NFR BLD AUTO: 0.7 % (ref 0–0.5)
LYMPHOCYTES # BLD AUTO: 2.37 10*3/MM3 (ref 0.7–3.1)
LYMPHOCYTES NFR BLD AUTO: 26.3 % (ref 19.6–45.3)
MCH RBC QN AUTO: 28.2 PG (ref 26.6–33)
MCHC RBC AUTO-ENTMCNC: 33.2 G/DL (ref 31.5–35.7)
MCV RBC AUTO: 85.1 FL (ref 79–97)
MONOCYTES # BLD AUTO: 0.88 10*3/MM3 (ref 0.1–0.9)
MONOCYTES NFR BLD AUTO: 9.8 % (ref 5–12)
NEUTROPHILS NFR BLD AUTO: 5.52 10*3/MM3 (ref 1.7–7)
NEUTROPHILS NFR BLD AUTO: 61.3 % (ref 42.7–76)
NRBC BLD AUTO-RTO: 0 /100 WBC (ref 0–0.2)
PLATELET # BLD AUTO: 272 10*3/MM3 (ref 140–450)
PMV BLD AUTO: 9.6 FL (ref 6–12)
POTASSIUM SERPL-SCNC: 4.5 MMOL/L (ref 3.5–5.2)
PROT SERPL-MCNC: 7.4 G/DL (ref 6–8.5)
RBC # BLD AUTO: 5.77 10*6/MM3 (ref 3.77–5.28)
SODIUM SERPL-SCNC: 143 MMOL/L (ref 136–145)
WBC NRBC COR # BLD AUTO: 9 10*3/MM3 (ref 3.4–10.8)

## 2024-09-09 PROCEDURE — 80053 COMPREHEN METABOLIC PANEL: CPT

## 2024-09-09 PROCEDURE — 85025 COMPLETE CBC W/AUTO DIFF WBC: CPT

## 2024-09-09 PROCEDURE — 71046 X-RAY EXAM CHEST 2 VIEWS: CPT

## 2024-09-09 PROCEDURE — 36415 COLL VENOUS BLD VENIPUNCTURE: CPT

## 2024-09-09 NOTE — DISCHARGE INSTRUCTIONS
Take the following medications the morning of surgery:  AMLODIPINE, BUPROPION    STOP COQ 10 AND PROBIOTICS STARTING NOW UNTIL AFTER SURGERY    DO NOT START ZEPBOUND INJECTION PRIOR TO SURGERY    If you are on prescription narcotic pain medication to control your pain you may also take that medication the morning of surgery.      General Instructions:     Do not eat solid food after midnight the night before surgery.  Clear liquids day of surgery are allowed but must be stopped at least two hours before your hospital arrival time.       Allowed clear liquids      Water, sodas, and tea or coffee with no cream or milk added.       12 to 20 ounces of a clear liquid that contains carbohydrates is recommended.  If non-diabetic, have Gatorade or Powerade.  If diabetic, have G2 or Powerade Zero.     Do not have liquids red in color.  Do not consume chicken, beef, pork or vegetable broth or bouillon cubes of any variety as they are not considered clear liquids and are not allowed.      Infants may have breast milk up to four hours before surgery.  Infants drinking formula may drink formula up to six hours before surgery.   Patients who avoid smoking, chewing tobacco and alcohol for 4 weeks prior to surgery have a reduced risk of post-operative complications.  Quit smoking as many days before surgery as you can.  Do not smoke, use chewing tobacco or drink alcohol the day of surgery.   If applicable bring your C-PAP/ BI-PAP machine in with you to preop day of surgery.  Bring any papers given to you in the doctor’s office.  Wear clean comfortable clothes.  Do not wear contact lenses, false eyelashes or make-up.  Bring a case for your glasses.   Bring crutches or walker if applicable.  Remove all piercings.  Leave jewelry and any other valuables at home.  Hair extensions with metal clips must be removed prior to surgery.  The Pre-Admission Testing nurse will instruct you to bring medications if unable to obtain an accurate list  in Pre-Admission Testing.            Preventing a Surgical Site Infection:  For 2 to 3 days before surgery, avoid shaving with a razor because the razor can irritate skin and make it easier to develop an infection.    Any areas of open skin can increase the risk of a post-operative wound infection by allowing bacteria to enter and travel throughout the body.  Notify your surgeon if you have any skin wounds / rashes even if it is not near the expected surgical site.  The area will need assessed to determine if surgery should be delayed until it is healed.  The night prior to surgery shower using a fresh bar of anti-bacterial soap (such as Dial) and clean washcloth.  Sleep in a clean bed with clean clothing.  Do not allow pets to sleep with you.  Shower on the morning of surgery using a fresh bar of anti-bacterial soap (such as Dial) and clean washcloth.  Dry with a clean towel and dress in clean clothing.  Ask your surgeon if you will be receiving antibiotics prior to surgery.  Make sure you, your family, and all healthcare providers clean their hands with soap and water or an alcohol based hand  before caring for you or your wound.    Day of surgery:  Your arrival time is approximately two hours before your scheduled surgery time.  Please note if you have an early arrival time the surgery doors do not open before 5:00 AM.  Upon arrival, a Pre-op nurse and Anesthesiologist will review your health history, obtain vital signs, and answer questions you may have.  The only belongings needed at this time will be a list of your home medications and if applicable your C-PAP/BI-PAP machine.  A Pre-op nurse will start an IV and you may receive medication in preparation for surgery, including something to help you relax.     Please be aware that surgery does come with discomfort.  We want to make every effort to control your discomfort so please discuss any uncontrolled symptoms with your nurse.   Your doctor will  most likely have prescribed pain medications.      If you are going home after surgery you will receive individualized written care instructions before being discharged.  A responsible adult must drive you to and from the hospital on the day of your surgery and ideally stay with you through the night.   .  Discharge prescriptions can be filled by the hospital pharmacy during regular pharmacy hours.  If you are having surgery late in the day/evening your prescription may be e-prescribed to your pharmacy.  Please verify your pharmacy hours or chose a 24 hour pharmacy to avoid not having access to your prescription because your pharmacy has closed for the day.    If you are staying overnight following surgery, you will be transported to your hospital room following the recovery period.  [facility] has all private rooms.    If you have any questions please call Pre-Admission Testing at [phone].  Deductibles and co-payments are collected on the day of service. Please be prepared to pay the required co-pay, deductible or deposit on the day of service as defined by your plan.    Call your surgeon immediately if you experience any of the following symptoms:  Sore Throat  Shortness of Breath or difficulty breathing  Cough  Chills  Body soreness or muscle pain  Headache  Fever  New loss of taste or smell  Do not arrive for your surgery ill.  Your procedure will need to be rescheduled to another time.  You will need to call your physician before the day of surgery to avoid any unnecessary exposure to hospital staff as well as other patients.

## 2024-09-11 ENCOUNTER — OFFICE VISIT (OUTPATIENT)
Dept: INTERNAL MEDICINE | Facility: CLINIC | Age: 64
End: 2024-09-11
Payer: COMMERCIAL

## 2024-09-11 VITALS
TEMPERATURE: 98.7 F | BODY MASS INDEX: 43.01 KG/M2 | HEIGHT: 66 IN | WEIGHT: 267.6 LBS | DIASTOLIC BLOOD PRESSURE: 80 MMHG | SYSTOLIC BLOOD PRESSURE: 138 MMHG | HEART RATE: 62 BPM | OXYGEN SATURATION: 95 %

## 2024-09-11 DIAGNOSIS — F32.A DEPRESSION, UNSPECIFIED DEPRESSION TYPE: Primary | ICD-10-CM

## 2024-09-11 DIAGNOSIS — I10 PRIMARY HYPERTENSION: ICD-10-CM

## 2024-09-11 DIAGNOSIS — M25.562 ACUTE PAIN OF LEFT KNEE: ICD-10-CM

## 2024-09-11 PROCEDURE — 99214 OFFICE O/P EST MOD 30 MIN: CPT | Performed by: INTERNAL MEDICINE

## 2024-09-11 NOTE — H&P
VERNON Curtis is a pleasant 64-year-old female who is here today with a longstanding history of left knee pain. She is retired. She denies any injury or change of activity. She says she has increased pain going from a sitting to a standing position or any type of weightbearing. She describes the current symptoms as stiffness, soreness, weakness, swelling, aching. She states pain is intermittent and typically is a 5 out of 10. She is done no specific treatment for this and is here today for further evaluation and treatment. She was seen last year by Dr. Lisandro Alonso for right knee pain and was given a a cortisone injection which helped to alleviate her symptoms completely. She was last seen by me on 8/6/2024 at which time cortisone injection was performed in the knee but unfortunately she did not get any pain relief with this. MRI was recently obtained.  ROS  ROS as noted in the [facility]  Physical Exam  The patient's general appearance is well developed, well nourished, no acute distress. Orientation is alert and oriented x 3. The patient's mood is normal. A head exam reveals normocephalic/atraumatic. An eye exam reveals pupils equal. Pulmonary exam shows normal air exchange, no labored breathing, or shortness of breath.  Left knee Exam: Skin is normal. Normal alignment. There is no atrophy. Effusion is 1+. No venous status deformatitus. No warmth. No erythema. Normal sensation. Capillary refill is normal. Range of motion of the knee is 0 to 120 degrees of flexion. There is pain with forced flexion and forced extension. Patellar tracking is normal. The leg lengths are equal. Pes planus is negative. PTTI is negative. Lachman grade 0. Posterior drawer grade 0. Posterior lateral drawer grade 0. Pivot shift grade 0. Valgus grade 0. Varus grade 0. Amy's is positive. Significant tenderness over the mid medial and posterior medial joint space to palpation. Good quad tone and strength.  Assessment / Plan  MRI left knee from  [facility] dated 8/22/2024:  IMPRESSION    1. Complex tear of the posterior horn and body of the medial meniscus with free  edge tear of the body of the medial meniscus.  2. Complex tear of the anterior horn and body of the lateral meniscus with small  flap component extending from the superior articular surface of the body of the  lateral meniscus into the superior lateral meniscal recess.  3. Focal chondromalacia in the central medial femoral condyle and posterior  medial aspect of the lateral tibial plateau.        1. Derangement of posterior horn of medial meniscus of left knee  M23.222: Derangement of posterior horn of medial meniscus due to old tear or injury, left knee    2. Derangement of posterior horn of lateral meniscus  M23.359: Other meniscus derangements, posterior horn of lateral meniscus, unspecified knee    3. Chondromalacia of left patella  M22.42: Chondromalacia patellae, left knee    Discussion Notes  We discussed operative and nonoperative options. She would like to proceed with left knee arthroscopy, partial medial and lateral meniscectomy.    We discussed the benefits of surgical intervention, as well as alternative treatments. Potential surgical risks and complications include but are not limited to bleeding, infection, failure of repaired structures to heal biologically, stiffness, recurrence, nerve or vascular injury, residual pain, and the possibility of revision surgery and prolonged convalescence. Sufficient opportunity was given to discuss the condition and treatment plan with the doctor, and all questions were answered for the patient. The patient agreed to proceed with the surgery.

## 2024-09-11 NOTE — PROGRESS NOTES
Subjective   Kirsten Lima is a 64 y.o. female.   Fu with on depression    History of Present Illness   She is getting sx tomorrow on a torn meniscus  She thinks her depression is much better  She is concerned because they told her to hold her lisinopril hydrochlorothiazide and the spironolactone tomorrow morning.  They said she could take her Wellbutrin and amlodipine.  She said her sister had a stroke when she had a surgery and I think it is because her blood pressure went too high    The following portions of the patient's history were reviewed and updated as appropriate: allergies, current medications, past family history, past medical history, past social history, past surgical history, and problem list.  Patient has not been walking much due to the knee pain she is trying to watch her diet  Review of Systems    Objective   Physical Exam  Vitals reviewed.   Constitutional:       Appearance: She is well-developed.   HENT:      Head: Normocephalic and atraumatic.      Right Ear: External ear normal.      Left Ear: External ear normal.   Eyes:      Conjunctiva/sclera: Conjunctivae normal.      Pupils: Pupils are equal, round, and reactive to light.   Neck:      Thyroid: No thyromegaly.      Trachea: No tracheal deviation.   Cardiovascular:      Rate and Rhythm: Normal rate and regular rhythm.      Heart sounds: Normal heart sounds.   Pulmonary:      Effort: Pulmonary effort is normal.      Breath sounds: Normal breath sounds.   Abdominal:      General: Bowel sounds are normal. There is no distension.      Palpations: Abdomen is soft.      Tenderness: There is no abdominal tenderness.   Musculoskeletal:         General: No deformity. Normal range of motion.      Cervical back: Normal range of motion.   Skin:     General: Skin is warm and dry.   Neurological:      Mental Status: She is alert and oriented to person, place, and time.   Psychiatric:         Behavior: Behavior normal.         Thought Content: Thought  content normal.         Judgment: Judgment normal.         Vitals:    09/11/24 0957   BP: 138/80   Pulse: 62   Temp: 98.7 °F (37.1 °C)   SpO2: 95%     Body mass index is 43.21 kg/m².         Assessment & Plan   Diagnoses and all orders for this visit:    1. Depression, unspecified depression type (Primary)    2. Primary hypertension    3. Acute pain of left knee      1.  Depression: She is doing much better with the Wellbutrin.  She is talking to the therapist and actually has an appointment to see the nurse practitioner to see if they would recommend any medication changes though she is doing so well I would continue where she is now  2.  Knee pain with a meniscal tear: She is scheduled for surgery tomorrow she is concerned because they are telling her to hold 2 of her blood pressure medications.  I advised her to go ahead and take her lisinopril tonight with her metoprolol and the amlodipine in the morning as they suggested.

## 2024-09-12 ENCOUNTER — ANESTHESIA EVENT (OUTPATIENT)
Dept: PERIOP | Facility: HOSPITAL | Age: 64
End: 2024-09-12
Payer: COMMERCIAL

## 2024-09-12 ENCOUNTER — HOSPITAL ENCOUNTER (OUTPATIENT)
Facility: HOSPITAL | Age: 64
Setting detail: OBSERVATION
Discharge: HOME | End: 2024-09-12
Attending: ORTHOPAEDIC SURGERY | Admitting: ORTHOPAEDIC SURGERY
Payer: COMMERCIAL

## 2024-09-12 ENCOUNTER — ANESTHESIA (OUTPATIENT)
Dept: PERIOP | Facility: HOSPITAL | Age: 64
End: 2024-09-12
Payer: COMMERCIAL

## 2024-09-12 VITALS
SYSTOLIC BLOOD PRESSURE: 144 MMHG | TEMPERATURE: 97.5 F | DIASTOLIC BLOOD PRESSURE: 74 MMHG | HEART RATE: 58 BPM | OXYGEN SATURATION: 94 % | RESPIRATION RATE: 16 BRPM | HEIGHT: 66 IN | WEIGHT: 266.76 LBS | BODY MASS INDEX: 42.87 KG/M2

## 2024-09-12 DIAGNOSIS — Z98.890 STATUS POST ARTHROSCOPY OF LEFT KNEE: Primary | ICD-10-CM

## 2024-09-12 PROBLEM — M25.562 LEFT KNEE PAIN: Status: ACTIVE | Noted: 2024-09-12

## 2024-09-12 PROCEDURE — 25810000003 LACTATED RINGERS PER 1000 ML: Performed by: STUDENT IN AN ORGANIZED HEALTH CARE EDUCATION/TRAINING PROGRAM

## 2024-09-12 PROCEDURE — 25010000002 CEFAZOLIN 3 G RECONSTITUTED SOLUTION 1 EACH VIAL: Performed by: ORTHOPAEDIC SURGERY

## 2024-09-12 PROCEDURE — 25010000002 HYDROMORPHONE PER 4 MG: Performed by: NURSE ANESTHETIST, CERTIFIED REGISTERED

## 2024-09-12 PROCEDURE — G0378 HOSPITAL OBSERVATION PER HR: HCPCS

## 2024-09-12 PROCEDURE — 25010000002 ONDANSETRON PER 1 MG: Performed by: NURSE ANESTHETIST, CERTIFIED REGISTERED

## 2024-09-12 PROCEDURE — 25010000002 MIDAZOLAM PER 1 MG: Performed by: STUDENT IN AN ORGANIZED HEALTH CARE EDUCATION/TRAINING PROGRAM

## 2024-09-12 PROCEDURE — 25010000002 FENTANYL CITRATE (PF) 50 MCG/ML SOLUTION: Performed by: NURSE ANESTHETIST, CERTIFIED REGISTERED

## 2024-09-12 PROCEDURE — 25010000002 GLYCOPYRROLATE 0.2 MG/ML SOLUTION: Performed by: NURSE ANESTHETIST, CERTIFIED REGISTERED

## 2024-09-12 PROCEDURE — 25810000003 LACTATED RINGERS PER 1000 ML: Performed by: ORTHOPAEDIC SURGERY

## 2024-09-12 PROCEDURE — 25010000002 DEXAMETHASONE SODIUM PHOSPHATE 20 MG/5ML SOLUTION: Performed by: NURSE ANESTHETIST, CERTIFIED REGISTERED

## 2024-09-12 PROCEDURE — 25010000002 EPINEPHRINE PER 0.1 MG: Performed by: ORTHOPAEDIC SURGERY

## 2024-09-12 PROCEDURE — 25010000002 PROPOFOL 200 MG/20ML EMULSION: Performed by: NURSE ANESTHETIST, CERTIFIED REGISTERED

## 2024-09-12 RX ORDER — HYDRALAZINE HYDROCHLORIDE 20 MG/ML
5 INJECTION INTRAMUSCULAR; INTRAVENOUS
Status: DISCONTINUED | OUTPATIENT
Start: 2024-09-12 | End: 2024-09-12 | Stop reason: HOSPADM

## 2024-09-12 RX ORDER — FLUMAZENIL 0.1 MG/ML
0.2 INJECTION INTRAVENOUS AS NEEDED
Status: DISCONTINUED | OUTPATIENT
Start: 2024-09-12 | End: 2024-09-12 | Stop reason: HOSPADM

## 2024-09-12 RX ORDER — SODIUM CHLORIDE 0.9 % (FLUSH) 0.9 %
3 SYRINGE (ML) INJECTION EVERY 12 HOURS SCHEDULED
Status: DISCONTINUED | OUTPATIENT
Start: 2024-09-12 | End: 2024-09-12 | Stop reason: HOSPADM

## 2024-09-12 RX ORDER — PROMETHAZINE HYDROCHLORIDE 25 MG/1
25 TABLET ORAL ONCE AS NEEDED
Status: DISCONTINUED | OUTPATIENT
Start: 2024-09-12 | End: 2024-09-12 | Stop reason: HOSPADM

## 2024-09-12 RX ORDER — HYDROMORPHONE HYDROCHLORIDE 1 MG/ML
0.25 INJECTION, SOLUTION INTRAMUSCULAR; INTRAVENOUS; SUBCUTANEOUS
Status: DISCONTINUED | OUTPATIENT
Start: 2024-09-12 | End: 2024-09-12 | Stop reason: HOSPADM

## 2024-09-12 RX ORDER — LABETALOL HYDROCHLORIDE 5 MG/ML
5 INJECTION, SOLUTION INTRAVENOUS
Status: DISCONTINUED | OUTPATIENT
Start: 2024-09-12 | End: 2024-09-12 | Stop reason: HOSPADM

## 2024-09-12 RX ORDER — ONDANSETRON 2 MG/ML
4 INJECTION INTRAMUSCULAR; INTRAVENOUS ONCE AS NEEDED
Status: COMPLETED | OUTPATIENT
Start: 2024-09-12 | End: 2024-09-12

## 2024-09-12 RX ORDER — EPHEDRINE SULFATE 50 MG/ML
5 INJECTION, SOLUTION INTRAVENOUS ONCE AS NEEDED
Status: DISCONTINUED | OUTPATIENT
Start: 2024-09-12 | End: 2024-09-12 | Stop reason: HOSPADM

## 2024-09-12 RX ORDER — DIPHENHYDRAMINE HYDROCHLORIDE 50 MG/ML
12.5 INJECTION INTRAMUSCULAR; INTRAVENOUS
Status: DISCONTINUED | OUTPATIENT
Start: 2024-09-12 | End: 2024-09-12 | Stop reason: HOSPADM

## 2024-09-12 RX ORDER — LIDOCAINE HYDROCHLORIDE 20 MG/ML
INJECTION, SOLUTION EPIDURAL; INFILTRATION; INTRACAUDAL; PERINEURAL AS NEEDED
Status: DISCONTINUED | OUTPATIENT
Start: 2024-09-12 | End: 2024-09-12 | Stop reason: SURG

## 2024-09-12 RX ORDER — IPRATROPIUM BROMIDE AND ALBUTEROL SULFATE 2.5; .5 MG/3ML; MG/3ML
3 SOLUTION RESPIRATORY (INHALATION) ONCE AS NEEDED
Status: DISCONTINUED | OUTPATIENT
Start: 2024-09-12 | End: 2024-09-12 | Stop reason: HOSPADM

## 2024-09-12 RX ORDER — NALOXONE HCL 0.4 MG/ML
0.2 VIAL (ML) INJECTION AS NEEDED
Status: DISCONTINUED | OUTPATIENT
Start: 2024-09-12 | End: 2024-09-12 | Stop reason: HOSPADM

## 2024-09-12 RX ORDER — SODIUM CHLORIDE, SODIUM LACTATE, POTASSIUM CHLORIDE, CALCIUM CHLORIDE 600; 310; 30; 20 MG/100ML; MG/100ML; MG/100ML; MG/100ML
9 INJECTION, SOLUTION INTRAVENOUS CONTINUOUS
Status: DISCONTINUED | OUTPATIENT
Start: 2024-09-12 | End: 2024-09-12 | Stop reason: HOSPADM

## 2024-09-12 RX ORDER — HYDROCODONE BITARTRATE AND ACETAMINOPHEN 5; 325 MG/1; MG/1
1 TABLET ORAL EVERY 4 HOURS PRN
Qty: 28 TABLET | Refills: 0 | Status: SHIPPED | OUTPATIENT
Start: 2024-09-12

## 2024-09-12 RX ORDER — LIDOCAINE HYDROCHLORIDE 10 MG/ML
0.5 INJECTION, SOLUTION INFILTRATION; PERINEURAL ONCE AS NEEDED
Status: DISCONTINUED | OUTPATIENT
Start: 2024-09-12 | End: 2024-09-12 | Stop reason: HOSPADM

## 2024-09-12 RX ORDER — FENTANYL CITRATE 50 UG/ML
25 INJECTION, SOLUTION INTRAMUSCULAR; INTRAVENOUS
Status: DISCONTINUED | OUTPATIENT
Start: 2024-09-12 | End: 2024-09-12 | Stop reason: HOSPADM

## 2024-09-12 RX ORDER — CLINDAMYCIN PHOSPHATE 900 MG/50ML
900 INJECTION, SOLUTION INTRAVENOUS ONCE
Status: DISCONTINUED | OUTPATIENT
Start: 2024-09-12 | End: 2024-09-12

## 2024-09-12 RX ORDER — BUPIVACAINE HYDROCHLORIDE AND EPINEPHRINE 5; 5 MG/ML; UG/ML
INJECTION, SOLUTION EPIDURAL; INTRACAUDAL; PERINEURAL AS NEEDED
Status: DISCONTINUED | OUTPATIENT
Start: 2024-09-12 | End: 2024-09-12 | Stop reason: HOSPADM

## 2024-09-12 RX ORDER — MIDAZOLAM HYDROCHLORIDE 1 MG/ML
1 INJECTION INTRAMUSCULAR; INTRAVENOUS
Status: COMPLETED | OUTPATIENT
Start: 2024-09-12 | End: 2024-09-12

## 2024-09-12 RX ORDER — FENTANYL CITRATE 50 UG/ML
INJECTION, SOLUTION INTRAMUSCULAR; INTRAVENOUS AS NEEDED
Status: DISCONTINUED | OUTPATIENT
Start: 2024-09-12 | End: 2024-09-12 | Stop reason: SURG

## 2024-09-12 RX ORDER — PROPOFOL 10 MG/ML
INJECTION, EMULSION INTRAVENOUS AS NEEDED
Status: DISCONTINUED | OUTPATIENT
Start: 2024-09-12 | End: 2024-09-12 | Stop reason: SURG

## 2024-09-12 RX ORDER — PROMETHAZINE HYDROCHLORIDE 25 MG/1
25 SUPPOSITORY RECTAL ONCE AS NEEDED
Status: DISCONTINUED | OUTPATIENT
Start: 2024-09-12 | End: 2024-09-12 | Stop reason: HOSPADM

## 2024-09-12 RX ORDER — HYDROCODONE BITARTRATE AND ACETAMINOPHEN 7.5; 325 MG/1; MG/1
1 TABLET ORAL EVERY 4 HOURS PRN
Status: DISCONTINUED | OUTPATIENT
Start: 2024-09-12 | End: 2024-09-12 | Stop reason: HOSPADM

## 2024-09-12 RX ORDER — ONDANSETRON 2 MG/ML
INJECTION INTRAMUSCULAR; INTRAVENOUS AS NEEDED
Status: DISCONTINUED | OUTPATIENT
Start: 2024-09-12 | End: 2024-09-12 | Stop reason: SURG

## 2024-09-12 RX ORDER — DEXAMETHASONE SODIUM PHOSPHATE 4 MG/ML
INJECTION, SOLUTION INTRA-ARTICULAR; INTRALESIONAL; INTRAMUSCULAR; INTRAVENOUS; SOFT TISSUE AS NEEDED
Status: DISCONTINUED | OUTPATIENT
Start: 2024-09-12 | End: 2024-09-12 | Stop reason: SURG

## 2024-09-12 RX ORDER — HYDROCODONE BITARTRATE AND ACETAMINOPHEN 5; 325 MG/1; MG/1
1 TABLET ORAL ONCE AS NEEDED
Status: DISCONTINUED | OUTPATIENT
Start: 2024-09-12 | End: 2024-09-12 | Stop reason: HOSPADM

## 2024-09-12 RX ORDER — EPHEDRINE SULFATE 50 MG/ML
INJECTION INTRAVENOUS AS NEEDED
Status: DISCONTINUED | OUTPATIENT
Start: 2024-09-12 | End: 2024-09-12 | Stop reason: SURG

## 2024-09-12 RX ORDER — SODIUM CHLORIDE 0.9 % (FLUSH) 0.9 %
3-10 SYRINGE (ML) INJECTION AS NEEDED
Status: DISCONTINUED | OUTPATIENT
Start: 2024-09-12 | End: 2024-09-12 | Stop reason: HOSPADM

## 2024-09-12 RX ORDER — FAMOTIDINE 10 MG/ML
20 INJECTION, SOLUTION INTRAVENOUS ONCE
Status: COMPLETED | OUTPATIENT
Start: 2024-09-12 | End: 2024-09-12

## 2024-09-12 RX ORDER — DROPERIDOL 2.5 MG/ML
0.62 INJECTION, SOLUTION INTRAMUSCULAR; INTRAVENOUS
Status: DISCONTINUED | OUTPATIENT
Start: 2024-09-12 | End: 2024-09-12 | Stop reason: HOSPADM

## 2024-09-12 RX ORDER — GLYCOPYRROLATE 0.2 MG/ML
INJECTION INTRAMUSCULAR; INTRAVENOUS AS NEEDED
Status: DISCONTINUED | OUTPATIENT
Start: 2024-09-12 | End: 2024-09-12 | Stop reason: SURG

## 2024-09-12 RX ADMIN — ONDANSETRON 4 MG: 2 INJECTION INTRAMUSCULAR; INTRAVENOUS at 13:27

## 2024-09-12 RX ADMIN — Medication 10 MG: at 13:42

## 2024-09-12 RX ADMIN — HYDROCODONE BITARTRATE AND ACETAMINOPHEN 1 TABLET: 7.5; 325 TABLET ORAL at 14:27

## 2024-09-12 RX ADMIN — Medication 20 MG: at 13:30

## 2024-09-12 RX ADMIN — HYDROMORPHONE HYDROCHLORIDE 0.25 MG: 1 INJECTION, SOLUTION INTRAMUSCULAR; INTRAVENOUS; SUBCUTANEOUS at 14:23

## 2024-09-12 RX ADMIN — LIDOCAINE HYDROCHLORIDE 80 MG: 20 INJECTION, SOLUTION EPIDURAL; INFILTRATION; INTRACAUDAL; PERINEURAL at 13:20

## 2024-09-12 RX ADMIN — FENTANYL CITRATE 25 MCG: 50 INJECTION, SOLUTION INTRAMUSCULAR; INTRAVENOUS at 13:50

## 2024-09-12 RX ADMIN — MIDAZOLAM 1 MG: 1 INJECTION INTRAMUSCULAR; INTRAVENOUS at 11:27

## 2024-09-12 RX ADMIN — GLYCOPYRROLATE 0.2 MG: 0.2 INJECTION INTRAMUSCULAR; INTRAVENOUS at 13:44

## 2024-09-12 RX ADMIN — SODIUM CHLORIDE 3 G: 900 INJECTION INTRAVENOUS at 13:05

## 2024-09-12 RX ADMIN — EPHEDRINE SULFATE 10 MG: 50 INJECTION INTRAVENOUS at 13:27

## 2024-09-12 RX ADMIN — DEXAMETHASONE SODIUM PHOSPHATE 6 MG: 4 INJECTION, SOLUTION INTRAMUSCULAR; INTRAVENOUS at 13:27

## 2024-09-12 RX ADMIN — HYDROMORPHONE HYDROCHLORIDE 0.25 MG: 1 INJECTION, SOLUTION INTRAMUSCULAR; INTRAVENOUS; SUBCUTANEOUS at 14:18

## 2024-09-12 RX ADMIN — SODIUM CHLORIDE, POTASSIUM CHLORIDE, SODIUM LACTATE AND CALCIUM CHLORIDE: 600; 310; 30; 20 INJECTION, SOLUTION INTRAVENOUS at 13:54

## 2024-09-12 RX ADMIN — PROPOFOL 170 MG: 10 INJECTION, EMULSION INTRAVENOUS at 13:20

## 2024-09-12 RX ADMIN — MIDAZOLAM 1 MG: 1 INJECTION INTRAMUSCULAR; INTRAVENOUS at 13:05

## 2024-09-12 RX ADMIN — ONDANSETRON 4 MG: 2 INJECTION, SOLUTION INTRAMUSCULAR; INTRAVENOUS at 15:27

## 2024-09-12 RX ADMIN — FAMOTIDINE 20 MG: 10 INJECTION INTRAVENOUS at 11:27

## 2024-09-12 RX ADMIN — EPHEDRINE SULFATE 10 MG: 50 INJECTION INTRAVENOUS at 13:54

## 2024-09-12 NOTE — DISCHARGE INSTRUCTIONS
Dr. Alonso  [address] [address]  [phone]    Discharge Instructions for Knee Arthroscopy      SPECIFIC POST-OP INSTRUCTIONS:  * FOLLOW UP APPOINTMENT: You will need to call our office [phone] and make a follow up appointment for    5-7 days after your date of surgery. We can see you back sooner if there are any problems or concerns.   * SUTURES: Sutures are taken out 5-7 days post-op. This will be done during your first post-op appointment in our office.   * ICE: Ice helps to decrease both pain and swelling. Ice should be applied to the front and back of the knee 20-25 minutes each hour while awake.   * DRESSING CHANGES: You can change dressing next day after surgery. You can remove tape, white gauze pads and small yellow gauze strips. We ask that you keep the incision clean and dry. Please use water proof Band-Aids to cover incision(s) while showering. These can be purchased at your local pharmacy.  These can be changed daily or as needed. Do not use any ointment on the incision(s).   * SHOWERING: The wound edges typically come together and seal by 3-5 days post-op if there is no drainage. Again, please use waterproof Band-Aids to cover incision(s) while showering. DO NOT SUBMERSE KNEE IN POOL, HOT TUB OR BATH UNTIL AT LEAST 3-4 WEEKS POST-OP (EVEN WITH WATERPROOF BANDAIDS).  * PAIN MEDICINE: You will be given a prescription for oral pain medication prior to discharge from the hospital. Please let us know if you have a sensitivity to certain pain medications prior to discharge. Additional pain medication prescriptions can be written, but must be picked up at either our [place] or [place] office locations. They can NOT be called into your pharmacy.   * ORAL ANTI-INFLAMMATORIES:   You will be asked to discontinue ALL oral anti-inflammatories 2 weeks prior to surgery. You can resume these day of surgery - AFTER YOUR PROCEDURE/ONCE YOU ARE HOME.  These can be combined with the oral pain  "medications safely. DO NOT TAKE ADDITIONAL TYLENOL WITH THE NARCOTIC PAIN MEDICATION (it already has Tylenol in it). If you were not taking an anti-inflammatory pre-op, you can start one of them the first day post-op. It will help decrease pain and swelling. Common medications and dosages are as follows:   Advil/Motrin/Ibuprofen 200mg, 4 pills every 8 hours   Aleve/Naproxen Sodium 220mg, 2 pills every 12 hours  * CRUTCHES/BRACE: You can be weight bearing as tolerated with crutches. Typically people use crutches for 3-7 days after a knee arthroscopy.  * PHYSICAL THERAPY: During your first post-op visit, we will give you a prescription to start physical therapy. This can be done downstairs at [facility] or at a location of your choice. Physical therapy is typically 2-3 times a week for 4 weeks, depending on the procedure, the individual, and his/her progress.   * DRIVING A CAR: Medically/legally we cannot recommend you drive a car while using crutches or while on pain medication.   * RETURNING TO DAILY AND RECREATIONAL ACTIVITIES: For the most part patients can progress to daily activities as tolerated (keeping in mind the restrictions listed above). Initially, we do not want you to \"overdo\" it in an attempt to minimize post-op pain and swelling. Once the swelling is controlled, you can progress with activities as tolerated. Pain and swelling should be your guide to increasing your activity level.   * RETURN TO WORK: The return to work date depends on many factors and is very dependent on the individual. You would most likely be able to return to a sedentary job after your first post-op visit (7-10 days after surgery). We can discuss specific work restrictions based on your job duties during this visit. A more physical job would obviously require a longer recovery time before return to work.   "

## 2024-09-12 NOTE — ANESTHESIA POSTPROCEDURE EVALUATION
Patient: Kirsten Lima    Procedure Summary       Date: 09/12/24 Room / Location:  [facility] [address] [place] [address] [facility]    Anesthesia Start: 1311 Anesthesia Stop: 1406    Procedure: LEFT KNEE ARTHROSCOPY PARTIAL MEDIAL AND LATERAL MENISECTOMY (Left: Knee) Diagnosis:     Surgeons: Lisandro Alonso MD Provider: Mila Deutsch MD    Anesthesia Type: general ASA Status: 3            Anesthesia Type: general    Vitals  Vitals Value Taken Time   /71 09/12/24 1545   Temp 36.4 °C (97.5 °F) 09/12/24 1405   Pulse 58 09/12/24 1550   Resp 16 09/12/24 1530   SpO2 95 % 09/12/24 1550   Vitals shown include unfiled device data.        Post Anesthesia Care and Evaluation    Patient location during evaluation: PHASE II  Patient participation: complete - patient participated  Level of consciousness: awake  Pain management: adequate    Airway patency: patent  Anesthetic complications: No anesthetic complications  PONV Status: controlled  Cardiovascular status: stable  Respiratory status: acceptable  Hydration status: acceptable     The patient is a 55y Female complaining of arm pain/injury.

## 2024-09-12 NOTE — OP NOTE
.KNEE ARTHROSCOPY  Procedure Note    Kirsten Lima  [birth year]  Pre-op Diagnosis:   Medial meniscus tear left knee  Lateral meniscus tear left knee    Post-op diagnosis:  Post-Op Diagnosis Codes:  Medial meniscus tear left knee  Lateral meniscus tear left knee    Procedure(s):  LEFT KNEE ARTHROSCOPY PARTIAL MEDIAL AND LATERAL MENISECTOMY    Surgeon(s):  Lisandro Alonso MD    Anesthesia: General  Anesthesiologist: Mila Deutsch MD  CRNA: Clair Mandujano CRNA    Staff:   Circulator: Tio Weiss RN  Scrub Person: Arabella Flannery  Assistant: Eliza Cortés APRN    Estimated Blood Loss: 10 mL    Specimens: * No orders in the log *      Findings: See Dictation    Complications: None    Procedure: The patient was taken to the operative suite.  After adequate anesthesia was established the lower extremity was prepped and draped in the usual fashion.  The leg was elevated, exsanguinated with an Esmarch, and tourniquet inflated to 250 mmHg.  An inferior lateral portal was made and the arthroscope was introduced into the knee.  Under direct visualization an inferior medial portal was made and diagnostic arthroscopy commenced with the following findings.  Findings included complex posterior horn medial meniscus tear with unstable meniscal flaps.  The medial femoral condyle and significant grade III-IV chondromalacia changes with some flaking articular cartilage.  The patella was relatively spared as well as the trochlea.  Lateral compartment articular cartilage was intact.  Lateral meniscus had tearing of the mid body centrally.  There was a loose chondral body floating in the knee that was evacuated.  The tear of the medial meniscus was probed to define the anatomy and extent of the unstable fragment.  A straight basket meniscal biter was used to trim portions of the tear back towards the rim.  A full radius resector were used to perform further contouring to complete the partial meniscectomy.  A smooth  contoured rim was achieved and photographed.  Evaluation of the chondral surfaces was carried out on the medial femoral condyle and unstable chondral surfaces that were identified were lightly shaved and smoothed with a full-radius resector.  This was performed along the circumference and base of the lesion until smooth.  The areas were photographed.  Attention was turned to the lateral meniscus where a straight basket was used to trim the mid body inner rim lateral meniscus until smooth.  Suction debridement was carried out to smooth the remaining rim.    Vigorous irrigation and suction was performed to remove any loose debris from the knee.  The instruments are then removed and the portal sites were closed with 4-0 nylon interrupted.  The portal sites were injected with half percent Marcaine with epinephrine.  A sterile compression dressing was applied and tourniquet was released.  Patient was awoken and taken to the postanesthetic recovery unit in good condition.      Lisandro Alonso MD     Date: 9/12/2024  Time: 14:12 EDT

## 2024-09-12 NOTE — ANESTHESIA PROCEDURE NOTES
Airway  Urgency: elective    Date/Time: 9/12/2024 1:22 PM  Airway not difficult    General Information and Staff    Patient location during procedure: OR  CRNA/CAA: Clair Mandujano CRNA    Indications and Patient Condition  Indications for airway management: airway protection    Preoxygenated: yes  MILS maintained throughout  Mask difficulty assessment: 0 - not attempted    Final Airway Details  Final airway type: supraglottic airway      Successful airway: LMA  Size 4     Number of attempts at approach: 1  Assessment: lips, teeth, and gum same as pre-op and atraumatic intubation

## 2024-09-12 NOTE — ANESTHESIA PREPROCEDURE EVALUATION
Anesthesia Evaluation     Patient summary reviewed and Nursing notes reviewed   no history of anesthetic complications:   NPO Solid Status: > 8 hours  NPO Liquid Status: > 2 hours           Airway   Mallampati: II  TM distance: >3 FB  No difficulty expected  Dental - normal exam     Pulmonary - normal exam   (+) COPD,sleep apnea (intolerant to CPAP, dental applicance)  (-) asthma  Cardiovascular   Exercise tolerance: good (4-7 METS)    ECG reviewed  Patient on routine beta blocker  Rhythm: regular    (+) hypertension 2 medications or greater, hyperlipidemia  (-) past MI, angina, cardiac stents    ROS comment: Stress test 04/2023 - normal perfusion  Echo 04/2023:   ·  Left ventricular systolic function is normal. Calculated left ventricular EF = 63.5% Normal global longitudinal LV strain (GLS) = -21.3%. Left ventricle strain data was reviewed by the physician and found to be accurate. Normal left ventricular cavity size noted. Left ventricular wall thickness is consistent with moderate concentric hypertrophy. All left ventricular wall segments contract normally. Left ventricular diastolic function was normal.  ·  Trace mitral valve regurgitation is present.  ·  Trace tricuspid valve regurgitation is present. Estimated right ventricular systolic pressure from tricuspid regurgitation is normal (<35 mmHg). Calculated right ventricular systolic pressure from tricuspid regurgitation is 10.0 mmHg.      Neuro/Psych  (+) psychiatric history Anxiety and Depression  (-) seizures, CVA  GI/Hepatic/Renal/Endo    (+) morbid obesity (BMI 43)  (-) GERD, liver disease, no renal disease    Musculoskeletal     (+) back pain, radiculopathy  Abdominal    Substance History - negative use     OB/GYN          Other   arthritis,                       Anesthesia Plan    ASA 3     general     (Positive VALERIE screen/DX:  2 or more mitigating factors include non-supine recovery position, none or reduced opiate use      I have reviewed the  "patient's history and chart with the patient, including all pertinent laboratory results and imaging. I have explained the risks of anesthesia including but not limited to dental or airway injury, nausea, cardio-pulmonary complications, such as aspiration, MI, stroke, or death.     VITALS:  /89 (BP Location: Left arm, Patient Position: Sitting)   Pulse 68   Temp 36.8 °C (98.3 °F) (Oral)   Resp 16   Ht 167.6 cm (66\")   Wt 121 kg (266 lb 12.1 oz)   SpO2 96%   BMI 43.06 kg/m² )  intravenous induction     Anesthetic plan, risks, benefits, and alternatives have been provided, discussed and informed consent has been obtained with: patient.  Pre-procedure education provided      CODE STATUS:         "

## 2024-09-13 ENCOUNTER — READMISSION MANAGEMENT (OUTPATIENT)
Dept: CALL CENTER | Facility: HOSPITAL | Age: 64
End: 2024-09-13
Payer: COMMERCIAL

## 2024-09-13 NOTE — OUTREACH NOTE
Prep Survey      Flowsheet Row Responses   [facility] patient discharged from? [place]   Is LACE score < 7 ? Yes   Eligibility Not Eligible   What are the reasons patient is not eligible? Other  [observation less than 8 hours]   Does the patient have one of the following disease processes/diagnoses(primary or secondary)? Other   Prep survey completed? Yes            Bethanie WOODARD - Registered Nurse

## 2024-09-22 DIAGNOSIS — I10 ESSENTIAL HYPERTENSION: ICD-10-CM

## 2024-09-23 RX ORDER — METOPROLOL SUCCINATE 200 MG/1
200 TABLET, EXTENDED RELEASE ORAL DAILY
Qty: 90 TABLET | Refills: 1 | Status: SHIPPED | OUTPATIENT
Start: 2024-09-23

## 2024-09-27 ENCOUNTER — OFFICE VISIT (OUTPATIENT)
Dept: SURGERY | Facility: CLINIC | Age: 64
End: 2024-09-27
Payer: COMMERCIAL

## 2024-09-27 VITALS
OXYGEN SATURATION: 96 % | DIASTOLIC BLOOD PRESSURE: 82 MMHG | WEIGHT: 266 LBS | SYSTOLIC BLOOD PRESSURE: 138 MMHG | HEIGHT: 66 IN | BODY MASS INDEX: 42.75 KG/M2 | HEART RATE: 68 BPM

## 2024-09-27 DIAGNOSIS — Z17.0 MALIGNANT NEOPLASM OF OVERLAPPING SITES OF LEFT BREAST IN FEMALE, ESTROGEN RECEPTOR POSITIVE: ICD-10-CM

## 2024-09-27 DIAGNOSIS — C50.812 MALIGNANT NEOPLASM OF OVERLAPPING SITES OF LEFT BREAST IN FEMALE, ESTROGEN RECEPTOR POSITIVE: ICD-10-CM

## 2024-09-27 DIAGNOSIS — Z91.89 INCREASED RISK OF BREAST CANCER: ICD-10-CM

## 2024-09-27 DIAGNOSIS — D05.02 LOBULAR CARCINOMA IN SITU (LCIS) OF LEFT BREAST: Primary | ICD-10-CM

## 2024-11-01 ENCOUNTER — PATIENT ROUNDING (BHMG ONLY) (OUTPATIENT)
Dept: URGENT CARE | Facility: CLINIC | Age: 64
End: 2024-11-01
Payer: COMMERCIAL

## 2024-11-01 NOTE — ED NOTES
Thank you for letting us care for you in your recent visit to our urgent care center. We would love to hear about your experience with us. Was this the first time you have visited our location?    We’re always looking for ways to make our patients’ experiences even better. Do you have any recommendations on ways we may improve?     I appreciate you taking the time to respond. Please be on the lookout for a survey about your recent visit from [employer] via text or email. We would greatly appreciate if you could fill that out and turn it back in. We want your voice to be heard and we value your feedback.   Thank you for choosing [facility] for your healthcare needs.

## 2024-11-07 ENCOUNTER — TELEPHONE (OUTPATIENT)
Dept: INTERNAL MEDICINE | Facility: CLINIC | Age: 64
End: 2024-11-07

## 2024-11-07 DIAGNOSIS — E78.00 PURE HYPERCHOLESTEROLEMIA: Primary | ICD-10-CM

## 2024-11-07 DIAGNOSIS — R73.09 ELEVATED GLUCOSE: ICD-10-CM

## 2024-11-07 DIAGNOSIS — I10 PRIMARY HYPERTENSION: ICD-10-CM

## 2024-11-07 NOTE — TELEPHONE ENCOUNTER
Caller: Kirsten Lima    Relationship: Self    Best call back number: [phone]     What orders are you requesting (i.e. lab or imaging): FASTING LABS    In what timeframe would the patient need to come in: ASAP    [facility] you [facility] your [facility]imaging [facility]    Additional notes: WANTS TO MAKE SURE ORDERS ARE PLACED AND RELEASED FOR [facility] PLEASE CALL AND ADVISE WHEN DONE

## 2024-11-10 LAB
ALBUMIN SERPL-MCNC: 4.1 G/DL (ref 3.9–4.9)
ALP SERPL-CCNC: 97 IU/L (ref 44–121)
ALT SERPL-CCNC: 13 IU/L (ref 0–32)
AST SERPL-CCNC: 15 IU/L (ref 0–40)
BASOPHILS # BLD AUTO: 0 X10E3/UL (ref 0–0.2)
BASOPHILS NFR BLD AUTO: 1 %
BILIRUB SERPL-MCNC: 0.5 MG/DL (ref 0–1.2)
BUN SERPL-MCNC: 13 MG/DL (ref 8–27)
BUN/CREAT SERPL: 11 (ref 12–28)
CALCIUM SERPL-MCNC: 10.1 MG/DL (ref 8.7–10.3)
CHLORIDE SERPL-SCNC: 102 MMOL/L (ref 96–106)
CHOLEST SERPL-MCNC: 136 MG/DL (ref 100–199)
CO2 SERPL-SCNC: 24 MMOL/L (ref 20–29)
CREAT SERPL-MCNC: 1.21 MG/DL (ref 0.57–1)
EGFRCR SERPLBLD CKD-EPI 2021: 50 ML/MIN/1.73
EOSINOPHIL # BLD AUTO: 0.1 X10E3/UL (ref 0–0.4)
EOSINOPHIL NFR BLD AUTO: 2 %
ERYTHROCYTE [DISTWIDTH] IN BLOOD BY AUTOMATED COUNT: 13.5 % (ref 11.7–15.4)
GLOBULIN SER CALC-MCNC: 3.3 G/DL (ref 1.5–4.5)
GLUCOSE SERPL-MCNC: 99 MG/DL (ref 70–99)
HBA1C MFR BLD: 6.3 % (ref 4.8–5.6)
HCT VFR BLD AUTO: 50.8 % (ref 34–46.6)
HDLC SERPL-MCNC: 56 MG/DL
HGB BLD-MCNC: 16.3 G/DL (ref 11.1–15.9)
IMM GRANULOCYTES # BLD AUTO: 0.1 X10E3/UL (ref 0–0.1)
IMM GRANULOCYTES NFR BLD AUTO: 1 %
LDLC SERPL CALC-MCNC: 57 MG/DL (ref 0–99)
LDLC/HDLC SERPL: 1 RATIO (ref 0–3.2)
LYMPHOCYTES # BLD AUTO: 2 X10E3/UL (ref 0.7–3.1)
LYMPHOCYTES NFR BLD AUTO: 27 %
MCH RBC QN AUTO: 28.4 PG (ref 26.6–33)
MCHC RBC AUTO-ENTMCNC: 32.1 G/DL (ref 31.5–35.7)
MCV RBC AUTO: 89 FL (ref 79–97)
MONOCYTES # BLD AUTO: 0.6 X10E3/UL (ref 0.1–0.9)
MONOCYTES NFR BLD AUTO: 8 %
NEUTROPHILS # BLD AUTO: 4.8 X10E3/UL (ref 1.4–7)
NEUTROPHILS NFR BLD AUTO: 61 %
PLATELET # BLD AUTO: 279 X10E3/UL (ref 150–450)
POTASSIUM SERPL-SCNC: 4.7 MMOL/L (ref 3.5–5.2)
PROT SERPL-MCNC: 7.4 G/DL (ref 6–8.5)
RBC # BLD AUTO: 5.74 X10E6/UL (ref 3.77–5.28)
SODIUM SERPL-SCNC: 142 MMOL/L (ref 134–144)
TRIGL SERPL-MCNC: 130 MG/DL (ref 0–149)
TSH SERPL DL<=0.005 MIU/L-ACNC: 0.87 UIU/ML (ref 0.45–4.5)
VLDLC SERPL CALC-MCNC: 23 MG/DL (ref 5–40)
WBC # BLD AUTO: 7.6 X10E3/UL (ref 3.4–10.8)

## 2024-11-20 ENCOUNTER — OFFICE VISIT (OUTPATIENT)
Dept: INTERNAL MEDICINE | Facility: CLINIC | Age: 64
End: 2024-11-20
Payer: COMMERCIAL

## 2024-11-20 VITALS
HEART RATE: 55 BPM | BODY MASS INDEX: 42.91 KG/M2 | DIASTOLIC BLOOD PRESSURE: 82 MMHG | OXYGEN SATURATION: 98 % | SYSTOLIC BLOOD PRESSURE: 132 MMHG | TEMPERATURE: 98.2 F | WEIGHT: 267 LBS | HEIGHT: 66 IN

## 2024-11-20 DIAGNOSIS — I10 PRIMARY HYPERTENSION: Primary | ICD-10-CM

## 2024-11-20 DIAGNOSIS — R73.09 ELEVATED GLUCOSE: ICD-10-CM

## 2024-11-20 PROCEDURE — 99213 OFFICE O/P EST LOW 20 MIN: CPT | Performed by: INTERNAL MEDICINE

## 2024-11-20 PROCEDURE — 99396 PREV VISIT EST AGE 40-64: CPT | Performed by: INTERNAL MEDICINE

## 2024-11-20 RX ORDER — BUPROPION HYDROCHLORIDE 150 MG/1
150 TABLET ORAL DAILY
Qty: 30 TABLET | Refills: 2 | Status: SHIPPED | OUTPATIENT
Start: 2024-11-20

## 2024-11-20 RX ORDER — VIBEGRON 75 MG/1
75 TABLET, FILM COATED ORAL DAILY
Qty: 42 TABLET | Refills: 0 | COMMUNITY
Start: 2024-11-20

## 2024-11-20 NOTE — PROGRESS NOTES
Subjective   Kirsten Lima is a 64 y.o. female and is here for a comprehensive physical exam. The patient reports problems - hallucinations at night .  For the last couple of weeks patient has had some hallucinations at night when she wakes up.  She finds herself grabbing for things that are not there.  Patient believes it is from the increased dose of Wellbutrin however she has also been taking hydrocodone for a cough.  Last dose was Tuesday night  Patient also reports that her overactive bladder symptoms have been some better with the Gemtesa.  She has tried several other agents and this is the only thing that worked  URI symptoms are better she has not taken cough syrup in a couple of nights.  Blood pressure and cholesterol are well-controlled with current medications no issues with side effects  She was given a prescription for Zepbound but did not start it because she was concerned about side effects  Pt is UTD with annual gyn exam and mammo     Do you take any herbs or supplements that were not prescribed by a doctor?  See list      Social History: No tobacco or alcohol  Social History     Socioeconomic History    Marital status: Single    Number of children: 0   Tobacco Use    Smoking status: Every Day     Current packs/day: 0.25     Average packs/day: 0.3 packs/day for 65.3 years (21.2 ttl pk-yrs)     Types: Cigarettes     Start date: 1979     Passive exposure: Current    Smokeless tobacco: Never    Tobacco comments:     Caffeine: 2 drinks daily and occ rich   Vaping Use    Vaping status: Never Used   Substance and Sexual Activity    Alcohol use: Yes     Alcohol/week: 0.0 - 1.0 standard drinks of alcohol     Comment: 1-2 drinks a month    Drug use: Never     Comment: marijuana in her twenties    Sexual activity: Not Currently     Partners: Male     Birth control/protection: Abstinence       Family History:   Family History   Problem Relation Age of Onset    Hypertension Mother          2018    COPD  "Father              Heart failure Father     Hypertension Father     Hyperlipidemia Father              Vision loss Father     Hypertension Sister     Alcohol abuse Sister              Liver disease Sister     Stroke Sister     Hypertension Brother     Pancreatic cancer Brother         1 brother  from pancreatic cancer    Alcohol abuse Brother              Cancer Brother              Hypertension Brother     Alcohol abuse Brother              Cancer Brother         Liver    Alcohol abuse Brother     Sudden death Brother     Alcohol abuse Brother          2018    Stroke Sister              Malig Hyperthermia Neg Hx        Past Medical History:   Past Medical History:   Diagnosis Date    Acute cystitis     HX    Allergic     Allergic rhinitis     Anxiety     Arthritis     Breast cancer     HX    Breast cyst     Colon polyp May 6, 2022    7    COPD (chronic obstructive pulmonary disease)     \"BEGINNING STAGES\"    Cyst of left nipple     Depression     Diverticulosis May 6, 2022    Dry skin     Hemorrhoid     Hidradenitis     History of bronchitis     History of COVID-19     Hyperlipidemia     Hypertension     Incontinence in female     wears pads    Insomnia     Low back pain     Nonspecific abnormal electrocardiogram (ECG) (EKG)     Obesity     Overactive bladder     Pregnancy     G-2, P-0    Pulmonary nodules     Sleep apnea     NO CPAP    Torn meniscus     LEFT KNEE    UTI (urinary tract infection) 2021    Vitamin B12 deficiency     Vitamin D insufficiency            Review of Systems    Pertinent items are noted in HPI.    Objective   /88 (BP Location: Right arm, Patient Position: Sitting)   Pulse 55   Temp 98.2 °F (36.8 °C) (Oral)   Ht 167.6 cm (66\")   Wt 121 kg (267 lb)   SpO2 98%   BMI 43.09 kg/m²     General Appearance:    Alert, cooperative, no distress, appears stated age   Head:    Normocephalic, without obvious " abnormality, atraumatic   Eyes:    PERRL, conjunctiva/corneas clear, EOM's intact, fundi     benign, both eyes   Ears:    Normal TM's and external ear canals, both ears   Nose:   Nares normal, septum midline, mucosa normal, no drainage    or sinus tenderness   Throat:   Lips, mucosa, and tongue normal; teeth and gums normal   Neck:   Supple, symmetrical, trachea midline, no adenopathy;     thyroid:  no enlargement/tenderness/nodules; no carotid    bruit or JVD   Back:     Symmetric, no curvature, ROM normal, no CVA tenderness   Lungs:     Clear to auscultation bilaterally, respirations unlabored   Chest Wall:    No tenderness or deformity    Heart:    Regular rate and rhythm, S1 and S2 normal, no murmur, rub   or gallop       Abdomen:     Soft, non-tender, bowel sounds active all four quadrants,     no masses, no organomegaly           Extremities:   Extremities normal, atraumatic, no cyanosis or edema   Pulses:   2+ and symmetric all extremities   Skin:   Skin color, texture, turgor normal, no rashes or lesions   Lymph nodes:   Cervical, supraclavicular, and axillary nodes normal   Neurologic:   CNII-XII intact, normal strength, sensation and reflexes     throughout       Vitals:    11/20/24 1102   BP: 146/88   Pulse: 55   Temp: 98.2 °F (36.8 °C)   SpO2: 98%     Body mass index is 43.09 kg/m².      Medications:   Current Outpatient Medications:     amLODIPine (NORVASC) 5 MG tablet, Take 1 tablet by mouth Every Morning., Disp: 90 tablet, Rfl: 3    anastrozole (ARIMIDEX) 1 MG tablet, TAKE 1 TABLET BY MOUTH EVERY DAY (Patient taking differently: Take 1 tablet by mouth Daily.), Disp: 90 tablet, Rfl: 3    cetirizine (zyrTEC) 10 MG tablet, Take 1 tablet by mouth Daily., Disp: , Rfl:     Cholecalciferol (VITAMIN D) 2000 units capsule, Take 1 capsule by mouth Daily., Disp: , Rfl:     coenzyme Q10 100 MG capsule, Take 1 capsule by mouth Daily. PT HOLDING FOR SURGERY, Disp: , Rfl:     Hydrocod Pietro-Chlorphe Pietro ER  (TUSSIONEX PENNKINETIC) 10-8 MG/5ML ER suspension, Take 5 mL by mouth Every 12 (Twelve) Hours As Needed for Cough., Disp: 150 mL, Rfl: 0    HYDROcodone-acetaminophen (NORCO) 5-325 MG per tablet, Take 1 tablet by mouth Every 4 (Four) Hours As Needed for Moderate Pain (Pain)., Disp: 28 tablet, Rfl: 0    lisinopril-hydrochlorothiazide (PRINZIDE,ZESTORETIC) 20-12.5 MG per tablet, Take 2 tablets by mouth Every Morning., Disp: 180 tablet, Rfl: 1    metoprolol succinate XL (TOPROL-XL) 200 MG 24 hr tablet, TAKE 1 TABLET DAILY, Disp: 90 tablet, Rfl: 1    Probiotic Product (Risaquad-2) capsule capsule, Take 1 capsule by mouth Daily. PT HOLDING FOR SURGERY, Disp: , Rfl:     rosuvastatin (CRESTOR) 10 MG tablet, Take 1 tablet by mouth Every Night., Disp: 90 tablet, Rfl: 3    spironolactone (ALDACTONE) 25 MG tablet, Take 1 tablet by mouth Every Morning., Disp: 90 tablet, Rfl: 3    Tirzepatide-Weight Management (ZEPBOUND) 2.5 MG/0.5ML solution auto-injector, Inject 0.5 mL under the skin into the appropriate area as directed 1 (One) Time Per Week., Disp: 2 mL, Rfl: 0    Vibegron (Gemtesa) 75 MG tablet, TAKE 1 TABLET BY MOUTH EVERY DAY (Patient taking differently: Take 1 tablet by mouth Every Night.), Disp: 90 tablet, Rfl: 1    buPROPion XL (Wellbutrin XL) 150 MG 24 hr tablet, Take 1 tablet by mouth Daily., Disp: 30 tablet, Rfl: 2             Assessment & Plan   Healthy female exam.      1. Healthcare Maintenance:  2. Patient Counseling:  --Nutrition: Stressed importance of moderation in sodium/caffeine intake, saturated fat and cholesterol, caloric balance, sufficient intake of fresh fruits, vegetables, fiber, calcium and vit D  --Exercise: she does exercise   --Substance Abuse:   --Dental health:   --Immunizations reviewed.  --Discussed benefits of screening colonoscopy.  3.  OAB-  gemtesa-is helping  she has been on several other meds -vesicar detrol, ditropan and myrbetriq- and this is the only on that work  4.  Elevated gluc-   she did not want to start the zepboumd as she is worried about SE  She will consider this now and I discussed limitimg sugars.  I am also going to for her for diabetes education  5. HTN- ok with current meds  6.  Hallucinations-  at night whern awakening  I suspect it is from the hydrocodone cough syrup   but I will send a lower dose rx for welbutrin in case it cont and may try that  7.  Recent URI-seems to be resolving

## 2024-12-04 ENCOUNTER — OFFICE VISIT (OUTPATIENT)
Dept: CARDIOLOGY | Facility: CLINIC | Age: 64
End: 2024-12-04
Payer: COMMERCIAL

## 2024-12-04 VITALS
DIASTOLIC BLOOD PRESSURE: 80 MMHG | WEIGHT: 267 LBS | SYSTOLIC BLOOD PRESSURE: 134 MMHG | HEIGHT: 66 IN | HEART RATE: 61 BPM | BODY MASS INDEX: 42.91 KG/M2

## 2024-12-04 DIAGNOSIS — I10 PRIMARY HYPERTENSION: Primary | ICD-10-CM

## 2024-12-04 DIAGNOSIS — E78.00 PURE HYPERCHOLESTEROLEMIA: ICD-10-CM

## 2024-12-04 PROCEDURE — 93000 ELECTROCARDIOGRAM COMPLETE: CPT | Performed by: INTERNAL MEDICINE

## 2024-12-04 PROCEDURE — 99214 OFFICE O/P EST MOD 30 MIN: CPT | Performed by: INTERNAL MEDICINE

## 2024-12-04 NOTE — PROGRESS NOTES
Date of Office Visit: 2024  Encounter Provider: Eliza Timmons MD  Place of Service: [facility] CARDIOLOGY  Patient Name: Kirsten Lima  :1960      Patient ID:  Kirsten Lima is a 64 y.o. female is here for cardiac risks.        History of Present Illness    She has a history of hypertension, left breast cancer, coronary artery calcification, VALERIE untreated, hyperlipidemia, obesity.      Her dad had COPD and hypertension.  Her mom had hypertension.  She has a sister and a stroke.  She has 2 brothers who had cancer.  She is single, retired from [employer], smokes half a pack of cigarettes a day, drinks 2 caffeinated beverages per day and has occasional alcohol.    Ankle-brachial indices done 2022 were normal.  CT chest low-dose cancer screening done 2024 showed benign findings on the lung exam with airway disease and emphysema, mild bronchiectasis noted.  I did review the images showing calcification of the proximal to mid LAD, calcification of the proximal circumflex, spotty calcification of the proximal RCA, calcification of the aortic valve annulus.    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  She was seen in 2021 and treatment was changed to anastrozole because she was concerned about risks of blood clots with tamoxifen.  She started anastrozole 2021.  She had an abnormal MRI 2021 which required left breast biopsy and left breast tumor excision-she was diagnosed with invasive lobular carcinoma.  She completed 20 cycles of adjuvant radiation 2022 and remains on anastrozole.  She follows with Dr. Dong.    Labs 2024 showed creatinine 1.21 with otherwise normal CMP, hemoglobin A1c 6.3%, total cholesterol 136, HDL 56, LDL  "57, triglycerides 130, VL DL 23, hemoglobin 16.3 with otherwise normal CBC.  She had a nonischemic stress nuclear perfusion study in 4/30/2024.  Echocardiogram done 4/30/2024 showed ejection fraction of 64% with global longitudinal strain of -21.3%, moderate left ventricular hypertrophy, no significant valvular abnormalities.    She has no exertional chest sinus or pressure.  She has no orthopnea or PND.  She takes her medications as directed without difficulty.  She has no tachycardia, dizziness or syncope.  She has minimal lower extremity edema at this time.  She has some edema in the left lower extremity because she had arthroscopic surgery of the left leg.  Overall, she is doing well.    Past Medical History:   Diagnosis Date    Acute cystitis     HX    Allergic     Allergic rhinitis     Anxiety     Arthritis     Breast cancer     HX    Breast cyst     Colon polyp May 6, 2022    7    COPD (chronic obstructive pulmonary disease)     \"BEGINNING STAGES\"    Cyst of left nipple     Depression     Diverticulosis May 6, 2022    Dry skin     Hemorrhoid     Hidradenitis     History of bronchitis     History of COVID-19     Hyperlipidemia     Hypertension     Incontinence in female     wears pads    Insomnia     Low back pain 2020    Nonspecific abnormal electrocardiogram (ECG) (EKG)     Obesity     Overactive bladder     Pregnancy     G-2, P-0    Pulmonary nodules     Sleep apnea     NO CPAP    Torn meniscus     LEFT KNEE    UTI (urinary tract infection) 02/2021    Vitamin B12 deficiency     Vitamin D insufficiency          Past Surgical History:   Procedure Laterality Date    AXILLARY HIDRADENITIS EXCISION Bilateral     BREAST BIOPSY Left 02/22/2021    Procedure: Left breast needle-localized excisional biopsy (tophat clip) and left breast ultrasound-guided excisional biopsy (hydromark clip);  Surgeon: Debora Zambrano MD;  Location: [facility];  Service: General;  Laterality: Left;    BREAST BIOPSY Left " 02/01/2022    Procedure: Left breast needle-localized excisional biopsy, Left nipple mass excision;  Surgeon: Debora Zambrano MD;  Location: [facility] MAIN OR;  Service: General;  Laterality: Left;    BRONCHOSCOPY N/A 12/15/2022    Procedure: BRONCHOSCOPY WITH ENDOBRONCHIAL ULTRASOUND WITH FNA;  Surgeon: Norberto Link MD;  Location: [facility] ENDOSCOPY;  Service: Pulmonary;  Laterality: N/A;  LYMPHADENOPATHY    CARDIAC CATHETERIZATION      COLONOSCOPY      COLONOSCOPY N/A 05/06/2022    Procedure: COLONOSCOPY;  Surgeon: Mirtha Davis MD;  Location: [facility] MAIN OR;  Service: Gastroenterology;  Laterality: N/A;  diverticulosis, polyps    HYSTERECTOMY  2006    LAVH    KNEE ARTHROSCOPY Left 09/12/2024    Procedure: LEFT KNEE ARTHROSCOPY PARTIAL MEDIAL AND LATERAL MENISECTOMY;  Surgeon: Lisandro Alonso MD;  Location: [facility] OR [facility];  Service: Orthopedics;  Laterality: Left;    SUBTOTAL HYSTERECTOMY  2006       Current Outpatient Medications on File Prior to Visit   Medication Sig Dispense Refill    amLODIPine (NORVASC) 5 MG tablet Take 1 tablet by mouth Every Morning. 90 tablet 3    anastrozole (ARIMIDEX) 1 MG tablet TAKE 1 TABLET BY MOUTH EVERY DAY (Patient taking differently: Take 1 tablet by mouth Daily.) 90 tablet 3    buPROPion XL (Wellbutrin XL) 150 MG 24 hr tablet Take 1 tablet by mouth Daily. (Patient taking differently: Take 2 tablets by mouth Daily.) 30 tablet 2    cetirizine (zyrTEC) 10 MG tablet Take 1 tablet by mouth Daily.      Cholecalciferol (VITAMIN D) 2000 units capsule Take 1 capsule by mouth Daily.      lisinopril-hydrochlorothiazide (PRINZIDE,ZESTORETIC) 20-12.5 MG per tablet Take 2 tablets by mouth Every Morning. 180 tablet 1    metoprolol succinate XL (TOPROL-XL) 200 MG 24 hr tablet TAKE 1 TABLET DAILY 90 tablet 1    Probiotic Product (Risaquad-2) capsule capsule Take 1 capsule by mouth Daily. PT HOLDING FOR SURGERY      rosuvastatin (CRESTOR) 10 MG tablet Take 1 tablet by mouth Every  Night. 90 tablet 3    spironolactone (ALDACTONE) 25 MG tablet Take 1 tablet by mouth Every Morning. 90 tablet 3    Vibegron (Gemtesa) 75 MG tablet TAKE 1 TABLET BY MOUTH EVERY DAY (Patient taking differently: Take 1 tablet by mouth Every Night.) 90 tablet 1    [DISCONTINUED] coenzyme Q10 100 MG capsule Take 1 capsule by mouth Daily. PT HOLDING FOR SURGERY (Patient not taking: Reported on 12/4/2024)      [DISCONTINUED] Hydrocod Pietro-Chlorphe Pietro ER (TUSSIONEX PENNKINETIC) 10-8 MG/5ML ER suspension Take 5 mL by mouth Every 12 (Twelve) Hours As Needed for Cough. (Patient not taking: Reported on 12/4/2024) 150 mL 0    [DISCONTINUED] HYDROcodone-acetaminophen (NORCO) 5-325 MG per tablet Take 1 tablet by mouth Every 4 (Four) Hours As Needed for Moderate Pain (Pain). (Patient not taking: Reported on 12/4/2024) 28 tablet 0    [DISCONTINUED] Tirzepatide-Weight Management (ZEPBOUND) 2.5 MG/0.5ML solution auto-injector Inject 0.5 mL under the skin into the appropriate area as directed 1 (One) Time Per Week. (Patient not taking: Reported on 12/4/2024) 2 mL 0    [DISCONTINUED] Vibegron (Gemtesa) 75 MG tablet Take 1 tablet by mouth Daily. (Patient not taking: Reported on 12/4/2024) 42 tablet 0     No current facility-administered medications on file prior to visit.       Social History     Socioeconomic History    Marital status: Single    Number of children: 0   Tobacco Use    Smoking status: Every Day     Current packs/day: 0.25     Average packs/day: 0.3 packs/day for 65.3 years (21.2 ttl pk-yrs)     Types: Cigarettes     Start date: 1/1/1979     Passive exposure: Current    Smokeless tobacco: Never    Tobacco comments:     Caffeine: 2 drinks daily and occ rich   Vaping Use    Vaping status: Never Used   Substance and Sexual Activity    Alcohol use: Yes     Alcohol/week: 0.0 - 1.0 standard drinks of alcohol     Comment: 1-2 drinks a month    Drug use: Never     Comment: marijuana in her twenties    Sexual activity: Not  "Currently     Partners: Male     Birth control/protection: Abstinence             Procedures    ECG 12 Lead    Date/Time: 12/4/2024 10:22 AM  Performed by: Eliza Timmons MD    Authorized by: Eliza Timmons MD  Comparison: compared with previous ECG   Similar to previous ECG  Rhythm: sinus rhythm  T inversion: I, aVL, V3, V4, V6, V5 and V2    Clinical impression: abnormal EKG              Objective:      Vitals:    12/04/24 1009   BP: 134/80   Pulse: 61   Weight: 121 kg (267 lb)   Height: 167.6 cm (66\")     Body mass index is 43.09 kg/m².    Vitals reviewed.   Constitutional:       General: Not in acute distress.     Appearance: Obese. Not diaphoretic.   Neck:      Vascular: No carotid bruit or JVD.   Pulmonary:      Effort: Pulmonary effort is normal.      Breath sounds: Normal breath sounds.   Cardiovascular:      Normal rate. Regular rhythm.      Murmurs: There is no murmur.      No gallop.  No rub.   Pulses:     Intact distal pulses.      Carotid: 2+ bilaterally.     Radial: 2+ bilaterally.     Dorsalis pedis: 2+ bilaterally.     Posterior tibial: 2+ bilaterally.  Edema:     Peripheral edema present.     Pretibial: bilateral 1+ edema of the pretibial area.     Ankle: bilateral 1+ edema of the ankle.  Neurological:      Cranial Nerves: No cranial nerve deficit.         Lab Review:       Assessment:      Diagnosis Plan   1. Primary hypertension        2. Pure hypercholesterolemia            Abnormal ECG, chronic  Hypertension with LVH, goal <120/80.   Hyperlipidemia, on rosuvastatin  Invasive lobular carcinoma of the left breast, treated with lumpectomy, adjuvant radiation and anastrozole.  Follows with Dr. Dong  Obesity and sedentary lifestyle  Coronary artery calcification noted on low-dose CT chest 2/2024  Tobacco use, needs to stop smoking.  Left lower extremity edema due to left knee arthroscopic surgery.  VALERIE, needs to see Dr. Edouard     Plan:       See Mila in 1 year, maintain amlodipine 5 " "mg in the morning, spironolactone 25 mg in the morning, lisinopril HCTZ 2 tablets in the morning, metoprolol at night.  Remain on rosuvastatin 10 mg nightly and start aspirin 81 mg daily.  No other testing at this time.      STOP-Bang Score  Have you been diagnosed with Sleep Apnea?: yes  Snoring?: no  Tired?: yes  Observed?: yes  Pressure?: yes  Stop Score: 3  Body Mass Index more than 35 kg/m2?: yes  Age older than 50 year old?: yes  Neck large? \">17\"/43cm-M, >16\"/41cm-F: yes  Gender=Male?: no  Total Stop-Bang Score: 6    "

## 2024-12-10 ENCOUNTER — OFFICE VISIT (OUTPATIENT)
Dept: ONCOLOGY | Facility: CLINIC | Age: 64
End: 2024-12-10
Payer: COMMERCIAL

## 2024-12-10 ENCOUNTER — LAB (OUTPATIENT)
Dept: LAB | Facility: HOSPITAL | Age: 64
End: 2024-12-10
Payer: COMMERCIAL

## 2024-12-10 VITALS
BODY MASS INDEX: 42.77 KG/M2 | WEIGHT: 266.1 LBS | TEMPERATURE: 98.1 F | HEIGHT: 66 IN | OXYGEN SATURATION: 97 % | DIASTOLIC BLOOD PRESSURE: 72 MMHG | HEART RATE: 60 BPM | SYSTOLIC BLOOD PRESSURE: 130 MMHG

## 2024-12-10 DIAGNOSIS — Z17.0 MALIGNANT NEOPLASM OF OVERLAPPING SITES OF LEFT BREAST IN FEMALE, ESTROGEN RECEPTOR POSITIVE: Primary | ICD-10-CM

## 2024-12-10 DIAGNOSIS — C50.812 MALIGNANT NEOPLASM OF OVERLAPPING SITES OF LEFT BREAST IN FEMALE, ESTROGEN RECEPTOR POSITIVE: Primary | ICD-10-CM

## 2024-12-10 DIAGNOSIS — Z79.811 AROMATASE INHIBITOR USE: ICD-10-CM

## 2024-12-10 DIAGNOSIS — F32.89 OTHER DEPRESSION: ICD-10-CM

## 2024-12-10 RX ORDER — ASPIRIN 81 MG/1
81 TABLET ORAL DAILY
COMMUNITY

## 2024-12-10 NOTE — PROGRESS NOTES
Subjective   Kirsten Lima is a 64 y.o. female. Referred by  for LCIS     History of Present Illness   Ms. Lima is a 61-year-old -American lady who presents with a screen detected abnormality of the left breast.    6/2/2020-screening mammogram  Scattered areas of fibroglandular density.  There are small asymmetry seen in both breasts.  No significant change from prior exams.  Stable punctate and spherical lucent centered calcifications with diffuse distribution in both breasts.  There is a new focal asymmetry measuring 12 mm in the central region of the left breast.    6/24/2020-left diagnostic mammogram  Focal asymmetry in the left breast does not persist.  Ultrasound-no sonographic correlate.  3 oval intraductal masses with partially defined margins measuring 5 mm to 9 mm in the anterior one third subareolar region of the left breast.  Patient reported history of intermittent clear nipple discharge for about 10 years.  These were considered suspicious and ultrasound-guided biopsy was recommended.    6/29/2020-left breast ultrasound-guided biopsy x2  1.left subareolar mass measuring 5 x 5 x 6 mm-benign sclerosing intraductal papilloma with calcifications.  2.left subareolar mass-benign sclerosing intraductal papilloma with usual ductal hyperplasia and microcalcifications.    She was evaluated by Dr. Zambrano on 8/7/2020 and recommended to undergo excisional biopsy of both intraductal papillomas.    2/22/2021-left breast needle localized excisional biopsy-pathology was consistent with lobular carcinoma in situ.  Multiple sclerosed intraductal papillomas with focal calcifications which were completely excised.  Background breast parenchyma showed usual ductal hyperplasia and calcifications with sclerosing adenosis.    She has been referred to medical oncology to discuss risk reduction given findings of lobular carcinoma in situ.    Patient reports chronic nipple discharge from the left.  She also  had significant issues with the breast skin due to hidradenitis and recurrent skin abscesses.  She had a past history of benign breast biopsy , unable to recall the date.  Denies any family history of breast or ovarian cancer.    6/11/2021-bilateral screening mammogram-benign.    12/10/2021-bilateral breast MRI  Right breast-no suspicious findings.    Left breast-  Postsurgical changes in the anterior left breast.  At 5:00, 3.4 cm posterior to the nipple there is a 1.8 cm linear branching non-mass enhancement which is suspicious.  Abnormal enhancement in the left nipple with approximately 1.2 x 1.7 x 1 cm mass involving the nipple itself and extending slightly deeper into the subareolar breast.  This is suspicious.    No extramammary findings    Recommendations  1.suspicious 1.7 cm enhancing left nipple mass, surgical excision recommended as this is not amenable to imaging guided biopsy.  2.suspicious 1.8 cm linear branching non-mass enhancement at 5:00 in the left breast, MR guided biopsy recommended.    12/22/2021-left breast MRI guided biopsy of the 5:00.  A.extensive lobular carcinoma in situ  B.no invasive carcinoma identified by routine or immunostaining  C.associated intraductal papilloma with microcalcifications, ductal cyst wall and fibrocystic change.    Patient was prescribed tamoxifen for risk reduction which she initially did not take and subsequently this was changed to anastrozole at her visit in September which she only started taking in December 2021.    1/12/2022-she was evaluated by Dr. Zambrano and scheduled for left breast needle localized excisional biopsy and left nipple mass excision.    2/1/2022-left breast needle localized excisional biopsy left nipple mass excision  1.left breast lumpectomy-invasive lobular carcinoma, lobular carcinoma in situ and deep typical lobular hyperplasia.  A.invasive lobular carcinoma  Measures 4 mm  Grade 1  The focus is located amongst the area of lobular  carcinoma in situ  Extensive lobular carcinoma in situ and atypical lobular hyperplasia  Fibroadenoma with calcification, sclerosing adenosis, apocrine metaplasia, clustered cysts in area consistent with radial scar and fat necrosis    2.left nipple excision  Simple cyst  Apocrine metaplasia  Intraductal papilloma  Atypical lobular hyperplasia    The lobular carcinoma is ER +80% strong, KS 3% moderate, HER-2 negative, Ki-67 2%    Axillary lymph nodes not evaluated    Case was discussed in multidisciplinary conference and decided to omit axillary staging and proceed with radiation and continue Arimidex.    11/8/2022-low-dose CT of the chest with multiple pulmonary nodules bilateral measuring up to 7 mm.  There is also mediastinal lymph node which measures 1.7 cm and right paratracheal lymph node which measures 1 cm.    11/18/2022-PET/CT  1.multiple bilateral pulmonary nodules which are below PET resolution.  Hypermetabolic mediastinal hilar and right axillary lymphadenopathy.  Right axilla lymph node is 1.2 cm with an SUV of 5.1.  Precavernous node 1.5 cm with an SUV of 5.7.  Asymmetric hypermetabolic thickening of the right palatine tonsil.  Direct visualization recommended.  Focal intense uptake in the rectum and anus is nonspecific.  Hypodense right thyroid lesion demonstrates mild uptake.  Asymmetric soft tissue thickening involving the left breast.    12/15/2022-bronchoscopy and biopsy of the nodes was performed.  Pathology was noted to be benign.  She had a follow-up with pulmonary and she was recommended to have a repeat CT in 3 months.    12/8/2022-thyroid ultrasound performed with multiple bilateral thyroid nodules and none of them meeting criteria for recommendation for biopsy or FNA.  One of them was previously biopsied and benign.    1/3/2023-she had an appointment with Dr. Jac Pop for the asymmetric soft tissue thickening involving the right tonsil.  No significant pathology noted.    Breast  "Mammogram from September 8, 2023 did note calcifications in the right breast that were suspicious and stereotactic biopsy was recommended.  Stereotactic biopsy from 9/21/2023 noted a cluster of apocrine cyst with polarizable calcium oxalate crystals and fibroadenomatoid change with rare calcifications.    Interval History:   Ms. Lima presents to the clinic today for follow-up.  She continues on anastrozole with good tolerance.    She is intermittently tearful today, admitting that she is struggling with worsening depression.  She lost her mother a few years ago and has had several other friends and family members die in the last few years.  She realizes that she has not properly grieved or dealt with this.  She saw a therapist briefly a few years back but did not connect with her.    With her accompanying depression she notes more fatigue and being more sedentary.   she is on Wellbutrin with recent dose increase up to 300 mg per PCP, Dr. Verenice Valdez.      The following portions of the patient's history were reviewed and updated as appropriate: allergies, current medications, past family history, past medical history, past social history, past surgical history and problem list.    Past Medical History:   Diagnosis Date    Acute cystitis     HX    Allergic     Allergic rhinitis     Anxiety     Arthritis     Breast cancer     HX    Breast cyst     Colon polyp May 6, 2022    7    COPD (chronic obstructive pulmonary disease)     \"BEGINNING STAGES\"    Cyst of left nipple     Depression     Diverticulosis May 6, 2022    Dry skin     Hemorrhoid     Hidradenitis     History of bronchitis     History of COVID-19     Hyperlipidemia     Hypertension     Incontinence in female     wears pads    Insomnia     Low back pain 2020    Nonspecific abnormal electrocardiogram (ECG) (EKG)     Obesity     Overactive bladder     Pregnancy     G-2, P-0    Pulmonary nodules     Sleep apnea     NO CPAP    Torn meniscus     LEFT KNEE    UTI " (urinary tract infection) 2021    Vitamin B12 deficiency     Vitamin D insufficiency         Past Surgical History:   Procedure Laterality Date    AXILLARY HIDRADENITIS EXCISION Bilateral     BREAST BIOPSY Left 2021    Procedure: Left breast needle-localized excisional biopsy (tophat clip) and left breast ultrasound-guided excisional biopsy (hydromark clip);  Surgeon: Debora Zambrano MD;  Location: [facility] OR;  Service: General;  Laterality: Left;    BREAST BIOPSY Left 2022    Procedure: Left breast needle-localized excisional biopsy, Left nipple mass excision;  Surgeon: Debora Zambrano MD;  Location: [facility] OR;  Service: General;  Laterality: Left;    BRONCHOSCOPY N/A 12/15/2022    Procedure: BRONCHOSCOPY WITH ENDOBRONCHIAL ULTRASOUND WITH FNA;  Surgeon: Norberto Link MD;  Location: [facility] ENDOSCOPY;  Service: Pulmonary;  Laterality: N/A;  LYMPHADENOPATHY    CARDIAC CATHETERIZATION      COLONOSCOPY      COLONOSCOPY N/A 2022    Procedure: COLONOSCOPY;  Surgeon: Mirtha Davis MD;  Location: [facility] OR;  Service: Gastroenterology;  Laterality: N/A;  diverticulosis, polyps    HYSTERECTOMY  2006    LAVH    KNEE ARTHROSCOPY Left 2024    Procedure: LEFT KNEE ARTHROSCOPY PARTIAL MEDIAL AND LATERAL MENISECTOMY;  Surgeon: Lisandro Alonso MD;  Location: [facility];  Service: Orthopedics;  Laterality: Left;    SUBTOTAL HYSTERECTOMY          Family History   Problem Relation Age of Onset    Hypertension Mother              COPD Father              Heart failure Father     Hypertension Father     Hyperlipidemia Father              Vision loss Father     Hypertension Sister     Alcohol abuse Sister              Liver disease Sister     Stroke Sister     Hypertension Brother     Pancreatic cancer Brother         1 brother  from pancreatic cancer    Alcohol abuse Brother              Cancer Brother           "2012    Hypertension Brother     Alcohol abuse Brother          2018    Cancer Brother         Liver    Alcohol abuse Brother     Sudden death Brother     Alcohol abuse Brother          2018    Stroke Sister              Malig Hyperthermia Neg Hx         Social History     Socioeconomic History    Marital status: Single    Number of children: 0   Tobacco Use    Smoking status: Every Day     Current packs/day: 0.25     Average packs/day: 0.3 packs/day for 65.3 years (21.2 ttl pk-yrs)     Types: Cigarettes     Start date: 1979     Passive exposure: Current    Smokeless tobacco: Never    Tobacco comments:     Caffeine: 2 drinks daily and occ rich   Vaping Use    Vaping status: Never Used   Substance and Sexual Activity    Alcohol use: Yes     Alcohol/week: 0.0 - 1.0 standard drinks of alcohol     Comment: 1-2 drinks a month    Drug use: Never     Comment: marijuana in her twenties    Sexual activity: Not Currently     Partners: Male     Birth control/protection: Abstinence        OB History    No obstetric history on file.      Age at menarche-13  Age at menopause- she is status post hysterectomy.  Still has both ovaries  She does not have any children  Total period of oral contraceptive pill use 8 years      No Known Allergies     Review of Systems   Constitutional: Negative.    HENT: Negative.     Eyes: Negative.    Respiratory: Negative.     Cardiovascular: Negative.    Gastrointestinal: Negative.    Genitourinary:  Positive for amenorrhea.   Allergic/Immunologic: Negative.    Neurological: Negative.    Hematological: Negative.    Psychiatric/Behavioral:  Positive for sleep disturbance and depressed mood.      Review of systems as mentioned HPI otherwise negative     Objective   Blood pressure 130/72, pulse 60, temperature 98.1 °F (36.7 °C), temperature source Oral, height 167.6 cm (65.98\"), weight 121 kg (266 lb 1.6 oz), SpO2 97%, not currently breastfeeding.   Physical Exam  Vitals " reviewed.   Constitutional:       Appearance: Normal appearance. She is obese.   HENT:      Head: Normocephalic and atraumatic.      Right Ear: External ear normal.      Left Ear: External ear normal.      Nose: Nose normal. No congestion or rhinorrhea.      Mouth/Throat:      Mouth: Mucous membranes are moist.      Pharynx: Oropharynx is clear. No oropharyngeal exudate or posterior oropharyngeal erythema.   Eyes:      General:         Right eye: No discharge.         Left eye: No discharge.      Conjunctiva/sclera: Conjunctivae normal.      Pupils: Pupils are equal, round, and reactive to light.   Cardiovascular:      Rate and Rhythm: Normal rate.   Pulmonary:      Effort: Pulmonary effort is normal.      Breath sounds: Normal breath sounds.   Abdominal:      General: Abdomen is flat. Bowel sounds are normal.      Palpations: Abdomen is soft.   Musculoskeletal:         General: No swelling, tenderness or deformity. Normal range of motion.      Cervical back: Normal range of motion.   Skin:     General: Skin is warm.      Coloration: Skin is not jaundiced or pale.   Neurological:      General: No focal deficit present.      Mental Status: She is alert and oriented to person, place, and time.      Cranial Nerves: No cranial nerve deficit.      Sensory: No sensory deficit.   Psychiatric:         Mood and Affect: Mood normal.         Behavior: Behavior normal.         Thought Content: Thought content normal.         Judgment: Judgment normal.       Breast Exam: Right breast appears normal inspection no palpable abnormalities of the right breast.  Left breast on inspection there is a periareolar incision which is well-healed and also another periareolar incision which is well-healed.  The left breast is hyperpigmented, thickened secondary to radiation changes.      I have reexamined the patient and the results are consistent with the previously documented exam except as updated. Jessenia Eason, APRN        No  visits with results within 30 Day(s) from this visit.   Latest known visit with results is:   Orders Only on 11/07/2024   Component Date Value Ref Range Status    Glucose 11/09/2024 99  70 - 99 mg/dL Final    BUN 11/09/2024 13  8 - 27 mg/dL Final    Creatinine 11/09/2024 1.21 (H)  0.57 - 1.00 mg/dL Final    EGFR Result 11/09/2024 50 (L)  >59 mL/min/1.73 Final    BUN/Creatinine Ratio 11/09/2024 11 (L)  12 - 28 Final    Sodium 11/09/2024 142  134 - 144 mmol/L Final    Potassium 11/09/2024 4.7  3.5 - 5.2 mmol/L Final    Chloride 11/09/2024 102  96 - 106 mmol/L Final    Total CO2 11/09/2024 24  20 - 29 mmol/L Final    Calcium 11/09/2024 10.1  8.7 - 10.3 mg/dL Final    Total Protein 11/09/2024 7.4  6.0 - 8.5 g/dL Final    Albumin 11/09/2024 4.1  3.9 - 4.9 g/dL Final    Globulin 11/09/2024 3.3  1.5 - 4.5 g/dL Final    Total Bilirubin 11/09/2024 0.5  0.0 - 1.2 mg/dL Final    Alkaline Phosphatase 11/09/2024 97  44 - 121 IU/L Final    AST (SGOT) 11/09/2024 15  0 - 40 IU/L Final    ALT (SGPT) 11/09/2024 13  0 - 32 IU/L Final    Total Cholesterol 11/09/2024 136  100 - 199 mg/dL Final    Triglycerides 11/09/2024 130  0 - 149 mg/dL Final    HDL Cholesterol 11/09/2024 56  >39 mg/dL Final    VLDL Cholesterol Avel 11/09/2024 23  5 - 40 mg/dL Final    LDL Chol Calc (NIH) 11/09/2024 57  0 - 99 mg/dL Final    LDL/HDL RATIO 11/09/2024 1.0  0.0 - 3.2 ratio Final    Comment:                                     LDL/HDL Ratio                                              Men  Women                                1/2 Avg.Risk  1.0    1.5                                    Avg.Risk  3.6    3.2                                 2X Avg.Risk  6.2    5.0                                 3X Avg.Risk  8.0    6.1      TSH 11/09/2024 0.866  0.450 - 4.500 uIU/mL Final    WBC 11/09/2024 7.6  3.4 - 10.8 x10E3/uL Final    RBC 11/09/2024 5.74 (H)  3.77 - 5.28 x10E6/uL Final    Hemoglobin 11/09/2024 16.3 (H)  11.1 - 15.9 g/dL Final    Hematocrit 11/09/2024  50.8 (H)  34.0 - 46.6 % Final    MCV 11/09/2024 89  79 - 97 fL Final    MCH 11/09/2024 28.4  26.6 - 33.0 pg Final    MCHC 11/09/2024 32.1  31.5 - 35.7 g/dL Final    RDW 11/09/2024 13.5  11.7 - 15.4 % Final    Platelets 11/09/2024 279  150 - 450 x10E3/uL Final    Neutrophil Rel % 11/09/2024 61  Not Estab. % Final    Lymphocyte Rel % 11/09/2024 27  Not Estab. % Final    Monocyte Rel % 11/09/2024 8  Not Estab. % Final    Eosinophil Rel % 11/09/2024 2  Not Estab. % Final    Basophil Rel % 11/09/2024 1  Not Estab. % Final    Neutrophils Absolute 11/09/2024 4.8  1.4 - 7.0 x10E3/uL Final    Lymphocytes Absolute 11/09/2024 2.0  0.7 - 3.1 x10E3/uL Final    Monocytes Absolute 11/09/2024 0.6  0.1 - 0.9 x10E3/uL Final    Eosinophils Absolute 11/09/2024 0.1  0.0 - 0.4 x10E3/uL Final    Basophils Absolute 11/09/2024 0.0  0.0 - 0.2 x10E3/uL Final    Immature Granulocyte Rel % 11/09/2024 1  Not Estab. % Final    Immature Grans Absolute 11/09/2024 0.1  0.0 - 0.1 x10E3/uL Final    Hemoglobin A1C 11/09/2024 6.3 (H)  4.8 - 5.6 % Final    Comment:          Prediabetes: 5.7 - 6.4           Diabetes: >6.4           Glycemic control for adults with diabetes: <7.0          No radiology results for the last 30 days.     MRI of the breast 12/9/2022-images independently reviewed and interpreted by me in detail summarized in the HPI.    Breast Mammogram from September 8, 2023 did note calcifications in the right breast that were suspicious and stereotactic biopsy was recommended.  Stereotactic biopsy from 9/21/2023 noted a cluster of apocrine cyst with polarizable calcium oxalate crystals and fibroadenomatoid change with rare calcifications.    Assessment & Plan       62-year-old lady with a previous hysterectomy with uncertain menopausal status presents for management of lobular carcinoma in situ.     *Lobular carcinoma in situ  2/22/2021-left breast needle localized excisional biopsy shows lobular carcinoma in situ with multiple intraductal  papillomas.  She was offered tamoxifen 5 mg daily for risk reduction however did not start taking that.  She was seen in September 2021 and treatment was changed to anastrozole as she was concerned about the risk of blood clots with tamoxifen.  She finally started taking anastrozole in December 2021.  Had an abnormal MRI in December 2021 requiring a left breast biopsy and left breast excision.  Diagnosed with invasive lobular carcinoma  Continues on anastrozole  MRI 12/9/2022 reviewed and benign.  Screening mammogram due August 2023.  This is to be ordered by her gynecologist.  Mammogram from September 8, 2023 did note calcifications in the right breast that were suspicious and stereotactic biopsy was recommended.  Stereotactic biopsy from 9/21/2023 noted a cluster of apocrine cyst with polarizable calcium oxalate crystals and fibroadenomatoid change with rare calcifications.   12/14/2023-bilateral breast [id number] postsurgical changes in the left breast.  No suspicious enhancing masses or non-mass enhancement.  Normal axilla.  Right breast without to be suspicious abnormalities.  9/2024 bilateral screening mammogram benign, BI-RADS 2.  Continue annual mammogram and MRI    *Invasive lobular carcinoma of the left breast  Invasive lobular carcinoma small focus diagnosed with an extensive areas of lobular carcinoma in situ  pT1 a Nx M0  Status post excision of the left breast mass  Completed adjuvant radiation to the left breast in April 2022  Continues on anastrozole  PET/CT shows right axillary lymphadenopathy  MRI 12/9/2022 without any evidence of axillary lymphadenopathy or new abnormalities in either breast.  No clear evidence of recurrent disease.  12/15/2022-bronchoscopy and biopsy with benign lymph nodes.  She continues on anastrozole without any evidence of recurrent disease  CT of the chest from 2/22/2024 and 4/19/2024-images independently reviewed and interpreted by me and there is no evidence of  metastatic disease.    *Left breast pain and skin changes  Most likely secondary to radiation.  However patient reports worsening pain over the past few months.  An image has been taken and uploaded to the media  Also discussed the findings with Dr. Zambrano with breast surgery who plans to order diagnostic left breast mammogram and subsequently follow-up to assess the patient further.    *Multiple pulmonary nodules and mediastinal and hilar lymphadenopathy  CT of the chest on 11/8/2022 shows multiple bilateral pulmonary nodules along with mediastinal hilar lymphadenopathy which is greater than 1 cm.  PET/CT 11/18/2022 shows increased uptake in the mediastinal and the hilar lymph nodes.  Bronchoscopy and biopsy 12/15/2022-pathology benign  CT chest from February 2023 reviewed and pulmonary nodules have resolved and the lymphadenopathy is stable.  Screening lung CT February 2024 benign  She subsequently had another CT with contrast for ruling out pulmonary embolism on 4/19/2024 which was also benign.  Repeat CT chest in April 2025    *Asymmetric uptake of the palatine tonsil  ENT appointment with Dr. Jac Pop on 1/3/2023  No concern for malignancy.  Continue follow-up with ENT    *Thyroid abnormalities on the PET/CT  Thyroid ultrasound 12/8/2022 without any concerning findings     *Lifestyle changes  She has not been exercising or modified her diet  She has been going to physical therapy for back pain  BMI 43.5.  Patient is not compliant with regular exercise    *Obesity  Body mass index is 42.97 kg/m².  She has met with a dietitian before  She is unable to implement dietary changes or regular exercises.     *Tobacco cessation  Referral made to Prerna Lewis to help with smoking cessation  Encouraged her to use the nicotine patches  Continues 5 cigarettes/day  Trying to quit completely but not successful     *Surveillance  Bilateral breast MRI December 9, 2022 benign  Screening mammogram due August  2023  Screening lung CT  11/8/2022 with pulmonary nodules and mediastinal lymphadenopathy  Screening lung CT February 2024 benign  MRI December 2023 benign  Next bilateral breast MRI scheduled March 2025     *Hypertension  Blood pressure stable, blood pressure BP: 130/72   Continue current medications    *Hyperlipidemia  Continue Crestor  Unchanged     *Bladder incontinence  Continue oxybutynin  Continue follow-up with Dr. Cantu her gynecologist  Did not complain of this today.    *Worsening depression  12/10/2024 patient with worsening depression, requesting help today.  States she does not have any plans to harm herself but recognizes that she has not dealt with unresolved grief related to multiple deaths within her friends and family group.  Referral placed oncology social work and message also sent to YAN Hartmann, who has previously met with the patient.     Plan:  Continue daily anastrozole.  Ambulatory referral to oncology social work as outlined above for worsening depression.  Next bilateral MRI of the breasts March 2025  Follow-up with Dr. Dong in 6 months.  Screening lung CT in April 2025      Patient remains on high risk medication and requires close monitoring for toxicity.

## 2024-12-10 NOTE — PATIENT INSTRUCTIONS
For Sleep:    Melatonin - try 3mg nightly. Could increase up to as high as 10 mg nightly.  Magnesium glycinate nightly (could take 1-2 tabs) - this is over the counter.

## 2024-12-16 RX ORDER — VIBEGRON 75 MG/1
1 TABLET, FILM COATED ORAL DAILY
Qty: 90 TABLET | Refills: 1 | Status: SHIPPED | OUTPATIENT
Start: 2024-12-16

## 2024-12-20 RX ORDER — BUPROPION HYDROCHLORIDE 150 MG/1
150 TABLET ORAL DAILY
Qty: 30 TABLET | Refills: 2 | Status: CANCELLED | OUTPATIENT
Start: 2024-12-20

## 2024-12-20 RX ORDER — BUPROPION HYDROCHLORIDE 300 MG/1
300 TABLET ORAL DAILY
Qty: 90 TABLET | Refills: 1 | Status: SHIPPED | OUTPATIENT
Start: 2024-12-20

## 2024-12-20 NOTE — TELEPHONE ENCOUNTER
Caller: Kirsten Lima    Relationship: Self    Best call back number: [phone]    What medication are you requesting: buPROPion XL (Wellbutrin XL) 300 MG         Have you had these symptoms before:    [x] Yes  [] No    Have you been treated for these symptoms before:   [x] Yes  [] No    If a prescription is needed, what is your preferred pharmacy and phone number: [facility] #[id number] - [facility] [address] [place] [address] [address]. AT [facility] [phone] [facility] [phone]      Additional notes: PATIENT STATED SHE HAS BEEN CONTINUING ON  MG OF WELLBUTRIN. THE PREVIOUS ISSUES HAVE RESOLVED THEMSELVES. THE PRESCRIPTION FOR  MG, NEEDS TO BE DISCONTINUED AT THIS TIME .

## 2024-12-26 ENCOUNTER — OFFICE VISIT (OUTPATIENT)
Dept: OTHER | Facility: HOSPITAL | Age: 64
End: 2024-12-26
Payer: COMMERCIAL

## 2024-12-26 VITALS
DIASTOLIC BLOOD PRESSURE: 80 MMHG | WEIGHT: 266.9 LBS | SYSTOLIC BLOOD PRESSURE: 132 MMHG | OXYGEN SATURATION: 97 % | HEART RATE: 55 BPM | TEMPERATURE: 97.8 F | BODY MASS INDEX: 43.1 KG/M2

## 2024-12-26 DIAGNOSIS — F51.01 PRIMARY INSOMNIA: ICD-10-CM

## 2024-12-26 DIAGNOSIS — F33.1 MODERATE EPISODE OF RECURRENT MAJOR DEPRESSIVE DISORDER: Primary | ICD-10-CM

## 2024-12-26 DIAGNOSIS — F43.21 COMPLICATED GRIEF: ICD-10-CM

## 2024-12-26 DIAGNOSIS — F17.200 TOBACCO USE DISORDER: ICD-10-CM

## 2024-12-26 DIAGNOSIS — C50.812 MALIGNANT NEOPLASM OF OVERLAPPING SITES OF LEFT BREAST IN FEMALE, ESTROGEN RECEPTOR POSITIVE: ICD-10-CM

## 2024-12-26 DIAGNOSIS — Z17.0 MALIGNANT NEOPLASM OF OVERLAPPING SITES OF LEFT BREAST IN FEMALE, ESTROGEN RECEPTOR POSITIVE: ICD-10-CM

## 2024-12-26 DIAGNOSIS — F41.1 GENERALIZED ANXIETY DISORDER: ICD-10-CM

## 2024-12-26 DIAGNOSIS — R53.83 OTHER FATIGUE: ICD-10-CM

## 2024-12-26 DIAGNOSIS — R29.898 MUSCULAR DECONDITIONING: ICD-10-CM

## 2024-12-26 PROCEDURE — G0463 HOSPITAL OUTPT CLINIC VISIT: HCPCS | Performed by: NURSE PRACTITIONER

## 2024-12-26 RX ORDER — MAGNESIUM GLUCONATE 27 MG(500)
200 TABLET ORAL 2 TIMES DAILY
COMMUNITY

## 2024-12-26 NOTE — PROGRESS NOTES
[facility] MULTIDISCIPLINARY CLINIC   IN CLINIC Initial Visit  SUPPORTIVE ONCOLOGY - SURVIVORSHIP    Kirsten Lima is a pleasant 64 y.o. female being followed by Irena Dong MD for invasive lobular carcinoma now on anastrozole. Reviewed today in Multidisciplinary Clinic, for initial visit    HPI  64-year-old patient with a previous hysterectomy who has been followed for LCIS since February 2021, initiated anastrozole December 2021 on and then had abnormal MRI December 2021 requiring a left breast biopsy and left breast excision diagnosed with invasive lobular carcinoma continued on anastrozole.  She continues surveillance with bilateral mammogram alternating with bilateral breast MRI.  Last imaging completed was mammography in September 2024    I have previously met the patient in April 2021 to discuss tobacco treatment and again in August 2022 to discuss treatment summary and survivorship care plan post breast cancer treatment, at the time reported smoking 10 cigarettes/day.    Additional medical history significant for COPD, hyperlipidemia, hypertension, sleep apnea.    She was recently seen for follow-up at medical oncology with Jenaro HEREDIA, presenting with intermittent tearfulness and reporting to be struggling with worsening depression as well as complicated grief.  She has previously met with a therapist as well as psych mental health APRN at [facility] as well as a  but did not maintain this connection. It is noted that with her depression she notes more fatigue and being more sedentary.  Her PCP Dr. Valdez has prescribed for her Wellbutrin 300 mg daily. She is not sure how much it has helped. She has tried celexa and trazodone in the past without benefit for symptoms also with primary care.    She recalls her mother passing away in 2018 with hospice. Her brother also passed away soon after this. She struggled through bereavement and then through her breast cancer treatment. She attends  [facility] [employer] weekly and made a good friend there - someone she trusted, who suddenly passed away in December of last year, leaving her feeling like she is starting all over again with the immediacy of her grief.    She notes no interest in anything but eating or watching TV. She is sedentary. She has chronic back pain that hurts when she is standing up for 10 minutes or more. She is using motorized scooters when shopping but wishes she didn't. She worries about her weight and gets frustrated with this.     The patient is retired from [employer]. She lives independently in her own home. She is never . She does not have children. She is one of five remaining siblings from a family of 11. She worries about who is going to take care of her. She is wary of others and does not trust easily.    She smokes about 10 cigarettes or less a day. She does not drink alcohol. She does not use marijuana or other drugs.     Target symptoms: sleep disruption, avolition, dysthymia    PHQ-9 Total Score: 17   MIKHAIL-7: 10     TREATMENT HISTORY:     Oncology/Hematology History Overview Note   5/28/19, Screening MMG with [facility] ([facility]):  Scattered areas of fibroglandular density.  1. There are small asymmetries seen in both breasts. The parenchyma includes stable nodular asymmetries. No suspicious masses, suspicious microcalcifications or areas of architectural distortion are identified. There has been no significant change from the prior exams.  2. There are stable punctate and spherical lucent-centered calcifications with diffuse/scattered distribution seen in both breast.  BI-RADS 2: Benign.     6/2/20, Screening MMG with [facility] ([facility]):  Scattered areas of fibroglandular density.  1. There are small asymmetries seen in both breasts. There has been no significant change from the prior exams.  2. There are stable punctate and spherical lucent-centered calcifications with diffuse  distribution seen in both breasts.  3. There is a new small focal asymmetry measuring 12 mm seen in the central region of the left breast. This is best visualized on tomosynthesis MLO view slice # 78. This is best seen on the MLO View.  BI-RADS 0: Incomplete.     20, Left Diagnostic MMG with [facility] & Left Breast US ([facility]):  MM. On the present examination, focal asymmetry in the left breast, central does not persist. The asymmetry noted on the recent screening mammogram resolves into stroma on additional views.  US:  1. There is no sonographic correlate. There is no evidence of any solid mass or abnormal cystic elements.  2. There are three oval intraductal masses with partially defined margins measuring 5 mm to 9 mm seen in the anterior one-third subareolar region of the left breast. The patient reports history of intermittent clear left nipple discharge for the past 10 years. These three structures are adjacent and would be included within the same biopsy site.  3. There is an oval solid mass with partially defined margins measuring 6 x 5 x 5 mm seen in the anterior one-third subareolar region of the left breast. This is located 2 to 3 cm away from the above described masses.   BI-RADS 4: Suspicious      20, Left Breast, US-Guided Biopsy x 2 ([facility])  1. Left Subareolar mass (5 x 5 x 6 cm), Ultrasound guided biopsy:   Benign sclerosing intraductal papilloma with calcifications.  -Tophat clip. Concordant.  2. Left Subareolar mass, ultrasound guided biopsy:   Benign sclerosing intraductal papilloma with usual duct hyperplasia and microcalcifications.   -Hydromark clip. Concordant.     20, Left Breast US ([facility]):  There are two oval masses and biopsy clips with circumscribed margins seen in the sub-areolar region of the left breast. There are no significant changes from the prior exam(s). This finding represents biopsy proven benign breast disease. These measure less than 1 cm.  BI-RADS  2: Benign.      02/22/21, Left breast needle-localized excisional biopsy and left breast ultrasound-guided excisional biopsy:  1. Left Breast, Needle-Localized Excisional Biopsy (13 grams):               A. LOBULAR CARCINOMA IN SITU (LCIS).               B. Multiple sclerosed intraductal papillomas with focal calcifications (completely excised).               C. Clips and associated biopsy site changes are present and adjacent to papillomas.               D. Background breast parenchyma with clusters of apocrine cysts, usual ductal hyperplasia, and       calcifications associated with sclerosing adenosis.    6/11/2021, Screening MMG with [facility] ([facility]):  Scattered areas of fibroglandular density. There is a new postsurgical scar seen subareolar region of the left breast.  There are no suspicious masses, calcifications, or areas of architectural distortion. In the right breast, no suspicious masses, significant calcifications or other abnormalities are seen.  BI-RADS 2: Benign.     Malignant neoplasm of overlapping sites of left breast in female, estrogen receptor positive   12/9/2021 Initial Diagnosis    Malignant neoplasm of overlapping sites of left breast in female, estrogen receptor positive (HCC)     12/10/2021 Imaging    [facility]  RIGHT BREAST:    No suspicious enhancing mass or area of non-mass enhancement is identified. The visualized axilla is within normal limits.    LEFT BREAST:    There are post surgical changes in the anterior left breast. At 5:00 in the anterior left breast, 3.4 cm posterior to the nipple,  there is a 1.8 cm AP dimension somewhat linear branching nonmass enhancement, which is suspicious.   There is abnormal asymmetric enhancement within the left nipple with an approximately 1.2 cm AP dimension, 1.7 cm transverse dimension, 1.0 cm craniocaudal dimension mass involving the nipple itself and extending slightly deeper into the subareolar breast, which is associated with rapid  initial and washout delayed phase enhancement on kinetic analysis. This is suspicious.  No suspicious enhancement is identified in the left chest wall. The visualized axilla is within normal limits.    EXTRAMAMMARY FINDINGS:   There are no abnormally enlarged internal mammary chain lymph nodes on either side.     BI-RADS 4: Suspicious.     12/22/2021 Biopsy    Left Breast, MR-Guided Biopsy ([facility]):    1. Left Breast at 5 o'clock (not for calcifications) MRI-Guided Core Biopsy:                A.  Extensive lobular carcinoma in situ (LCIS):                            1.  LCIS measures up to 7 mm in single greatest dimension focally involving 7 of 8                      representative cores.                2.  Low grade nuclear features without necrosis.  B.  No invasive carcinoma identified by routine and/or immunostaining.  C.  Associated intraductal papilloma with microcalcification, ductal cyst wall and fibrocystic change noted.      2/1/2022 Surgery    Left breast needle-localized excisional biopsy and left nipple mass excision:    1. Breast, Left, Lumpectomy: Invasive lobular carcinoma, lobular carcinoma in situ and atypical lobular hyperplasia.               A. Invasive lobular carcinoma.                            1. The invasive lobular carcinoma measures 4 mm on the slide.                            2. The invasive lobular carcinoma is Addison grade I (tubular grade 3, nuclear grade 1, mitotic grade 1).                            3. The foci is located amongst an area of lobular carcinoma in situ and near the biopsy cavity.                            4. Biopsy site and clip identified (barbell shaped clip).                            5. The invasive lobular carcinoma comes within 1.3 mm of the medial margin, 7.5 mm of the posterior margin, 8.8 mm of the anterior margin, 12.5 mm of the lateral margin and 15 mm of the superior and inferior margins.                            6. Microcalcifications are not  associated with the invasive tumor.               B. Extensive lobular carcinoma in situ and atypical lobular hyperplasia.  C. Fibroadenoma with calcification, sclerosing adenosis, apocrine metaplasia, clustered cysts, area consistent with radial scar and fat necrosis.     2. Breast, Left, Nipple, Excision:  Breast tissue with               A. Simple cysts.               B. Calcifications in non-neoplastic tissue.               C. Apocrine metaplasia.               D. Intraductal papilloma with sclerosis.               E. Atypical lobular hyperplasia.    ER+ (80%, strong)  RI+ (3%, moderate)  Her2 negative (IHC 1+)  Ki-67 2%     3/9/2022 - 4/5/2022 Radiation    Radiation OncologyTreatment Course:  Kirsten Lima received 4990 cGy in 20 fractions to LEFT breast.     4/6/2022 -  Hormonal Therapy    Anastrozole (Arimidex)     8/15/2022 Imaging    Screening MMG with [facility] ([facility]):  Scattered areas of fibroglandular density.  There is a stable post-surgical scar with associated skin lesion and trabecular thickening seen in the central region of the left breast. Post-surgical scar is in an area of prior lumpectomy. Findings are consistent with post-surgical and post-radiation therapy changes.  In the right breast, no suspicious masses, significant calcifications or other abnormalities are seen.  BI-RADS 2: Benign.     12/9/2022 Imaging    Breast MRI at [facility]  FINDINGS:Scattered fibroglandular tissue seen is throughout both  breasts. Mild background parenchymal enhancement of both breasts is  noted. Postsurgical change in the left breast is noted. Otherwise, no  areas of abnormal morphology are seen in either breast. No areas of  abnormal enhancement are seen in either breast. I see no evidence for  abnormal skin, nipple or chest wall enhancement of either breast and  there is no evidence for axillary or internal mammary chain adenopathy.     IMPRESSION:  There are no findings suspicious for  malignancy in either  breast. Routine follow-up imaging is recommended.     BI-RADS category 2     9/1/2023 Imaging    Bilateral screening mammogram at [facility]  There are scattered areas of fibroglandular density.    Finding 1:  There is a stable post-surgical scar seen in the left breast.  No  suspicious masses, suspicious microcalcifications or architectural distortions  are identified.  There has been no significant change from the prior exam(s).    Finding 2:  There are calcifications seen in the sub-areolar region of the right  breast.    IMPRESSION:  Finding 1:  Stable post-surgical scar in the left breast is benign-negative.    Finding 2:  Calcifications in the right breast require additional evaluation.  Magnification mammography is recommended.    BI-RADS Category 0: Incomplete: Needs Additional Imaging Evaluation     9/7/2023 Imaging    Right diagnostic mammogram at [facility]  There are scattered areas of fibroglandular density.    There are round and punctate calcifications measuring 8 mm seen in the sub-areolar region of the right breast  located 5 centimeters from the nipple.  Otherwise, no suspicious masses, suspicious microcalcifications or  architectural distortions are identified.  There has been no significant change from the prior exam(s).    IMPRESSION:  Calcifications in the right breast are suspicious. Stereotactic biopsy is recommended.    The patient was mailed a notification letter.    BI-RADS Category 4B: Suspicious Abnormality     9/19/2023 Biopsy    Right stereotactic biopsy at [facility]  Technically successful stereotactic right breast biopsy with marker clip placement and positive radiograph.   Pathology results to follow.     A two view mammogram shows the clip in the expected location.   Pathology returned as cyst and fibroadenomatoid hyperplasia.   Pathology is benign and concordant.  A follow up mammogram in 6 months is recommended.      12/14/2023 Imaging    Bilateral breast MRI [facility]  FINDINGS:  There is scattered fibroglandular tissue. There is mild  background parenchymal enhancement.     RIGHT BREAST:    There are new post biopsy changes in the anterior right breast from a  recent benign biopsy. No suspicious enhancing mass or area of non-mass  enhancement is identified.  The visualized axilla is within normal limits.     LEFT BREAST:    Benign-appearing postsurgical changes are identified in the left breast.  No suspicious enhancing mass or area of non-mass enhancement is  identified.  The visualized axilla is within normal limits.     EXTRAMAMMARY FINDINGS:  There are no pathologically enlarged internal mammary chain lymph nodes  on either side.          IMPRESSION AND RECOMMENDATION:     No MR evidence of malignancy in either breast. Recommend annual  screening mammogram in September 2023.     BI-RADS Category 2: Benign     3/28/2024 Imaging    Right diagnostic mammogram at [facility]  The following views were obtained: Right craniocaudal; right craniocaudal magnification; right mediolateral; right  mediolateral magnification; and right mediolateral oblique.  This examination was reviewed with the aid of R2  computer aided detection.    Follow-up examination was performed for the calcifications in the right breast, sub-areolar seen on 09/07/2023.  There is a decreased number of calcifications.  This finding represents biopsy proven benign breast disease.  Note  is made of a biopsy clip as evidence of a previous surgical procedure.    IMPRESSION:  Calcifications in the right breast are benign-negative.    Screening mammogram in 1 year is recommended.    The patient was mailed a notification letter.    BI-RADS Category 2: Benign Finding(s)     7/23/2024 Imaging    Left Diagnostic MMG with [facility] & Left Breast US ( [facility]):  MMG:  Benign-appearing postsurgical and treatment-related changes in the left breast, evolved over prior mammograms. There are benign-appearing calcifications. There are no suspicious masses,  "calcifications, or areas of architectural distortion.  US:  Targeted sonographic evaluation of the left breast was performed in the areas of concern indicated by the patient at 3-4 o'clock in the area of patient indicated pain and at 11:00, 8 cm from the nipple and 11:00 retroareolar in the areas of reported skin changes. Predominantly fatty tissue is identified. Skin thickness is upper normal to 0.3 cm at 11:00 retroareolar, likely treatment related. No suspicious cystic or solid mass is identified.  BI-RADS 2: Benign      9/20/2024 Imaging    Bilateral screening mammogram at [facility]  There are scattered areas of fibroglandular density.    Previous screening shows a post-surgical scar. On the present examination, there  is a post-surgical scar in the left breast. Post-surgical scar is in an area of  prior lumpectomy.  There are no suspicious masses, calcifications or  architectural distortions.    In the right breast, no suspicious masses, significant calcifications or other  abnormalities are seen.    IMPRESSION:  Post-surgical scar in the left breast is benign-negative.    Screening mammogram in 1 year is recommended.    BI-RADS Category 2: Benign           Past Medical History:   Diagnosis Date    Acute cystitis     HX    Allergic     Allergic rhinitis     Anxiety     Arthritis     Breast cancer     HX    Breast cyst     Colon polyp May 6, 2022    7    COPD (chronic obstructive pulmonary disease)     \"BEGINNING STAGES\"    Cyst of left nipple     Depression     Diverticulosis May 6, 2022    Dry skin     Hemorrhoid     Hidradenitis     History of bronchitis     History of COVID-19     Hyperlipidemia     Hypertension     Incontinence in female     wears pads    Insomnia     Low back pain 2020    Nonspecific abnormal electrocardiogram (ECG) (EKG)     Obesity     Overactive bladder     Pregnancy     G-2, P-0    Pulmonary nodules     Sleep apnea     NO CPAP    Torn meniscus     LEFT KNEE    UTI (urinary tract infection) " 02/2021    Vitamin B12 deficiency     Vitamin D insufficiency        Past Surgical History:   Procedure Laterality Date    AXILLARY HIDRADENITIS EXCISION Bilateral     BREAST BIOPSY Left 02/22/2021    Procedure: Left breast needle-localized excisional biopsy (tophat clip) and left breast ultrasound-guided excisional biopsy (hydromark clip);  Surgeon: Debora Zambrano MD;  Location: [facility] OR;  Service: General;  Laterality: Left;    BREAST BIOPSY Left 02/01/2022    Procedure: Left breast needle-localized excisional biopsy, Left nipple mass excision;  Surgeon: Debora Zambrano MD;  Location: [facility] OR;  Service: General;  Laterality: Left;    BRONCHOSCOPY N/A 12/15/2022    Procedure: BRONCHOSCOPY WITH ENDOBRONCHIAL ULTRASOUND WITH FNA;  Surgeon: Norberto Link MD;  Location: [facility] ENDOSCOPY;  Service: Pulmonary;  Laterality: N/A;  LYMPHADENOPATHY    CARDIAC CATHETERIZATION      COLONOSCOPY      COLONOSCOPY N/A 05/06/2022    Procedure: COLONOSCOPY;  Surgeon: Mirtha Davis MD;  Location: [facility] OR;  Service: Gastroenterology;  Laterality: N/A;  diverticulosis, polyps    HYSTERECTOMY  2006    [facility]    KNEE ARTHROSCOPY Left 09/12/2024    Procedure: LEFT KNEE ARTHROSCOPY PARTIAL MEDIAL AND LATERAL MENISECTOMY;  Surgeon: Lisadnro Alonso MD;  Location: [facility];  Service: Orthopedics;  Laterality: Left;    SUBTOTAL HYSTERECTOMY  2006       Social History     Socioeconomic History    Marital status: Single    Number of children: 0   Tobacco Use    Smoking status: Every Day     Current packs/day: 0.25     Average packs/day: 0.3 packs/day for 65.4 years (21.2 ttl pk-yrs)     Types: Cigarettes     Start date: 1/1/1979     Passive exposure: Current    Smokeless tobacco: Never    Tobacco comments:     Caffeine: 2 drinks daily and occ rich   Vaping Use    Vaping status: Never Used   Substance and Sexual Activity    Alcohol use: Yes     Alcohol/week: 0.0 - 1.0 standard drinks of alcohol      Comment: 1-2 drinks a month    Drug use: Never     Comment: marijuana in her twenties    Sexual activity: Not Currently     Partners: Male     Birth control/protection: Abstinence         Uric Acid   Date Value Ref Range Status   04/18/2024 5.8 3.0 - 7.2 mg/dL Final     Comment:                Therapeutic target for gout patients: <6.0         Lab Results   Component Value Date    GLUCOSE 99 11/09/2024    BUN 13 11/09/2024    CREATININE 1.21 (H) 11/09/2024    EGFRIFNONA 76 11/19/2021    EGFRIFAFRI 84 01/27/2022    BCR 11 (L) 11/09/2024    K 4.7 11/09/2024    CO2 24 11/09/2024    CALCIUM 10.1 11/09/2024    PROTENTOTREF 7.4 11/09/2024    ALBUMIN 4.1 11/09/2024    LABIL2 1.2 05/10/2024    AST 15 11/09/2024    ALT 13 11/09/2024       CBC w/diff          5/10/2024    08:25 9/9/2024    16:12 11/9/2024    08:10   CBC w/Diff   WBC 8.0  9.00  7.6    RBC 5.44  5.77  5.74    Hemoglobin 15.2  16.3  16.3    Hematocrit 45.7  49.1  50.8    MCV 84  85.1  89    MCH 27.9  28.2  28.4    MCHC 33.3  33.2  32.1    RDW 14.2  14.9  13.5    Platelets 270  272  279    Neutrophil Rel % 65  61.3  61    Immature Granulocyte Rel %  0.7     Lymphocyte Rel % 23  26.3  27    Monocyte Rel % 9  9.8  8    Eosinophil Rel % 1  1.2  2    Basophil Rel % 1  0.7  1        Allergies as of 12/26/2024    (No Known Allergies)        MEDICATIONS:  Patient medication list reviewed today    Review of Systems   Constitutional:  Positive for activity change (decreased), appetite change (increased) and fatigue.   Respiratory:  Negative for chest tightness and shortness of breath.    Cardiovascular:  Negative for chest pain and palpitations.   Gastrointestinal:  Positive for constipation (Senokot helping). Negative for diarrhea.   Genitourinary:  Positive for frequency.   Musculoskeletal:  Positive for arthralgias.   Neurological:  Positive for weakness. Negative for dizziness.   Psychiatric/Behavioral:  Positive for decreased concentration, dysphoric mood and  sleep disturbance. Negative for self-injury and suicidal ideas. The patient is nervous/anxious.        /80   Pulse 55   Temp 97.8 °F (36.6 °C)   Wt 121 kg (266 lb 14.4 oz)   SpO2 97%   BMI 43.10 kg/m²     Wt Readings from Last 3 Encounters:   12/26/24 121 kg (266 lb 14.4 oz)   12/10/24 121 kg (266 lb 1.6 oz)   12/04/24 121 kg (267 lb)        Pain Score    12/26/24 1250   PainSc: 0-No pain           Physical Exam  Constitutional:       Appearance: She is well-groomed and overweight.   HENT:      Head: Normocephalic and atraumatic.   Cardiovascular:      Rate and Rhythm: Normal rate and regular rhythm.   Pulmonary:      Effort: No tachypnea or respiratory distress.   Abdominal:      General: Abdomen is flat. There is no distension.   Skin:     General: Skin is warm and dry.   Neurological:      Mental Status: She is alert and oriented to person, place, and time.      Gait: Gait is intact.   Psychiatric:         Attention and Perception: Attention and perception normal.         Mood and Affect: Mood is depressed. Affect is tearful.         Speech: Speech normal.         Behavior: Behavior normal. Behavior is cooperative.         Thought Content: Thought content normal. Thought content does not include homicidal or suicidal plan.           DISCUSSION HELD TODAY:   Cancer Screening:   Mammogram: 9/20/24 Mammo: BI-RADS 2; MRI breast bilateral ordered March 2025  Colonoscopy: no prior for review  Cervical cancer screening: Total lap hysterectomy, removal of left ovarian mass 11/28/2006 Dr Cantu, continues annual gyn exams  Low-dose chest CT: LDCT Chest 2/22/24:   1. Lung RADS score: 2, benign based on imaging features or indolent behavior. Continue annual screening with low-dose chest CT in 12 months.  2. Airways disease and emphysema.  *Repeat CT chest due April 2025    Discussed reconnection to behavioral health for medication management as well as behavioral counseling and she is open to this  Continues  Wellbutrin 300 mg daily prescribed by Dr Valdez PCP  Discussed possible delay with scheduling initial consultation - not likely until earliest end of January, in the meantime we can begin to work on some behavioral strategies including physical activity and optimize sleep  She is restarting CPAP tonight for VALERIE. Also discussed keeping a sleep diary for a week to review for patterns or opportunities to enhance sleep hygiene and maintain CPAP compliance  Discussed current immobility and sedentary lifestyle. Reports last PT prior to covid and was helpful. [facility]able [facility] Strength After Breast Cancer program, discussed long term goal of going the grocery without motorized scooter and walking the whole time.   Reviewed cigarette use, smoking less than 1/2 PPD now. Reviewed availability of quit line. Discussed beginning the above strategies which will only enhance her next attempts at smoking cessation.          Plan and recommendations:  Referral to behavioral health [facility] for medication management and supportive counseling  Starting CPAP tonight  Complete sleep diary x1 week, consensus sleep diary provided  Referral to [facility] strength after breast cancer program - initial goal going to the grocery without a motorized scooter  Discussed importance of behavioral activation, planning daily activities  Bilateral MRI breast March 2025  Repeat CT chest due 4/2025  Tobacco use: Discussed 1-800-QUIT-NOW  Follow up arranged 1/7/25 at 2:30  Call my office as needed at any point for additional information, resources or support at [phone]    (F33.1) Moderate episode of recurrent major depressive disorder - Plan: Ambulatory Referral to Psychiatry, Ambulatory Referral to Psychology    (F41.1) Generalized anxiety disorder - Plan: Ambulatory Referral to Psychiatry, Ambulatory Referral to Psychology    (F43.21) Complicated grief - Plan: Ambulatory Referral to Psychology    (F51.01) Primary  insomnia    (C50.812,  Z17.0) Malignant neoplasm of overlapping sites of left breast in female, estrogen receptor positive - Plan: Ambulatory Referral to Physical Therapy for Evaluation & Treatment    (R29.898) Muscular deconditioning - Plan: Ambulatory Referral to Physical Therapy for Evaluation & Treatment    (R53.83) Other fatigue - Plan: Ambulatory Referral to Physical Therapy for Evaluation & Treatment    (F17.200) Tobacco use disorder     I spent 60 minutes caring for this patient on this date of service by face-to-face counseling. This time includes time spent by me in the following activities: preparing for the visit, reviewing tests, performing a medically appropriate examination and/or evaluation, counseling and educating the patient/family/caregiver, referring and communicating with other health care professionals, documenting information in the medical record, independently interpreting results and communicating that information with the patient/family/caregiver, care coordination, obtaining a separately obtained history, and reviewing a separately obtained history     Prerna Lewis DNP, APRN, AGCNS-BC, AOCNS  12/26/2024

## 2025-01-02 ENCOUNTER — DOCUMENTATION (OUTPATIENT)
Dept: PSYCHIATRY | Facility: HOSPITAL | Age: 65
End: 2025-01-02
Payer: COMMERCIAL

## 2025-01-02 NOTE — PROGRESS NOTES
Oncology Support Services    This patient was presented by YAN Burroughs, in the Supportive Oncology Services call today.  The patient has been referred to [facility] for medication management and supportive counseling.  Until care is established there, Prerna is seeing the patient to work on some behavioral strategies including physical activity and optimize sleep.  OSW will close the referral for a new therapist due to referral being made and supportive care being provided.     Phuong Schroeder, MSW, [facility]  Oncology Social Worker

## 2025-01-07 DIAGNOSIS — I10 ESSENTIAL HYPERTENSION: ICD-10-CM

## 2025-01-07 RX ORDER — LISINOPRIL AND HYDROCHLOROTHIAZIDE 12.5; 2 MG/1; MG/1
2 TABLET ORAL EVERY MORNING
Qty: 180 TABLET | Refills: 3 | Status: SHIPPED | OUTPATIENT
Start: 2025-01-07

## 2025-01-07 NOTE — TELEPHONE ENCOUNTER
NOV-12/09/25-RM  LOV-12/04/24-RM    Abnormal ECG, chronic  Hypertension with LVH, goal <120/80.   Hyperlipidemia, on rosuvastatin  Invasive lobular carcinoma of the left breast, treated with lumpectomy, adjuvant radiation and anastrozole.  Follows with Dr. Dong  Obesity and sedentary lifestyle  Coronary artery calcification noted on low-dose CT chest 2/2024  Tobacco use, needs to stop smoking.  Left lower extremity edema due to left knee arthroscopic surgery.  VALERIE, needs to see Dr. Edouard     Plan:       See Mila in 1 year, maintain amlodipine 5 mg in the morning, spironolactone 25 mg in the morning, lisinopril HCTZ 2 tablets in the morning, metoprolol at night.  Remain on rosuvastatin 10 mg nightly and start aspirin 81 mg daily.  No other testing at this time.

## 2025-01-16 ENCOUNTER — TELEPHONE (OUTPATIENT)
Dept: PSYCHIATRY | Facility: HOSPITAL | Age: 65
End: 2025-01-16
Payer: COMMERCIAL

## 2025-01-16 NOTE — TELEPHONE ENCOUNTER
Oncology Support Services    OSW reopened the Social Work Referral, at the request of YAN Burroughs.  The patient had missed her follow-up appointment with Ms. Lewis and the request was to make sure the patient is getting the behavioral health support that is needed.      OSW called the patient and explained the reason for the call.  The patient was receptive to discussing her thoughts on getting emotional support at this time.  The patient had looked at the referral to the [facility] and felt that it was not the right fit for her.  She plans to utilize a tool provided by her insurance to look at other options. OSW offered to assist her in getting rescheduled with YAN Burroughs, and the patient denied need.      The patient reported progress in decreasing her social isolation through [employer] involvement and attending a Support Group through [employer]  The patient's supportive [employer] is [facility].  OSW validated these choices and encouraged the patient in the steps she is taking for self care.  The patient is also familiar with [employer] and shared that they seem to work closely with [employer].      The patient acknowledged her desire to keep taking daily step by step to work through what she needs to work through.  OSW offered to mail a letter and contact information to maintain our method of communication.  The patient verbalized agreement with that plan.  OSW thanked the patient for her time.    ILYA Yip, [facility]  Oncology Social Worker

## 2025-02-11 ENCOUNTER — TELEPHONE (OUTPATIENT)
Dept: INTERNAL MEDICINE | Facility: CLINIC | Age: 65
End: 2025-02-11
Payer: COMMERCIAL

## 2025-02-11 DIAGNOSIS — R73.09 ELEVATED GLUCOSE: Primary | ICD-10-CM

## 2025-02-15 LAB
ALBUMIN SERPL-MCNC: 4.1 G/DL (ref 3.9–4.9)
ALP SERPL-CCNC: 108 IU/L (ref 44–121)
ALT SERPL-CCNC: 16 IU/L (ref 0–32)
AST SERPL-CCNC: 15 IU/L (ref 0–40)
BILIRUB SERPL-MCNC: 0.4 MG/DL (ref 0–1.2)
BUN SERPL-MCNC: 13 MG/DL (ref 8–27)
BUN/CREAT SERPL: 10 (ref 12–28)
CALCIUM SERPL-MCNC: 9.8 MG/DL (ref 8.7–10.3)
CHLORIDE SERPL-SCNC: 103 MMOL/L (ref 96–106)
CO2 SERPL-SCNC: 26 MMOL/L (ref 20–29)
CREAT SERPL-MCNC: 1.25 MG/DL (ref 0.57–1)
EGFRCR SERPLBLD CKD-EPI 2021: 48 ML/MIN/1.73
GLOBULIN SER CALC-MCNC: 3.2 G/DL (ref 1.5–4.5)
GLUCOSE SERPL-MCNC: 94 MG/DL (ref 70–99)
HBA1C MFR BLD: 6.1 % (ref 4.8–5.6)
POTASSIUM SERPL-SCNC: 5.3 MMOL/L (ref 3.5–5.2)
PROT SERPL-MCNC: 7.3 G/DL (ref 6–8.5)
SODIUM SERPL-SCNC: 141 MMOL/L (ref 134–144)

## 2025-02-17 ENCOUNTER — TELEPHONE (OUTPATIENT)
Dept: INTERNAL MEDICINE | Facility: CLINIC | Age: 65
End: 2025-02-17

## 2025-02-17 NOTE — TELEPHONE ENCOUNTER
Caller: RAFAEL    Relationship:     Best call back number:           What was the call regarding:CALLING WITH AN ALERT FOR LAB POTASSIUM LEVEL IS 5.3    WILL FAX OVER THE ALERT TO OFFICE

## 2025-02-20 ENCOUNTER — OFFICE VISIT (OUTPATIENT)
Dept: INTERNAL MEDICINE | Facility: CLINIC | Age: 65
End: 2025-02-20
Payer: COMMERCIAL

## 2025-02-20 VITALS
HEART RATE: 58 BPM | BODY MASS INDEX: 43.63 KG/M2 | SYSTOLIC BLOOD PRESSURE: 134 MMHG | DIASTOLIC BLOOD PRESSURE: 76 MMHG | WEIGHT: 271.5 LBS | HEIGHT: 66 IN | OXYGEN SATURATION: 95 % | TEMPERATURE: 97.7 F

## 2025-02-20 DIAGNOSIS — I10 PRIMARY HYPERTENSION: Primary | ICD-10-CM

## 2025-02-20 DIAGNOSIS — R63.5 WEIGHT GAIN: ICD-10-CM

## 2025-02-20 DIAGNOSIS — E78.00 PURE HYPERCHOLESTEROLEMIA: ICD-10-CM

## 2025-02-20 PROCEDURE — 99214 OFFICE O/P EST MOD 30 MIN: CPT | Performed by: INTERNAL MEDICINE

## 2025-02-20 NOTE — PROGRESS NOTES
Subjective   Kirsten Lima is a 64 y.o. female.   Weight gain  Hypertension    Hyperglycemia     She says she has been craving sweets and is really struggling with this  she does have the zepbound at home and can start it    The following portions of the patient's history were reviewed and updated as appropriate: allergies, current medications, past family history, past medical history, past social history, past surgical history, and problem list.    Review of Systems    Objective   Physical Exam  Vitals reviewed.   Constitutional:       Appearance: She is well-developed.   HENT:      Head: Normocephalic and atraumatic.      Right Ear: External ear normal.      Left Ear: External ear normal.   Eyes:      Conjunctiva/sclera: Conjunctivae normal.      Pupils: Pupils are equal, round, and reactive to light.   Neck:      Thyroid: No thyromegaly.      Trachea: No tracheal deviation.   Cardiovascular:      Rate and Rhythm: Normal rate and regular rhythm.      Heart sounds: Normal heart sounds.   Pulmonary:      Effort: Pulmonary effort is normal.      Breath sounds: Normal breath sounds.   Abdominal:      General: Bowel sounds are normal. There is no distension.      Palpations: Abdomen is soft.      Tenderness: There is no abdominal tenderness.   Musculoskeletal:         General: No deformity. Normal range of motion.      Cervical back: Normal range of motion.   Skin:     General: Skin is warm and dry.   Neurological:      Mental Status: She is alert and oriented to person, place, and time.   Psychiatric:         Behavior: Behavior normal.         Thought Content: Thought content normal.         Judgment: Judgment normal.         Vitals:    02/20/25 1257   BP: 134/76   Pulse: 58   Temp: 97.7 °F (36.5 °C)   SpO2: 95%     Body mass index is 43.85 kg/m².         Assessment & Plan   Diagnoses and all orders for this visit:    1. Primary hypertension (Primary)    2. Pure hypercholesterolemia    3. Weight gain       Weight  gain-  she is struggling with weight  not as active  she is eating more sugar she really needs to start on the Zepbound as I really think that we will give her a benefit to help with her sugar and with her weight loss.  Her insurance did cover it but she was not sure she wanted to go on it is because she is afraid of giving herself an injection.  We went ahead today in the office and gave her an injection.  She saw simple it was and she is going to start taking this.  OSA_ using melatonin and cpap  HTN-  ok with lambert collins  HPL-  ok with crestor

## 2025-02-21 ENCOUNTER — OFFICE VISIT (OUTPATIENT)
Dept: INTERNAL MEDICINE | Facility: CLINIC | Age: 65
End: 2025-02-21
Payer: COMMERCIAL

## 2025-02-21 ENCOUNTER — LAB (OUTPATIENT)
Dept: LAB | Facility: HOSPITAL | Age: 65
End: 2025-02-21
Payer: COMMERCIAL

## 2025-02-21 VITALS
SYSTOLIC BLOOD PRESSURE: 136 MMHG | TEMPERATURE: 97.3 F | OXYGEN SATURATION: 98 % | BODY MASS INDEX: 43.87 KG/M2 | DIASTOLIC BLOOD PRESSURE: 80 MMHG | WEIGHT: 273 LBS | HEART RATE: 64 BPM | HEIGHT: 66 IN

## 2025-02-21 DIAGNOSIS — R33.9 URINARY RETENTION: Primary | ICD-10-CM

## 2025-02-21 LAB
ANION GAP SERPL CALCULATED.3IONS-SCNC: 10 MMOL/L (ref 5–15)
BILIRUB BLD-MCNC: NEGATIVE MG/DL
BUN SERPL-MCNC: 21 MG/DL (ref 8–23)
BUN/CREAT SERPL: 14.4 (ref 7–25)
CALCIUM SPEC-SCNC: 10.3 MG/DL (ref 8.6–10.5)
CHLORIDE SERPL-SCNC: 103 MMOL/L (ref 98–107)
CLARITY, POC: ABNORMAL
CO2 SERPL-SCNC: 25 MMOL/L (ref 22–29)
COLOR UR: YELLOW
CREAT SERPL-MCNC: 1.46 MG/DL (ref 0.57–1)
EGFRCR SERPLBLD CKD-EPI 2021: 40 ML/MIN/1.73
EXPIRATION DATE: ABNORMAL
GLUCOSE SERPL-MCNC: 89 MG/DL (ref 65–99)
GLUCOSE UR STRIP-MCNC: NEGATIVE MG/DL
KETONES UR QL: NEGATIVE
LEUKOCYTE EST, POC: ABNORMAL
Lab: ABNORMAL
NITRITE UR-MCNC: POSITIVE MG/ML
PH UR: 5.5 [PH] (ref 5–8)
POTASSIUM SERPL-SCNC: 4.4 MMOL/L (ref 3.5–5.2)
PROT UR STRIP-MCNC: NEGATIVE MG/DL
RBC # UR STRIP: NEGATIVE /UL
SODIUM SERPL-SCNC: 138 MMOL/L (ref 136–145)
SP GR UR: 1.02 (ref 1–1.03)
UROBILINOGEN UR QL: NORMAL

## 2025-02-21 PROCEDURE — 36415 COLL VENOUS BLD VENIPUNCTURE: CPT | Performed by: FAMILY MEDICINE

## 2025-02-21 PROCEDURE — 80048 BASIC METABOLIC PNL TOTAL CA: CPT | Performed by: FAMILY MEDICINE

## 2025-02-21 PROCEDURE — 87186 SC STD MICRODIL/AGAR DIL: CPT | Performed by: FAMILY MEDICINE

## 2025-02-21 PROCEDURE — 87086 URINE CULTURE/COLONY COUNT: CPT | Performed by: FAMILY MEDICINE

## 2025-02-21 PROCEDURE — 81003 URINALYSIS AUTO W/O SCOPE: CPT | Performed by: FAMILY MEDICINE

## 2025-02-21 PROCEDURE — 87088 URINE BACTERIA CULTURE: CPT | Performed by: FAMILY MEDICINE

## 2025-02-21 PROCEDURE — 99213 OFFICE O/P EST LOW 20 MIN: CPT | Performed by: FAMILY MEDICINE

## 2025-02-21 RX ORDER — NITROFURANTOIN 25; 75 MG/1; MG/1
100 CAPSULE ORAL 2 TIMES DAILY
Qty: 10 CAPSULE | Refills: 0 | Status: SHIPPED | OUTPATIENT
Start: 2025-02-21 | End: 2025-02-21

## 2025-02-21 RX ORDER — AMOXICILLIN AND CLAVULANATE POTASSIUM 500; 125 MG/1; MG/1
1 TABLET, FILM COATED ORAL 2 TIMES DAILY
Qty: 10 TABLET | Refills: 0 | Status: SHIPPED | OUTPATIENT
Start: 2025-02-21

## 2025-02-21 NOTE — PROGRESS NOTES
Subjective   Kirsten Lima is a 64 y.o. female.   Chief Complaint   Patient presents with    Decreased urination       History of Present Illness     Decreased urination-started yesterday after patient came back from our office.  She says that she urinated significantly less then usually , she was dribbling.  She goes to the restroom every day 2 to 3 hours and urinates only a little.  She has no burning sensation with urination, no urgency, no pressure.  No flank pain.  She denies seeing blood in urine.  She is on Gemtesa, but it is  not a new medication.  She received yesterday Zepbound for a first time.  Otherwise no new medications, including over-the-counter medications.  Last creatinine 1.25, GFR 48.    Review of Systems   Constitutional:  Negative for fever.   Genitourinary:  Positive for decreased urine volume and frequency. Negative for dysuria, flank pain, hematuria and urgency.         Objective   Wt Readings from Last 3 Encounters:   02/21/25 124 kg (273 lb)   02/20/25 123 kg (271 lb 8 oz)   12/26/24 121 kg (266 lb 14.4 oz)      Vitals:    02/21/25 1530   BP: 136/80   Pulse: 64   Temp: 97.3 °F (36.3 °C)   SpO2: 98%     Temp Readings from Last 3 Encounters:   02/21/25 97.3 °F (36.3 °C)   02/20/25 97.7 °F (36.5 °C) (Oral)   12/26/24 97.8 °F (36.6 °C)     BP Readings from Last 3 Encounters:   02/21/25 136/80   02/20/25 134/76   12/26/24 132/80     Pulse Readings from Last 3 Encounters:   02/21/25 64   02/20/25 58   12/26/24 55     Body mass index is 44.08 kg/m².    Physical Exam  Constitutional:       Appearance: Normal appearance.   Cardiovascular:      Rate and Rhythm: Normal rate and regular rhythm.   Pulmonary:      Effort: Pulmonary effort is normal.      Breath sounds: Normal breath sounds.   Abdominal:      General: There is no distension.      Palpations: Abdomen is soft. There is no mass.      Tenderness: There is no abdominal tenderness. There is no right CVA tenderness or left CVA tenderness.    Psychiatric:         Behavior: Behavior normal.         Assessment & Plan   Diagnoses and all orders for this visit:    1. Urinary retention (Primary)  -     POCT urinalysis dipstick, automated  -     Urine Culture - Urine, Urine, Clean Catch  -     Basic Metabolic Panel    Other orders  -     Discontinue: nitrofurantoin, macrocrystal-monohydrate, (Macrobid) 100 MG capsule; Take 1 capsule by mouth 2 (Two) Times a Day.  Dispense: 10 capsule; Refill: 0  -     amoxicillin-clavulanate (Augmentin) 500-125 MG per tablet; Take 1 tablet by mouth 2 (Two) Times a Day.  Dispense: 10 tablet; Refill: 0        Urinary retention - urine dip positive for trace of leukocytes and nitrates.  We are sending urine for culture.  Will treat with Augmentin.  As symptoms started after she received first dose of Zepbound will check kidney tests.  If she sees blood in urine, cannot urinate, has pressure/pain in lower abdomen she will go to ER.

## 2025-02-23 LAB — BACTERIA SPEC AEROBE CULT: ABNORMAL

## 2025-02-25 LAB
BACTERIA UR CULT: ABNORMAL
BACTERIA UR CULT: ABNORMAL
OTHER ANTIBIOTIC SUSC ISLT: ABNORMAL

## 2025-03-14 ENCOUNTER — HOSPITAL ENCOUNTER (OUTPATIENT)
Dept: MRI IMAGING | Facility: HOSPITAL | Age: 65
Discharge: HOME | End: 2025-03-14
Admitting: NURSE PRACTITIONER
Payer: COMMERCIAL

## 2025-03-14 DIAGNOSIS — D05.02 LOBULAR CARCINOMA IN SITU (LCIS) OF LEFT BREAST: ICD-10-CM

## 2025-03-14 DIAGNOSIS — Z91.89 INCREASED RISK OF BREAST CANCER: ICD-10-CM

## 2025-03-14 PROCEDURE — A9577 INJ MULTIHANCE: HCPCS | Performed by: NURSE PRACTITIONER

## 2025-03-14 PROCEDURE — 25510000002 GADOBENATE DIMEGLUMINE 529 MG/ML SOLUTION: Performed by: NURSE PRACTITIONER

## 2025-03-14 PROCEDURE — 77049 MRI BREAST C-+ W/CAD BI: CPT

## 2025-03-14 RX ADMIN — GADOBENATE DIMEGLUMINE 20 ML: 529 INJECTION, SOLUTION INTRAVENOUS at 15:09

## 2025-03-17 ENCOUNTER — TELEPHONE (OUTPATIENT)
Dept: SURGERY | Facility: CLINIC | Age: 65
End: 2025-03-17
Payer: COMMERCIAL

## 2025-03-17 DIAGNOSIS — R92.8 ABNORMAL FINDINGS ON DIAGNOSTIC IMAGING OF BREAST: Primary | ICD-10-CM

## 2025-03-17 NOTE — TELEPHONE ENCOUNTER
Spoke to pt and got her lucas for a mammo and us on 04/2 @ 930 here at the hospital     Pt stated understanding

## 2025-03-17 NOTE — TELEPHONE ENCOUNTER
Called patient and discussed her recent MRI.  Please set up for left diagnostic mammogram and left limited breast ultrasound in the near future and I will call her with those results.

## 2025-03-20 NOTE — PROGRESS NOTES
[facility]     Referring Provider: No ref. provider found     Chief complaint: Routine follow up breast cancer    Subjective   HPI:   8/7/2020:  Ms. Kirsten Lima is a 61 yo woman, seen at the request of Dr. Tad Lowe, for a new diagnosis of left breast intraductal papillomas. These were initially detected as an imaging abnormality on routine screening. Her work-up is detailed in the breast history section below. Despite these being incidentally found, the patient reports that she has had intermittent, clear, left nipple discharge for years. 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 She denies any associated breast lumps or pain. She does report chronic issues with her breast skin due to hidradenitis and recurrent skin abscesses, however she has not had any acute problems lately. She has a past history of a benign left breast stereotactic biopsy in 2012. She denies any family history of breast or ovarian cancer.      1/7/2021:  At her last visit, I recommended excision of both of the left breast papillomas due the associated nipple discharge. She wanted to think about it and ultimately decided to hold off on surgery. She underwent follow-up ultrasound prior to her appointment, which showed stable left breast intraductal masses (see report details below). She has not tried to elicit any nipple discharge since the last visit and has not noticed any spontaneously.     3/10/2021:  She underwent left breast excisional biopsy x 2 on 2/22/21, which showed LCIS. See surgery & pathology details in breast history section below. She has been recovering well and has no complaints.      6/28/2021, Visit with YAN Dill :  She returns today for follow up with no breast complaints   In 3/2021 she met with Dr Dong and discussed starting tamoxifen, referral to nutritionist and smoking  cessation were made.  Due to risk for blood clots she has reservations about taking tamoxifen. She will follow up with Dr Dong in 9/21 to discuss again.   Screening mammogram with tomosynthesis was completed 6/11/2021 at [facility] (see full report below)    1/12/2022:  She saw Regina Gilbert in June with a normal screening mammogram. She saw Dr. Dong who initially prescribed low-dose tamoxifen, however the patient never started taking it because she was concerned about blood clots. Dr. Dong then prescribed anastrozole in September, however the patient just started taking this 3 weeks ago.  She underwent her first screening MRI in December, which showed an area of non-mass enhancement in the left lower outer breast and a left nipple mass. She subsequently underwent an MR biopsy of the non-mass enhancement and this showed extensive LCIS (see imaging and pathology report details below).    2/16/2022:  She underwent left breast excisional biopsy and left nipple mass excision on 2/1/22. The excisional biopsy showed an incidental focus of low-grade invasive lobular carcinoma measuring 4 mm with negative margins (see report details below). I have already discussed her case in tumor board and the consensus was that axillary staging is not necessary. She has been recovering well from surgery.    8/24/2022:  She returns today for scheduled follow-up. She completed radiation on 4/5/22 and tolerated this well. She remains on anastrozole and is still tolerating this well. She underwent MMG prior to her appointment which was benign.  She called the office about a month ago complaining of some left breast pain and nodular areas under her left nipple. She began wearing a sports bra day and night and the pain has resolved. She also noticed that the lower part of her left breast looked less swollen after wearing the bra.     2/6/2023, Seen by YAN Schrader:  Patient returns the office today for routine  follow-up.  She last saw Dr. Dong on 1/11/2023 and no changes were made to treatment plan.  Currently on anastrozole and tolerating well.     9/26/2023, Seen by YAN Schrader:  Presenting to the office today for routine follow-up.  She last saw her oncologist in May without any changes to the treatment plan.  She had a screening mammogram on 9/1/2023 that resulted as BI-RADS 0.  She then had a right diagnostic mammogram which showed calcifications and suggested a biopsy.  She underwent a stereotactic breast biopsy that returned as benign.     4/11/2024, Seen by YAN Schrader:  Patient presenting to the office today for routine follow-up.  On 12/14/2023 she had a bilateral breast MRI that resulted as BI-RADS 2.  On 3/28/2024 she had a right diagnostic mammogram that resulted as BI-RADS 2.  She is due for her bilateral screening mammogram in September.  Last saw her oncologist in May 2023 with no changes made to the treatment plan.  She is due to follow-up with them in May 2024.     7/31/2024 Saw Dr. Zambrano   She saw Dr. Dong in June complaining of increased left breast pain.  Since then the pain has actually resolved on its own.  She only drinks about 1 cup of caffeine per day, but she is still smoking.  Diagnostic mammo and ultrasound prior to this appointment were benign.    9/27/2024   Patient presenting to the office today for routine follow-up.  She last saw her oncologist in June with no changes made to the treatment plan.  She is currently on anastrozole and tolerating that well.  She had a bilateral screening mammogram on 9/20/2024 that resulted as BI-RADS 2.  She has no new breast complaints or concerns today.  Her breast pain is well-controlled.    3/20/2025 interval history  Presents today for routine follow-up and exam.  Completed breast MRI on 3/14/2025 resulting in BI-RADS 4.  Left diagnostic mammogram and left limited ultrasound has already been ordered and scheduled on 4/2/2025, will  call her with these results.  Last saw oncology on 12/10/2024, she will continue on anastrozole.  No new breast complaints or concerns.    Oncology/Hematology History Overview Note   19, Screening MMG with [facility] ([facility]):  Scattered areas of fibroglandular density.  1. There are small asymmetries seen in both breasts. The parenchyma includes stable nodular asymmetries. No suspicious masses, suspicious microcalcifications or areas of architectural distortion are identified. There has been no significant change from the prior exams.  2. There are stable punctate and spherical lucent-centered calcifications with diffuse/scattered distribution seen in both breast.  BI-RADS 2: Benign.     20, Screening MMG with [facility] ([facility]):  Scattered areas of fibroglandular density.  1. There are small asymmetries seen in both breasts. There has been no significant change from the prior exams.  2. There are stable punctate and spherical lucent-centered calcifications with diffuse distribution seen in both breasts.  3. There is a new small focal asymmetry measuring 12 mm seen in the central region of the left breast. This is best visualized on tomosynthesis MLO view slice # 78. This is best seen on the MLO View.  BI-RADS 0: Incomplete.     20, Left Diagnostic MMG with [facility] & Left Breast US ([facility]):  MM. On the present examination, focal asymmetry in the left breast, central does not persist. The asymmetry noted on the recent screening mammogram resolves into stroma on additional views.  US:  1. There is no sonographic correlate. There is no evidence of any solid mass or abnormal cystic elements.  2. There are three oval intraductal masses with partially defined margins measuring 5 mm to 9 mm seen in the anterior one-third subareolar region of the left breast. The patient reports history of intermittent clear left nipple discharge for the past 10 years. These three structures are adjacent and would be included  within the same biopsy site.  3. There is an oval solid mass with partially defined margins measuring 6 x 5 x 5 mm seen in the anterior one-third subareolar region of the left breast. This is located 2 to 3 cm away from the above described masses.   BI-RADS 4: Suspicious      6/29/20, Left Breast, US-Guided Biopsy x 2 ([facility])  1. Left Subareolar mass (5 x 5 x 6 cm), Ultrasound guided biopsy:   Benign sclerosing intraductal papilloma with calcifications.  -Tophat clip. Concordant.  2. Left Subareolar mass, ultrasound guided biopsy:   Benign sclerosing intraductal papilloma with usual duct hyperplasia and microcalcifications.   -Hydromark clip. Concordant.     12/28/20, Left Breast US ([facility]):  There are two oval masses and biopsy clips with circumscribed margins seen in the sub-areolar region of the left breast. There are no significant changes from the prior exam(s). This finding represents biopsy proven benign breast disease. These measure less than 1 cm.  BI-RADS  2: Benign.     02/22/21, Left breast needle-localized excisional biopsy and left breast ultrasound-guided excisional biopsy:  1. Left Breast, Needle-Localized Excisional Biopsy (13 grams):               A. LOBULAR CARCINOMA IN SITU (LCIS).               B. Multiple sclerosed intraductal papillomas with focal calcifications (completely excised).               C. Clips and associated biopsy site changes are present and adjacent to papillomas.               D. Background breast parenchyma with clusters of apocrine cysts, usual ductal hyperplasia, and       calcifications associated with sclerosing adenosis.    6/11/2021, Screening MMG with [facility] ([facility]):  Scattered areas of fibroglandular density. There is a new postsurgical scar seen subareolar region of the left breast.  There are no suspicious masses, calcifications, or areas of architectural distortion. In the right breast, no suspicious masses, significant calcifications or other abnormalities are  seen.  BI-RADS 2: Benign.     Malignant neoplasm of overlapping sites of left breast in female, estrogen receptor positive   12/9/2021 Initial Diagnosis    Malignant neoplasm of overlapping sites of left breast in female, estrogen receptor positive (HCC)     12/10/2021 Imaging    Bilateral Breast MRI ( [facility]):  RIGHT BREAST:    No suspicious enhancing mass or area of non-mass enhancement is identified. The visualized axilla is within normal limits.    LEFT BREAST:    There are post surgical changes in the anterior left breast. At 5:00 in the anterior left breast, 3.4 cm posterior to the nipple,  there is a 1.8 cm AP dimension somewhat linear branching nonmass enhancement, which is suspicious.   There is abnormal asymmetric enhancement within the left nipple with an approximately 1.2 cm AP dimension, 1.7 cm transverse dimension, 1.0 cm craniocaudal dimension mass involving the nipple itself and extending slightly deeper into the subareolar breast, which is associated with rapid initial and washout delayed phase enhancement on kinetic analysis. This is suspicious.  No suspicious enhancement is identified in the left chest wall. The visualized axilla is within normal limits.    EXTRAMAMMARY FINDINGS:   There are no abnormally enlarged internal mammary chain lymph nodes on either side.     BI-RADS 4: Suspicious.     12/22/2021 Biopsy    Left Breast, MR-Guided Biopsy ( [facility]):    1. Left Breast at 5 o'clock (not for calcifications) MRI-Guided Core Biopsy:                A.  Extensive lobular carcinoma in situ (LCIS):                            1.  LCIS measures up to 7 mm in single greatest dimension focally involving 7 of 8                      representative cores.                2.  Low grade nuclear features without necrosis.  B.  No invasive carcinoma identified by routine and/or immunostaining.  C.  Associated intraductal papilloma with microcalcification, ductal cyst wall and fibrocystic change noted.       2/1/2022 Surgery    Left breast needle-localized excisional biopsy and left nipple mass excision:    1. Breast, Left, Lumpectomy: Invasive lobular carcinoma, lobular carcinoma in situ and atypical lobular hyperplasia.               A. Invasive lobular carcinoma.                            1. The invasive lobular carcinoma measures 4 mm on the slide.                            2. The invasive lobular carcinoma is Addison grade I (tubular grade 3, nuclear grade 1, mitotic grade 1).                            3. The foci is located amongst an area of lobular carcinoma in situ and near the biopsy cavity.                            4. Biopsy site and clip identified (barbell shaped clip).                            5. The invasive lobular carcinoma comes within 1.3 mm of the medial margin, 7.5 mm of the posterior margin, 8.8 mm of the anterior margin, 12.5 mm of the lateral margin and 15 mm of the superior and inferior margins.                            6. Microcalcifications are not associated with the invasive tumor.               B. Extensive lobular carcinoma in situ and atypical lobular hyperplasia.  C. Fibroadenoma with calcification, sclerosing adenosis, apocrine metaplasia, clustered cysts, area consistent with radial scar and fat necrosis.     2. Breast, Left, Nipple, Excision:  Breast tissue with               A. Simple cysts.               B. Calcifications in non-neoplastic tissue.               C. Apocrine metaplasia.               D. Intraductal papilloma with sclerosis.               E. Atypical lobular hyperplasia.    ER+ (80%, strong)  [place]+ (3%, moderate)  Her2 negative (IHC 1+)  Ki-67 2%     3/9/2022 - 4/5/2022 Radiation    Radiation OncologyTreatment Course:  Kirsten Lima received 4990 cGy in 20 fractions to LEFT breast.     4/6/2022 -  Hormonal Therapy    Anastrozole (Arimidex)     8/15/2022 Imaging    Screening MMG with [facility] ([facility]):  Scattered areas of fibroglandular density.  There is  a stable post-surgical scar with associated skin lesion and trabecular thickening seen in the central region of the left breast. Post-surgical scar is in an area of prior lumpectomy. Findings are consistent with post-surgical and post-radiation therapy changes.  In the right breast, no suspicious masses, significant calcifications or other abnormalities are seen.  BI-RADS 2: Benign.     12/9/2022 Imaging    Breast MRI at [facility]  FINDINGS:Scattered fibroglandular tissue seen is throughout both  breasts. Mild background parenchymal enhancement of both breasts is  noted. Postsurgical change in the left breast is noted. Otherwise, no  areas of abnormal morphology are seen in either breast. No areas of  abnormal enhancement are seen in either breast. I see no evidence for  abnormal skin, nipple or chest wall enhancement of either breast and  there is no evidence for axillary or internal mammary chain adenopathy.     IMPRESSION:  There are no findings suspicious for malignancy in either  breast. Routine follow-up imaging is recommended.     BI-RADS category 2     9/1/2023 Imaging    Bilateral screening mammogram at [facility]  There are scattered areas of fibroglandular density.    Finding 1:  There is a stable post-surgical scar seen in the left breast.  No  suspicious masses, suspicious microcalcifications or architectural distortions  are identified.  There has been no significant change from the prior exam(s).    Finding 2:  There are calcifications seen in the sub-areolar region of the right  breast.    IMPRESSION:  Finding 1:  Stable post-surgical scar in the left breast is benign-negative.    Finding 2:  Calcifications in the right breast require additional evaluation.  Magnification mammography is recommended.    BI-RADS Category 0: Incomplete: Needs Additional Imaging Evaluation     9/7/2023 Imaging    Right diagnostic mammogram at [facility]  There are scattered areas of fibroglandular density.    There are  round and punctate calcifications measuring 8 mm seen in the sub-areolar region of the right breast  located 5 centimeters from the nipple.  Otherwise, no suspicious masses, suspicious microcalcifications or  architectural distortions are identified.  There has been no significant change from the prior exam(s).    IMPRESSION:  Calcifications in the right breast are suspicious. Stereotactic biopsy is recommended.    The patient was mailed a notification letter.    BI-RADS Category 4B: Suspicious Abnormality     9/19/2023 Biopsy    Right stereotactic biopsy at [facility]  Technically successful stereotactic right breast biopsy with marker clip placement and positive radiograph.   Pathology results to follow.     A two view mammogram shows the clip in the expected location.   Pathology returned as cyst and fibroadenomatoid hyperplasia.   Pathology is benign and concordant.  A follow up mammogram in 6 months is recommended.      12/14/2023 Imaging    Bilateral breast MRI [facility]  FINDINGS: There is scattered fibroglandular tissue. There is mild  background parenchymal enhancement.     RIGHT BREAST:    There are new post biopsy changes in the anterior right breast from a  recent benign biopsy. No suspicious enhancing mass or area of non-mass  enhancement is identified.  The visualized axilla is within normal limits.     LEFT BREAST:    Benign-appearing postsurgical changes are identified in the left breast.  No suspicious enhancing mass or area of non-mass enhancement is  identified.  The visualized axilla is within normal limits.     EXTRAMAMMARY FINDINGS:  There are no pathologically enlarged internal mammary chain lymph nodes  on either side.          IMPRESSION AND RECOMMENDATION:     No MR evidence of malignancy in either breast. Recommend annual  screening mammogram in September 2023.     BI-RADS Category 2: Benign     3/28/2024 Imaging    Right diagnostic mammogram at [facility]  The following views were obtained: Right  craniocaudal; right craniocaudal magnification; right mediolateral; right  mediolateral magnification; and right mediolateral oblique.  This examination was reviewed with the aid of R2  computer aided detection.    Follow-up examination was performed for the calcifications in the right breast, sub-areolar seen on 09/07/2023.  There is a decreased number of calcifications.  This finding represents biopsy proven benign breast disease.  Note  is made of a biopsy clip as evidence of a previous surgical procedure.    IMPRESSION:  Calcifications in the right breast are benign-negative.    Screening mammogram in 1 year is recommended.    The patient was mailed a notification letter.    BI-RADS Category 2: Benign Finding(s)     7/23/2024 Imaging    Left Diagnostic MMG with [facility] & Left Breast US ( [facility]):  MMG:  Benign-appearing postsurgical and treatment-related changes in the left breast, evolved over prior mammograms. There are benign-appearing calcifications. There are no suspicious masses, calcifications, or areas of architectural distortion.  US:  Targeted sonographic evaluation of the left breast was performed in the areas of concern indicated by the patient at 3-4 o'clock in the area of patient indicated pain and at 11:00, 8 cm from the nipple and 11:00 retroareolar in the areas of reported skin changes. Predominantly fatty tissue is identified. Skin thickness is upper normal to 0.3 cm at 11:00 retroareolar, likely treatment related. No suspicious cystic or solid mass is identified.  BI-RADS 2: Benign      9/20/2024 Imaging    Bilateral screening mammogram at [facility]  There are scattered areas of fibroglandular density.    Previous screening shows a post-surgical scar. On the present examination, there  is a post-surgical scar in the left breast. Post-surgical scar is in an area of  prior lumpectomy.  There are no suspicious masses, calcifications or  architectural distortions.    In the right breast, no suspicious  masses, significant calcifications or other  abnormalities are seen.    IMPRESSION:  Post-surgical scar in the left breast is benign-negative.    Screening mammogram in 1 year is recommended.    BI-RADS Category 2: Benign       3/14/2025 Imaging    Breast MRI at [facility]  FINDINGS: There is scattered fibroglandular tissue. There is mild  background parenchymal enhancement.  RIGHT BREAST:    No suspicious enhancing mass or area of non-mass enhancement is  identified.  The visualized axilla is within normal limits.  LEFT BREAST:    In the anterior subareolar left breast, at the base of the nipple, which  is asymmetrically retracted, there is irregular non-mass enhancement  measuring 1.4 x 1.5 x 0.9 cm. This is new from prior MRI from 2023 and  is suspicious. Adjacent benign-appearing postsurgical changes are  similar to prior. The degree of nipple retraction is also similar to  prior and likely postoperative. Benign-appearing treatment-related  changes are noted.   The visualized axilla is within normal limits.     EXTRAMAMMARY FINDINGS:  There are no pathologically enlarged internal mammary chain lymph nodes  on either side.    IMPRESSION AND RECOMMENDATION:  1.  New suspicious 1.5 cm irregular non-mass enhancement at the base of  the left nipple. Recommend further evaluation with diagnostic mammogram  and targeted ultrasound, followed by possible percutaneous biopsy.  However, given the location, this area is not amenable to MRI guided  core needle biopsy and may also not be amenable to stereotactic or  ultrasound-guided core needle biopsy. In the absence of a mammographic  or sonographic correlate to safely target for biopsy, surgical  evaluation is recommended for consideration of excision. If intervention  is not pursued, short-term follow-up contrast-enhanced breast MRI would  be recommended in 6 months.  2.  No MRI evidence of malignancy in the right breast.  An epic in basket message was sent to YAN Schrader  on 3/14/2025.  BI-RADS Category 4: Suspicious         Review of Systems:  See interval history.       Medications:    Current Outpatient Medications:     amLODIPine (NORVASC) 5 MG tablet, Take 1 tablet by mouth Every Morning., Disp: 90 tablet, Rfl: 3    amoxicillin-clavulanate (Augmentin) 500-125 MG per tablet, Take 1 tablet by mouth 2 (Two) Times a Day., Disp: 10 tablet, Rfl: 0    anastrozole (ARIMIDEX) 1 MG tablet, TAKE 1 TABLET BY MOUTH EVERY DAY (Patient taking differently: Take 1 tablet by mouth Daily.), Disp: 90 tablet, Rfl: 3    aspirin 81 MG EC tablet, Take 1 tablet by mouth Daily., Disp: , Rfl:     buPROPion XL (Wellbutrin XL) 300 MG 24 hr tablet, Take 1 tablet by mouth Daily., Disp: 90 tablet, Rfl: 1    cetirizine (zyrTEC) 10 MG tablet, Take 1 tablet by mouth Daily., Disp: , Rfl:     Cholecalciferol (VITAMIN D) 2000 units capsule, Take 1 capsule by mouth Daily., Disp: , Rfl:     Gemtesa 75 MG tablet, TAKE 1 TABLET BY MOUTH EVERY DAY, Disp: 90 tablet, Rfl: 1    lisinopril-hydrochlorothiazide (PRINZIDE,ZESTORETIC) 20-12.5 MG per tablet, TAKE 2 TABLETS BY MOUTH EVERY MORNING, Disp: 180 tablet, Rfl: 3    Melatonin 3 MG capsule, Take  by mouth., Disp: , Rfl:     metoprolol succinate XL (TOPROL-XL) 200 MG 24 hr tablet, TAKE 1 TABLET DAILY, Disp: 90 tablet, Rfl: 1    Probiotic Product (Risaquad-2) capsule capsule, Take 1 capsule by mouth Daily. PT HOLDING FOR SURGERY, Disp: , Rfl:     rosuvastatin (CRESTOR) 10 MG tablet, Take 1 tablet by mouth Every Night., Disp: 90 tablet, Rfl: 3    spironolactone (ALDACTONE) 25 MG tablet, Take 1 tablet by mouth Every Morning., Disp: 90 tablet, Rfl: 3    Tirzepatide 2.5 MG/0.5ML solution auto-injector, Inject 2.5 mg under the skin into the appropriate area as directed 1 (One) Time Per Week., Disp: 2 mL, Rfl: 0      Allergies:  No Known Allergies      Family History   Problem Relation Age of Onset    Hypertension Mother              COPD Father              Heart  failure Father     Hypertension Father     Hyperlipidemia Father              Vision loss Father     Hypertension Sister     Alcohol abuse Sister              Liver disease Sister     Stroke Sister     Hypertension Brother     Pancreatic cancer Brother         1 brother  from pancreatic cancer    Alcohol abuse Brother              Cancer Brother              Hypertension Brother     Alcohol abuse Brother              Cancer Brother         Liver    Alcohol abuse Brother     Sudden death Brother     Alcohol abuse Brother              Stroke Sister              Malig Hyperthermia Neg Hx      Objective   PHYSICAL EXAMINATION:   There were no vitals filed for this visit.      ECOG 0 - Asymptomatic  General: NAD, well appearing, obese  Psych: a&o x 3, normal mood and affect  Eyes: EOMI, no scleral icterus  ENMT: neck supple without masses or thyromegaly, mucus membranes moist  MSK: normal gait, normal ROM in bilateral shoulders  Lymph nodes: Bilateral axillary scar; no cervical, supraclavicular or axillary lymphadenopathy  Breast: very large size, pendulous  Right: No visible abnormalities on inspection while seated, with arms raised or hands on hips. No masses, skin changes, or nipple abnormalities.  Left: On inspection, there is hyperpigmentation and the left breast is smaller and uplifted vs the right. Well-healed medial periareolar and lower outer periareolar scars.  Stable periareolar hyperpigmentation and mild edema of the inferior breast.  No masses or nipple abnormalities.          Assessment & Plan   Assessment:   1. 64 y.o. F with a new diagnosis of left breast cancer: Low grade, invasive lobular carcinoma, ER/GA positive, HER-2 negative. This was diagnosed after excisional biopsy for LCIS on 22, [id number]. Case was discussed at multidisciplinary tumor board and it was decided to omit axillary staging. She completed radiation on 22. She is  currently on anastrozole.  2. She has a past history of left breast LCIS, which was diagnosed after left breast excisional biopsy x2 on 2/22/21 for intraductal papillomas associated with pathologic nipple discharge.   3. Because of her increased lifetime risk of breast cancer (47.1%, TCv8, calculated 3/10/21) and history of LCIS, she was in high risk screening and on anastrozole since 1/2022. Since she was already in high risk screening, we will plan to continue this in the future.  4. She has mild left breast lymphedema and intermittent left breast pain.  5. Abnormal imaging     Plan:    -Continue follow-up with Dr. Dong.  -Diagnostic mammogram and left limited ultrasound in the near future and call with results  -Mammogram and exam in September- order after dx results back    YAN Schrader      CC:  No ref. provider found  MD Emma Thurston MD

## 2025-03-21 ENCOUNTER — OFFICE VISIT (OUTPATIENT)
Dept: SURGERY | Facility: CLINIC | Age: 65
End: 2025-03-21
Payer: COMMERCIAL

## 2025-03-21 VITALS
WEIGHT: 273 LBS | SYSTOLIC BLOOD PRESSURE: 142 MMHG | DIASTOLIC BLOOD PRESSURE: 88 MMHG | HEART RATE: 64 BPM | HEIGHT: 66 IN | BODY MASS INDEX: 43.87 KG/M2 | OXYGEN SATURATION: 95 %

## 2025-03-21 DIAGNOSIS — C50.812 MALIGNANT NEOPLASM OF OVERLAPPING SITES OF LEFT BREAST IN FEMALE, ESTROGEN RECEPTOR POSITIVE: Primary | ICD-10-CM

## 2025-03-21 DIAGNOSIS — D05.02 LOBULAR CARCINOMA IN SITU (LCIS) OF LEFT BREAST: ICD-10-CM

## 2025-03-21 DIAGNOSIS — R92.8 ABNORMAL FINDINGS ON DIAGNOSTIC IMAGING OF BREAST: ICD-10-CM

## 2025-03-21 DIAGNOSIS — Z91.89 INCREASED RISK OF BREAST CANCER: ICD-10-CM

## 2025-03-21 DIAGNOSIS — Z17.0 MALIGNANT NEOPLASM OF OVERLAPPING SITES OF LEFT BREAST IN FEMALE, ESTROGEN RECEPTOR POSITIVE: Primary | ICD-10-CM

## 2025-03-26 DIAGNOSIS — I10 ESSENTIAL HYPERTENSION: ICD-10-CM

## 2025-03-26 RX ORDER — SPIRONOLACTONE 25 MG/1
25 TABLET ORAL EVERY MORNING
Qty: 90 TABLET | Refills: 3 | Status: SHIPPED | OUTPATIENT
Start: 2025-03-26

## 2025-03-26 RX ORDER — AMLODIPINE BESYLATE 5 MG/1
5 TABLET ORAL EVERY MORNING
Qty: 90 TABLET | Refills: 3 | Status: SHIPPED | OUTPATIENT
Start: 2025-03-26

## 2025-03-26 NOTE — TELEPHONE ENCOUNTER
NOV-12/09/25-RM  LOV-12/04/24-RM    Abnormal ECG, chronic  Hypertension with LVH, goal <120/80.   Hyperlipidemia, on rosuvastatin  Invasive lobular carcinoma of the left breast, treated with lumpectomy, adjuvant radiation and anastrozole.  Follows with Dr. Dong  Obesity and sedentary lifestyle  Coronary artery calcification noted on low-dose CT chest 2/2024  Tobacco use, needs to stop smoking.  Left lower extremity edema due to left knee arthroscopic surgery.  VALERIE, needs to see Dr. Edouard     Plan:       See Mila in 1 year, maintain amlodipine 5 mg in the morning, spironolactone 25 mg in the morning, lisinopril HCTZ 2 tablets in the morning, metoprolol at night.  Remain on rosuvastatin 10 mg nightly and start aspirin 81 mg daily.  No other testing at this time.

## (undated) DEVICE — GOWN ISOL W/THUMB UNIV BLU BX/15

## (undated) DEVICE — PENCL E/S ULTRAVAC TELESCP NOSE HOLSTR 10FT

## (undated) DEVICE — SUT MNCRYL PLS ANTIB UD 4/0 PS2 18IN

## (undated) DEVICE — MSK AIRWY LARYNG LMA PILOT SZ4

## (undated) DEVICE — ADAPT SWVL FIBROPTIC BRONCH

## (undated) DEVICE — SYRINGE, LUER LOCK, 60ML: Brand: MEDLINE

## (undated) DEVICE — TRAP FLD MINIVAC MEGADYNE 100ML

## (undated) DEVICE — CANN NASL CO2 TRULINK W/O2 A/

## (undated) DEVICE — NDL HYPO ECLPS SFTY 22G 1 1/2IN

## (undated) DEVICE — SENSR O2 OXIMAX FNGR A/ 18IN NONSTR

## (undated) DEVICE — PAD,ABDOMINAL,8"X10",ST,LF: Brand: MEDLINE

## (undated) DEVICE — LARGE BORE STOPCOCK WITH EXTENSION SET, MALE LUER LOCK ADAPTER WITH RETRACTABLE COLLAR

## (undated) DEVICE — PK CHST BRST 40

## (undated) DEVICE — SKIN PREP TRAY W/CHG: Brand: MEDLINE INDUSTRIES, INC.

## (undated) DEVICE — ELECTRD BLD EZ CLN MOD XLNG 2.75IN

## (undated) DEVICE — PK ARTHSCP 40

## (undated) DEVICE — Device: Brand: BALLOON

## (undated) DEVICE — SUT SILK 2/0 SH 30IN K833H

## (undated) DEVICE — 9165 UNIVERSAL PATIENT PLATE: Brand: 3M™

## (undated) DEVICE — DISPOSABLE TOURNIQUET CUFF SINGLE BLADDER, SINGLE PORT AND QUICK CONNECT CONNECTOR: Brand: COLOR CUFF

## (undated) DEVICE — VITAL SIGNS™ JACKSON-REES CIRCUITS: Brand: VITAL SIGNS™

## (undated) DEVICE — LEGGINGS, PAIR, 31X48, STERILE: Brand: MEDLINE

## (undated) DEVICE — GLV SURG SENSICARE PI MIC PF SZ6.5 LF STRL

## (undated) DEVICE — VIAL FORMLN CAP 10PCT 20ML

## (undated) DEVICE — Device: Brand: SINGLE USE ASPIRATION NEEDLE NA-U401SX

## (undated) DEVICE — ADAPT CLN BIOGUARD AIR/H2O DISP

## (undated) DEVICE — SUT ETHLN 4/0 PS2 PLSTC 1667G

## (undated) DEVICE — SINGLE USE BIOPSY VALVE MAJ-210: Brand: SINGLE USE BIOPSY VALVE (STERILE)

## (undated) DEVICE — GLV SURG BIOGEL LTX PF 8 1/2

## (undated) DEVICE — GOWN,SIRUS,NON REINFRCD,LARGE,SET IN SL: Brand: MEDLINE

## (undated) DEVICE — FLEX ADVANTAGE 1500CC: Brand: FLEX ADVANTAGE

## (undated) DEVICE — BITEBLOCK OMNI BLOC

## (undated) DEVICE — DRESSING,GAUZE,XEROFORM,CURAD,1"X8",ST: Brand: CURAD

## (undated) DEVICE — Device

## (undated) DEVICE — TRAP SPECI SUCTIONPOLYPTRAP 4/CHMBR

## (undated) DEVICE — TUBING, SUCTION, 1/4" X 10', STRAIGHT: Brand: MEDLINE

## (undated) DEVICE — KT ORCA ORCAPOD DISP STRL

## (undated) DEVICE — SINGLE USE SUCTION VALVE MAJ-209: Brand: SINGLE USE SUCTION VALVE (STERILE)

## (undated) DEVICE — BLD SHV DBL/CUT COOLCUT 4MM 13CM

## (undated) DEVICE — GAUZE,SPONGE,4"X4",16PLY,XRAY,STRL,LF: Brand: MEDLINE

## (undated) DEVICE — ADHS SKIN SURG TISS VISC PREMIERPRO EXOFIN HI/VISC FAST/DRY

## (undated) DEVICE — APPL CHLORAPREP HI/LITE 26ML ORNG

## (undated) DEVICE — GLV SURG SENSICARE POLYISPRN W/ALOE PF LF 6.5 GRN STRL

## (undated) DEVICE — ANTIBACTERIAL UNDYED BRAIDED (POLYGLACTIN 910), SYNTHETIC ABSORBABLE SUTURE: Brand: COATED VICRYL

## (undated) DEVICE — GLV SURG BIOGEL LTX PF 8

## (undated) DEVICE — LASSO POLYPECTOMY SNARE: Brand: LASSO

## (undated) DEVICE — GAS SAMPLING LINE,10,MM,0.047ID,CO-EX: Brand: MEDLINE

## (undated) DEVICE — BNDG ESMARK STRL 6INX12FT LF